# Patient Record
Sex: FEMALE | Race: WHITE | NOT HISPANIC OR LATINO | Employment: OTHER | URBAN - METROPOLITAN AREA
[De-identification: names, ages, dates, MRNs, and addresses within clinical notes are randomized per-mention and may not be internally consistent; named-entity substitution may affect disease eponyms.]

---

## 2017-03-03 ENCOUNTER — TELEPHONE (OUTPATIENT)
Dept: NEUROLOGY | Facility: CLINIC | Age: 67
End: 2017-03-03

## 2017-03-07 ENCOUNTER — TELEPHONE (OUTPATIENT)
Dept: NEUROLOGY | Facility: CLINIC | Age: 67
End: 2017-03-07

## 2017-03-07 NOTE — TELEPHONE ENCOUNTER
----- Message from Marcia Costa sent at 3/7/2017  2:02 PM CST -----  Contact: Patient [phone]  Patient is calling to find out if her EMG results have been mailed to her, Please call.

## 2020-06-12 PROBLEM — E78.00 HIGH CHOLESTEROL: Status: ACTIVE | Noted: 2018-02-23

## 2020-06-12 PROBLEM — G47.00 INSOMNIA: Status: ACTIVE | Noted: 2017-10-27

## 2020-06-12 PROBLEM — G43.909 MIGRAINE: Status: ACTIVE | Noted: 2017-10-27

## 2020-06-12 PROBLEM — R25.2 SPASM: Status: ACTIVE | Noted: 2018-02-23

## 2020-11-12 NOTE — TELEPHONE ENCOUNTER
----- Message from George Murillo sent at 3/3/2017 12:19 PM CST -----  Contact: Self [phone]  Patient is calling to get copy  of the results of the EMG test for her files, pls call   
I have spoken to this patient and asked her where she need me to send her results to and she said to mail them to her home   
operating room

## 2020-12-10 PROBLEM — E11.21 DIABETIC RENAL DISEASE: Status: ACTIVE | Noted: 2020-12-10

## 2020-12-13 PROBLEM — E11.21 DIABETIC RENAL DISEASE: Chronic | Status: ACTIVE | Noted: 2020-12-10

## 2021-07-06 PROBLEM — E11.21 DIABETIC RENAL DISEASE: Chronic | Status: RESOLVED | Noted: 2020-12-10 | Resolved: 2021-07-06

## 2021-09-17 ENCOUNTER — LAB VISIT (OUTPATIENT)
Dept: LAB | Facility: HOSPITAL | Age: 71
End: 2021-09-17
Attending: FAMILY MEDICINE
Payer: MEDICARE

## 2021-09-17 DIAGNOSIS — N39.0 URINARY TRACT INFECTION WITHOUT HEMATURIA, SITE UNSPECIFIED: ICD-10-CM

## 2021-09-17 PROCEDURE — 87086 URINE CULTURE/COLONY COUNT: CPT | Performed by: FAMILY MEDICINE

## 2021-09-19 LAB — BACTERIA UR CULT: NO GROWTH

## 2022-03-04 ENCOUNTER — LAB VISIT (OUTPATIENT)
Dept: LAB | Facility: HOSPITAL | Age: 72
End: 2022-03-04
Attending: FAMILY MEDICINE
Payer: MEDICARE

## 2022-03-04 DIAGNOSIS — I10 ESSENTIAL HYPERTENSION: Chronic | ICD-10-CM

## 2022-03-04 DIAGNOSIS — E78.00 HIGH CHOLESTEROL: ICD-10-CM

## 2022-03-04 DIAGNOSIS — E11.40 TYPE 2 DIABETES MELLITUS WITH DIABETIC NEUROPATHY, WITHOUT LONG-TERM CURRENT USE OF INSULIN: Chronic | ICD-10-CM

## 2022-03-04 LAB
ALBUMIN SERPL BCP-MCNC: 3.6 G/DL (ref 3.5–5.2)
ALP SERPL-CCNC: 61 U/L (ref 55–135)
ALT SERPL W/O P-5'-P-CCNC: 20 U/L (ref 10–44)
ANION GAP SERPL CALC-SCNC: 10 MMOL/L (ref 8–16)
AST SERPL-CCNC: 24 U/L (ref 10–40)
BASOPHILS # BLD AUTO: 0.06 K/UL (ref 0–0.2)
BASOPHILS NFR BLD: 0.7 % (ref 0–1.9)
BILIRUB SERPL-MCNC: 0.5 MG/DL (ref 0.1–1)
BUN SERPL-MCNC: 14 MG/DL (ref 8–23)
CALCIUM SERPL-MCNC: 10 MG/DL (ref 8.7–10.5)
CHLORIDE SERPL-SCNC: 99 MMOL/L (ref 95–110)
CHOLEST SERPL-MCNC: 213 MG/DL (ref 120–199)
CHOLEST/HDLC SERPL: 5.9 {RATIO} (ref 2–5)
CO2 SERPL-SCNC: 31 MMOL/L (ref 23–29)
CREAT SERPL-MCNC: 0.8 MG/DL (ref 0.5–1.4)
DIFFERENTIAL METHOD: ABNORMAL
EOSINOPHIL # BLD AUTO: 0.2 K/UL (ref 0–0.5)
EOSINOPHIL NFR BLD: 2.1 % (ref 0–8)
ERYTHROCYTE [DISTWIDTH] IN BLOOD BY AUTOMATED COUNT: 12.8 % (ref 11.5–14.5)
EST. GFR  (AFRICAN AMERICAN): >60 ML/MIN/1.73 M^2
EST. GFR  (NON AFRICAN AMERICAN): >60 ML/MIN/1.73 M^2
ESTIMATED AVG GLUCOSE: 180 MG/DL (ref 68–131)
GLUCOSE SERPL-MCNC: 159 MG/DL (ref 70–110)
HBA1C MFR BLD: 7.9 % (ref 4–5.6)
HCT VFR BLD AUTO: 39.5 % (ref 37–48.5)
HDLC SERPL-MCNC: 36 MG/DL (ref 40–75)
HDLC SERPL: 16.9 % (ref 20–50)
HGB BLD-MCNC: 13.7 G/DL (ref 12–16)
IMM GRANULOCYTES # BLD AUTO: 0.02 K/UL (ref 0–0.04)
IMM GRANULOCYTES NFR BLD AUTO: 0.2 % (ref 0–0.5)
LDLC SERPL CALC-MCNC: 137.8 MG/DL (ref 63–159)
LYMPHOCYTES # BLD AUTO: 2.8 K/UL (ref 1–4.8)
LYMPHOCYTES NFR BLD: 34.8 % (ref 18–48)
MCH RBC QN AUTO: 31.6 PG (ref 27–31)
MCHC RBC AUTO-ENTMCNC: 34.7 G/DL (ref 32–36)
MCV RBC AUTO: 91 FL (ref 82–98)
MONOCYTES # BLD AUTO: 0.5 K/UL (ref 0.3–1)
MONOCYTES NFR BLD: 6.5 % (ref 4–15)
NEUTROPHILS # BLD AUTO: 4.5 K/UL (ref 1.8–7.7)
NEUTROPHILS NFR BLD: 55.7 % (ref 38–73)
NONHDLC SERPL-MCNC: 177 MG/DL
NRBC BLD-RTO: 0 /100 WBC
PLATELET # BLD AUTO: 230 K/UL (ref 150–450)
PMV BLD AUTO: 11.6 FL (ref 9.2–12.9)
POTASSIUM SERPL-SCNC: 3.9 MMOL/L (ref 3.5–5.1)
PROT SERPL-MCNC: 7.5 G/DL (ref 6–8.4)
RBC # BLD AUTO: 4.33 M/UL (ref 4–5.4)
SODIUM SERPL-SCNC: 140 MMOL/L (ref 136–145)
TRIGL SERPL-MCNC: 196 MG/DL (ref 30–150)
TSH SERPL DL<=0.005 MIU/L-ACNC: 1.67 UIU/ML (ref 0.4–4)
WBC # BLD AUTO: 8.1 K/UL (ref 3.9–12.7)

## 2022-03-04 PROCEDURE — 83036 HEMOGLOBIN GLYCOSYLATED A1C: CPT | Performed by: FAMILY MEDICINE

## 2022-03-04 PROCEDURE — 36415 COLL VENOUS BLD VENIPUNCTURE: CPT | Performed by: FAMILY MEDICINE

## 2022-03-04 PROCEDURE — 80053 COMPREHEN METABOLIC PANEL: CPT | Performed by: FAMILY MEDICINE

## 2022-03-04 PROCEDURE — 85025 COMPLETE CBC W/AUTO DIFF WBC: CPT | Performed by: FAMILY MEDICINE

## 2022-03-04 PROCEDURE — 84443 ASSAY THYROID STIM HORMONE: CPT | Performed by: FAMILY MEDICINE

## 2022-03-04 PROCEDURE — 80061 LIPID PANEL: CPT | Performed by: FAMILY MEDICINE

## 2022-07-08 PROBLEM — F11.20 OPIOID DEPENDENCE: Status: ACTIVE | Noted: 2022-07-08

## 2023-01-05 ENCOUNTER — LAB VISIT (OUTPATIENT)
Dept: LAB | Facility: HOSPITAL | Age: 73
End: 2023-01-05
Attending: FAMILY MEDICINE
Payer: MEDICARE

## 2023-01-05 DIAGNOSIS — E78.00 HIGH CHOLESTEROL: ICD-10-CM

## 2023-01-05 DIAGNOSIS — I10 ESSENTIAL HYPERTENSION: Chronic | ICD-10-CM

## 2023-01-05 DIAGNOSIS — E11.40 TYPE 2 DIABETES MELLITUS WITH DIABETIC NEUROPATHY, WITHOUT LONG-TERM CURRENT USE OF INSULIN: Chronic | ICD-10-CM

## 2023-01-05 LAB
ALBUMIN SERPL BCP-MCNC: 3.4 G/DL (ref 3.5–5.2)
ALP SERPL-CCNC: 63 U/L (ref 55–135)
ALT SERPL W/O P-5'-P-CCNC: 24 U/L (ref 10–44)
ANION GAP SERPL CALC-SCNC: 9 MMOL/L (ref 8–16)
AST SERPL-CCNC: 20 U/L (ref 10–40)
BASOPHILS # BLD AUTO: 0.06 K/UL (ref 0–0.2)
BASOPHILS NFR BLD: 0.7 % (ref 0–1.9)
BILIRUB SERPL-MCNC: 0.4 MG/DL (ref 0.1–1)
BUN SERPL-MCNC: 7 MG/DL (ref 8–23)
CALCIUM SERPL-MCNC: 9.3 MG/DL (ref 8.7–10.5)
CHLORIDE SERPL-SCNC: 101 MMOL/L (ref 95–110)
CHOLEST SERPL-MCNC: 199 MG/DL (ref 120–199)
CHOLEST/HDLC SERPL: 5 {RATIO} (ref 2–5)
CO2 SERPL-SCNC: 30 MMOL/L (ref 23–29)
CREAT SERPL-MCNC: 0.8 MG/DL (ref 0.5–1.4)
DIFFERENTIAL METHOD: ABNORMAL
EOSINOPHIL # BLD AUTO: 0.2 K/UL (ref 0–0.5)
EOSINOPHIL NFR BLD: 2.4 % (ref 0–8)
ERYTHROCYTE [DISTWIDTH] IN BLOOD BY AUTOMATED COUNT: 13.2 % (ref 11.5–14.5)
EST. GFR  (NO RACE VARIABLE): >60 ML/MIN/1.73 M^2
ESTIMATED AVG GLUCOSE: 174 MG/DL (ref 68–131)
GLUCOSE SERPL-MCNC: 220 MG/DL (ref 70–110)
HBA1C MFR BLD: 7.7 % (ref 4–5.6)
HCT VFR BLD AUTO: 33.6 % (ref 37–48.5)
HDLC SERPL-MCNC: 40 MG/DL (ref 40–75)
HDLC SERPL: 20.1 % (ref 20–50)
HGB BLD-MCNC: 12.5 G/DL (ref 12–16)
IMM GRANULOCYTES # BLD AUTO: 0.04 K/UL (ref 0–0.04)
IMM GRANULOCYTES NFR BLD AUTO: 0.4 % (ref 0–0.5)
LDLC SERPL CALC-MCNC: 121.8 MG/DL (ref 63–159)
LYMPHOCYTES # BLD AUTO: 2.7 K/UL (ref 1–4.8)
LYMPHOCYTES NFR BLD: 29.9 % (ref 18–48)
MCH RBC QN AUTO: 36 PG (ref 27–31)
MCHC RBC AUTO-ENTMCNC: 37.2 G/DL (ref 32–36)
MCV RBC AUTO: 97 FL (ref 82–98)
MONOCYTES # BLD AUTO: 0.6 K/UL (ref 0.3–1)
MONOCYTES NFR BLD: 6.5 % (ref 4–15)
NEUTROPHILS # BLD AUTO: 5.4 K/UL (ref 1.8–7.7)
NEUTROPHILS NFR BLD: 60.1 % (ref 38–73)
NONHDLC SERPL-MCNC: 159 MG/DL
NRBC BLD-RTO: 0 /100 WBC
PLATELET # BLD AUTO: 207 K/UL (ref 150–450)
PMV BLD AUTO: 11 FL (ref 9.2–12.9)
POTASSIUM SERPL-SCNC: 4 MMOL/L (ref 3.5–5.1)
PROT SERPL-MCNC: 7 G/DL (ref 6–8.4)
RBC # BLD AUTO: 3.47 M/UL (ref 4–5.4)
SODIUM SERPL-SCNC: 140 MMOL/L (ref 136–145)
TRIGL SERPL-MCNC: 186 MG/DL (ref 30–150)
WBC # BLD AUTO: 8.93 K/UL (ref 3.9–12.7)

## 2023-01-05 PROCEDURE — 80061 LIPID PANEL: CPT | Performed by: FAMILY MEDICINE

## 2023-01-05 PROCEDURE — 85025 COMPLETE CBC W/AUTO DIFF WBC: CPT | Performed by: FAMILY MEDICINE

## 2023-01-05 PROCEDURE — 83036 HEMOGLOBIN GLYCOSYLATED A1C: CPT | Performed by: FAMILY MEDICINE

## 2023-01-05 PROCEDURE — 80053 COMPREHEN METABOLIC PANEL: CPT | Performed by: FAMILY MEDICINE

## 2023-01-05 PROCEDURE — 36415 COLL VENOUS BLD VENIPUNCTURE: CPT | Performed by: FAMILY MEDICINE

## 2023-09-14 ENCOUNTER — LAB VISIT (OUTPATIENT)
Dept: LAB | Facility: HOSPITAL | Age: 73
End: 2023-09-14
Attending: FAMILY MEDICINE
Payer: MEDICARE

## 2023-09-14 DIAGNOSIS — E11.40 TYPE 2 DIABETES MELLITUS WITH DIABETIC NEUROPATHY, WITHOUT LONG-TERM CURRENT USE OF INSULIN: Chronic | ICD-10-CM

## 2023-09-14 LAB
ANION GAP SERPL CALC-SCNC: 11 MMOL/L (ref 8–16)
BUN SERPL-MCNC: 12 MG/DL (ref 8–23)
CALCIUM SERPL-MCNC: 9.8 MG/DL (ref 8.7–10.5)
CHLORIDE SERPL-SCNC: 105 MMOL/L (ref 95–110)
CO2 SERPL-SCNC: 26 MMOL/L (ref 23–29)
CREAT SERPL-MCNC: 0.8 MG/DL (ref 0.5–1.4)
EST. GFR  (NO RACE VARIABLE): >60 ML/MIN/1.73 M^2
ESTIMATED AVG GLUCOSE: 143 MG/DL (ref 68–131)
GLUCOSE SERPL-MCNC: 133 MG/DL (ref 70–110)
HBA1C MFR BLD: 6.6 % (ref 4–5.6)
POTASSIUM SERPL-SCNC: 4.2 MMOL/L (ref 3.5–5.1)
SODIUM SERPL-SCNC: 142 MMOL/L (ref 136–145)

## 2023-09-14 PROCEDURE — 80048 BASIC METABOLIC PNL TOTAL CA: CPT | Performed by: FAMILY MEDICINE

## 2023-09-14 PROCEDURE — 83036 HEMOGLOBIN GLYCOSYLATED A1C: CPT | Performed by: FAMILY MEDICINE

## 2023-09-14 PROCEDURE — 36415 COLL VENOUS BLD VENIPUNCTURE: CPT | Performed by: FAMILY MEDICINE

## 2023-09-17 PROBLEM — Z53.20 MAMMOGRAM DECLINED: Status: ACTIVE | Noted: 2023-09-17

## 2023-09-17 PROBLEM — Z53.20 COLON CANCER SCREENING DECLINED: Status: ACTIVE | Noted: 2023-09-17

## 2024-03-12 ENCOUNTER — LAB VISIT (OUTPATIENT)
Dept: LAB | Facility: HOSPITAL | Age: 74
End: 2024-03-12
Attending: FAMILY MEDICINE
Payer: MEDICARE

## 2024-03-12 DIAGNOSIS — E11.40 TYPE 2 DIABETES MELLITUS WITH DIABETIC NEUROPATHY, WITHOUT LONG-TERM CURRENT USE OF INSULIN: Chronic | ICD-10-CM

## 2024-03-12 LAB
ANION GAP SERPL CALC-SCNC: 10 MMOL/L (ref 8–16)
BUN SERPL-MCNC: 15 MG/DL (ref 8–23)
CALCIUM SERPL-MCNC: 9.4 MG/DL (ref 8.7–10.5)
CHLORIDE SERPL-SCNC: 99 MMOL/L (ref 95–110)
CO2 SERPL-SCNC: 28 MMOL/L (ref 23–29)
CREAT SERPL-MCNC: 1 MG/DL (ref 0.5–1.4)
EST. GFR  (NO RACE VARIABLE): 60 ML/MIN/1.73 M^2
ESTIMATED AVG GLUCOSE: 151 MG/DL (ref 68–131)
GLUCOSE SERPL-MCNC: 139 MG/DL (ref 70–110)
HBA1C MFR BLD: 6.9 % (ref 4–5.6)
POTASSIUM SERPL-SCNC: 4.9 MMOL/L (ref 3.5–5.1)
SODIUM SERPL-SCNC: 137 MMOL/L (ref 136–145)

## 2024-03-12 PROCEDURE — 83036 HEMOGLOBIN GLYCOSYLATED A1C: CPT | Performed by: FAMILY MEDICINE

## 2024-03-12 PROCEDURE — 80048 BASIC METABOLIC PNL TOTAL CA: CPT | Performed by: FAMILY MEDICINE

## 2024-03-12 PROCEDURE — 82043 UR ALBUMIN QUANTITATIVE: CPT | Performed by: FAMILY MEDICINE

## 2024-03-12 PROCEDURE — 36415 COLL VENOUS BLD VENIPUNCTURE: CPT | Performed by: FAMILY MEDICINE

## 2024-03-13 LAB
ALBUMIN/CREAT UR: 27.5 UG/MG (ref 0–30)
CREAT UR-MCNC: 153 MG/DL (ref 15–325)
MICROALBUMIN UR DL<=1MG/L-MCNC: 42 UG/ML

## 2024-05-14 ENCOUNTER — LAB VISIT (OUTPATIENT)
Dept: LAB | Facility: HOSPITAL | Age: 74
End: 2024-05-14
Attending: FAMILY MEDICINE
Payer: MEDICARE

## 2024-05-14 DIAGNOSIS — R04.0 EPISTAXIS: ICD-10-CM

## 2024-05-14 DIAGNOSIS — E78.00 HIGH CHOLESTEROL: ICD-10-CM

## 2024-05-14 DIAGNOSIS — I10 ESSENTIAL HYPERTENSION: Chronic | ICD-10-CM

## 2024-05-14 DIAGNOSIS — E11.40 TYPE 2 DIABETES MELLITUS WITH DIABETIC NEUROPATHY, WITHOUT LONG-TERM CURRENT USE OF INSULIN: Chronic | ICD-10-CM

## 2024-05-14 LAB
ALBUMIN SERPL BCP-MCNC: 3.7 G/DL (ref 3.5–5.2)
ALP SERPL-CCNC: 64 U/L (ref 55–135)
ALT SERPL W/O P-5'-P-CCNC: 25 U/L (ref 10–44)
ANION GAP SERPL CALC-SCNC: 11 MMOL/L (ref 8–16)
AST SERPL-CCNC: 23 U/L (ref 10–40)
BASOPHILS # BLD AUTO: 0.08 K/UL (ref 0–0.2)
BASOPHILS NFR BLD: 0.9 % (ref 0–1.9)
BILIRUB SERPL-MCNC: 0.6 MG/DL (ref 0.1–1)
BUN SERPL-MCNC: 18 MG/DL (ref 8–23)
CALCIUM SERPL-MCNC: 10.1 MG/DL (ref 8.7–10.5)
CHLORIDE SERPL-SCNC: 103 MMOL/L (ref 95–110)
CHOLEST SERPL-MCNC: 189 MG/DL (ref 120–199)
CHOLEST/HDLC SERPL: 5 {RATIO} (ref 2–5)
CO2 SERPL-SCNC: 28 MMOL/L (ref 23–29)
CREAT SERPL-MCNC: 1 MG/DL (ref 0.5–1.4)
DIFFERENTIAL METHOD BLD: ABNORMAL
EOSINOPHIL # BLD AUTO: 0.3 K/UL (ref 0–0.5)
EOSINOPHIL NFR BLD: 3 % (ref 0–8)
ERYTHROCYTE [DISTWIDTH] IN BLOOD BY AUTOMATED COUNT: 13 % (ref 11.5–14.5)
EST. GFR  (NO RACE VARIABLE): 60 ML/MIN/1.73 M^2
ESTIMATED AVG GLUCOSE: 146 MG/DL (ref 68–131)
GLUCOSE SERPL-MCNC: 143 MG/DL (ref 70–110)
HBA1C MFR BLD: 6.7 % (ref 4–5.6)
HCT VFR BLD AUTO: 40.7 % (ref 37–48.5)
HDLC SERPL-MCNC: 38 MG/DL (ref 40–75)
HDLC SERPL: 20.1 % (ref 20–50)
HGB BLD-MCNC: 13.9 G/DL (ref 12–16)
IMM GRANULOCYTES # BLD AUTO: 0.03 K/UL (ref 0–0.04)
IMM GRANULOCYTES NFR BLD AUTO: 0.3 % (ref 0–0.5)
LDLC SERPL CALC-MCNC: 133 MG/DL (ref 63–159)
LYMPHOCYTES # BLD AUTO: 3.4 K/UL (ref 1–4.8)
LYMPHOCYTES NFR BLD: 38.1 % (ref 18–48)
MCH RBC QN AUTO: 32.5 PG (ref 27–31)
MCHC RBC AUTO-ENTMCNC: 34.2 G/DL (ref 32–36)
MCV RBC AUTO: 95 FL (ref 82–98)
MONOCYTES # BLD AUTO: 0.7 K/UL (ref 0.3–1)
MONOCYTES NFR BLD: 7.5 % (ref 4–15)
NEUTROPHILS # BLD AUTO: 4.4 K/UL (ref 1.8–7.7)
NEUTROPHILS NFR BLD: 50.2 % (ref 38–73)
NONHDLC SERPL-MCNC: 151 MG/DL
NRBC BLD-RTO: 0 /100 WBC
PLATELET # BLD AUTO: 190 K/UL (ref 150–450)
PMV BLD AUTO: 11.8 FL (ref 9.2–12.9)
POTASSIUM SERPL-SCNC: 4.4 MMOL/L (ref 3.5–5.1)
PROT SERPL-MCNC: 7.6 G/DL (ref 6–8.4)
RBC # BLD AUTO: 4.28 M/UL (ref 4–5.4)
SODIUM SERPL-SCNC: 142 MMOL/L (ref 136–145)
TRIGL SERPL-MCNC: 90 MG/DL (ref 30–150)
WBC # BLD AUTO: 8.79 K/UL (ref 3.9–12.7)

## 2024-05-14 PROCEDURE — 80061 LIPID PANEL: CPT | Performed by: FAMILY MEDICINE

## 2024-05-14 PROCEDURE — 36415 COLL VENOUS BLD VENIPUNCTURE: CPT | Performed by: FAMILY MEDICINE

## 2024-05-14 PROCEDURE — 80053 COMPREHEN METABOLIC PANEL: CPT | Performed by: FAMILY MEDICINE

## 2024-05-14 PROCEDURE — 85025 COMPLETE CBC W/AUTO DIFF WBC: CPT | Performed by: FAMILY MEDICINE

## 2024-05-14 PROCEDURE — 83036 HEMOGLOBIN GLYCOSYLATED A1C: CPT | Performed by: FAMILY MEDICINE

## 2024-09-16 ENCOUNTER — LAB VISIT (OUTPATIENT)
Dept: LAB | Facility: HOSPITAL | Age: 74
End: 2024-09-16
Attending: FAMILY MEDICINE
Payer: MEDICARE

## 2024-09-16 DIAGNOSIS — E11.40 TYPE 2 DIABETES MELLITUS WITH DIABETIC NEUROPATHY, WITHOUT LONG-TERM CURRENT USE OF INSULIN: ICD-10-CM

## 2024-09-16 LAB
ANION GAP SERPL CALC-SCNC: 9 MMOL/L (ref 8–16)
BUN SERPL-MCNC: 19 MG/DL (ref 8–23)
CALCIUM SERPL-MCNC: 10.1 MG/DL (ref 8.7–10.5)
CHLORIDE SERPL-SCNC: 106 MMOL/L (ref 95–110)
CO2 SERPL-SCNC: 25 MMOL/L (ref 23–29)
CREAT SERPL-MCNC: 0.9 MG/DL (ref 0.5–1.4)
EST. GFR  (NO RACE VARIABLE): >60 ML/MIN/1.73 M^2
ESTIMATED AVG GLUCOSE: 128 MG/DL (ref 68–131)
GLUCOSE SERPL-MCNC: 163 MG/DL (ref 70–110)
HBA1C MFR BLD: 6.1 % (ref 4–5.6)
POTASSIUM SERPL-SCNC: 4.6 MMOL/L (ref 3.5–5.1)
SODIUM SERPL-SCNC: 140 MMOL/L (ref 136–145)

## 2024-09-16 PROCEDURE — 80048 BASIC METABOLIC PNL TOTAL CA: CPT | Performed by: FAMILY MEDICINE

## 2024-09-16 PROCEDURE — 83036 HEMOGLOBIN GLYCOSYLATED A1C: CPT | Performed by: FAMILY MEDICINE

## 2025-03-25 ENCOUNTER — APPOINTMENT (OUTPATIENT)
Dept: LAB | Facility: HOSPITAL | Age: 75
End: 2025-03-25
Attending: FAMILY MEDICINE
Payer: MEDICARE

## 2025-06-18 ENCOUNTER — HOSPITAL ENCOUNTER (INPATIENT)
Facility: HOSPITAL | Age: 75
LOS: 6 days | Discharge: OTHER INSTITUTION | DRG: 260 | End: 2025-06-24
Attending: EMERGENCY MEDICINE | Admitting: INTERNAL MEDICINE
Payer: MEDICARE

## 2025-06-18 DIAGNOSIS — I44.2 CHB (COMPLETE HEART BLOCK): ICD-10-CM

## 2025-06-18 DIAGNOSIS — J81.0 ACUTE PULMONARY EDEMA: ICD-10-CM

## 2025-06-18 DIAGNOSIS — M79.89 SWOLLEN LEG: ICD-10-CM

## 2025-06-18 DIAGNOSIS — R53.1 WEAKNESS: ICD-10-CM

## 2025-06-18 DIAGNOSIS — N17.9 AKI (ACUTE KIDNEY INJURY): ICD-10-CM

## 2025-06-18 DIAGNOSIS — R10.9 ABDOMINAL PAIN, UNSPECIFIED ABDOMINAL LOCATION: ICD-10-CM

## 2025-06-18 DIAGNOSIS — R00.1 BRADYCARDIA: ICD-10-CM

## 2025-06-18 DIAGNOSIS — E87.6 HYPOKALEMIA: ICD-10-CM

## 2025-06-18 DIAGNOSIS — R55 SYNCOPE, UNSPECIFIED SYNCOPE TYPE: ICD-10-CM

## 2025-06-18 DIAGNOSIS — I44.2 COMPLETE HEART BLOCK: Primary | ICD-10-CM

## 2025-06-18 LAB
ABSOLUTE EOSINOPHIL (OHS): 0.17 K/UL
ABSOLUTE MONOCYTE (OHS): 0.49 K/UL (ref 0.3–1)
ABSOLUTE NEUTROPHIL COUNT (OHS): 4.94 K/UL (ref 1.8–7.7)
ALBUMIN SERPL BCP-MCNC: 3.5 G/DL (ref 3.5–5.2)
ALLENS TEST: YES
ALLENS TEST: YES
ALP SERPL-CCNC: 51 UNIT/L (ref 40–150)
ALT SERPL W/O P-5'-P-CCNC: 35 UNIT/L (ref 10–44)
ANION GAP (OHS): 11 MMOL/L (ref 8–16)
ANION GAP (OHS): 12 MMOL/L (ref 8–16)
AST SERPL-CCNC: 29 UNIT/L (ref 11–45)
BASOPHILS # BLD AUTO: 0.04 K/UL
BASOPHILS NFR BLD AUTO: 0.5 %
BILIRUB SERPL-MCNC: 0.5 MG/DL (ref 0.1–1)
BNP SERPL-MCNC: 1200 PG/ML (ref 0–99)
BUN SERPL-MCNC: 25 MG/DL (ref 8–23)
BUN SERPL-MCNC: 28 MG/DL (ref 8–23)
CALCIUM SERPL-MCNC: 8.6 MG/DL (ref 8.7–10.5)
CALCIUM SERPL-MCNC: 9.4 MG/DL (ref 8.7–10.5)
CHLORIDE SERPL-SCNC: 107 MMOL/L (ref 95–110)
CHLORIDE SERPL-SCNC: 109 MMOL/L (ref 95–110)
CO2 SERPL-SCNC: 20 MMOL/L (ref 23–29)
CO2 SERPL-SCNC: 21 MMOL/L (ref 23–29)
CREAT SERPL-MCNC: 1.1 MG/DL (ref 0.5–1.4)
CREAT SERPL-MCNC: 1.2 MG/DL (ref 0.5–1.4)
EAG (OHS): 151 MG/DL (ref 68–131)
ERYTHROCYTE [DISTWIDTH] IN BLOOD BY AUTOMATED COUNT: 14.9 % (ref 11.5–14.5)
FIO2: 100 %
FIO2: 100 %
GFR SERPLBLD CREATININE-BSD FMLA CKD-EPI: 48 ML/MIN/1.73/M2
GFR SERPLBLD CREATININE-BSD FMLA CKD-EPI: 53 ML/MIN/1.73/M2
GLUCOSE SERPL-MCNC: 170 MG/DL (ref 70–110)
GLUCOSE SERPL-MCNC: 248 MG/DL (ref 70–110)
HBA1C MFR BLD: 6.9 % (ref 4–5.6)
HCT VFR BLD AUTO: 33.4 % (ref 37–48.5)
HGB BLD-MCNC: 12.4 GM/DL (ref 12–16)
HOLD SPECIMEN: NORMAL
IMM GRANULOCYTES # BLD AUTO: 0.03 K/UL (ref 0–0.04)
IMM GRANULOCYTES NFR BLD AUTO: 0.4 % (ref 0–0.5)
LYMPHOCYTES # BLD AUTO: 2.12 K/UL (ref 1–4.8)
MAGNESIUM SERPL-MCNC: 1.7 MG/DL (ref 1.6–2.6)
MCH RBC QN AUTO: 37.7 PG (ref 27–31)
MCHC RBC AUTO-ENTMCNC: 37.1 G/DL (ref 32–36)
MCV RBC AUTO: 102 FL (ref 82–98)
NUCLEATED RBC (/100WBC) (OHS): 0 /100 WBC
PCO2 BLDA: 49 MMHG (ref 35–45)
PCO2 BLDA: 51.9 MMHG (ref 35–45)
PH SMN: 7.24 [PH] (ref 7.35–7.45)
PH SMN: 7.29 [PH] (ref 7.35–7.45)
PLATELET # BLD AUTO: 132 K/UL (ref 150–450)
PMV BLD AUTO: 12.6 FL (ref 9.2–12.9)
PO2 BLDA: 159 MMHG (ref 80–100)
PO2 BLDA: 77.5 MMHG (ref 80–100)
POC BASE DEFICIT: -3.7 MMOL/L (ref -2–2)
POC BASE DEFICIT: -5.5 MMOL/L (ref -2–2)
POC HCO3: 22.4 MMOL/L (ref 24–28)
POC HCO3: 23.3 MMOL/L (ref 24–28)
POC PERFORMED BY: ABNORMAL
POC PERFORMED BY: ABNORMAL
POC SATURATED O2: 93.9 % (ref 95–100)
POC SATURATED O2: 99.3 % (ref 95–100)
POCT GLUCOSE: 190 MG/DL (ref 70–110)
POTASSIUM SERPL-SCNC: 4.1 MMOL/L (ref 3.5–5.1)
POTASSIUM SERPL-SCNC: 4.3 MMOL/L (ref 3.5–5.1)
PROT SERPL-MCNC: 7 GM/DL (ref 6–8.4)
RBC # BLD AUTO: 3.29 M/UL (ref 4–5.4)
RELATIVE EOSINOPHIL (OHS): 2.2 %
RELATIVE LYMPHOCYTE (OHS): 27.2 % (ref 18–48)
RELATIVE MONOCYTE (OHS): 6.3 % (ref 4–15)
RELATIVE NEUTROPHIL (OHS): 63.4 % (ref 38–73)
SODIUM SERPL-SCNC: 140 MMOL/L (ref 136–145)
SODIUM SERPL-SCNC: 140 MMOL/L (ref 136–145)
SPECIMEN SOURCE: ABNORMAL
SPECIMEN SOURCE: ABNORMAL
TROPONIN I SERPL DL<=0.01 NG/ML-MCNC: 0.02 NG/ML
TSH SERPL-ACNC: 2.75 UIU/ML (ref 0.4–4)
WBC # BLD AUTO: 7.79 K/UL (ref 3.9–12.7)

## 2025-06-18 PROCEDURE — 25000003 PHARM REV CODE 250

## 2025-06-18 PROCEDURE — 63600175 PHARM REV CODE 636 W HCPCS

## 2025-06-18 PROCEDURE — 33207 INSERT HEART PM VENTRICULAR: CPT | Mod: ,,, | Performed by: INTERNAL MEDICINE

## 2025-06-18 PROCEDURE — 85025 COMPLETE CBC W/AUTO DIFF WBC: CPT | Performed by: EMERGENCY MEDICINE

## 2025-06-18 PROCEDURE — 80053 COMPREHEN METABOLIC PANEL: CPT | Performed by: EMERGENCY MEDICINE

## 2025-06-18 PROCEDURE — 99285 EMERGENCY DEPT VISIT HI MDM: CPT | Mod: 25

## 2025-06-18 PROCEDURE — 99900035 HC TECH TIME PER 15 MIN (STAT)

## 2025-06-18 PROCEDURE — 63600175 PHARM REV CODE 636 W HCPCS: Performed by: INTERNAL MEDICINE

## 2025-06-18 PROCEDURE — 63600175 PHARM REV CODE 636 W HCPCS: Performed by: EMERGENCY MEDICINE

## 2025-06-18 PROCEDURE — C1894 INTRO/SHEATH, NON-LASER: HCPCS | Performed by: INTERNAL MEDICINE

## 2025-06-18 PROCEDURE — 94660 CPAP INITIATION&MGMT: CPT

## 2025-06-18 PROCEDURE — 5A1223Z PERFORMANCE OF CARDIAC PACING, CONTINUOUS: ICD-10-PCS | Performed by: INTERNAL MEDICINE

## 2025-06-18 PROCEDURE — C1766 INTRO/SHEATH,STRBLE,NON-PEEL: HCPCS | Performed by: INTERNAL MEDICINE

## 2025-06-18 PROCEDURE — 27000221 HC OXYGEN, UP TO 24 HOURS

## 2025-06-18 PROCEDURE — 82803 BLOOD GASES ANY COMBINATION: CPT

## 2025-06-18 PROCEDURE — 93010 ELECTROCARDIOGRAM REPORT: CPT | Mod: ,,, | Performed by: INTERNAL MEDICINE

## 2025-06-18 PROCEDURE — 02HK3JZ INSERTION OF PACEMAKER LEAD INTO RIGHT VENTRICLE, PERCUTANEOUS APPROACH: ICD-10-PCS | Performed by: INTERNAL MEDICINE

## 2025-06-18 PROCEDURE — 83036 HEMOGLOBIN GLYCOSYLATED A1C: CPT

## 2025-06-18 PROCEDURE — 96374 THER/PROPH/DIAG INJ IV PUSH: CPT

## 2025-06-18 PROCEDURE — 27000190 HC CPAP FULL FACE MASK W/VALVE

## 2025-06-18 PROCEDURE — 83880 ASSAY OF NATRIURETIC PEPTIDE: CPT | Performed by: EMERGENCY MEDICINE

## 2025-06-18 PROCEDURE — 83735 ASSAY OF MAGNESIUM: CPT | Performed by: EMERGENCY MEDICINE

## 2025-06-18 PROCEDURE — 84443 ASSAY THYROID STIM HORMONE: CPT | Performed by: EMERGENCY MEDICINE

## 2025-06-18 PROCEDURE — 99291 CRITICAL CARE FIRST HOUR: CPT | Mod: 25,ICN,, | Performed by: INTERNAL MEDICINE

## 2025-06-18 PROCEDURE — 5A09457 ASSISTANCE WITH RESPIRATORY VENTILATION, 24-96 CONSECUTIVE HOURS, CONTINUOUS POSITIVE AIRWAY PRESSURE: ICD-10-PCS | Performed by: INTERNAL MEDICINE

## 2025-06-18 PROCEDURE — C1769 GUIDE WIRE: HCPCS | Performed by: INTERNAL MEDICINE

## 2025-06-18 PROCEDURE — 33207 INSERT HEART PM VENTRICULAR: CPT | Performed by: INTERNAL MEDICINE

## 2025-06-18 PROCEDURE — 84484 ASSAY OF TROPONIN QUANT: CPT | Performed by: EMERGENCY MEDICINE

## 2025-06-18 PROCEDURE — 94761 N-INVAS EAR/PLS OXIMETRY MLT: CPT

## 2025-06-18 PROCEDURE — 20000000 HC ICU ROOM

## 2025-06-18 PROCEDURE — 80061 LIPID PANEL: CPT

## 2025-06-18 PROCEDURE — 93005 ELECTROCARDIOGRAM TRACING: CPT

## 2025-06-18 PROCEDURE — 36600 WITHDRAWAL OF ARTERIAL BLOOD: CPT

## 2025-06-18 PROCEDURE — 27201423 OPTIME MED/SURG SUP & DEVICES STERILE SUPPLY: Performed by: INTERNAL MEDICINE

## 2025-06-18 RX ORDER — MIDAZOLAM HYDROCHLORIDE 1 MG/ML
INJECTION, SOLUTION INTRAMUSCULAR; INTRAVENOUS
Status: DISCONTINUED | OUTPATIENT
Start: 2025-06-18 | End: 2025-06-18 | Stop reason: HOSPADM

## 2025-06-18 RX ORDER — NALOXONE HCL 0.4 MG/ML
0.02 VIAL (ML) INJECTION
Status: DISCONTINUED | OUTPATIENT
Start: 2025-06-18 | End: 2025-06-24 | Stop reason: HOSPADM

## 2025-06-18 RX ORDER — INSULIN ASPART 100 [IU]/ML
0-5 INJECTION, SOLUTION INTRAVENOUS; SUBCUTANEOUS EVERY 6 HOURS PRN
Status: DISCONTINUED | OUTPATIENT
Start: 2025-06-19 | End: 2025-06-19

## 2025-06-18 RX ORDER — LORAZEPAM 2 MG/ML
0.5 INJECTION INTRAMUSCULAR ONCE
Status: COMPLETED | OUTPATIENT
Start: 2025-06-18 | End: 2025-06-18

## 2025-06-18 RX ORDER — LIDOCAINE HYDROCHLORIDE 10 MG/ML
INJECTION, SOLUTION EPIDURAL; INFILTRATION; INTRACAUDAL; PERINEURAL
Status: DISCONTINUED | OUTPATIENT
Start: 2025-06-18 | End: 2025-06-18 | Stop reason: HOSPADM

## 2025-06-18 RX ORDER — GLUCAGON 1 MG
1 KIT INJECTION
Status: DISCONTINUED | OUTPATIENT
Start: 2025-06-18 | End: 2025-06-24 | Stop reason: HOSPADM

## 2025-06-18 RX ORDER — ATROPINE SULFATE 0.1 MG/ML
0.5 INJECTION INTRAVENOUS
Status: COMPLETED | OUTPATIENT
Start: 2025-06-18 | End: 2025-06-18

## 2025-06-18 RX ORDER — ATORVASTATIN CALCIUM 20 MG/1
20 TABLET, FILM COATED ORAL DAILY
Status: DISCONTINUED | OUTPATIENT
Start: 2025-06-19 | End: 2025-06-24 | Stop reason: HOSPADM

## 2025-06-18 RX ORDER — IBUPROFEN 200 MG
16 TABLET ORAL
Status: DISCONTINUED | OUTPATIENT
Start: 2025-06-18 | End: 2025-06-24 | Stop reason: HOSPADM

## 2025-06-18 RX ORDER — GLUCAGON 1 MG
1 KIT INJECTION
Status: DISCONTINUED | OUTPATIENT
Start: 2025-06-19 | End: 2025-06-19

## 2025-06-18 RX ORDER — DOPAMINE HYDROCHLORIDE 160 MG/100ML
2.5-1 INJECTION, SOLUTION INTRAVENOUS CONTINUOUS
Status: DISCONTINUED | OUTPATIENT
Start: 2025-06-18 | End: 2025-06-18

## 2025-06-18 RX ORDER — OLANZAPINE 10 MG/2ML
2.5 INJECTION, POWDER, FOR SOLUTION INTRAMUSCULAR ONCE AS NEEDED
Status: CANCELLED | OUTPATIENT
Start: 2025-06-18 | End: 2036-11-14

## 2025-06-18 RX ORDER — NITROGLYCERIN 20 MG/100ML
0-5 INJECTION INTRAVENOUS CONTINUOUS
Status: DISCONTINUED | OUTPATIENT
Start: 2025-06-18 | End: 2025-06-19

## 2025-06-18 RX ORDER — SODIUM CHLORIDE 0.9 % (FLUSH) 0.9 %
2 SYRINGE (ML) INJECTION EVERY 12 HOURS PRN
Status: DISCONTINUED | OUTPATIENT
Start: 2025-06-18 | End: 2025-06-24 | Stop reason: HOSPADM

## 2025-06-18 RX ORDER — FUROSEMIDE 10 MG/ML
40 INJECTION INTRAMUSCULAR; INTRAVENOUS ONCE
Status: COMPLETED | OUTPATIENT
Start: 2025-06-18 | End: 2025-06-18

## 2025-06-18 RX ORDER — NITROGLYCERIN 20 MG/100ML
INJECTION INTRAVENOUS
Status: COMPLETED
Start: 2025-06-18 | End: 2025-06-18

## 2025-06-18 RX ORDER — MAGNESIUM SULFATE HEPTAHYDRATE 40 MG/ML
2 INJECTION, SOLUTION INTRAVENOUS ONCE
Status: COMPLETED | OUTPATIENT
Start: 2025-06-18 | End: 2025-06-18

## 2025-06-18 RX ORDER — OLANZAPINE 10 MG/2ML
2.5 INJECTION, POWDER, FOR SOLUTION INTRAMUSCULAR ONCE
Status: COMPLETED | OUTPATIENT
Start: 2025-06-19 | End: 2025-06-18

## 2025-06-18 RX ORDER — FENTANYL CITRATE 50 UG/ML
INJECTION, SOLUTION INTRAMUSCULAR; INTRAVENOUS
Status: DISCONTINUED | OUTPATIENT
Start: 2025-06-18 | End: 2025-06-18 | Stop reason: HOSPADM

## 2025-06-18 RX ORDER — HYDROXYZINE HYDROCHLORIDE 25 MG/1
25 TABLET, FILM COATED ORAL 2 TIMES DAILY
Status: DISCONTINUED | OUTPATIENT
Start: 2025-06-18 | End: 2025-06-19

## 2025-06-18 RX ORDER — HEPARIN SODIUM 200 [USP'U]/100ML
INJECTION, SOLUTION INTRAVENOUS
Status: DISCONTINUED | OUTPATIENT
Start: 2025-06-18 | End: 2025-06-24 | Stop reason: HOSPADM

## 2025-06-18 RX ORDER — LORAZEPAM 2 MG/ML
INJECTION INTRAMUSCULAR
Status: COMPLETED
Start: 2025-06-18 | End: 2025-06-18

## 2025-06-18 RX ORDER — IBUPROFEN 200 MG
24 TABLET ORAL
Status: DISCONTINUED | OUTPATIENT
Start: 2025-06-18 | End: 2025-06-24 | Stop reason: HOSPADM

## 2025-06-18 RX ORDER — FUROSEMIDE 10 MG/ML
60 INJECTION INTRAMUSCULAR; INTRAVENOUS ONCE
Status: COMPLETED | OUTPATIENT
Start: 2025-06-18 | End: 2025-06-18

## 2025-06-18 RX ORDER — ATROPINE SULFATE 0.1 MG/ML
INJECTION INTRAVENOUS
Status: DISPENSED
Start: 2025-06-18 | End: 2025-06-19

## 2025-06-18 RX ORDER — VANCOMYCIN HYDROCHLORIDE 1 G/20ML
INJECTION, POWDER, LYOPHILIZED, FOR SOLUTION INTRAVENOUS
Status: DISCONTINUED | OUTPATIENT
Start: 2025-06-18 | End: 2025-06-18 | Stop reason: HOSPADM

## 2025-06-18 RX ADMIN — NITROGLYCERIN 5 MCG/KG/MIN: 20 INJECTION INTRAVENOUS at 09:06

## 2025-06-18 RX ADMIN — LORAZEPAM 0.5 MG: 2 INJECTION INTRAMUSCULAR at 09:06

## 2025-06-18 RX ADMIN — OLANZAPINE 2.5 MG: 10 INJECTION, POWDER, FOR SOLUTION INTRAMUSCULAR at 11:06

## 2025-06-18 RX ADMIN — FUROSEMIDE 40 MG: 10 INJECTION, SOLUTION INTRAVENOUS at 07:06

## 2025-06-18 RX ADMIN — ATROPINE SULFATE 0.5 MG: 0.1 INJECTION, SOLUTION ENDOTRACHEAL; INTRAMUSCULAR; INTRAVENOUS; SUBCUTANEOUS at 03:06

## 2025-06-18 RX ADMIN — HYDROXYZINE HYDROCHLORIDE 25 MG: 25 TABLET, FILM COATED ORAL at 07:06

## 2025-06-18 RX ADMIN — FUROSEMIDE 60 MG: 10 INJECTION, SOLUTION INTRAMUSCULAR; INTRAVENOUS at 09:06

## 2025-06-18 RX ADMIN — LORAZEPAM 0.5 MG: 2 INJECTION INTRAMUSCULAR; INTRAVENOUS at 09:06

## 2025-06-18 RX ADMIN — MAGNESIUM SULFATE 2 G: 2 INJECTION INTRAVENOUS at 08:06

## 2025-06-18 NOTE — Clinical Note
Patient remained in room for recovery until ICU bed available. During recovery several instances of pauses were noted. MD Tellezf notified and at bedside to adjust TVP placement.

## 2025-06-18 NOTE — Clinical Note
A dressing was applied to the right internal jugular vein puncture site. TVP Sheath Sutured in place

## 2025-06-18 NOTE — Clinical Note
Pt. Transferred to ICU bed in room. Placed on transport monitors. Staff waiting for ICU room to be cleaned.

## 2025-06-18 NOTE — Clinical Note
TVP experiencing frequent pauses. Patient moved back to the exam table, right IJ cleaned with chloroprep, and redraped.

## 2025-06-18 NOTE — H&P
"[facility] Medicine H&P Note     Admitting Team: \[facility]\"" Hospitalist Team B  Attending Physician: Robin Ndiaye MD  Resident: Blair  Intern: Merry    Date of Admit: 2025    Chief Complaint     Bradycardia (Pt referred to ED by PCP for bradycardia, HR 30's. Pt reports fatigue w/ intermittent SOB. )   for 3 weeks    Subjective:      History of Present Illness:  Cady Watkins is a 74 y.o.  female who  has a past medical history of Diabetes mellitus, type 2, Fibromyalgia (2016), and Hypertension (2016).. The patient presented to [facility] on 2025 with a primary complaint of Bradycardia (Pt referred to ED by PCP for bradycardia, HR 30's. Pt reports fatigue w/ intermittent SOB. )    Patient presenting to the hospital after being seen at her PCP clinic where she was found to be bradycardic to the 30's. She was transferred to the ED and emergenty taken to the cath lab for insertion of temporary transvenous pacemaker.     Of note patient was in her normal state of health until 3 weeks weeks ago when she "blacked out," patient endorses being in her kitchen and just standing around feeling very off and decided to walk to her couch. She states that she blacked out and was out for 3 minutes prior to walking up. For the last 3 weeks she has been more fatigued than usual, decrease appetite, and increase shortness of breath.    Past Medical History:  Past Medical History:   Diagnosis Date    Diabetes mellitus, type 2     Fibromyalgia 2016    Hypertension 2016       Past Surgical History:  Past Surgical History:   Procedure Laterality Date     SECTION      CHOLECYSTECTOMY      FOOT SURGERY Bilateral     plantar fasciotomy bilateral, arthroplasty fifth toe bilateral    HERNIA REPAIR         Allergies:  Review of patient's allergies indicates:   Allergen Reactions    Advil [ibuprofen] Hives    Penicillins     Codeine     Excedrin aspirin free [acetaminophen-caffeine]  "       Home Medications:  WILL NEED TO VERIFY  Prior to Admission medications    Medication Sig Start Date End Date Taking? Authorizing Provider   cyclobenzaprine (FLEXERIL) 10 MG tablet TAKE 1 TABLET BY MOUTH EVERY DAY NIGHTLY AS NEEDED FOR MUSCLE SPASMS 6/18/25   Blaine Benítez MD   gabapentin (NEURONTIN) 300 MG capsule TAKE 1 CAPSULE BY MOUTH EVERYDAY AT BEDTIME 4/15/24   Blaine Benítez MD   lisinopriL (PRINIVIL,ZESTRIL) 20 MG tablet TAKE 1 TABLET BY MOUTH EVERY DAY 12/12/24   Blaine Benítez MD   meclizine (ANTIVERT) 12.5 mg tablet TAKE 1 TABLET BY MOUTH 2 (TWO) TIMES DAILY AS NEEDED FOR DIZZINESS. 7/30/22   Blaine Benítez MD   metFORMIN (GLUCOPHAGE) 500 MG tablet TAKE 1 TABLET BY MOUTH TWICE A DAY WITH MEALS 7/19/24   Blaine Benítez MD   metoprolol tartrate (LOPRESSOR) 50 MG tablet TAKE 1 TABLET BY MOUTH TWICE A DAY 10/18/24   Blaine Benítez MD   oxyCODONE-acetaminophen (PERCOCET)  mg per tablet Take 1 tablet by mouth nightly as needed for Pain. 5/21/25   Blaine Benítez MD   rosuvastatin (CRESTOR) 5 MG tablet Take 1 tablet (5 mg total) by mouth once daily. 3/24/25 3/24/26  Blaine Benítez MD   cyclobenzaprine (FLEXERIL) 10 MG tablet TAKE 1 TABLET BY MOUTH EVERY DAY NIGHTLY AS NEEDED FOR MUSCLE SPASMS 5/19/25 6/18/25  Blaine Benítez MD       Family History:  Family History   Problem Relation Name Age of Onset    Diabetes Father      Heart disease Sister      Heart disease Brother         Social History:  Social History[1]    Review of Systems:  Pertinent items are noted in HPI. All other systems are reviewed and are negative.    Health Maintaince :   Primary Care Physician: Blaine Benítez MD    Immunizations:   TDap Did not assess    Flu Did not assess  Pna Did not assess    Cancer Screening:  PAP: Did not assess  MMG: Did not assess  Colonoscopy: Did not assess     Objective:   Last 24 Hour Vital Signs:  BP  Min: 162/52  " Max: 232/106  Temp  Av.4 °F (36.3 °C)  Min: 97.1 °F (36.2 °C)  Max: 97.6 °F (36.4 °C)  Pulse  Av.9  Min: 28  Max: 80  Resp  Av.4  Min: 18  Max: 44  SpO2  Av.9 %  Min: 90 %  Max: 100 %  Height  Av' 1.5" (156.2 cm)  Min: 5' 1" (154.9 cm)  Max: 5' 2" (157.5 cm)  Weight  Av.5 kg (166 lb 6.9 oz)  Min: 73.5 kg (162 lb)  Max: 77.5 kg (170 lb 13.7 oz)  Body mass index is 31.25 kg/m².  No intake/output data recorded.    Physical Examination:  Physical Exam  Constitutional:       Appearance: Normal appearance.   HENT:      Head: Normocephalic and atraumatic.   Cardiovascular:      Rate and Rhythm: Regular rhythm. Bradycardia present.      Pulses: Normal pulses.      Heart sounds: Normal heart sounds.   Pulmonary:      Effort: Pulmonary effort is normal.      Breath sounds: Normal breath sounds.   Abdominal:      Comments: Large ventral hernia   Skin:     General: Skin is warm and dry.   Neurological:      General: No focal deficit present.      Mental Status: She is alert.   Psychiatric:         Mood and Affect: Mood normal.         Behavior: Behavior normal.         Thought Content: Thought content normal.         Judgment: Judgment normal.     Post Procedural  She had a normal heart rate but was very anxious and nervous. She was very hypertensive to the 230 sbp and started on a nitroglyerin drip.         Laboratory:  Most Recent Data:  CBC:   Lab Results   Component Value Date    WBC 7.79 2025    HGB 12.4 2025    HCT 33.4 (L) 2025     (L) 2025     (H) 2025    RDW 14.9 (H) 2025       BMP:   Lab Results   Component Value Date     2025    K 4.3 2025     2025    CO2 20 (L) 2025    BUN 28 (H) 2025    CREATININE 1.2 2025     (H) 2025    CALCIUM 9.4 2025    MG 1.7 2025     LFTs:   Lab Results   Component Value Date    PROT 7.0 2025    ALBUMIN 3.5 2025    BILITOT 0.5 " "06/18/2025    AST 29 06/18/2025    ALKPHOS 51 06/18/2025    ALT 35 06/18/2025     Coags: No results found for: "INR", "PROTIME", "PTT"  FLP:   Lab Results   Component Value Date    CHOL 138 03/25/2025    HDL 34 (L) 03/25/2025    LDLCALC 75.8 03/25/2025    TRIG 141 03/25/2025    CHOLHDL 24.6 03/25/2025     DM:   Lab Results   Component Value Date    HGBA1C 6.9 (H) 06/18/2025    HGBA1C 6.4 (H) 03/25/2025    HGBA1C 6.1 (H) 09/16/2024    LDLCALC 75.8 03/25/2025    CREATININE 1.2 06/18/2025     Thyroid:   Lab Results   Component Value Date    TSH 2.751 06/18/2025     Anemia:   Lab Results   Component Value Date    ZZTQKDAA40 245 03/25/2025    FOLATE 4.4 03/25/2025     Cardiac:   Lab Results   Component Value Date    TROPONINI 0.019 06/18/2025    BNP 1,200 (H) 06/18/2025     Urinalysis:   Lab Results   Component Value Date    LABURIN No growth 09/17/2021    COLORU Light Yellow 03/16/2025    UROBILINOGEN Normal 03/16/2025       Trended Lab Data:  Recent Labs   Lab 06/18/25  1557   WBC 7.79   HGB 12.4   HCT 33.4*   *   *   RDW 14.9*      K 4.3      CO2 20*   BUN 28*   CREATININE 1.2   *   PROT 7.0   ALBUMIN 3.5   BILITOT 0.5   AST 29   ALKPHOS 51   ALT 35       Trended Cardiac Data:  Recent Labs   Lab 06/18/25  1557   TROPONINI 0.019   BNP 1,200*       Microbiology Data:  None    Other Results:  EKG (my interpretation): Complete Heart Block HR 28    Radiology:  Chest xray concern for pulmonary edema.     Assessment:     Cady Watkins is a 74 y.o. female with:  Patient Active Problem List    Diagnosis Date Noted    Complete heart block 06/18/2025    Syncope 06/18/2025    Colon cancer screening declined 09/17/2023    Mammogram declined 09/17/2023    Opioid dependence 07/08/2022    Diabetic nephropathy 12/10/2020    High cholesterol 02/23/2018    Spasm 02/23/2018    Insomnia 10/27/2017    Migraine 10/27/2017    Type 2 diabetes with neuropathy 12/13/2016    Obesity 10/13/2016    Kidney " stone 10/13/2016    Fibromyalgia 09/21/2016    Hypertension 09/21/2016    Ventral hernia 09/21/2016        Plan:     Complete Heart Block  Syncope  - Cardiology consulted in the ED.  - Cardiology placing Temporary Pacemaker  - Discontinue AV blocking agents.  - Holding Metoprolol  - Will order TSH and 2D Echocardiogram    Hypertensive Emergency  Flash Pulmonary Edema  Anxiety  - Patient hypertensive with SBP in the 230s. MAP goal of 120 within 2 hours of initiation of drip.   - Started on Nitroglycerine drip per cardiology recommendations.   - Strict In and out put. BNP 1200 on arrival. Given 40 mg of IV lasix followed by 60 mg of IV Lasix.   - Patient received one dose of 0.5 mg ativan due to agitation and tachypnea.  - did not tolerate Ativan. Attempting Zyprexa IM x 2 times. If anxiety and agitation does not improve will intubate.  - Will do Propofol and Fentanyl for sedation per cardiology.     Diabetes  Diabetic neuropathy  - Holding Metformin  - SSI while in patient   - Last A1c 6.4 ~ 3 months ago    ISAIAS  - Will hold lisinopril at this time.   - CR 1.2 will continue to monitor.   - Recheck BMP.    HLD  - Holding Crestor.  - LDL 76, HDL 34, Total 138 ~ 3 months ago    Folate Deficiency  Vitamin B12 Deficiency  Macrocytosis  - Should be on weekly B12 injections  - MCV continues to be high.      Code Status:     Full Code  No blood products    Nalini Pinto MD  [facility] Internal Medicine HO-2    [facility] Medicine Hospitalist Pager numbers:   [facility] Hospitalist Medicine Team A (Jamar/Leslie): [phone]  [facility] Hospitalist Medicine Team B (Zelda/Romeo):  [phone]             [1]   Social History  Tobacco Use    Smoking status: Never    Smokeless tobacco: Never   Substance Use Topics    Alcohol use: Yes     Comment: Occasionally

## 2025-06-18 NOTE — NURSING
Pt arrived to ICU room 558; pt on 1L NC and connected to ICU monitoring; RIJ TVP  in place and capturing and sensing (KVO attached); pt agitated and attempting to get out of bed to use the restroom; purewick in place; pt AAOx3 with some confusion to month and year

## 2025-06-18 NOTE — Clinical Note
The ECG showed paced rhythm. The patient has a temporary pacemaker. The pacemaker rate was 70 bpm. The pacemaker output was 10 mA.

## 2025-06-18 NOTE — BRIEF OP NOTE
Procedure:     Successful transjugular temporary pacemaker placement for symptomatic bradycardia     Access:  Right IJ with 5 [place] sheath.  Ultrasound guidance     Closure:  Sheath sutured in place     Complications:  No apparent immediate complications postprocedure     Estimated blood loss:  None     Description of the procedure:  Informed consent obtained.  Patient brought to the cath lab.  Prepped and draped in the usual sterile fashion.  Under ultrasound guidance right IJ access with micro puncture needle.  Micro puncture sheath placed and was exchanged over J-wire to 5 [place] sheath.  Temp pacemaker advanced and placed in the RV under fluoro guidance.      The pacemaker was capturing well with loss of V capture at 0.2 mA.  V output set at 5 mA.  Rate set at 70     Assessment and plan  Complete heart block     Plan  Continue to hold Toprol  We will assess in 24-48 hours if permanent pacemaker is needed after beta-blockers washout  Check chest x-ray

## 2025-06-18 NOTE — Clinical Note
The ECG showed paced rhythm. The patient has a temporary pacemaker. The pacemaker rate was 80 bpm. The pacemaker output was 10 mA. The pacemaker output was 2 mV.

## 2025-06-18 NOTE — CONSULTS
[facility] - Emergency Dept  Cardiology  Consult Note    Patient Name: Cady Watkins  MRN: [id number]  Admission Date: 2025  Hospital Length of Stay: 0 days  Code Status: No Order   Attending Provider: Sanya Mishra Jr., MD   Consulting Provider: Sean Ureña MD  Primary Care Physician: Blaine Benítez MD  Principal Problem:<principal problem not specified>    Patient information was obtained from patient, past medical records, and ER records.     Inpatient consult to Cardiology  Consult performed by: Sean Ureña MD  Consult ordered by: Sanya Mishra Jr., MD        Subjective:     Chief Complaint:  Weakness and dizziness  HPI:  Cardiology consulted for complete heart block.  74 years old female who has not been feeling well for the last 2-3 weeks.  Significant weakness, dizziness, and syncopal event.  Also lower extremities edema.  Went to her PCP today was noted to have slow heart rate so referred to the emergency room.  EKG showed complete heart block with a rate in the 20s.  Blood pressure stable.  Mentation okay.  On Toprol-XL 50 mg that she took today    Past Medical History:   Diagnosis Date    Diabetes mellitus, type 2     Fibromyalgia 2016    Hypertension 2016       Past Surgical History:   Procedure Laterality Date     SECTION      CHOLECYSTECTOMY      FOOT SURGERY Bilateral     plantar fasciotomy bilateral, arthroplasty fifth toe bilateral    HERNIA REPAIR         Review of patient's allergies indicates:   Allergen Reactions    Advil [ibuprofen] Hives    Penicillins     Codeine     Excedrin aspirin free [acetaminophen-caffeine]        No current facility-administered medications on file prior to encounter.     Current Outpatient Medications on File Prior to Encounter   Medication Sig    cyclobenzaprine (FLEXERIL) 10 MG tablet TAKE 1 TABLET BY MOUTH EVERY DAY NIGHTLY AS NEEDED FOR MUSCLE SPASMS    gabapentin (NEURONTIN) 300 MG capsule TAKE 1 CAPSULE BY MOUTH  EVERYDAY AT BEDTIME    lisinopriL (PRINIVIL,ZESTRIL) 20 MG tablet TAKE 1 TABLET BY MOUTH EVERY DAY    meclizine (ANTIVERT) 12.5 mg tablet TAKE 1 TABLET BY MOUTH 2 (TWO) TIMES DAILY AS NEEDED FOR DIZZINESS.    metFORMIN (GLUCOPHAGE) 500 MG tablet TAKE 1 TABLET BY MOUTH TWICE A DAY WITH MEALS    metoprolol tartrate (LOPRESSOR) 50 MG tablet TAKE 1 TABLET BY MOUTH TWICE A DAY    oxyCODONE-acetaminophen (PERCOCET)  mg per tablet Take 1 tablet by mouth nightly as needed for Pain.    rosuvastatin (CRESTOR) 5 MG tablet Take 1 tablet (5 mg total) by mouth once daily.    [DISCONTINUED] cyclobenzaprine (FLEXERIL) 10 MG tablet TAKE 1 TABLET BY MOUTH EVERY DAY NIGHTLY AS NEEDED FOR MUSCLE SPASMS     Family History       Problem Relation (Age of Onset)    Diabetes Father    Heart disease Sister, Brother          Tobacco Use    Smoking status: Never    Smokeless tobacco: Never   Substance and Sexual Activity    Alcohol use: Yes     Comment: Occasionally    Drug use: Not on file    Sexual activity: Yes     Review of Systems   Unable to perform ROS: Acuity of condition     Objective:     Vital Signs (Most Recent):  Pulse: (!) 28 (06/18/25 1559)  Resp: (!) 23 (06/18/25 1557)  BP: (!) 181/76 (06/18/25 1542)  SpO2: 100 % (06/18/25 1557) Vital Signs (24h Range):  Temp:  [97.6 °F (36.4 °C)] 97.6 °F (36.4 °C)  Pulse:  [28-76] 28  Resp:  [23-24] 23  SpO2:  [96 %-100 %] 100 %  BP: (162-181)/(52-76) 181/76     Weight: 73.5 kg (162 lb)  Body mass index is 30.61 kg/m².    SpO2: 100 %       No intake or output data in the 24 hours ending 06/18/25 1620    Lines/Drains/Airways       Peripheral Intravenous Line  Duration                  Peripheral IV - Single Lumen 06/18/25 1553 20 G Right Antecubital <1 day         Peripheral IV - Single Lumen 06/18/25 20 G Left Antecubital <1 day                    Physical Exam  Constitutional:       Appearance: She is ill-appearing.   HENT:      Head: Normocephalic and atraumatic.   Cardiovascular:       "Rate and Rhythm: Bradycardia present.      Heart sounds: Murmur (Grade 2 systolic) heard.   Pulmonary:      Breath sounds: Rales present.   Abdominal:      General: Abdomen is flat.      Palpations: Abdomen is soft.   Musculoskeletal:      Right lower leg: Edema present.      Left lower leg: Edema present.   Neurological:      Mental Status: She is oriented to person, place, and time.   Psychiatric:         Behavior: Behavior normal.         Significant Labs: BMP: No results for input(s): "GLU", "NA", "K", "CL", "CO2", "BUN", "CREATININE", "CALCIUM", "MG" in the last 48 hours., CMP No results for input(s): "NA", "K", "CL", "CO2", "GLU", "BUN", "CREATININE", "CALCIUM", "PROT", "ALBUMIN", "BILITOT", "ALKPHOS", "AST", "ALT", "ANIONGAP", "ESTGFRAFRICA", "EGFRNONAA" in the last 48 hours., Lipid Panel No results for input(s): "CHOL", "HDL", "LDLCALC", "TRIG", "CHOLHDL" in the last 48 hours., Troponin No results for input(s): "TROPONINIHS" in the last 48 hours., and All pertinent lab results from the last 24 hours have been reviewed.    Significant Imaging: Echocardiogram: 2D echo with color flow doppler: No results found for this or any previous visit. and Transthoracic echo (TTE) complete (Cupid Only): No results found for this or any previous visit.  Assessment and Plan:   74 years old female with      1. Complete heart block  2. Christian      Emergent temporary pacemaker  Discontinue Toprol  Avoid any AV manjit blocking agents  2D echocardiogram  Check TSH      Total critical care time: Approximately 45 minutes     Due to a high probability of clinically significant, life threatening deterioration, I personally spent this critical care time directly and personally managing the patient. This critical care time included obtaining a history; examining the patient; ordering and review of studies; arranging urgent treatment with development of a management plan; evaluation of patient's response to treatment; " frequent reassessment; and, discussions with other providers.   This critical care time was performed to assess and manage the high probability of imminent, life-threatening deterioration that could result in multi-organ failure. It was medically reasonable and necessary. It was exclusive of separately billable procedures and treating other patients and teaching time       There are no hospital problems to display for this patient.      VTE Risk Mitigation (From admission, onward)      None            Thank you for your consult. I will follow-up with patient. Please contact us if you have any additional questions.    Sean Ureña MD  Cardiology   [facility] - Emergency Dept

## 2025-06-18 NOTE — EICU
"Virtual ICU Admission    Admit Date: 2025  LOS: 0  Code Status: No Order   : 1950  Bed: K558/K558 A:     Diagnosis: <principal problem not specified>    Patient  has a past medical history of Diabetes mellitus, type 2, Fibromyalgia, and Hypertension.    Last VS: BP (!) 181/76   Pulse 80   Resp 18   Ht 5' 1" (1.549 m)   Wt 73.5 kg (162 lb)   LMP 2001   SpO2 100%   BMI 30.61 kg/m²       VICU Review    VICU nurse assessment :  [place] completed and LDA documentation reconciliation completed                  "

## 2025-06-19 PROBLEM — J96.01 ACUTE HYPOXEMIC RESPIRATORY FAILURE: Status: ACTIVE | Noted: 2025-06-19

## 2025-06-19 PROBLEM — J81.0 ACUTE PULMONARY EDEMA: Status: ACTIVE | Noted: 2025-06-19

## 2025-06-19 PROBLEM — I16.1 HYPERTENSIVE EMERGENCY: Status: ACTIVE | Noted: 2025-06-19

## 2025-06-19 LAB
ABSOLUTE EOSINOPHIL (OHS): 0.01 K/UL
ABSOLUTE MONOCYTE (OHS): 0.53 K/UL (ref 0.3–1)
ABSOLUTE NEUTROPHIL COUNT (OHS): 9.79 K/UL (ref 1.8–7.7)
ALBUMIN SERPL BCP-MCNC: 3.1 G/DL (ref 3.5–5.2)
ALLENS TEST: ABNORMAL
ALP SERPL-CCNC: 46 UNIT/L (ref 40–150)
ALT SERPL W/O P-5'-P-CCNC: 31 UNIT/L (ref 10–44)
ANION GAP (OHS): 9 MMOL/L (ref 8–16)
AORTIC SIZE INDEX (SOV): 1.8 CM/M2
AORTIC SIZE INDEX: 1.7 CM/M2
APICAL FOUR CHAMBER EJECTION FRACTION: 23 %
APICAL TWO CHAMBER EJECTION FRACTION: 21 %
ASCENDING AORTA: 2.9 CM
AST SERPL-CCNC: 27 UNIT/L (ref 11–45)
AV INDEX (PROSTH): 0.92
AV MEAN GRADIENT: 5 MMHG
AV PEAK GRADIENT: 7 MMHG
AV VALVE AREA BY VELOCITY RATIO: 2.2 CM²
AV VALVE AREA: 2.6 CM²
AV VELOCITY RATIO: 0.77
BASOPHILS # BLD AUTO: 0.03 K/UL
BASOPHILS NFR BLD AUTO: 0.3 %
BILIRUB SERPL-MCNC: 0.9 MG/DL (ref 0.1–1)
BSA FOR ECHO PROCEDURE: 1.79 M2
BUN SERPL-MCNC: 25 MG/DL (ref 8–23)
CALCIUM SERPL-MCNC: 8.6 MG/DL (ref 8.7–10.5)
CHLORIDE SERPL-SCNC: 109 MMOL/L (ref 95–110)
CHOLEST SERPL-MCNC: 116 MG/DL (ref 120–199)
CHOLEST/HDLC SERPL: 4.3 {RATIO} (ref 2–5)
CO2 SERPL-SCNC: 22 MMOL/L (ref 23–29)
CREAT SERPL-MCNC: 1.1 MG/DL (ref 0.5–1.4)
CV ECHO LV RWT: 0.39 CM
DOP CALC AO PEAK VEL: 1.3 M/S
DOP CALC AO VTI: 22.6 CM
DOP CALC LVOT AREA: 2.8 CM2
DOP CALC LVOT DIAMETER: 1.9 CM
DOP CALC LVOT PEAK VEL: 1 M/S
DOP CALC LVOT STROKE VOLUME: 58.7 CM3
DOP CALCLVOT PEAK VEL VTI: 20.7 CM
E WAVE DECELERATION TIME: 125 MSEC
E/A RATIO: 1.29
E/E' RATIO: 19 M/S
ECHO LV POSTERIOR WALL: 0.9 CM (ref 0.6–1.1)
ERYTHROCYTE [DISTWIDTH] IN BLOOD BY AUTOMATED COUNT: 14 % (ref 11.5–14.5)
FIO2: 80 %
FIO2: 80 %
FIO2: 90 %
FRACTIONAL SHORTENING: 34.8 % (ref 28–44)
GFR SERPLBLD CREATININE-BSD FMLA CKD-EPI: 53 ML/MIN/1.73/M2
GLUCOSE SERPL-MCNC: 188 MG/DL (ref 70–110)
HCT VFR BLD AUTO: 37 % (ref 37–48.5)
HDLC SERPL-MCNC: 27 MG/DL (ref 40–75)
HDLC SERPL: 23.3 % (ref 20–50)
HGB BLD-MCNC: 12.6 GM/DL (ref 12–16)
IMM GRANULOCYTES # BLD AUTO: 0.11 K/UL (ref 0–0.04)
IMM GRANULOCYTES NFR BLD AUTO: 0.9 % (ref 0–0.5)
INTERVENTRICULAR SEPTUM: 0.8 CM (ref 0.6–1.1)
IVRT: 133 MSEC
LDLC SERPL CALC-MCNC: 70 MG/DL (ref 63–159)
LEFT ATRIUM AREA SYSTOLIC (APICAL 2 CHAMBER): 20.82 CM2
LEFT ATRIUM AREA SYSTOLIC (APICAL 4 CHAMBER): 19.64 CM2
LEFT ATRIUM VOLUME INDEX MOD: 34 ML/M2
LEFT ATRIUM VOLUME MOD: 59 ML
LEFT INTERNAL DIMENSION IN SYSTOLE: 3 CM (ref 2.1–4)
LEFT VENTRICLE DIASTOLIC VOLUME INDEX: 56 ML/M2
LEFT VENTRICLE DIASTOLIC VOLUME: 98 ML
LEFT VENTRICLE END DIASTOLIC VOLUME APICAL 2 CHAMBER: 90.5 ML
LEFT VENTRICLE END DIASTOLIC VOLUME APICAL 4 CHAMBER: 80.97 ML
LEFT VENTRICLE END SYSTOLIC VOLUME APICAL 2 CHAMBER: 61.55 ML
LEFT VENTRICLE END SYSTOLIC VOLUME APICAL 4 CHAMBER: 55.95 ML
LEFT VENTRICLE MASS INDEX: 72.9 G/M2
LEFT VENTRICLE SYSTOLIC VOLUME INDEX: 20 ML/M2
LEFT VENTRICLE SYSTOLIC VOLUME: 35 ML
LEFT VENTRICULAR INTERNAL DIMENSION IN DIASTOLE: 4.6 CM (ref 3.5–6)
LEFT VENTRICULAR MASS: 127.7 G
LV LATERAL E/E' RATIO: 15.9 M/S
LV SEPTAL E/E' RATIO: 22.2 M/S
LVED V (TEICH): 97.9 ML
LVES V (TEICH): 35.22 ML
LVOT MG: 2.48 MMHG
LVOT MV: 0.75 CM/S
LYMPHOCYTES # BLD AUTO: 1.12 K/UL (ref 1–4.8)
MAGNESIUM SERPL-MCNC: 1.7 MG/DL (ref 1.6–2.6)
MCH RBC QN AUTO: 31.8 PG (ref 27–31)
MCHC RBC AUTO-ENTMCNC: 34.1 G/DL (ref 32–36)
MCV RBC AUTO: 93 FL (ref 82–98)
MV PEAK A VEL: 0.86 M/S
MV PEAK E VEL: 1.11 M/S
MV STENOSIS PRESSURE HALF TIME: 36.18 MS
MV VALVE AREA P 1/2 METHOD: 6.08 CM2
NONHDLC SERPL-MCNC: 89 MG/DL
NUCLEATED RBC (/100WBC) (OHS): 0 /100 WBC
OHS CV RV/LV RATIO: 0.63 CM
OHS LV EJECTION FRACTION SIMPSONS BIPLANE MOD: 21 %
PCO2 BLDA: 35.8 MMHG (ref 35–45)
PCO2 BLDA: 58.1 MMHG (ref 35–45)
PCO2 BLDA: 58.5 MMHG (ref 35–45)
PH SMN: 7.29 [PH] (ref 7.35–7.45)
PH SMN: 7.29 [PH] (ref 7.35–7.45)
PH SMN: 7.42 [PH] (ref 7.35–7.45)
PHOSPHATE SERPL-MCNC: 3.3 MG/DL (ref 2.7–4.5)
PISA TR MAX VEL: 3.9 M/S
PLATELET # BLD AUTO: 121 K/UL (ref 150–450)
PLATELET BLD QL SMEAR: ABNORMAL
PMV BLD AUTO: 12.4 FL (ref 9.2–12.9)
PO2 BLDA: 17.3 MMHG (ref 40–60)
PO2 BLDA: 23.7 MMHG (ref 40–60)
PO2 BLDA: 69.4 MMHG (ref 80–100)
POC BASE DEFICIT: -0.2 MMOL/L (ref -2–2)
POC BASE DEFICIT: -0.9 MMOL/L (ref -2–2)
POC BASE DEFICIT: 0 MMOL/L (ref -2–2)
POC HCO3: 23.2 MMOL/L (ref 24–28)
POC HCO3: 27.6 MMOL/L (ref 24–28)
POC HCO3: 27.9 MMOL/L (ref 24–28)
POC PERFORMED BY: ABNORMAL
POC SATURATED O2: 27.3 % (ref 95–100)
POC SATURATED O2: 33.9 % (ref 95–100)
POC SATURATED O2: 95.1 % (ref 95–100)
POCT GLUCOSE: 187 MG/DL (ref 70–110)
POCT GLUCOSE: 192 MG/DL (ref 70–110)
POCT GLUCOSE: 199 MG/DL (ref 70–110)
POCT GLUCOSE: 224 MG/DL (ref 70–110)
POTASSIUM SERPL-SCNC: 4.4 MMOL/L (ref 3.5–5.1)
PROT SERPL-MCNC: 6.4 GM/DL (ref 6–8.4)
RA PRESSURE ESTIMATED: 0 MMHG
RA VOL SYS: 46.35 ML
RBC # BLD AUTO: 3.96 M/UL (ref 4–5.4)
RELATIVE EOSINOPHIL (OHS): 0.1 %
RELATIVE LYMPHOCYTE (OHS): 9.7 % (ref 18–48)
RELATIVE MONOCYTE (OHS): 4.6 % (ref 4–15)
RELATIVE NEUTROPHIL (OHS): 84.4 % (ref 38–73)
RIGHT ATRIAL AREA: 17.2 CM2
RIGHT ATRIUM END SYSTOLIC VOLUME APICAL 4 CHAMBER INDEX BSA: 25.84 ML/M2
RIGHT ATRIUM VOLUME AREA LENGTH APICAL 4 CHAMBER: 45.22 ML
RIGHT VENTRICLE DIASTOLIC BASEL DIMENSION: 2.9 CM
RV TB RVSP: 4 MMHG
RV TISSUE DOPPLER FREE WALL SYSTOLIC VELOCITY 1 (APICAL 4 CHAMBER VIEW): 198.68 CM/S
SINUS: 3.15 CM
SODIUM SERPL-SCNC: 140 MMOL/L (ref 136–145)
SPECIMEN SOURCE: ABNORMAL
STJ: 2.6 CM
TDI LATERAL: 0.07 M/S
TDI SEPTAL: 0.05 M/S
TDI: 0.06 M/S
TR MAX PG: 74 MMHG
TRICUSPID ANNULAR PLANE SYSTOLIC EXCURSION: 1.6 CM
TRIGL SERPL-MCNC: 95 MG/DL (ref 30–150)
TV REST PULMONARY ARTERY PRESSURE: 61 MMHG
VT: 40
WBC # BLD AUTO: 11.59 K/UL (ref 3.9–12.7)
Z-SCORE OF LEFT VENTRICULAR DIMENSION IN END DIASTOLE: -0.51
Z-SCORE OF LEFT VENTRICULAR DIMENSION IN END SYSTOLE: 0.01

## 2025-06-19 PROCEDURE — 93005 ELECTROCARDIOGRAM TRACING: CPT

## 2025-06-19 PROCEDURE — 25500020 PHARM REV CODE 255: Performed by: INTERNAL MEDICINE

## 2025-06-19 PROCEDURE — 85025 COMPLETE CBC W/AUTO DIFF WBC: CPT

## 2025-06-19 PROCEDURE — 63600175 PHARM REV CODE 636 W HCPCS: Performed by: INTERNAL MEDICINE

## 2025-06-19 PROCEDURE — 25000003 PHARM REV CODE 250: Performed by: NURSE PRACTITIONER

## 2025-06-19 PROCEDURE — 63600175 PHARM REV CODE 636 W HCPCS

## 2025-06-19 PROCEDURE — 51702 INSERT TEMP BLADDER CATH: CPT

## 2025-06-19 PROCEDURE — 5A0935A ASSISTANCE WITH RESPIRATORY VENTILATION, LESS THAN 24 CONSECUTIVE HOURS, HIGH NASAL FLOW/VELOCITY: ICD-10-PCS | Performed by: INTERNAL MEDICINE

## 2025-06-19 PROCEDURE — 99900035 HC TECH TIME PER 15 MIN (STAT)

## 2025-06-19 PROCEDURE — 36600 WITHDRAWAL OF ARTERIAL BLOOD: CPT

## 2025-06-19 PROCEDURE — 27100092 HC HIGH FLOW DELIVERY CANNULA

## 2025-06-19 PROCEDURE — 27000249 HC VAPOTHERM CIRCUIT

## 2025-06-19 PROCEDURE — 20000000 HC ICU ROOM

## 2025-06-19 PROCEDURE — 83735 ASSAY OF MAGNESIUM: CPT

## 2025-06-19 PROCEDURE — 25000003 PHARM REV CODE 250

## 2025-06-19 PROCEDURE — 80053 COMPREHEN METABOLIC PANEL: CPT

## 2025-06-19 PROCEDURE — 27100171 HC OXYGEN HIGH FLOW UP TO 24 HOURS

## 2025-06-19 PROCEDURE — 84100 ASSAY OF PHOSPHORUS: CPT

## 2025-06-19 PROCEDURE — 94799 UNLISTED PULMONARY SVC/PX: CPT

## 2025-06-19 PROCEDURE — 94761 N-INVAS EAR/PLS OXIMETRY MLT: CPT | Mod: XB

## 2025-06-19 PROCEDURE — 82803 BLOOD GASES ANY COMBINATION: CPT

## 2025-06-19 PROCEDURE — 93010 ELECTROCARDIOGRAM REPORT: CPT | Mod: ,,, | Performed by: INTERNAL MEDICINE

## 2025-06-19 PROCEDURE — 99291 CRITICAL CARE FIRST HOUR: CPT | Mod: FS,,, | Performed by: NURSE PRACTITIONER

## 2025-06-19 PROCEDURE — 63600175 PHARM REV CODE 636 W HCPCS: Performed by: NURSE PRACTITIONER

## 2025-06-19 RX ORDER — MUPIROCIN 20 MG/G
OINTMENT TOPICAL DAILY
Status: DISCONTINUED | OUTPATIENT
Start: 2025-06-19 | End: 2025-06-24 | Stop reason: HOSPADM

## 2025-06-19 RX ORDER — INSULIN GLARGINE 100 [IU]/ML
5 INJECTION, SOLUTION SUBCUTANEOUS DAILY
Status: DISCONTINUED | OUTPATIENT
Start: 2025-06-19 | End: 2025-06-23

## 2025-06-19 RX ORDER — FUROSEMIDE 10 MG/ML
120 INJECTION INTRAMUSCULAR; INTRAVENOUS ONCE
Status: COMPLETED | OUTPATIENT
Start: 2025-06-19 | End: 2025-06-19

## 2025-06-19 RX ORDER — GLUCAGON 1 MG
1 KIT INJECTION
Status: DISCONTINUED | OUTPATIENT
Start: 2025-06-19 | End: 2025-06-24 | Stop reason: HOSPADM

## 2025-06-19 RX ORDER — FUROSEMIDE 10 MG/ML
80 INJECTION INTRAMUSCULAR; INTRAVENOUS 2 TIMES DAILY
Status: DISCONTINUED | OUTPATIENT
Start: 2025-06-19 | End: 2025-06-19

## 2025-06-19 RX ORDER — ACETAMINOPHEN 500 MG
1000 TABLET ORAL EVERY 8 HOURS PRN
Status: DISCONTINUED | OUTPATIENT
Start: 2025-06-19 | End: 2025-06-24 | Stop reason: HOSPADM

## 2025-06-19 RX ORDER — HEPARIN SODIUM 5000 [USP'U]/ML
5000 INJECTION, SOLUTION INTRAVENOUS; SUBCUTANEOUS EVERY 8 HOURS
Status: DISCONTINUED | OUTPATIENT
Start: 2025-06-19 | End: 2025-06-20

## 2025-06-19 RX ORDER — INSULIN ASPART 100 [IU]/ML
0-10 INJECTION, SOLUTION INTRAVENOUS; SUBCUTANEOUS EVERY 6 HOURS PRN
Status: DISCONTINUED | OUTPATIENT
Start: 2025-06-19 | End: 2025-06-22

## 2025-06-19 RX ORDER — OLANZAPINE 10 MG/2ML
2.5 INJECTION, POWDER, FOR SOLUTION INTRAMUSCULAR ONCE AS NEEDED
Status: DISCONTINUED | OUTPATIENT
Start: 2025-06-19 | End: 2025-06-19

## 2025-06-19 RX ORDER — LOSARTAN POTASSIUM 25 MG/1
25 TABLET ORAL DAILY
Status: DISCONTINUED | OUTPATIENT
Start: 2025-06-19 | End: 2025-06-20

## 2025-06-19 RX ORDER — MAGNESIUM SULFATE HEPTAHYDRATE 40 MG/ML
2 INJECTION, SOLUTION INTRAVENOUS ONCE
Status: COMPLETED | OUTPATIENT
Start: 2025-06-19 | End: 2025-06-19

## 2025-06-19 RX ORDER — FUROSEMIDE 10 MG/ML
80 INJECTION INTRAMUSCULAR; INTRAVENOUS EVERY 12 HOURS
Status: DISCONTINUED | OUTPATIENT
Start: 2025-06-19 | End: 2025-06-21

## 2025-06-19 RX ORDER — ONDANSETRON HYDROCHLORIDE 2 MG/ML
4 INJECTION, SOLUTION INTRAVENOUS ONCE
Status: COMPLETED | OUTPATIENT
Start: 2025-06-19 | End: 2025-06-19

## 2025-06-19 RX ORDER — FUROSEMIDE 10 MG/ML
60 INJECTION INTRAMUSCULAR; INTRAVENOUS EVERY 8 HOURS
Status: DISCONTINUED | OUTPATIENT
Start: 2025-06-19 | End: 2025-06-19

## 2025-06-19 RX ORDER — OLANZAPINE 10 MG/2ML
2.5 INJECTION, POWDER, FOR SOLUTION INTRAMUSCULAR ONCE AS NEEDED
Status: COMPLETED | OUTPATIENT
Start: 2025-06-19 | End: 2025-06-19

## 2025-06-19 RX ADMIN — ATORVASTATIN CALCIUM 20 MG: 20 TABLET, FILM COATED ORAL at 08:06

## 2025-06-19 RX ADMIN — ACETAMINOPHEN 1000 MG: 500 TABLET ORAL at 05:06

## 2025-06-19 RX ADMIN — LOSARTAN POTASSIUM 25 MG: 25 TABLET, FILM COATED ORAL at 02:06

## 2025-06-19 RX ADMIN — HUMAN ALBUMIN MICROSPHERES AND PERFLUTREN 0.11 MG: 10; .22 INJECTION, SOLUTION INTRAVENOUS at 10:06

## 2025-06-19 RX ADMIN — INSULIN ASPART 2 UNITS: 100 INJECTION, SOLUTION INTRAVENOUS; SUBCUTANEOUS at 02:06

## 2025-06-19 RX ADMIN — INSULIN ASPART 1 UNITS: 100 INJECTION, SOLUTION INTRAVENOUS; SUBCUTANEOUS at 09:06

## 2025-06-19 RX ADMIN — INSULIN ASPART 1 UNITS: 100 INJECTION, SOLUTION INTRAVENOUS; SUBCUTANEOUS at 01:06

## 2025-06-19 RX ADMIN — MUPIROCIN: 20 OINTMENT TOPICAL at 02:06

## 2025-06-19 RX ADMIN — MAGNESIUM SULFATE 2 G: 2 INJECTION INTRAVENOUS at 06:06

## 2025-06-19 RX ADMIN — INSULIN ASPART 2 UNITS: 100 INJECTION, SOLUTION INTRAVENOUS; SUBCUTANEOUS at 08:06

## 2025-06-19 RX ADMIN — OLANZAPINE 2.5 MG: 10 INJECTION, POWDER, FOR SOLUTION INTRAMUSCULAR at 06:06

## 2025-06-19 RX ADMIN — HEPARIN SODIUM 5000 UNITS: 5000 INJECTION INTRAVENOUS; SUBCUTANEOUS at 02:06

## 2025-06-19 RX ADMIN — FUROSEMIDE 80 MG: 10 INJECTION, SOLUTION INTRAVENOUS at 08:06

## 2025-06-19 RX ADMIN — INSULIN GLARGINE 5 UNITS: 100 INJECTION, SOLUTION SUBCUTANEOUS at 08:06

## 2025-06-19 RX ADMIN — FUROSEMIDE 120 MG: 10 INJECTION, SOLUTION INTRAVENOUS at 02:06

## 2025-06-19 RX ADMIN — HEPARIN SODIUM 5000 UNITS: 5000 INJECTION INTRAVENOUS; SUBCUTANEOUS at 09:06

## 2025-06-19 RX ADMIN — FUROSEMIDE 120 MG: 10 INJECTION, SOLUTION INTRAVENOUS at 08:06

## 2025-06-19 RX ADMIN — ONDANSETRON 4 MG: 2 INJECTION INTRAMUSCULAR; INTRAVENOUS at 05:06

## 2025-06-19 RX ADMIN — ACETAMINOPHEN 1000 MG: 500 TABLET ORAL at 11:06

## 2025-06-19 NOTE — PROGRESS NOTES
Pt remains restless/agitated attempting to get out of bed and pulling at/removing lines. SBP >180 on Nitro gtt. Per Dr. Keita orders ok to give 2.5 mg IM Zyprexa now. Call if pt remains agitated post intervention.

## 2025-06-19 NOTE — EICU
Intervention Initiated From:  Bedside    Anamika intervened regarding:  Other      Doctor Notified:  Yes    Comments: Patient agitated with increased work of breathing.  Bedside RN requesting eICU doctor to camera into room.    LIO Sharma M.D. notified

## 2025-06-19 NOTE — NURSING
Called to bedside by primary RN. Pox low 80's on NRB. Patient aggitated, pulling at TVP wires, pulse ox, and other monitoring devices. Unable to redirect patient. Dr. Pinto notified and stated he'd come up to bedside.

## 2025-06-19 NOTE — SUBJECTIVE & OBJECTIVE
Review of Systems   Reason unable to perform ROS: limted due to acute respiratory distress.   Cardiovascular:  Positive for dyspnea on exertion.   Respiratory:  Positive for shortness of breath.      Objective:     Vital Signs (Most Recent):  Temp: 96.4 °F (35.8 °C) (06/18/25 2315)  Pulse: 70 (06/19/25 0947)  Resp: (!) 21 (06/19/25 0947)  BP: (!) 155/88 (06/19/25 0930)  SpO2: (!) 86 % (06/19/25 0947) Vital Signs (24h Range):  Temp:  [96.4 °F (35.8 °C)-97.6 °F (36.4 °C)] 96.4 °F (35.8 °C)  Pulse:  [22-88] 70  Resp:  [16-48] 21  SpO2:  [71 %-100 %] 86 %  BP: (116-233)/() 155/88     Weight: 77.5 kg (170 lb 13.7 oz)  Body mass index is 31.25 kg/m².     SpO2: (!) 86 %         Intake/Output Summary (Last 24 hours) at 6/19/2025 0955  Last data filed at 6/19/2025 0840  Gross per 24 hour   Intake 701.54 ml   Output 1920 ml   Net -1218.46 ml       Lines/Drains/Airways       Drain  Duration                  Urethral Catheter 06/19/25 0030 Silicone <1 day              Peripheral Intravenous Line  Duration                  Peripheral IV - Single Lumen 06/18/25 1553 20 G Right Antecubital <1 day         Peripheral IV - Single Lumen 06/19/25 0445 20 G 3/4 in Right Hand <1 day                       Physical Exam  Constitutional:       General: She is not in acute distress.     Appearance: She is well-developed.   Cardiovascular:      Rate and Rhythm: Normal rate and regular rhythm.      Heart sounds: No murmur heard.     No gallop.   Pulmonary:      Effort: Tachypnea, accessory muscle usage and respiratory distress present.      Breath sounds: Rales present. No wheezing.   Abdominal:      General: Bowel sounds are normal. There is no distension.      Palpations: Abdomen is soft.      Tenderness: There is no abdominal tenderness.   Skin:     General: Skin is warm and dry.   Neurological:      Mental Status: She is alert and oriented to person, place, and time.                Significant Imaging: Echocardiogram:  Transthoracic echo (TTE) complete (Cupid Only):   Results for orders placed or performed during the hospital encounter of 06/18/25   Echo *Canceled*    Narrative    The following orders were created for panel order Echo.  Procedure                               Abnormality         Status                     ---------                               -----------         ------                       Please view results for these tests on the individual orders.

## 2025-06-19 NOTE — ASSESSMENT & PLAN NOTE
- initial /106  - started on IV Tridil upon admission; SBP trended down to 140s-150s overnight  - on Lisinopril 20 and Metoprolol 50mg BID as an outpatient- both on hold for now given CHB and ISAIAS  - overall goal BP less than 130/80 however 140s-150s acceptable at this time; anticipate continued improvement in BP as respiratory status improves; resume ACEI if creatinine remains stable; will continue to hold BB; if EF depressed could consider using Hydralazine/Isordil combination  - monitor BP closely

## 2025-06-19 NOTE — CONSULTS
Consult Note  [facility]    Attending: Dr. Rahman  Fellow: Dr. Newman  Admit Date: 6/18/2025  Today's Date: 06/19/2025  Reason for Consult:  BIPAP and agitation     ASSESSMENT & RECOMMENDATIONS     Brief HPI: 75yo female with a PMH of HTN, DM, HLD admitted 6/18 for symptomatic bradycardia. Hx obtained from chart review, patient at bedside too tired to give story. Went to PCP and was found to have bradycardia and has been feeling unwell for 3 days and had one syncopal episode. No CP, fever, chills, n/v or urinary/bowel changes. Found to be bradycardic to 20-30s on arrival and was taken for TVP with cardiology. However, in the afternoon patient started becoming SOB, agitated with what appeared to be flash pulm edema. BP increased to 230s/130s ([id number]), hypoxic not improving with NRB, RR 30-40s and transitioned to BIPAP. Stepped up to MICU 6/18 for AHRF and incr. WOB.     Overall assessment: 71F with sx bradycardia s/p TVP, developed flash pulmonary edema and AHRF, placed on BIPAP and stepped up to MICU.    CNS/Neuro:  Encephalopathy?  -patient slow to answer, but per nursing staff AOX3; endorses that she feels tired     Plan:  CTM     Cardiovascular:  Complete Heart Block  -Bradycardic 20-30s, syncope and fatigue; EKG AV dissociation   -Taking lopressor at home     Plan:  Hold home BB   Cards following, considering placing PPM  TTE ordered    HTN Emergency   -developed flash pulmonary edema post procedure; SOB, agitated   -BP increased to 230s/130s ([id number]), hypoxic not improving with NRB, RR 30-40s and transitioned to BIPAP  -placed on nitroglycerin gtt and lasix 40>60  -BNP 1200, trop 0.019  -Continue statin     Plan:  Restart home antihypertensive when appropriate, no BB  Aggressive diuresis, lasix 60 TID   Strict I/Os    Respiratory:  Acute hypoxic respiratory failure   Pulmonary edema   -SOB, significant incr WOB; hypoxic to 70s failing NRB; placed on BIPAP   -VBG 7.28/58/28  -CXR c/w  pulm edema    Plan:  Aggressive diuresis as above  Wean from bipap to HFNC, wean as able     GI/Metabolic:  F: none   E: K > 4, Phos > 3, Mg > 2 and replete PRN   N: cardiac      Renal:  Baseline Cr ~ .9-1.1    Plan:  CTM Cr as patient diuresis     Heme:  Baseline Hgb ~ 12.2-13.7    Plan:  CTM    Endo:   Last TSH: 2.751  Last A1C: 6.4%    Glucose within goal range this admission with 140-180  -SSI    ID:  No acute concern   Temp: AF  WBC: 11.59  Micro: NA      SUBJECTIVE:     Overnight stepped up for agitation and flash pulm edema requiring bipap, elevated BP started on NTG now discontinued. Improved Bps and weaning to HFNC. Otherwise, no other complaints.     OBJECTIVE:     Vital Signs Trends/Hx Reviewed  Vitals:    06/19/25 1118 06/19/25 1119 06/19/25 1130 06/19/25 1132   BP: 132/65  126/61 126/61   BP Location:       Patient Position:       Pulse: 70 70 70 70   Resp: (!) 7 (!) 9 11 11   Temp:       TempSrc:       SpO2: 95% 95% (!) 94% (!) 94%   Weight:       Height:           Ventilator settings: HFNC 35L 70%  Physical Exam:  General: NAD, cooperative   HEENT: EOMI, oral and nasal mucosa moist.   Neck: Supple   Cardiac: normal rate, regular rhythm, with no murmurs, rubs, gallops with brisk cap refill and symmetric pulses in distal extremities. All extremities warm to touch.   Respiratory: On HFNC. Crackles to mid lungs BL.   Abdomen: Soft, NT/ND. +BS.  Extremities: No edema.   Neuro: Grossly intact to brief exam. Oriented x3. Follows commands.       Laboratory:  Recent Labs   Lab 06/19/25  1025   PH 7.419   PCO2 35.8   PO2 69.4*   HCO3 23.2*   POCSATURATED 95.1     Recent Labs   Lab 06/19/25  0514   WBC 11.59   RBC 3.96*   HGB 12.6   HCT 37.0   *   MCV 93   MCH 31.8*   MCHC 34.1     Recent Labs   Lab 06/19/25  0514      K 4.4      CO2 22*   BUN 25*   CREATININE 1.1   CALCIUM 8.6*   MG 1.7       Microbiology Data:   Microbiology Results (last 7 days)       ** No results found for the last 168  hours. **             Chest Imaging:   CXR Patient is slightly rotated.  Several overlying monitoring leads.  Right central venous catheter/transvenous pacer lead positioning, similar.  Persistent patchy interstitial and airspace opacities with bibasilar density and effusions as before.  Some interval clearing at the right upper lung zone from prior.  No gross pneumothorax.     Infusions:     heparin (porcine)    Continuous  mL/hr at 06/19/25 0809 Rate Verify at 06/19/25 0809       Scheduled Medications:    atorvastatin  20 mg Oral Daily    furosemide (LASIX) injection  60 mg Intravenous Q8H    heparin (porcine)  5,000 Units Subcutaneous Q8H    insulin glargine U-100  5 Units Subcutaneous Daily         Feed: heart healthy  Analgesia: NA  Sedation: NA  Thromboembolic prophylaxis: heparin TID  Head-of-bed: elevated  Ulcer prophylaxis: MA  Glycemic control: 140-180, SSI   SAT/SBT: NA  Bowel regimen: NA  Indwelling: PIV x2, lazo  De-escalate Abx: NA    Code status: Full     Disposition: pending weaning HFNC/BIPAP and diuresis    Thank you for allowing us to participate in the care of this patient. Please call with questions.    Rosanna Chopra, DO  [facility] PGY II

## 2025-06-19 NOTE — ASSESSMENT & PLAN NOTE
- presented with CHB with HR 20s; ABG with hypoxia on 100% NRB; IV diuresis initated and pO2 improving  - maintained on BiPap overnight and transitioned to HFNC this AM; rales noted throughout all lung fields along with tachypnea with RR in the 40s and mild respiratory distress; repeat ABG pending; Lasix 120mg IV ordered this AM with plans for 60mg IV Q8hrs may need more depending on response to IV Lasix as well as respiratory status and filling pressures on echo   - echo pending; concerned about possibility of new onset CHF but will await echo read  - will monitor I&Os closely with IV Lasix

## 2025-06-19 NOTE — PLAN OF CARE
Vss, nadn, pt on vapotherm, L/O decreasing slowly, pt christiana'ing. O2 sats wnls. ABG improved. Calm, coop, pleasant. Afebrile. Remains on TVP, HR wnls, VPaced. Christiana'ing well. BP wnls. SL'ed. Daughter and pt updated by CCPulm MDs and Cardio. See flow sheet for sunny info.       Problem: Wound  Goal: Optimal Coping  Outcome: Progressing  Goal: Optimal Functional Ability.   Outcome: Progressing     Problem: Adult Inpatient Plan of Care  Goal: Plan of Care Review  Outcome: Progressing  Goal: Optimal Comfort and Wellbeing  Outcome: Progressing  Goal: Readiness for Transition of Care  Outcome: Progressing     Problem: Delirium  Goal: Improved Behavioral Control  Outcome: Progressing  Goal: Improved Sleep  Outcome: Progressing

## 2025-06-19 NOTE — ASSESSMENT & PLAN NOTE
- presented to PCP appt with syncope; noted with HR 20s consistent with CHB  - admitted to ICU and TVP placed; currently V paced rhythm; no tachyarrhythmias noted on telemetry overnight  - echo pending

## 2025-06-19 NOTE — CARE UPDATE
06/18/25 1955   Patient Assessment/Suction   Level of Consciousness (AVPU) responds to voice   Respiratory Effort Normal;Unlabored   Skin Integrity   $ Wound Care Tech Time 15 min   Area Observed Cheek;Nares   Skin Appearance without discoloration   PRE-TX-O2   Device (Oxygen Therapy) nasal cannula   $ Is the patient on Low Flow Oxygen? Yes   Flow (L/min) (Oxygen Therapy) 2   SpO2 (!) 90 %   Pulse Oximetry Type Continuous   $ Pulse Oximetry - Multiple Charge Pulse Oximetry - Multiple   Pulse 70   Resp (!) 38   Respiratory Evaluation   $ Care Plan Tech Time 15 min

## 2025-06-19 NOTE — EICU
Virtual ICU Quality Rounds    Admit Date: 6/18/2025  Hospital Day: 1    ICU Day: 13h    24H Vital Sign Range:  Temp:  [96.4 °F (35.8 °C)-97.6 °F (36.4 °C)]   Pulse:  [28-88]   Resp:  [16-48]   BP: (116-233)/()   SpO2:  [71 %-100 %]     VICU Surveillance Screening    Daily review of  line necessity(optional): Urinary catheter in place            Wallis Indications : Urinary retention    LDA reconciliation : Yes

## 2025-06-19 NOTE — ASSESSMENT & PLAN NOTE
- presented with HR 20s with CHB noted on admit EKG  - emergent TVP placed and currently VPaced rhythm  - on BB as an outpatient for HTN management; will hold BB and allow for BB washout and will continue TVP fo rnow  - echo pending; will monitor closely and need for permanent pacemaker TBD

## 2025-06-19 NOTE — PROGRESS NOTES
"[facility] Medicine Progress Note    Primary Team: [facility] Hospitalist Team B  Attending Physician: Robin Ndiaye MD  Resident: Ang  Intern: Jesu    Subjective:      Patient seen at bedside this morning. She is doing much better since placing her on high flow nasal canula. Patient denied any symptoms this morning.      Objective:     Last 24 Hour Vital Signs:  BP  Min: 116/71  Max: 233/135  Temp  Av.5 °F (36.4 °C)  Min: 96.4 °F (35.8 °C)  Max: 98.3 °F (36.8 °C)  Pulse  Av.7  Min: 22  Max: 88  Resp  Av.5  Min: 7  Max: 48  SpO2  Av.4 %  Min: 71 %  Max: 100 %  Height  Av' 1.7" (156.7 cm)  Min: 5' 1" (154.9 cm)  Max: 5' 2" (157.5 cm)  Weight  Av.8 kg (164 lb 15.2 oz)  Min: 73.5 kg (162 lb)  Max: 77.5 kg (170 lb 13.7 oz)  I/O last 3 completed shifts:  In: 151.4 [I.V.:151.4]  Out: 1880 [Urine:1880]    Physical Examination:  Physical Exam  Constitutional:       Comments: Patient has TVP placed in the Right IJ.   HENT:      Mouth/Throat:      Mouth: Mucous membranes are moist.      Pharynx: Oropharynx is clear.   Eyes:      Pupils: Pupils are equal, round, and reactive to light.   Cardiovascular:      Rate and Rhythm: Normal rate and regular rhythm.   Skin:     General: Skin is warm and dry.   Neurological:      General: No focal deficit present.      Mental Status: She is alert.   Psychiatric:         Mood and Affect: Mood normal.         Behavior: Behavior normal.         Thought Content: Thought content normal.         Judgment: Judgment normal.       Laboratory:  Laboratory Data Reviewed: yes  Pertinent Findings:  Recent Labs   Lab 25  1557 25  2310 25  0514   WBC 7.79  --  11.59   HGB 12.4  --  12.6   HCT 33.4*  --  37.0   *  --  121*    140 140   K 4.3 4.1 4.4    107 109   CREATININE 1.2 1.1 1.1   BUN 28* 25* 25*   CO2 20* 21* 22*   ALT 35  --  31   AST 29  --  27     Microbiology Data Reviewed: no  Pertinent Findings:  None    Other Results:  EKG " (my interpretation): Complete Heart Block.    Radiology Data Reviewed: yes  Pertinent Findings:  X-Ray Chest AP Portable   Final Result      As above.         Electronically signed by: Michael Fields   Date:    06/19/2025   Time:    09:12      X-Ray Chest AP Portable   Final Result      As above.         Electronically signed by: Michael Fields   Date:    06/19/2025   Time:    09:31      X-Ray Chest AP Portable    (Results Pending)         Current Medications:     Infusions:   heparin (porcine)    Continuous  mL/hr at 06/19/25 0809 Rate Verify at 06/19/25 0809        Scheduled:   atorvastatin  20 mg Oral Daily    furosemide (LASIX) injection  80 mg Intravenous Q12H    heparin (porcine)  5,000 Units Subcutaneous Q8H    insulin glargine U-100  5 Units Subcutaneous Daily    losartan  25 mg Oral Daily        PRN:    Current Facility-Administered Medications:     dextrose 50%, 12.5 g, Intravenous, PRN    dextrose 50%, 12.5 g, Intravenous, PRN    dextrose 50%, 25 g, Intravenous, PRN    glucagon (human recombinant), 1 mg, Intramuscular, PRN    glucagon (human recombinant), 1 mg, Intramuscular, PRN    glucose, 16 g, Oral, PRN    glucose, 24 g, Oral, PRN    heparin (porcine), , , Continuous PRN    insulin aspart U-100, 0-10 Units, Subcutaneous, Q6H PRN    naloxone, 0.02 mg, Intravenous, PRN    sodium chloride 0.9%, 2 mL, Intravenous, Q12H PRN    Antibiotics and Day Number of Therapy:  None    Lines and Day Number of Therapy:  Peripheral IV - Single Lumen 06/18/25 1553 20 G Right Antecubital   Peripheral IV - Single Lumen 06/19/25 0445 20 G 3/4 in Right Hand   Urethral Catheter 06/19/25 0030 Silicone   Wound 06/18/25 1646 Incision Right Neck venous venogram puncture     Assessment:     Cady Watkins is a 74 y.o.female with  Patient Active Problem List    Diagnosis Date Noted    Acute pulmonary edema 06/19/2025    Acute hypoxemic respiratory failure 06/19/2025    Hypertensive emergency 06/19/2025    Complete  heart block 06/18/2025    Syncope 06/18/2025    Colon cancer screening declined 09/17/2023    Mammogram declined 09/17/2023    Opioid dependence 07/08/2022    Diabetic nephropathy 12/10/2020    High cholesterol 02/23/2018    Spasm 02/23/2018    Insomnia 10/27/2017    Migraine 10/27/2017    Type 2 diabetes with neuropathy 12/13/2016    Obesity 10/13/2016    Kidney stone 10/13/2016    Fibromyalgia 09/21/2016    Hypertension 09/21/2016    Ventral hernia 09/21/2016        Plan:     Complete Heart Block  Syncope  Heart Failure with Reduced Ejection Fraction  Severe Pulmonary HTN.  - Cardiology consulted in the ED.  - Cardiology placing Temporary Pacemaker  - Discontinue AV blocking agents.  - Holding Metoprolol  - TSH WNL.  - 2D Echocardiogram with severally reduced EF 20-25%.     Hypertensive Emergency, improved  Flash Pulmonary Edema, resolved  Anxiety  - Patient hypertensive with SBP in the 230s. MAP goal of 120 within 2 hours of initiation of drip.   - Started on Nitroglycerine drip per cardiology recommendations.   - Strict In and out put. BNP 1200 on arrival. Given 40 mg of IV lasix followed by 60 mg of IV Lasix.   - Patient received one dose of 0.5 mg ativan due to agitation and tachypnea.  - did not tolerate Ativan. Attempting Zyprexa IM x 2 times. If anxiety and agitation does not improve will intubate.  - Will do Propofol and Fentanyl for sedation per cardiology.      Diabetes  Diabetic neuropathy  - Holding Metformin  - SSI while in patient   - Last A1c 6.4 ~ 3 months ago     ISAIAS  - Will hold lisinopril at this time.   - CR 1.2 will continue to monitor.   - Recheck BMP.     HLD  - Holding Crestor.  - LDL 76, HDL 34, Total 138 ~ 3 months ago     Folate Deficiency  Vitamin B12 Deficiency  Macrocytosis  - Should be on weekly B12 injections  - MCV continues to be high.    Diet: Cardiac  Code:Full  DVT: Holding for possible ICD/PPM insertion    Nalini Pinto MD  [facility] -2    [facility]  Hospitalist Pager numbers:   [facility] Hospitalist Medicine Team A (Jamar/Leslie): [phone]  [facility] Hospitalist Medicine Team B (Zelda/Romeo):  [phone]

## 2025-06-19 NOTE — ASSESSMENT & PLAN NOTE
- related to acute pulmonary edema  - initial pO2 77.5 on 100% O2 with trend up to 159 on repeat ABG; ABG pendng this AM  - monitor closely with diuresis; if repeat ABG with recurrent hypoxia may need to be placed back on Bipap trying to avoid intubation

## 2025-06-19 NOTE — PROGRESS NOTES
Notified Dr. Keita of VBG results. Reported pt has been restless and repeatedly trying to remove the BIPAP mask. Per orders ok to give 2.5 mg IM Zyprexa now and repeat VBG @ 0900. Plan discussed with RT Wilmer.

## 2025-06-19 NOTE — PLAN OF CARE
The sw met with the pt and her dtr Marilyn Ayoub [phone] who was at bedside during the assessment. The pt lives alone in [place] but she has a very huge support system. The pt hasn't been driving lately,so Marilyn transports her to dr waggoner's and errands. The pt's independent with her ADL's and doesn't use dme. The sw completed the white board in the pt's room with her name and contact info. The sw encouraged them to call if they have any further questions or concerns. The sw will continue to follow the pt throughout her transitions of care and will assist with any d/c needs. The sw gave the pt a d/c brochure with her contact info on it.      [facility] - Intensive Care  Initial Discharge Assessment       Primary Care Provider: Blaine Benítez MD    Admission Diagnosis: Complete heart block [I44.2]  Bradycardia [R00.1]  Weakness [R53.1]  CHB (complete heart block) [I44.2]    Admission Date: 6/18/2025  Expected Discharge Date:     Transition of Care Barriers: (P) None    Payor: [employer] [facility] / Plan: [employer] / Product Type: Medicare Advantage /     Extended Emergency Contact Information  Primary Emergency Contact: Marilyn Ayoub  Mobile Phone: [phone]  Relation: Daughter  Preferred language: English  Secondary Emergency Contact: Kim Borrego   [place]  Mobile Phone: [phone]  Relation: Sister    Discharge Plan A: (P) Home Health  Discharge Plan B: (P) Other (TBD)      [id number] [place] - [address]  [address] [address]  Phone: [phone] Fax: [phone]    [id number] - [place] - [address]  [address] [facility] [address]  [address] [place] [address]  Phone: [phone] Fax: [phone]      Initial Assessment (most recent)       Adult Discharge Assessment - 06/19/25 1429          Discharge Assessment    Assessment Type Discharge Planning Assessment (P)      Confirmed/corrected  address, phone number and insurance Yes (P)      Confirmed Demographics Correct on Facesheet (P)      Source of Information patient;family (P)      Communicated LOKESH with patient/caregiver Date not available/Unable to determine (P)      People in Home alone (P)      Do you expect to return to your current living situation? Other (see comments) (P)    TBD    Do you have help at home or someone to help you manage your care at home? Yes (P)      Who are your caregiver(s) and their phone number(s)? Marilyn Ayoub(dtr)[phone]/Kim Borrego(sister)[phone] (P)      Prior to hospitilization cognitive status: Alert/Oriented (P)      Current cognitive status: Alert/Oriented (P)      Walking or Climbing Stairs Difficulty no (P)      Dressing/Bathing Difficulty no (P)      Home Accessibility wheelchair accessible (P)      Home Layout Able to live on 1st floor (P)      Equipment Currently Used at Home none (P)    the pt has a rw but doesn't use it.    Readmission within 30 days? No (P)      Patient currently being followed by outpatient case management? No (P)      Do you currently have service(s) that help you manage your care at home? No (P)      Do you take prescription medications? Yes (P)      Do you have prescription coverage? Yes (P)      Do you have any problems affording any of your prescribed medications? No (P)      Is the patient taking medications as prescribed? yes (P)      Who is going to help you get home at discharge? Marilyn Ayoub(dtr)[phone] (P)      How do you get to doctors appointments? family or friend will provide (P)      Are you on dialysis? No (P)      Do you take coumadin? No (P)      Discharge Plan A Home Health (P)      Discharge Plan B Other (P)    TBD    DME Needed Upon Discharge  other (see comments) (P)    TBD    Discharge Plan discussed with: Patient;Adult children (P)      Transition of Care Barriers None (P)         Physical Activity    On average, how many days per week do you engage in moderate to  strenuous exercise (like a brisk walk)? 0 days (P)      On average, how many minutes do you engage in exercise at this level? 0 min (P)         Financial Resource Strain    How hard is it for you to pay for the very basics like food, housing, medical care, and heating? Somewhat hard (P)         Housing Stability    In the last 12 months, was there a time when you were not able to pay the mortgage or rent on time? No (P)      In the past 12 months, how many times have you moved where you were living? 0 (P)      At any time in the past 12 months, were you homeless or living in a shelter (including now)? No (P)         Transportation Needs    In the past 12 months, has lack of transportation kept you from medical appointments or from getting medications? No (P)      In the past 12 months, has lack of transportation kept you from meetings, work, or from getting things needed for daily living? No (P)         Food Insecurity    Within the past 12 months, you worried that your food would run out before you got the money to buy more. Never true (P)      Within the past 12 months, the food you bought just didn't last and you didn't have money to get more. Never true (P)         Stress    Do you feel stress - tense, restless, nervous, or anxious, or unable to sleep at night because your mind is troubled all the time - these days? Very much (P)         Social Isolation    How often do you feel lonely or isolated from those around you?  Never (P)         Alcohol Use    Q1: How often do you have a drink containing alcohol? Never (P)      Q2: How many drinks containing alcohol do you have on a typical day when you are drinking? Patient does not drink (P)      Q3: How often do you have six or more drinks on one occasion? Never (P)         Utilities    In the past 12 months has the electric, gas, oil, or water company threatened to shut off services in your home? No (P)         Health Literacy    How often do you need to have  someone help you when you read instructions, pamphlets, or other written material from your doctor or pharmacy? Sometimes (P)

## 2025-06-19 NOTE — PROGRESS NOTES
[facility] - Intensive Care  Cardiology  Progress Note    Patient Name: Cady Watkins  MRN: [id number]  Admission Date: 6/18/2025  Hospital Length of Stay: 1 days  Code Status: Full Code   Attending Physician: Robin Ndiaye MD   Primary Care Physician: Blaine Benítez MD  Expected Discharge Date:   Principal Problem:Complete heart block    Subjective:     Hospital Course:   Per Dr. Ureña consult note 6/18/2025 Cardiology consulted for complete heart block. 74 years old female who has not been feeling well for the last 2-3 weeks. Significant weakness, dizziness, and syncopal event. Also lower extremities edema. Went to her PCP today was noted to have slow heart rate so referred to the emergency room. EKG showed complete heart block with a rate in the 20s. Blood pressure stable. Mentation okay. On Toprol-XL 50 mg that she took today     1. Complete heart block  2. Yarsanism        Emergent temporary pacemaker  Discontinue Toprol  Avoid any AV manjit blocking agents  2D echocardiogram  Check TSH    6/19/2025 TVP remains in placed with VPaced rhythm. Transitioned from BiPap to HFNC- tachypneic and mild respiratory distress noted on PE. CBC with H&H 12.6&37.0 BMp with K+ 4.4 creatinine 1.21 Mg 1.7 Initial /106 started on IV Tridil overnight with trend down of SBP to 140s-150s and Tridil weaned off. Repeat CXR this AM with continued pulmonary edema and Lasix 120mg IV ordered this AM. Echo pending. Initial ABG yesterday evening 7.242/51.9/77.5/22.4 with repeat ABG 7.285/49.0/159/23.3 repeat pending this AM                  Review of Systems   Reason unable to perform ROS: limted due to acute respiratory distress.   Cardiovascular:  Positive for dyspnea on exertion.   Respiratory:  Positive for shortness of breath.      Objective:     Vital Signs (Most Recent):  Temp: 96.4 °F (35.8 °C) (06/18/25 2315)  Pulse: 70 (06/19/25 0947)  Resp: (!) 21 (06/19/25 0947)  BP: (!) 155/88 (06/19/25 0930)  SpO2: (!) 86  % (06/19/25 0947) Vital Signs (24h Range):  Temp:  [96.4 °F (35.8 °C)-97.6 °F (36.4 °C)] 96.4 °F (35.8 °C)  Pulse:  [22-88] 70  Resp:  [16-48] 21  SpO2:  [71 %-100 %] 86 %  BP: (116-233)/() 155/88     Weight: 77.5 kg (170 lb 13.7 oz)  Body mass index is 31.25 kg/m².     SpO2: (!) 86 %         Intake/Output Summary (Last 24 hours) at 6/19/2025 0955  Last data filed at 6/19/2025 0840  Gross per 24 hour   Intake 701.54 ml   Output 1920 ml   Net -1218.46 ml       Lines/Drains/Airways       Drain  Duration                  Urethral Catheter 06/19/25 0030 Silicone <1 day              Peripheral Intravenous Line  Duration                  Peripheral IV - Single Lumen 06/18/25 1553 20 G Right Antecubital <1 day         Peripheral IV - Single Lumen 06/19/25 0445 20 G 3/4 in Right Hand <1 day                       Physical Exam  Constitutional:       General: She is not in acute distress.     Appearance: She is well-developed.   Cardiovascular:      Rate and Rhythm: Normal rate and regular rhythm.      Heart sounds: No murmur heard.     No gallop.   Pulmonary:      Effort: Tachypnea, accessory muscle usage and respiratory distress present.      Breath sounds: Rales present. No wheezing.   Abdominal:      General: Bowel sounds are normal. There is no distension.      Palpations: Abdomen is soft.      Tenderness: There is no abdominal tenderness.   Skin:     General: Skin is warm and dry.   Neurological:      Mental Status: She is alert and oriented to person, place, and time.                Significant Imaging: Echocardiogram: Transthoracic echo (TTE) complete (Cupid Only):   Results for orders placed or performed during the hospital encounter of 06/18/25   Echo *Canceled*    Narrative    The following orders were created for panel order Echo.  Procedure                               Abnormality         Status                     ---------                               -----------         ------                        Please view results for these tests on the individual orders.     Assessment and Plan:     Brief HPI: Seen on AM rounds with Dr. Ureña while resting in bed with noted tachypnea and mild acute respiratory distress. Discussed POC as detailed below-verbalized understanding and agrees with POC      * Complete heart block  - presented with HR 20s with CHB noted on admit EKG  - emergent TVP placed and currently VPaced rhythm  - on BB as an outpatient for HTN management; will hold BB and allow for BB washout and will continue TVP fo rnow  - echo pending; will monitor closely and need for permanent pacemaker TBD     Hypertensive emergency  - initial /106  - started on IV Tridil upon admission; SBP trended down to 140s-150s overnight  - on Lisinopril 20 and Metoprolol 50mg BID as an outpatient- both on hold for now given CHB and ISAIAS  - overall goal BP less than 130/80 however 140s-150s acceptable at this time; anticipate continued improvement in BP as respiratory status improves; resume ACEI if creatinine remains stable; will continue to hold BB; if EF depressed could consider using Hydralazine/Isordil combination  - monitor BP closely     Acute hypoxemic respiratory failure  - related to acute pulmonary edema  - initial pO2 77.5 on 100% O2 with trend up to 159 on repeat ABG; ABG pendng this AM  - monitor closely with diuresis; if repeat ABG with recurrent hypoxia may need to be placed back on Bipap trying to avoid intubation     Acute pulmonary edema  - presented with CHB with HR 20s; ABG with hypoxia on 100% NRB; IV diuresis initated and pO2 improving  - maintained on BiPap overnight and transitioned to HFNC this AM; rales noted throughout all lung fields along with tachypnea with RR in the 40s and mild respiratory distress; repeat ABG pending; Lasix 120mg IV ordered this AM with plans for 60mg IV Q8hrs may need more depending on response to IV Lasix as well as respiratory status and filling pressures on echo    - echo pending; concerned about possibility of new onset CHF but will await echo read  - will monitor I&Os closely with IV Lasix     Syncope  - presented to PCP appt with syncope; noted with HR 20s consistent with CHB  - admitted to ICU and TVP placed; currently V paced rhythm; no tachyarrhythmias noted on telemetry overnight  - echo pending     Hypertension  - as detailed under HTN emergency         VTE Risk Mitigation (From admission, onward)           Ordered     heparin (porcine) injection 5,000 Units  Every 8 hours         06/19/25 1001     heparin infusion 1,000 units/500 ml in 0.9% NaCl (pressure line flush)  Intra-op continuous PRN         06/18/25 1628                Patient admitted to ICU with acute hypoxemic respiratory failure related to acute pulmonary edema along with CHB and HTN emergency. Seen on AM rounds with marked tachypnea with RR in the 40s on HFNC. CXR with continued pulmonary edema and aggressive diuresis in process. Currently has a lazo in place due to critical illness and acute decompensation rather than urinary retention. At this time, we recommend continued use of lazo catheter placement due to acute pulmonary edema, hypoxia and TVP use. Movement to meet urinary needs provides risk of further decompensation or loss of capture of TVP which could cause worsening of acute issues. Once her acute issues improves then it is reasonable to remove the lazo and we anticipate that being in the next 24-48 hours     > 40 minutes was spent in the care of this patient for evaluation, critical thinking and decision making. Also discussion with patient as to present condition. Not all time was spent in direct face to face with patient as time was spent reviewing ECGs, CXR, labs, medications and past studies.       JOSÉ MIGUEL Griggs, ANP  Cardiology  [facility] - Intensive Care

## 2025-06-19 NOTE — PROGRESS NOTES
I saw and evaluated the patient. I have reviewed and agree with the residents findings, including all diagnostic interpretations, and plans as written. This is an attestation of a separate note written by the ICU resident today.  As the teaching physician, I was present for and have confirmed the key portions of the service performed by the resident today.  In addition to the resident's note, I add the following:    Problems:  Acute hypoxic respiratory failure  Complete Heart block  HTN emergency     Plan:  Continue diuresis. Alternate HHFNO and Bipap  TVP remains in place. Rate set at 70, output 5mA. Will eventually need PPM         Routine MICU Care Items    Family and Patient involvement in ICU Care:  Updated Patient  Feeding:  NPO  Analgesia: No current pain  Sedation: Not Intubated  Thromboembolism Prophylaxis:  Heparin  Head of Bed at 30 degrees: Yes  Ulcer Prophylaxis: None Indicated (On tube feeding or taking food my mouth)  Glycemic Control:  goal glucose < 180mg/dL  Spontaneous Awakening and Breathing Trial:  Not Intubated  Bowel Regimen: None Required  Invasive Device Removal:   Wallis  [x]Not Present  []Present, Remove Today and begin Nursing Monitoring for Retention  []Present, Indicated   Indication:   []Recent Pelvic Surgery   []Stage IV Sacral Ulcer and cannot be managed with non-invasive urine collection   []Hourly Titration of Continuous Infusions according to Urine Output   []Hospice or end-of-life care   []Requested by Urology    Central Line  []Not Present  []Present, Remove Today  [x]Present, Indicated      Drug de-escalation:   Antibiotics: None Currently   Code Status: FULL CODE  Disposition: ICU     55 minutes of critical care time was spent in the critical care management of the patient. This included management of organ failures related to critical illness, titration of continuous infusions, management of mechanical ventilation, review of pertinent labs and imaging studies, discussion of  the patient with consulting services, and discussion of the patients condition with the patient and family.      Bret Rahman MD  [facility]

## 2025-06-19 NOTE — ED PROVIDER NOTES
Encounter Date: 2025       History     Chief Complaint   Patient presents with    Bradycardia     Pt referred to ED by PCP for bradycardia, HR 30's. Pt reports fatigue w/ intermittent SOB.      Cady Watkins is a 74 y.o. female who  has a past medical history of Diabetes mellitus, type 2, Fibromyalgia (2016), and Hypertension (2016).    The patient presents to the ED due to   Bradycardia.  Patient was evaluated by her PCP who referred her to the emergency department for bradycardia.  Patient states she has been feeling unwell for the past 3 weeks she describes shortness of breath as well as fatigue.  She had 1 syncopal episode during this time.  She states she feels no worse today than she has been feeling for the past several days.  She does report  her sister unfortunately passed away yesterday.  She denies any fever present chest pain nausea vomiting or other symptoms/concerns    The history is provided by the patient and medical records.     Review of patient's allergies indicates:   Allergen Reactions    Advil [ibuprofen] Hives    Penicillins     Codeine     Excedrin aspirin free [acetaminophen-caffeine]      Past Medical History:   Diagnosis Date    Diabetes mellitus, type 2     Fibromyalgia 2016    Hypertension 2016     Past Surgical History:   Procedure Laterality Date     SECTION      CHOLECYSTECTOMY      FOOT SURGERY Bilateral     plantar fasciotomy bilateral, arthroplasty fifth toe bilateral    HERNIA REPAIR       Family History   Problem Relation Name Age of Onset    Diabetes Father      Heart disease Sister      Heart disease Brother       Social History[1]  Review of Systems   Constitutional:  Positive for fatigue. Negative for fever.   HENT:  Negative for sore throat.    Respiratory:  Positive for shortness of breath.    Cardiovascular:  Negative for chest pain.   Gastrointestinal:  Negative for nausea.   Genitourinary:  Negative for dysuria.   Musculoskeletal:   Negative for back pain.   Skin:  Negative for rash.   Neurological:  Positive for light-headedness. Negative for weakness.   Hematological:  Does not bruise/bleed easily.       Physical Exam     Initial Vitals   BP Pulse Resp Temp SpO2   06/18/25 1542 06/18/25 1541 06/18/25 1541 06/18/25 1855 06/18/25 1541   (!) 181/76 (!) 28 (!) 24 97.1 °F (36.2 °C) 100 %      MAP       --                Physical Exam    Nursing note and vitals reviewed.  Constitutional: She appears well-developed and well-nourished. She is not diaphoretic. No distress.   HENT:   Head: Normocephalic and atraumatic. Mouth/Throat: Oropharynx is clear and moist.   Eyes: EOM are normal. Pupils are equal, round, and reactive to light.   Neck: No tracheal deviation present.   Cardiovascular:  Normal heart sounds and intact distal pulses.            bradycardic   Pulmonary/Chest: Breath sounds normal. No stridor. No respiratory distress. She has no wheezes.   Abdominal: Abdomen is soft. Bowel sounds are normal. She exhibits distension. She exhibits no mass. There is no abdominal tenderness.   Musculoskeletal:         General: No edema. Normal range of motion.     Neurological: She is alert. She has normal strength.    Cranial nerves 2-12 grossly intact, moving all extremities well   Skin: Skin is warm and dry. Capillary refill takes less than 2 seconds. No rash noted.   Psychiatric: She has a normal mood and affect.         ED Course   Procedures  Labs Reviewed   POCT GLUCOSE - Abnormal       Result Value    POCT Glucose 190 (*)           Imaging Results    None          Medications   heparin infusion 1,000 units/500 ml in 0.9% NaCl (pressure line flush) (500 Units/hr Irrigation New Bag 6/18/25 1628)   sodium chloride 0.9% flush 2 mL (has no administration in time range)   naloxone 0.4 mg/mL injection 0.02 mg (has no administration in time range)   glucose chewable tablet 16 g (has no administration in time range)   glucose chewable tablet 24 g (has no  administration in time range)   dextrose 50% injection 12.5 g (has no administration in time range)   dextrose 50% injection 25 g (has no administration in time range)   glucagon (human recombinant) injection 1 mg (has no administration in time range)   atorvastatin tablet 20 mg (has no administration in time range)   magnesium sulfate 2g in water 50mL IVPB (premix) (has no administration in time range)   hydrOXYzine HCL tablet 25 mg (25 mg Oral Given 6/18/25 1959)   atropine injection 0.5 mg ( Intravenous Not Given 6/18/25 1600)   furosemide injection 40 mg (40 mg Intravenous Given 6/18/25 1959)     Medical Decision Making  Differential diagnosis includes but is not limited to:    Complete heart block, ACS, congestive heart failure, electrolyte abnormality,    Amount and/or Complexity of Data Reviewed  Labs: ordered.    Risk  Prescription drug management.  Decision regarding hospitalization.  Risk Details:  Patient with complete heart block, fortunately hemodynamically stable without any acute change in her symptoms today.      Patient was immediately placed in his resuscitation  room.  Dopamine on standby, pacer pads in place.  Patient will be taken to the cath lab for transvenous pacemaker and left here without acute event.  I discussed the case with [facility] as recommended by cardiology who will admit patient.  Patient will go to ICU after her procedure              Attending Attestation:         Attending Critical Care:   Critical Care Times:   Direct Patient Care (initial evaluation, reassessments, and time considering the case)................................................................10 minutes.   Additional History from reviewing old medical records or taking additional history from the family, EMS, PCP, etc.......................5 minutes.   Ordering, Reviewing, and Interpreting Diagnostic  Studies...............................................................................................................5 minutes.   Documentation..................................................................................................................................................................................5 minutes.   Consultation with other Physicians. .................................................................................................................................................5 minutes.   ==============================================================  Total Critical Care Time - exclusive of procedural time: 30 minutes.  ==============================================================  Critical care reasons: complete heart block.           ED Course as of 06/18/25 1959 Wed Jun 18, 2025   1545  EKG: Rate 28.  Appears to be third-degree heart block with AV dissociation.  No STEMI. [RN]   1606   Dr. Brock at bedside  obtaining consent for a temporary pacemaker.  Patient received 0.5 mg of IV atropine.  Pacer pads on and dopamine on standby.  Labs pending [RN]      ED Course User Index  [RN] Sanya Mishra Jr., MD                           Clinical Impression:  Final diagnoses:  [R53.1] Weakness  [I44.2] CHB (complete heart block)          ED Disposition Condition    Admit                Portions of this note were dictated using voice recognition software and may contain dictation related errors in spelling/grammar/syntax not found on text review           [1]   Social History  Tobacco Use    Smoking status: Never    Smokeless tobacco: Never   Substance Use Topics    Alcohol use: Yes     Comment: Occasionally        Sanya Mishra Jr., MD  06/18/25 1959

## 2025-06-19 NOTE — HOSPITAL COURSE
Per Dr. Ureña consult note 6/18/2025 Cardiology consulted for complete heart block. 74 years old female who has not been feeling well for the last 2-3 weeks. Significant weakness, dizziness, and syncopal event. Also lower extremities edema. Went to her PCP today was noted to have slow heart rate so referred to the emergency room. EKG showed complete heart block with a rate in the 20s. Blood pressure stable. Mentation okay. On Toprol-XL 50 mg that she took today     1. Complete heart block  2. Faith        Emergent temporary pacemaker  Discontinue Toprol  Avoid any AV manjit blocking agents  2D echocardiogram  Check TSH    6/19/2025 TVP remains in placed with VPaced rhythm. Transitioned from BiPap to HFNC- tachypneic and mild respiratory distress noted on PE. CBC with H&H 12.6&37.0 BMp with K+ 4.4 creatinine 1.21 Mg 1.7 Initial /106 started on IV Tridil overnight with trend down of SBP to 140s-150s and Tridil weaned off. Repeat CXR this AM with continued pulmonary edema and Lasix 120mg IV ordered this AM. Echo pending. Initial ABG yesterday evening 7.242/51.9/77.5/22.4 with repeat ABG 7.285/49.0/159/23.3 repeat pending this AM          6/20/2025 Remains on HFNC with wean in progress down to 70% 15L this AM. Remains on IV Lasix 80mg BID with 3.2L out overnight negative 4.2L since admission. Echo yesterday with EF 20-25% WMA and PHTN. TVP remains in place with V Paced rhythm CBC WNL BMP with K+ 3.3 Mg 1.6 replacement ordered     Per Dr. Horner progress note 6/22/2025 74 y.o. female with DM, HTN p/w sx of fatigue found to be tenzin in PCP office and later endorsed chest discomfort for last few weeks found to be in CHB w/ HR in 30s c/b syncope s/p TVP. Also with flash pulm edema. TTE EF 20-25% WMA in LAD distribution c/f complete anterior MI vs. Stress CM in light of recent stressors. Initial ECG CHB 30s, RBB anterior TW changes, Tr negative, BNP 1200.  Significant improvement with diuresis and TVP.      Continues to have significant delirium now requiring Precedex drip.  Significantly sedated this morning.  Some hypotension noted with Precedex drip.  Creatinine mildly increased to 1.5.  Chest x-ray reviewed, pacer in place, significant atelectasis and interstitial opacification noted right lower and middle lobes.  Largely pacer dependent today.     -holding spironolactone and losartan in setting of hypotension while Precedex drip is weaned   -hold further diuresis for today--can transition back to oral diuresis tomorrow  -on atorva 20,  -asa 81mg daily   -can start spironolactone 25 mg daily today  -further GDMT to follow up  -pending mental status improving we will plan for ICD/PPM Tuesday, keep NPO after midnight Monday pending mental status    6/23/2025 Hypotensive overnight requiring IV Levophed. On Precedex overnight as well for agitation but weaned off this AM. CBC with H&H 12.8&37.8 BMP with K+ 4.1 creatinine 1.9 up from 1.5 Urology consulted with CT scan with non obstructing 3mm renal stone no mass appreciated

## 2025-06-19 NOTE — EICU
Camera evaluation for Respiratory distress, anxiety.    Discussed with RN,  On NRB, awake and alert.  Obese.  Anxious.   S/p venous pacing for Bradycardia, syncope.  Trying to sit up.  HR 69, MAP > 65.    Plan:  Getting NTG drip for CHF per Cards.  Avoiding precedex at this time, asked bed side to touch base with cards if it is ok to use it if needed overnight. Risk for intubation over night.  -stat low dose ativan for now  Asp precautions  Get stat Chest X ray, ABG.  Going on BiPAP, has an order already.    21:49  CxR images seen, no old x ray to compare.  Consistent with pulmonary edema, but has also right mid zone air space densities-pneumonia.     AB.24/51/77/22.   WBC normal.  No fever.    - follow ABG while on BiPAP.   - diuresis, NTG drip.  - s/p heparin intra op. To go on lovenox as VTE prophylaxis from tomorrow.

## 2025-06-20 PROBLEM — I50.21 ACUTE SYSTOLIC HEART FAILURE: Status: ACTIVE | Noted: 2025-06-20

## 2025-06-20 LAB
ABSOLUTE EOSINOPHIL (OHS): 0.05 K/UL
ABSOLUTE MONOCYTE (OHS): 0.83 K/UL (ref 0.3–1)
ABSOLUTE NEUTROPHIL COUNT (OHS): 7.15 K/UL (ref 1.8–7.7)
ALBUMIN SERPL BCP-MCNC: 2.8 G/DL (ref 3.5–5.2)
ALP SERPL-CCNC: 37 UNIT/L (ref 40–150)
ALT SERPL W/O P-5'-P-CCNC: 23 UNIT/L (ref 10–44)
ANION GAP (OHS): 8 MMOL/L (ref 8–16)
ANION GAP (OHS): 8 MMOL/L (ref 8–16)
AST SERPL-CCNC: 19 UNIT/L (ref 11–45)
BASOPHILS # BLD AUTO: 0.04 K/UL
BASOPHILS NFR BLD AUTO: 0.4 %
BILIRUB SERPL-MCNC: 1 MG/DL (ref 0.1–1)
BSA FOR ECHO PROCEDURE: 1.79 M2
BUN SERPL-MCNC: 23 MG/DL (ref 8–23)
BUN SERPL-MCNC: 25 MG/DL (ref 8–23)
CALCIUM SERPL-MCNC: 8.1 MG/DL (ref 8.7–10.5)
CALCIUM SERPL-MCNC: 8.2 MG/DL (ref 8.7–10.5)
CHLORIDE SERPL-SCNC: 100 MMOL/L (ref 95–110)
CHLORIDE SERPL-SCNC: 105 MMOL/L (ref 95–110)
CO2 SERPL-SCNC: 26 MMOL/L (ref 23–29)
CO2 SERPL-SCNC: 31 MMOL/L (ref 23–29)
CREAT SERPL-MCNC: 1 MG/DL (ref 0.5–1.4)
CREAT SERPL-MCNC: 1.1 MG/DL (ref 0.5–1.4)
ERYTHROCYTE [DISTWIDTH] IN BLOOD BY AUTOMATED COUNT: 14 % (ref 11.5–14.5)
GFR SERPLBLD CREATININE-BSD FMLA CKD-EPI: 53 ML/MIN/1.73/M2
GFR SERPLBLD CREATININE-BSD FMLA CKD-EPI: 60 ML/MIN/1.73/M2
GLUCOSE SERPL-MCNC: 141 MG/DL (ref 70–110)
GLUCOSE SERPL-MCNC: 145 MG/DL (ref 70–110)
HCT VFR BLD AUTO: 34 % (ref 37–48.5)
HGB BLD-MCNC: 11.5 GM/DL (ref 12–16)
IMM GRANULOCYTES # BLD AUTO: 0.03 K/UL (ref 0–0.04)
IMM GRANULOCYTES NFR BLD AUTO: 0.3 % (ref 0–0.5)
LYMPHOCYTES # BLD AUTO: 2.78 K/UL (ref 1–4.8)
MAGNESIUM SERPL-MCNC: 1.6 MG/DL (ref 1.6–2.6)
MCH RBC QN AUTO: 31.9 PG (ref 27–31)
MCHC RBC AUTO-ENTMCNC: 33.8 G/DL (ref 32–36)
MCV RBC AUTO: 94 FL (ref 82–98)
NUCLEATED RBC (/100WBC) (OHS): 0 /100 WBC
PHOSPHATE SERPL-MCNC: 2.6 MG/DL (ref 2.7–4.5)
PLATELET # BLD AUTO: 118 K/UL (ref 150–450)
PMV BLD AUTO: 12.4 FL (ref 9.2–12.9)
POCT GLUCOSE: 143 MG/DL (ref 70–110)
POCT GLUCOSE: 149 MG/DL (ref 70–110)
POCT GLUCOSE: 160 MG/DL (ref 70–110)
POCT GLUCOSE: 166 MG/DL (ref 70–110)
POCT GLUCOSE: 170 MG/DL (ref 70–110)
POTASSIUM SERPL-SCNC: 3.3 MMOL/L (ref 3.5–5.1)
POTASSIUM SERPL-SCNC: 4.1 MMOL/L (ref 3.5–5.1)
PROT SERPL-MCNC: 5.7 GM/DL (ref 6–8.4)
RBC # BLD AUTO: 3.61 M/UL (ref 4–5.4)
RELATIVE EOSINOPHIL (OHS): 0.5 %
RELATIVE LYMPHOCYTE (OHS): 25.6 % (ref 18–48)
RELATIVE MONOCYTE (OHS): 7.6 % (ref 4–15)
RELATIVE NEUTROPHIL (OHS): 65.6 % (ref 38–73)
SODIUM SERPL-SCNC: 139 MMOL/L (ref 136–145)
SODIUM SERPL-SCNC: 139 MMOL/L (ref 136–145)
WBC # BLD AUTO: 10.88 K/UL (ref 3.9–12.7)

## 2025-06-20 PROCEDURE — 25000003 PHARM REV CODE 250

## 2025-06-20 PROCEDURE — 84100 ASSAY OF PHOSPHORUS: CPT

## 2025-06-20 PROCEDURE — 63600175 PHARM REV CODE 636 W HCPCS: Performed by: NURSE PRACTITIONER

## 2025-06-20 PROCEDURE — 20000000 HC ICU ROOM

## 2025-06-20 PROCEDURE — 94761 N-INVAS EAR/PLS OXIMETRY MLT: CPT

## 2025-06-20 PROCEDURE — 25000003 PHARM REV CODE 250: Performed by: CLINIC/CENTER

## 2025-06-20 PROCEDURE — 36415 COLL VENOUS BLD VENIPUNCTURE: CPT

## 2025-06-20 PROCEDURE — 27100171 HC OXYGEN HIGH FLOW UP TO 24 HOURS

## 2025-06-20 PROCEDURE — 99291 CRITICAL CARE FIRST HOUR: CPT | Mod: ,,, | Performed by: NURSE PRACTITIONER

## 2025-06-20 PROCEDURE — 99900035 HC TECH TIME PER 15 MIN (STAT)

## 2025-06-20 PROCEDURE — 63600175 PHARM REV CODE 636 W HCPCS: Performed by: INTERNAL MEDICINE

## 2025-06-20 PROCEDURE — 25000003 PHARM REV CODE 250: Performed by: NURSE PRACTITIONER

## 2025-06-20 PROCEDURE — 63600175 PHARM REV CODE 636 W HCPCS

## 2025-06-20 PROCEDURE — 85025 COMPLETE CBC W/AUTO DIFF WBC: CPT

## 2025-06-20 PROCEDURE — 83735 ASSAY OF MAGNESIUM: CPT

## 2025-06-20 PROCEDURE — 80053 COMPREHEN METABOLIC PANEL: CPT

## 2025-06-20 RX ORDER — CETIRIZINE HYDROCHLORIDE 5 MG/1
5 TABLET ORAL ONCE
Status: COMPLETED | OUTPATIENT
Start: 2025-06-20 | End: 2025-06-20

## 2025-06-20 RX ORDER — ENOXAPARIN SODIUM 100 MG/ML
40 INJECTION SUBCUTANEOUS EVERY 24 HOURS
Status: DISCONTINUED | OUTPATIENT
Start: 2025-06-20 | End: 2025-06-23

## 2025-06-20 RX ORDER — LOSARTAN POTASSIUM 50 MG/1
100 TABLET ORAL DAILY
Status: DISCONTINUED | OUTPATIENT
Start: 2025-06-21 | End: 2025-06-23

## 2025-06-20 RX ORDER — LORAZEPAM 0.5 MG/1
0.5 TABLET ORAL ONCE
Status: COMPLETED | OUTPATIENT
Start: 2025-06-20 | End: 2025-06-20

## 2025-06-20 RX ORDER — LOSARTAN POTASSIUM 25 MG/1
25 TABLET ORAL ONCE
Status: COMPLETED | OUTPATIENT
Start: 2025-06-20 | End: 2025-06-20

## 2025-06-20 RX ORDER — POLYETHYLENE GLYCOL 3350 17 G/17G
17 POWDER, FOR SOLUTION ORAL DAILY
Status: DISCONTINUED | OUTPATIENT
Start: 2025-06-20 | End: 2025-06-24 | Stop reason: HOSPADM

## 2025-06-20 RX ORDER — LOSARTAN POTASSIUM 50 MG/1
50 TABLET ORAL ONCE
Status: COMPLETED | OUTPATIENT
Start: 2025-06-20 | End: 2025-06-20

## 2025-06-20 RX ORDER — LOSARTAN POTASSIUM 50 MG/1
50 TABLET ORAL DAILY
Status: DISCONTINUED | OUTPATIENT
Start: 2025-06-21 | End: 2025-06-20

## 2025-06-20 RX ORDER — MAGNESIUM SULFATE HEPTAHYDRATE 40 MG/ML
2 INJECTION, SOLUTION INTRAVENOUS ONCE
Status: COMPLETED | OUTPATIENT
Start: 2025-06-20 | End: 2025-06-20

## 2025-06-20 RX ADMIN — HEPARIN SODIUM 5000 UNITS: 5000 INJECTION INTRAVENOUS; SUBCUTANEOUS at 06:06

## 2025-06-20 RX ADMIN — ACETAMINOPHEN 1000 MG: 500 TABLET ORAL at 03:06

## 2025-06-20 RX ADMIN — MAGNESIUM SULFATE HEPTAHYDRATE 2 G: 40 INJECTION, SOLUTION INTRAVENOUS at 06:06

## 2025-06-20 RX ADMIN — INSULIN ASPART 2 UNITS: 100 INJECTION, SOLUTION INTRAVENOUS; SUBCUTANEOUS at 05:06

## 2025-06-20 RX ADMIN — CETIRIZINE HYDROCHLORIDE 5 MG: 5 TABLET, FILM COATED ORAL at 03:06

## 2025-06-20 RX ADMIN — LOSARTAN POTASSIUM 25 MG: 25 TABLET, FILM COATED ORAL at 09:06

## 2025-06-20 RX ADMIN — LORAZEPAM 0.5 MG: 0.5 TABLET ORAL at 10:06

## 2025-06-20 RX ADMIN — POTASSIUM BICARBONATE 20 MEQ: 391 TABLET, EFFERVESCENT ORAL at 11:06

## 2025-06-20 RX ADMIN — POTASSIUM BICARBONATE 20 MEQ: 391 TABLET, EFFERVESCENT ORAL at 06:06

## 2025-06-20 RX ADMIN — FUROSEMIDE 80 MG: 10 INJECTION, SOLUTION INTRAVENOUS at 09:06

## 2025-06-20 RX ADMIN — LOSARTAN POTASSIUM 50 MG: 50 TABLET, FILM COATED ORAL at 12:06

## 2025-06-20 RX ADMIN — POLYETHYLENE GLYCOL 3350 17 G: 17 POWDER, FOR SOLUTION ORAL at 10:06

## 2025-06-20 RX ADMIN — INSULIN GLARGINE 5 UNITS: 100 INJECTION, SOLUTION SUBCUTANEOUS at 09:06

## 2025-06-20 RX ADMIN — MUPIROCIN: 20 OINTMENT TOPICAL at 09:06

## 2025-06-20 RX ADMIN — ENOXAPARIN SODIUM 40 MG: 40 INJECTION SUBCUTANEOUS at 04:06

## 2025-06-20 RX ADMIN — LOSARTAN POTASSIUM 25 MG: 25 TABLET, FILM COATED ORAL at 10:06

## 2025-06-20 RX ADMIN — POTASSIUM BICARBONATE 20 MEQ: 391 TABLET, EFFERVESCENT ORAL at 09:06

## 2025-06-20 RX ADMIN — ATORVASTATIN CALCIUM 20 MG: 20 TABLET, FILM COATED ORAL at 09:06

## 2025-06-20 RX ADMIN — POTASSIUM BICARBONATE 20 MEQ: 391 TABLET, EFFERVESCENT ORAL at 10:06

## 2025-06-20 RX ADMIN — INSULIN ASPART 2 UNITS: 100 INJECTION, SOLUTION INTRAVENOUS; SUBCUTANEOUS at 11:06

## 2025-06-20 RX ADMIN — FUROSEMIDE 80 MG: 10 INJECTION, SOLUTION INTRAVENOUS at 08:06

## 2025-06-20 NOTE — PROGRESS NOTES
"[facility] Medicine Progress Note    Primary Team: [facility] Hospitalist Team B  Attending Physician: Robin Ndiaye MD  Resident: Ang  Intern: Jesu    Subjective:      Patient seen at bedside this morning. No acute complaints. Denies chest pain, shortness of breath, fevers, or chills.     Objective:     Last 24 Hour Vital Signs:  BP  Min: 121/68  Max: 177/88  Temp  Av °F (36.7 °C)  Min: 97.8 °F (36.6 °C)  Max: 98.3 °F (36.8 °C)  Pulse  Av.7  Min: 68  Max: 73  Resp  Av.9  Min: 5  Max: 43  SpO2  Av.1 %  Min: 84 %  Max: 100 %  Height  Av' 2" (157.5 cm)  Min: 5' 2" (157.5 cm)  Max: 5' 2" (157.5 cm)  Weight  Av.5 kg (162 lb)  Min: 73.5 kg (162 lb)  Max: 73.5 kg (162 lb)  I/O last 3 completed shifts:  In: 898.5 [P.O.:600; I.V.:238.5; Other:60]  Out: 5170 [Urine:5170]    Physical Examination:  Physical Exam  Constitutional:       Comments: Patient has TVP placed in the Right IJ.   HENT:      Mouth/Throat:      Mouth: Mucous membranes are moist.      Pharynx: Oropharynx is clear.   Eyes:      Pupils: Pupils are equal, round, and reactive to light.   Cardiovascular:      Rate and Rhythm: Normal rate and regular rhythm.   Skin:     General: Skin is warm and dry.   Neurological:      General: No focal deficit present.      Mental Status: She is alert.   Psychiatric:         Mood and Affect: Mood normal.         Behavior: Behavior normal.         Thought Content: Thought content normal.         Judgment: Judgment normal.       Laboratory:  Laboratory Data Reviewed: yes  Pertinent Findings:  Recent Labs   Lab 25  1557 25  2310 25  0514 25  0337   WBC 7.79  --  11.59 10.88   HGB 12.4  --  12.6 11.5*   HCT 33.4*  --  37.0 34.0*   *  --  121* 118*    140 140 139   K 4.3 4.1 4.4 3.3*    107 109 105   CREATININE 1.2 1.1 1.1 1.1   BUN 28* 25* 25* 23   CO2 20* 21* 22* 26   ALT 35  --  31 23   AST 29  --  27 19     Microbiology Data Reviewed: no  Pertinent " Findings:  None    Other Results:  EKG (my interpretation): Complete Heart Block.    Radiology Data Reviewed: yes  Pertinent Findings:  X-Ray Chest AP Portable   Final Result      As above.         Electronically signed by: Michael Fields   Date:    06/19/2025   Time:    09:12      X-Ray Chest AP Portable   Final Result      As above.         Electronically signed by: Michael Fields   Date:    06/19/2025   Time:    09:31      X-Ray Chest AP Portable    (Results Pending)   X-Ray Chest AP Portable    (Results Pending)         Current Medications:     Infusions:   heparin (porcine)    Continuous  mL/hr at 06/18/25 1628 500 Units/hr at 06/18/25 1628        Scheduled:   atorvastatin  20 mg Oral Daily    furosemide (LASIX) injection  80 mg Intravenous Q12H    heparin (porcine)  5,000 Units Subcutaneous Q8H    insulin glargine U-100  5 Units Subcutaneous Daily    losartan  25 mg Oral Daily    mupirocin   Topical (Top) Daily    potassium bicarbonate  20 mEq Oral Q2H        PRN:    Current Facility-Administered Medications:     acetaminophen, 1,000 mg, Oral, Q8H PRN    dextrose 50%, 12.5 g, Intravenous, PRN    dextrose 50%, 12.5 g, Intravenous, PRN    dextrose 50%, 25 g, Intravenous, PRN    glucagon (human recombinant), 1 mg, Intramuscular, PRN    glucagon (human recombinant), 1 mg, Intramuscular, PRN    glucose, 16 g, Oral, PRN    glucose, 24 g, Oral, PRN    heparin (porcine), , , Continuous PRN    insulin aspart U-100, 0-10 Units, Subcutaneous, Q6H PRN    naloxone, 0.02 mg, Intravenous, PRN    sodium chloride 0.9%, 2 mL, Intravenous, Q12H PRN    Antibiotics and Day Number of Therapy:  None    Lines and Day Number of Therapy:  Peripheral IV - Single Lumen 06/18/25 1553 20 G Right Antecubital   Peripheral IV - Single Lumen 06/19/25 0445 20 G 3/4 in Right Hand   Wound 06/18/25 1646 Incision Right Neck venous venogram puncture     Assessment:     Cady Watkins is a 74 y.o.female with  Patient Active Problem  List    Diagnosis Date Noted    Acute pulmonary edema 06/19/2025    Acute hypoxemic respiratory failure 06/19/2025    Hypertensive emergency 06/19/2025    Complete heart block 06/18/2025    Syncope 06/18/2025    Colon cancer screening declined 09/17/2023    Mammogram declined 09/17/2023    Opioid dependence 07/08/2022    Diabetic nephropathy 12/10/2020    High cholesterol 02/23/2018    Spasm 02/23/2018    Insomnia 10/27/2017    Migraine 10/27/2017    Type 2 diabetes with neuropathy 12/13/2016    Obesity 10/13/2016    Kidney stone 10/13/2016    Fibromyalgia 09/21/2016    Hypertension 09/21/2016    Ventral hernia 09/21/2016        Plan:     Complete Heart Block  Syncope  Heart Failure with Reduced Ejection Fraction  Severe Pulmonary HTN.  - Cardiology consulted in the ED.  - Cardiology placed Temporary Pacemaker, Permanent next week.  - Discontinue AV blocking agents.  - Holding Metoprolol  - TSH WNL.  - 2D Echocardiogram with severally reduced EF 20-25%. Repeat next week.  - Losartan ordered 6/19 by cardiology.     Hypertensive Emergency, improved  Flash Pulmonary Edema, resolved  Anxiety  - Patient hypertensive with SBP in the 230s. MAP goal of 120 within 2 hours of initiation of drip.   - Started on Nitroglycerine drip per cardiology recommendations.   - Strict In and out put. BNP 1200 on arrival. Given 40 mg of IV lasix followed by 60 mg of IV Lasix.   - Patient received one dose of 0.5 mg ativan due to agitation and tachypnea.  - did not tolerate Ativan. Attempting Zyprexa IM x 2 times. If anxiety and agitation does not improve will intubate.  - Will do Propofol and Fentanyl for sedation per cardiology.      Diabetes  Diabetic neuropathy  - Holding Metformin  - SSI while in patient   - Last A1c 6.4 ~ 3 months ago     ISAIAS, stable  - Will hold lisinopril at this time.   - CR 1.2 will continue to monitor.   - Recheck BMP.     HLD  - Holding Crestor.  - LDL 76, HDL 34, Total 138 ~ 3 months ago     Folate  Deficiency  Vitamin B12 Deficiency  Macrocytosis  - Should be on weekly B12 injections  - MCV continues to be high.    Diet: Cardiac  Code:Full  DVT: Holding for possible ICD/PPM insertion    Nalini Pinto MD  [facility] Internal Medicine HO-2    [facility] Medicine Hospitalist Pager numbers:   [facility] Hospitalist Medicine Team A (Jamar/Leslie): [phone]  [facility] Hospitalist Medicine Team B (Zelda/Romeo):  [phone]

## 2025-06-20 NOTE — ASSESSMENT & PLAN NOTE
- new onset  - echo yesterday with EF 20-25% and WMA; acute pulmonary edema upon admission; aggressive diuresis in process with good response; negative 4.2L since admission  - WMA noted on echo as well; continue with medical management for now with ARB with hopes for transition to Entresto; no BB due to CHB  - anticipate need for ischemic evaluation with timing to be determined; currently ADHF precludes proceeding and ideally needs to be more euvolemic

## 2025-06-20 NOTE — ASSESSMENT & PLAN NOTE
- presented with HR 20s with CHB noted on admit EKG  - emergent TVP placed and currently VPaced rhythm  - on BB as an outpatient for HTN management; will hold BB and allow for BB washout and will continue TVP fo rnow  - echo with depressed EF; will monitor closely and need for permanent pacemaker TBD

## 2025-06-20 NOTE — ASSESSMENT & PLAN NOTE
- presented with CHB with HR 20s; ABG with hypoxia on 100% NRB with transition to BiPap now on HFNC  - aggressive diuresis in process with Lasix 80mg IV BID with 3.2L out overnight negative 4.2L since admission; HFNC wean in process down to 15L from 30L and FiO2 70%   - echo with newly depressed EF  - will monitor I&Os closely with IV Lasix

## 2025-06-20 NOTE — PLAN OF CARE
Pt. Had uneventful night. VSS, V paced rhythm @ 70 bpm. Pt. Has had intermittent confusion but able to be redirected. Bed in lowest position, side rails raised x2, call bell within reach. Daughter at bedside overnight, plan of care reviewed with patient and daughter.     Problem: Adult Inpatient Plan of Care  Goal: Plan of Care Review  Outcome: Progressing  Goal: Patient-Specific Goal (Individualized)  Outcome: Progressing  Goal: Absence of Hospital-Acquired Illness or Injury  Outcome: Progressing  Goal: Optimal Comfort and Wellbeing  Outcome: Progressing  Goal: Readiness for Transition of Care  Outcome: Progressing     Problem: Delirium  Goal: Optimal Coping  Outcome: Progressing  Goal: Improved Behavioral Control  Outcome: Progressing  Goal: Improved Attention and Thought Clarity  Outcome: Progressing  Goal: Improved Sleep  Outcome: Progressing

## 2025-06-20 NOTE — PLAN OF CARE
06/19/25 2018   Patient Assessment/Suction   Level of Consciousness (AVPU) alert   Respiratory Effort Normal;Unlabored   Expansion/Accessory Muscles/Retractions no retractions;no use of accessory muscles   All Lung Fields Breath Sounds Anterior:;Lateral:   ROSSY Breath Sounds clear   LLL Breath Sounds diminished   RUL Breath Sounds clear   RML Breath Sounds diminished   RLL Breath Sounds diminished   Rhythm/Pattern, Respiratory depth regular;pattern regular;unlabored   Skin Integrity   $ Wound Care Tech Time 15 min   Area Observed Behind ear;Cheek;Bridge of nose   Skin Appearance without discoloration   PRE-TX-O2   Device (Oxygen Therapy) high flow nasal cannula w/device  (vapotherm)   $ Is the patient on High Flow Oxygen? Yes   $ Noninvasive Daily Charge Noninvasive Daily   Humidification temp set 36   Humidification temp actual 36   Flow (L/min) (Oxygen Therapy) 30   Oxygen Concentration (%) 90   SpO2 96 %   Pulse Oximetry Type Continuous   $ Pulse Oximetry - Multiple Charge Pulse Oximetry - Multiple   Pulse 69   Resp (!) 21   Ready to Wean/Extubation Screen   FIO2<=50 (chart decimal) (!) 0.9   Preset CPAP/BiPAP Settings   Mode Of Delivery BiPAP S/T;standby   CPAP/BIPAP charged w/in last 24 h YES   Respiratory Evaluation   $ Care Plan Tech Time 15 min

## 2025-06-20 NOTE — PROGRESS NOTES
[facility] - Intensive Care  Cardiology  Progress Note    Patient Name: Cady Watkins  MRN: [id number]  Admission Date: 6/18/2025  Hospital Length of Stay: 2 days  Code Status: Full Code   Attending Physician: Robin Ndiaye MD   Primary Care Physician: Blaine Benítez MD  Expected Discharge Date: 6/25/2025  Principal Problem:Complete heart block    Subjective:     Hospital Course:   Per Dr. Ureña consult note 6/18/2025 Cardiology consulted for complete heart block. 74 years old female who has not been feeling well for the last 2-3 weeks. Significant weakness, dizziness, and syncopal event. Also lower extremities edema. Went to her PCP today was noted to have slow heart rate so referred to the emergency room. EKG showed complete heart block with a rate in the 20s. Blood pressure stable. Mentation okay. On Toprol-XL 50 mg that she took today     1. Complete heart block  2. Mosque        Emergent temporary pacemaker  Discontinue Toprol  Avoid any AV manjit blocking agents  2D echocardiogram  Check TSH    6/19/2025 TVP remains in placed with VPaced rhythm. Transitioned from BiPap to HFNC- tachypneic and mild respiratory distress noted on PE. CBC with H&H 12.6&37.0 BMp with K+ 4.4 creatinine 1.21 Mg 1.7 Initial /106 started on IV Tridil overnight with trend down of SBP to 140s-150s and Tridil weaned off. Repeat CXR this AM with continued pulmonary edema and Lasix 120mg IV ordered this AM. Echo pending. Initial ABG yesterday evening 7.242/51.9/77.5/22.4 with repeat ABG 7.285/49.0/159/23.3 repeat pending this AM          6/20/2025 Remains on HFNC with wean in progress down to 70% 15L this AM. Remains on IV Lasix 80mg BID with 3.2L out overnight negative 4.2L since admission. Echo yesterday with EF 20-25% WMA and PHTN. TVP remains in place with V Paced rhythm CBC WNL BMP with K+ 3.3 Mg 1.6 replacement ordered         Review of Systems   Constitutional: Negative for chills, decreased appetite,  diaphoresis, fever, malaise/fatigue, weight gain and weight loss.   Cardiovascular:  Positive for dyspnea on exertion. Negative for chest pain, claudication, irregular heartbeat, leg swelling, near-syncope, orthopnea, palpitations and paroxysmal nocturnal dyspnea.   Respiratory:  Negative for cough, shortness of breath, snoring, sputum production and wheezing.    Endocrine: Negative for cold intolerance, heat intolerance, polydipsia, polyphagia and polyuria.   Skin:  Negative for color change, dry skin, itching, nail changes and poor wound healing.   Musculoskeletal:  Negative for back pain, gout, joint pain and joint swelling.   Gastrointestinal:  Negative for bloating, abdominal pain, constipation, diarrhea, hematemesis, hematochezia, melena, nausea and vomiting.   Genitourinary:  Negative for dysuria, hematuria and nocturia.   Neurological:  Negative for dizziness, headaches, light-headedness, numbness, paresthesias and weakness.   Psychiatric/Behavioral:  Negative for altered mental status, depression and memory loss.      Objective:     Vital Signs (Most Recent):  Temp: 98.3 °F (36.8 °C) (06/20/25 1100)  Pulse: 69 (06/20/25 1112)  Resp: (!) 25 (06/20/25 1112)  BP: (!) 179/86 (06/20/25 1100)  SpO2: 99 % (06/20/25 1112) Vital Signs (24h Range):  Temp:  [97.8 °F (36.6 °C)-98.3 °F (36.8 °C)] 98.3 °F (36.8 °C)  Pulse:  [68-71] 69  Resp:  [5-43] 25  SpO2:  [63 %-100 %] 99 %  BP: (121-230)/() 179/86     Weight: 73.5 kg (162 lb)  Body mass index is 29.63 kg/m².     SpO2: 99 %         Intake/Output Summary (Last 24 hours) at 6/20/2025 1117  Last data filed at 6/20/2025 1048  Gross per 24 hour   Intake 737.17 ml   Output 3140 ml   Net -2402.83 ml       Lines/Drains/Airways       Drain  Duration             Female External Urinary Catheter w/ Suction 06/20/25 1000 <1 day              Peripheral Intravenous Line  Duration                  Peripheral IV - Single Lumen 06/19/25 0445 20 G 3/4 in Right Hand 1 day     Peripheral IV Single Lumen 06/19/25 2235 20 G Left Forearm <1 day                       Physical Exam  Constitutional:       General: She is not in acute distress.     Appearance: She is well-developed.   Cardiovascular:      Rate and Rhythm: Normal rate and regular rhythm.      Heart sounds: No murmur heard.     No gallop.   Pulmonary:      Effort: Pulmonary effort is normal. No respiratory distress.      Breath sounds: Normal breath sounds. No wheezing.   Abdominal:      General: Bowel sounds are normal. There is no distension.      Palpations: Abdomen is soft.      Tenderness: There is no abdominal tenderness.   Skin:     General: Skin is warm and dry.   Neurological:      Mental Status: She is alert and oriented to person, place, and time.                Significant Imaging: Echocardiogram: Transthoracic echo (TTE) complete (Cupid Only):   Results for orders placed or performed during the hospital encounter of 06/18/25   Echo   Result Value Ref Range    Dorantes's Biplane MOD Ejection Fraction 21 %    A2C EF 21 %    A4C EF 23 %    LVOT stroke volume 58.7 cm3    LVIDd 4.6 3.5 - 6.0 cm    LV Systolic Volume 35 mL    LV Systolic Volume Index 20.0 mL/m2    LVIDs 3.0 2.1 - 4.0 cm    LV ESV A2C 61.55 mL    LV Diastolic Volume 98 mL    LV ESV A4C 55.95 mL    LV Diastolic Volume Index 56.00 mL/m2    LV EDV A2C 90.565203075477636 mL    LV EDV A4C 80.97 mL    Left Ventricular End Systolic Volume by Teichholz Method 35.22 mL    Left Ventricular End Diastolic Volume by Teichholz Method 97.90 mL    IVS 0.8 0.6 - 1.1 cm    LVOT diameter 1.9 cm    LVOT area 2.8 cm2    FS 34.8 28 - 44 %    Left Ventricle Relative Wall Thickness 0.39 cm    PW 0.9 0.6 - 1.1 cm    LV mass 127.7 g    LV Mass Index 72.9 g/m2    MV Peak E Darrin 1.11 m/s    TDI LATERAL 0.07 m/s    TDI SEPTAL 0.05 m/s    E/E' ratio 19 m/s    MV Peak A Darrin 0.86 m/s    TR Max Darrin 3.9 m/s    E/A ratio 1.29     IVRT 133 msec    E wave deceleration time 125 msec    LV SEPTAL  E/E' RATIO 22.2 m/s    LV LATERAL E/E' RATIO 15.9 m/s    LVOT peak leanna 1.0 m/s    Left Ventricular Outflow Tract Mean Velocity 0.75 cm/s    Left Ventricular Outflow Tract Mean Gradient 2.48 mmHg    RV- elias basal diam 2.9 cm    RV S' 198.68 cm/s    TAPSE 1.6 cm    RV/LV Ratio 0.63 cm    LA Vol (MOD) 59 mL    AJAY (MOD) 34 mL/m2    RA area length vol 45.22 mL    RA Area 17.2 cm2    RA vol index 25.84 mL/m2    RA Vol 46.35 mL    AV mean gradient 5 mmHg    AV peak gradient 7 mmHg    Ao peak leanna 1.3 m/s    Ao VTI 22.6 cm    LVOT peak VTI 20.7 cm    AV valve area 2.6 cm²    AV Velocity Ratio 0.77     AV index (prosthetic) 0.92     KAREN by Velocity Ratio 2.2 cm²    MV stenosis pressure 1/2 time 36.18 ms    MV valve area p 1/2 method 6.08 cm2    Triscuspid Valve Regurgitation Peak Gradient 74 mmHg    Sinus 3.15 cm    ASI 1.8 cm/m2    STJ 2.6 cm    Ascending aorta 2.9 cm    ASI 1.7 cm/m2    Mean e' 0.06 m/s    ZLVIDS 0.01     ZLVIDD -0.51     LA area A4C 19.64 cm2    LA area A2C 20.82 cm2    BSA 1.79 m2    TV resting pulmonary artery pressure 61 mmHg    RV TB RVSP 4 mmHg    Est. RA pres 0 mmHg    Narrative      Left Ventricle: The left ventricle is normal in size. Normal wall   thickness. Regional wall motion abnormalities present. See diagram for   wall motion findings. Septal motion is consistent with pacing. There is   severely reduced systolic function with a visually estimated ejection   fraction of 20 - 25%. Quantitated ejection fraction is 21%. Unable to   assess diastolic function due to E-A fusion.    Right Ventricle: The right ventricle is normal in size measuring 2.9   cm. Systolic function is normal. Pacemaker lead present in the ventricle.    Left Atrium: The left atrium is mildly dilated measuring 34 mL/m2.    Aortic Valve: The aortic valve is a trileaflet valve. There is aortic   valve sclerosis.    Mitral Valve: There is mild regurgitation.    Tricuspid Valve: There is moderate regurgitation.    Pulmonary  Artery: There is moderate to severe pulmonary hypertension.   The estimated pulmonary artery systolic pressure is 61 mmHg.       Assessment and Plan:     Brief HPI: Seen this morning on AM rounds with Dr Horner while resting in bed with daughter at bedside. Reports feeling better. Discussed POC as detailed below-verbalized understanding and agrees with POC      * Complete heart block  - presented with HR 20s with CHB noted on admit EKG  - emergent TVP placed and currently VPaced rhythm  - on BB as an outpatient for HTN management; will hold BB and allow for BB washout and will continue TVP fo rnow  - echo with depressed EF; will monitor closely and need for permanent pacemaker TBD     Acute systolic heart failure  - new onset  - echo yesterday with EF 20-25% and WMA; acute pulmonary edema upon admission; aggressive diuresis in process with good response; negative 4.2L since admission  - WMA noted on echo as well; continue with medical management for now with ARB with hopes for transition to Entresto; no BB due to CHB  - anticipate need for ischemic evaluation with timing to be determined; currently ADHF precludes proceeding and ideally needs to be more euvolemic     Hypertensive emergency  - initial /106  - initially treated with IV Tridil upon admission; SBP trending down to 120s-130s overnight  - on Lisinopril 20 and Metoprolol 50mg BID as an outpatient- ARB resumed with plans for transition to Entresto once euvolemic; continue to hold BB   - goal BP less than 130/80  - monitor BP closely     Acute hypoxemic respiratory failure  - related to acute pulmonary edema  - initial pO2 77.5 on 100% O2 with trend up to 159 on repeat ABG; last ABG yesterday with pO2 79  - diuresis in process      Acute pulmonary edema  - presented with CHB with HR 20s; ABG with hypoxia on 100% NRB with transition to BiPap now on HFNC  - aggressive diuresis in process with Lasix 80mg IV BID with 3.2L out overnight negative 4.2L since  admission; HFNC wean in process down to 15L from 30L and FiO2 70%   - echo with newly depressed EF  - will monitor I&Os closely with IV Lasix     Syncope  - presented to PCP appt with syncope; noted with HR 20s consistent with CHB  - admitted to ICU and TVP placed; currently V paced rhythm; no tachyarrhythmias noted on telemetry overnight  - echo with depressed EF     Hypertension  - as detailed under HTN emergency         VTE Risk Mitigation (From admission, onward)           Ordered     heparin (porcine) injection 5,000 Units  Every 8 hours         06/19/25 1001     heparin infusion 1,000 units/500 ml in 0.9% NaCl (pressure line flush)  Intra-op continuous PRN         06/18/25 1628                  > 40 minutes was spent in the care of this patient for evaluation, critical thinking and decision making. Also discussion with patient as to present condition. Not all time was spent in direct face to face with patient as time was spent reviewing ECGs, CXR, labs, medications and past studies.       Zahira Deutsch APRN, ANP  Cardiology  [facility] - Intensive Care

## 2025-06-20 NOTE — PLAN OF CARE
Problem: Adult Inpatient Plan of Care  Goal: Plan of Care Review  Outcome: Progressing  Goal: Optimal Comfort and Wellbeing  Outcome: Progressing  Goal: Readiness for Transition of Care  Outcome: Progressing     Problem: Skin Injury Risk Increased  Goal: Skin Health and Integrity  Outcome: Progressing     Problem: Delirium  Goal: Improved Sleep  Outcome: Progressing

## 2025-06-20 NOTE — EICU
Virtual ICU Quality Rounds    Admit Date: 6/18/2025  Hospital Day: 2    ICU Day: 1d 12h    24H Vital Sign Range:  Temp:  [97.8 °F (36.6 °C)-98.3 °F (36.8 °C)]   Pulse:  [22-74]   Resp:  [5-48]   BP: (121-174)/(61-91)   SpO2:  [84 %-100 %]     VICU Surveillance Screening    Daily review of  line necessity(optional): Urinary catheter in place            Lazo Indications : Urinary retention and Critically ill in the intensive care unit requiring hourly urine output monitoring     LDA reconciliation : Yes            Lazo best practices : Nurse driven lazo removal protocol ordered, Prompt to consider removal if no urine output in past 24hrs or ESRD, Prompt alternatives(external catheters, in/ou cath, bladder scanning), Initiate bladder management algorithm for patients with urinary retention, Lazo securement device in place with an intact seal visualized, and Sheet clips are used to prevent dependent loops, keep the bag below the bladder, and keep the bag off the ground

## 2025-06-20 NOTE — EICU
VICU Night Rounds Checklist  24H Vital Sign Range:  Temp:  [96.4 °F (35.8 °C)-98.3 °F (36.8 °C)]   Pulse:  [22-88]   Resp:  [7-48]   BP: (116-233)/()   SpO2:  [71 %-100 %]     Video rounds and LDA reconciliation        Nursing orders placed : IP LOREN Peripheral IV Access

## 2025-06-20 NOTE — ASSESSMENT & PLAN NOTE
- presented to PCP appt with syncope; noted with HR 20s consistent with CHB  - admitted to ICU and TVP placed; currently V paced rhythm; no tachyarrhythmias noted on telemetry overnight  - echo with depressed EF

## 2025-06-20 NOTE — ASSESSMENT & PLAN NOTE
- initial /106  - initially treated with IV Tridil upon admission; SBP trending down to 120s-130s overnight  - on Lisinopril 20 and Metoprolol 50mg BID as an outpatient- ARB resumed with plans for transition to Entresto once euvolemic; continue to hold BB   - goal BP less than 130/80  - monitor BP closely

## 2025-06-20 NOTE — PROGRESS NOTES
Progress Note  [facility]    Attending: Dr. Rahman  Fellow: Dr. Newman  Admit Date: 6/18/2025  Today's Date: 06/20/2025  Reason for Consult:  BIPAP and agitation     ASSESSMENT & RECOMMENDATIONS     Brief HPI: 73yo female with a PMH of HTN, DM, HLD admitted 6/18 for symptomatic bradycardia. Hx obtained from chart review, patient at bedside too tired to give story. Went to PCP and was found to have bradycardia and has been feeling unwell for 3 days and had one syncopal episode. No CP, fever, chills, n/v or urinary/bowel changes. Found to be bradycardic to 20-30s on arrival and was taken for TVP with cardiology. However, in the afternoon patient started becoming SOB, agitated with what appeared to be flash pulm edema. BP increased to 230s/130s ([id number]), hypoxic not improving with NRB, RR 30-40s and transitioned to BIPAP. Stepped up to MICU 6/18 for AHRF and incr. WOB.     Overall assessment: 71F with sx bradycardia s/p TVP, developed flash pulmonary edema and AHRF, placed on BIPAP and stepped up to MICU.      CNS/Neuro:  No acute concerns    Cardiovascular:    Complete Heart Block  -Bradycardic 20-30s, syncope and fatigue; EKG AV dissociation   -Taking lopressor at home     Plan:  Hold home BB   Cards following, considering placing PPM next week     HTN Emergency   New onset HFrEF  Severe pHTN  -developed flash pulmonary edema post procedure; SOB, agitated   -BP increased to 230s/130s ([id number]), hypoxic not improving with NRB, RR 30-40s and transitioned to BIPAP  -placed on nitroglycerin gtt and lasix 40>60  -BNP 1200, trop 0.019  -Continue statin   -TTE EF 20-25%, unable to assess diastolic, mild LA dilation, mild MR, mod TR, severe pHTN, PASP 61    Plan:  Restart home antihypertensive when appropriate, no BB  Losartan for afterload reduction per cards, increase to 100mg qd   Plan for AICD/PPM next week with improvement  Repeat echo next week   Aggressive diuresis, lasix 80 BID   Strict  I/Os    Respiratory:    Acute hypoxic respiratory failure   Pulmonary edema   -SOB, significant incr WOB; hypoxic to 70s failing NRB; placed on BIPAP   -VBG 7.28/58/28 --> ABG 7.48/35/69/23  -CXR c/w pulm edema    Plan:  Aggressive diuresis as above  Weaned to 3-4L NC, wean as able     GI/Metabolic:  F: none   E: K > 4, Phos > 3, Mg > 2 and replete PRN   N: cardiac    Renal:  Baseline Cr ~ .9-1.1    Plan:  CTM Cr as patient diuresis     Heme:  Baseline Hgb ~ 12.2-13.7    Plan:  Jehovas witness    Endo:   Last TSH: 2.751  Last A1C: 6.4%    Glucose within goal range this admission with 140-180  -Lantus 5u   -SSI    ID:  No acute concern   Temp: AF  WBC: 10.8  Micro: NA      SUBJECTIVE:     Improved Bps and weaning HFNC. Otherwise, no other complaints.     OBJECTIVE:     Vital Signs Trends/Hx Reviewed  Vitals:    06/20/25 0600 06/20/25 0615 06/20/25 0630 06/20/25 0645   BP: (!) 145/75  136/74    BP Location:       Patient Position:       Pulse: 69 69 69 70   Resp: 14 17 20 (!) 30   Temp:       TempSrc:       SpO2: 99% 98% 99% 99%   Weight:       Height:           Ventilator settings: HFNC 30L 90% > 15L 70% > 3L NC   Physical Exam:  General: NAD, cooperative   HEENT: EOMI, oral and nasal mucosa moist.   Neck: Supple, TVP in place, clean dry intact   Cardiac: normal rate, regular rhythm, with no murmurs, rubs, gallops with brisk cap refill and symmetric pulses in distal extremities. All extremities warm to touch.   Respiratory: On NC. Crackles to mid lungs BL, improving.   Abdomen: Soft, NT/ND. +BS.  Extremities: No edema.   Neuro: Grossly intact to brief exam. Oriented x3. Follows commands.       Laboratory:  Recent Labs   Lab 06/19/25  1025   PH 7.419   PCO2 35.8   PO2 69.4*   HCO3 23.2*   POCSATURATED 95.1     Recent Labs   Lab 06/20/25  0337   WBC 10.88   RBC 3.61*   HGB 11.5*   HCT 34.0*   *   MCV 94   MCH 31.9*   MCHC 33.8     Recent Labs   Lab 06/20/25  0337      K 3.3*      CO2 26   BUN 23    CREATININE 1.1   CALCIUM 8.1*   MG 1.6       Microbiology Data:   Microbiology Results (last 7 days)       ** No results found for the last 168 hours. **             Chest Imaging:   CXR Patient is slightly rotated.  Several overlying monitoring leads.  Right central venous catheter/transvenous pacer lead positioning, similar.  Persistent patchy interstitial and airspace opacities with bibasilar density and effusions as before.  Some interval clearing at the right upper lung zone from prior.  No gross pneumothorax.     Infusions:     heparin (porcine)    Continuous  mL/hr at 06/18/25 1628 500 Units/hr at 06/18/25 1628       Scheduled Medications:    atorvastatin  20 mg Oral Daily    furosemide (LASIX) injection  80 mg Intravenous Q12H    heparin (porcine)  5,000 Units Subcutaneous Q8H    insulin glargine U-100  5 Units Subcutaneous Daily    losartan  25 mg Oral Daily    magnesium sulfate 2 g IVPB  2 g Intravenous Once    mupirocin   Topical (Top) Daily    potassium bicarbonate  20 mEq Oral Q2H         Feed: heart healthy  Analgesia: NA  Sedation: NA  Thromboembolic prophylaxis: LVXN   Head-of-bed: elevated  Ulcer prophylaxis: NA  Glycemic control: 140-180, SSI   SAT/SBT: NA  Bowel regimen: NA  Indwelling: PIV x2, lazo (discontinued)  De-escalate Abx: NA    Code status: Full     Disposition: pending weaning HFNC and diuresis    Thank you for allowing us to participate in the care of this patient. Please call with questions.    Rosanna Chopra, DO  [facility] PGY II

## 2025-06-20 NOTE — ASSESSMENT & PLAN NOTE
- related to acute pulmonary edema  - initial pO2 77.5 on 100% O2 with trend up to 159 on repeat ABG; last ABG yesterday with pO2 79  - diuresis in process

## 2025-06-20 NOTE — SUBJECTIVE & OBJECTIVE
Review of Systems   Constitutional: Negative for chills, decreased appetite, diaphoresis, fever, malaise/fatigue, weight gain and weight loss.   Cardiovascular:  Positive for dyspnea on exertion. Negative for chest pain, claudication, irregular heartbeat, leg swelling, near-syncope, orthopnea, palpitations and paroxysmal nocturnal dyspnea.   Respiratory:  Negative for cough, shortness of breath, snoring, sputum production and wheezing.    Endocrine: Negative for cold intolerance, heat intolerance, polydipsia, polyphagia and polyuria.   Skin:  Negative for color change, dry skin, itching, nail changes and poor wound healing.   Musculoskeletal:  Negative for back pain, gout, joint pain and joint swelling.   Gastrointestinal:  Negative for bloating, abdominal pain, constipation, diarrhea, hematemesis, hematochezia, melena, nausea and vomiting.   Genitourinary:  Negative for dysuria, hematuria and nocturia.   Neurological:  Negative for dizziness, headaches, light-headedness, numbness, paresthesias and weakness.   Psychiatric/Behavioral:  Negative for altered mental status, depression and memory loss.      Objective:     Vital Signs (Most Recent):  Temp: 98.3 °F (36.8 °C) (06/20/25 1100)  Pulse: 69 (06/20/25 1112)  Resp: (!) 25 (06/20/25 1112)  BP: (!) 179/86 (06/20/25 1100)  SpO2: 99 % (06/20/25 1112) Vital Signs (24h Range):  Temp:  [97.8 °F (36.6 °C)-98.3 °F (36.8 °C)] 98.3 °F (36.8 °C)  Pulse:  [68-71] 69  Resp:  [5-43] 25  SpO2:  [63 %-100 %] 99 %  BP: (121-230)/() 179/86     Weight: 73.5 kg (162 lb)  Body mass index is 29.63 kg/m².     SpO2: 99 %         Intake/Output Summary (Last 24 hours) at 6/20/2025 1117  Last data filed at 6/20/2025 1048  Gross per 24 hour   Intake 737.17 ml   Output 3140 ml   Net -2402.83 ml       Lines/Drains/Airways       Drain  Duration             Female External Urinary Catheter w/ Suction 06/20/25 1000 <1 day              Peripheral Intravenous Line  Duration                   Peripheral IV - Single Lumen 06/19/25 0445 20 G 3/4 in Right Hand 1 day    Peripheral IV Single Lumen 06/19/25 2235 20 G Left Forearm <1 day                       Physical Exam  Constitutional:       General: She is not in acute distress.     Appearance: She is well-developed.   Cardiovascular:      Rate and Rhythm: Normal rate and regular rhythm.      Heart sounds: No murmur heard.     No gallop.   Pulmonary:      Effort: Pulmonary effort is normal. No respiratory distress.      Breath sounds: Normal breath sounds. No wheezing.   Abdominal:      General: Bowel sounds are normal. There is no distension.      Palpations: Abdomen is soft.      Tenderness: There is no abdominal tenderness.   Skin:     General: Skin is warm and dry.   Neurological:      Mental Status: She is alert and oriented to person, place, and time.                Significant Imaging: Echocardiogram: Transthoracic echo (TTE) complete (Cupid Only):   Results for orders placed or performed during the hospital encounter of 06/18/25   Echo   Result Value Ref Range    Dorantes's Biplane MOD Ejection Fraction 21 %    A2C EF 21 %    A4C EF 23 %    LVOT stroke volume 58.7 cm3    LVIDd 4.6 3.5 - 6.0 cm    LV Systolic Volume 35 mL    LV Systolic Volume Index 20.0 mL/m2    LVIDs 3.0 2.1 - 4.0 cm    LV ESV A2C 61.55 mL    LV Diastolic Volume 98 mL    LV ESV A4C 55.95 mL    LV Diastolic Volume Index 56.00 mL/m2    LV EDV A2C 90.046487432107504 mL    LV EDV A4C 80.97 mL    Left Ventricular End Systolic Volume by Teichholz Method 35.22 mL    Left Ventricular End Diastolic Volume by Teichholz Method 97.90 mL    IVS 0.8 0.6 - 1.1 cm    LVOT diameter 1.9 cm    LVOT area 2.8 cm2    FS 34.8 28 - 44 %    Left Ventricle Relative Wall Thickness 0.39 cm    PW 0.9 0.6 - 1.1 cm    LV mass 127.7 g    LV Mass Index 72.9 g/m2    MV Peak E Darrin 1.11 m/s    TDI LATERAL 0.07 m/s    TDI SEPTAL 0.05 m/s    E/E' ratio 19 m/s    MV Peak A Darrin 0.86 m/s    TR Max Darrin 3.9 m/s    E/A  ratio 1.29     IVRT 133 msec    E wave deceleration time 125 msec    LV SEPTAL E/E' RATIO 22.2 m/s    LV LATERAL E/E' RATIO 15.9 m/s    LVOT peak leanna 1.0 m/s    Left Ventricular Outflow Tract Mean Velocity 0.75 cm/s    Left Ventricular Outflow Tract Mean Gradient 2.48 mmHg    RV- elias basal diam 2.9 cm    RV S' 198.68 cm/s    TAPSE 1.6 cm    RV/LV Ratio 0.63 cm    LA Vol (MOD) 59 mL    AJAY (MOD) 34 mL/m2    RA area length vol 45.22 mL    RA Area 17.2 cm2    RA vol index 25.84 mL/m2    RA Vol 46.35 mL    AV mean gradient 5 mmHg    AV peak gradient 7 mmHg    Ao peak leanna 1.3 m/s    Ao VTI 22.6 cm    LVOT peak VTI 20.7 cm    AV valve area 2.6 cm²    AV Velocity Ratio 0.77     AV index (prosthetic) 0.92     KAREN by Velocity Ratio 2.2 cm²    MV stenosis pressure 1/2 time 36.18 ms    MV valve area p 1/2 method 6.08 cm2    Triscuspid Valve Regurgitation Peak Gradient 74 mmHg    Sinus 3.15 cm    ASI 1.8 cm/m2    STJ 2.6 cm    Ascending aorta 2.9 cm    ASI 1.7 cm/m2    Mean e' 0.06 m/s    ZLVIDS 0.01     ZLVIDD -0.51     LA area A4C 19.64 cm2    LA area A2C 20.82 cm2    BSA 1.79 m2    TV resting pulmonary artery pressure 61 mmHg    RV TB RVSP 4 mmHg    Est. RA pres 0 mmHg    Narrative      Left Ventricle: The left ventricle is normal in size. Normal wall   thickness. Regional wall motion abnormalities present. See diagram for   wall motion findings. Septal motion is consistent with pacing. There is   severely reduced systolic function with a visually estimated ejection   fraction of 20 - 25%. Quantitated ejection fraction is 21%. Unable to   assess diastolic function due to E-A fusion.    Right Ventricle: The right ventricle is normal in size measuring 2.9   cm. Systolic function is normal. Pacemaker lead present in the ventricle.    Left Atrium: The left atrium is mildly dilated measuring 34 mL/m2.    Aortic Valve: The aortic valve is a trileaflet valve. There is aortic   valve sclerosis.    Mitral Valve: There is mild  regurgitation.    Tricuspid Valve: There is moderate regurgitation.    Pulmonary Artery: There is moderate to severe pulmonary hypertension.   The estimated pulmonary artery systolic pressure is 61 mmHg.

## 2025-06-20 NOTE — PROGRESS NOTES
I saw and evaluated the patient. I have reviewed and agree with the residents findings, including all diagnostic interpretations, and plans as written. This is an attestation of a separate note written by the ICU resident today.  As the teaching physician, I was present for and have confirmed the key portions of the service performed by the resident today.  In addition to the resident's note, I add the followinyo F w/ pmhx DM2, HTN, Fibromyalgia who presented to [facility] ED on  following bradycardia at PCP office. EKG confirmed CHB. Taken to the cath lab for TVP. Course complicated by HTN emergency and pulmonary edema. Nearly intubated as she did not tolerate NIV but tolerated HHFNO. Good UOP with lasix.     Problems:  Acute hypoxic respiratory failure  Complete Heart block  HTN emergency     Plan:  Wean O2 as tolerated. Continue diuresis  TVP remains in place. Rate set at 70, output 5mA. Plan for PPM next week       Routine MICU Care Items    Family and Patient involvement in ICU Care:  Updated Patient  Feeding:  PO Diet  Analgesia: No current pain  Sedation: Not Intubated  Thromboembolism Prophylaxis:  Heparin  Head of Bed at 30 degrees: Yes  Ulcer Prophylaxis: None Indicated (On tube feeding or taking food my mouth)  Glycemic Control:  goal glucose < 180mg/dL  Spontaneous Awakening and Breathing Trial:  Not Intubated  Bowel Regimen: None Required  Invasive Device Removal:   Wallis  []Not Present  [x]Present, Remove Today and begin Nursing Monitoring for Retention  []Present, Indicated   Indication:   []Recent Pelvic Surgery   []Stage IV Sacral Ulcer and cannot be managed with non-invasive urine collection   []Hourly Titration of Continuous Infusions according to Urine Output   []Hospice or end-of-life care   []Requested by Urology    Central Line  []Not Present  []Present, Remove Today  [x]Present, Indicated      Drug de-escalation:   Antibiotics: None Currently   Code Status: FULL CODE  Disposition:  ICU     50 minutes of critical care time was spent in the critical care management of the patient. This included management of organ failures related to critical illness, titration of continuous infusions, management of mechanical ventilation, review of pertinent labs and imaging studies, discussion of the patient with consulting services, and discussion of the patients condition with the patient and family.      Bret Rahman MD  [facility]

## 2025-06-21 LAB
ABSOLUTE EOSINOPHIL (OHS): 0.04 K/UL
ABSOLUTE MONOCYTE (OHS): 0.66 K/UL (ref 0.3–1)
ABSOLUTE NEUTROPHIL COUNT (OHS): 7.09 K/UL (ref 1.8–7.7)
ALBUMIN SERPL BCP-MCNC: 2.8 G/DL (ref 3.5–5.2)
ALP SERPL-CCNC: 40 UNIT/L (ref 40–150)
ALT SERPL W/O P-5'-P-CCNC: 17 UNIT/L (ref 10–44)
ANION GAP (OHS): 11 MMOL/L (ref 8–16)
AST SERPL-CCNC: 23 UNIT/L (ref 11–45)
BASOPHILS # BLD AUTO: 0.06 K/UL
BASOPHILS NFR BLD AUTO: 0.6 %
BILIRUB SERPL-MCNC: 1.2 MG/DL (ref 0.1–1)
BUN SERPL-MCNC: 21 MG/DL (ref 8–23)
CALCIUM SERPL-MCNC: 8.5 MG/DL (ref 8.7–10.5)
CHLORIDE SERPL-SCNC: 99 MMOL/L (ref 95–110)
CO2 SERPL-SCNC: 28 MMOL/L (ref 23–29)
CREAT SERPL-MCNC: 0.9 MG/DL (ref 0.5–1.4)
ERYTHROCYTE [DISTWIDTH] IN BLOOD BY AUTOMATED COUNT: 13.9 % (ref 11.5–14.5)
GFR SERPLBLD CREATININE-BSD FMLA CKD-EPI: >60 ML/MIN/1.73/M2
GLUCOSE SERPL-MCNC: 147 MG/DL (ref 70–110)
HCT VFR BLD AUTO: 39.7 % (ref 37–48.5)
HGB BLD-MCNC: 13.4 GM/DL (ref 12–16)
IMM GRANULOCYTES # BLD AUTO: 0.07 K/UL (ref 0–0.04)
IMM GRANULOCYTES NFR BLD AUTO: 0.7 % (ref 0–0.5)
LYMPHOCYTES # BLD AUTO: 1.88 K/UL (ref 1–4.8)
MAGNESIUM SERPL-MCNC: 1.7 MG/DL (ref 1.6–2.6)
MCH RBC QN AUTO: 31.8 PG (ref 27–31)
MCHC RBC AUTO-ENTMCNC: 33.8 G/DL (ref 32–36)
MCV RBC AUTO: 94 FL (ref 82–98)
NUCLEATED RBC (/100WBC) (OHS): 0 /100 WBC
OHS QRS DURATION: 100 MS
OHS QRS DURATION: 132 MS
OHS QRS DURATION: 132 MS
OHS QTC CALCULATION: 407 MS
OHS QTC CALCULATION: 498 MS
OHS QTC CALCULATION: 505 MS
PHOSPHATE SERPL-MCNC: 2.1 MG/DL (ref 2.7–4.5)
PLATELET # BLD AUTO: 124 K/UL (ref 150–450)
PLATELET BLD QL SMEAR: ABNORMAL
PMV BLD AUTO: 13.3 FL (ref 9.2–12.9)
POCT GLUCOSE: 116 MG/DL (ref 70–110)
POCT GLUCOSE: 150 MG/DL (ref 70–110)
POCT GLUCOSE: 154 MG/DL (ref 70–110)
POCT GLUCOSE: 156 MG/DL (ref 70–110)
POCT GLUCOSE: 164 MG/DL (ref 70–110)
POCT GLUCOSE: 171 MG/DL (ref 70–110)
POTASSIUM SERPL-SCNC: 4.2 MMOL/L (ref 3.5–5.1)
PROT SERPL-MCNC: 6.4 GM/DL (ref 6–8.4)
RBC # BLD AUTO: 4.22 M/UL (ref 4–5.4)
RELATIVE EOSINOPHIL (OHS): 0.4 %
RELATIVE LYMPHOCYTE (OHS): 19.2 % (ref 18–48)
RELATIVE MONOCYTE (OHS): 6.7 % (ref 4–15)
RELATIVE NEUTROPHIL (OHS): 72.4 % (ref 38–73)
SODIUM SERPL-SCNC: 138 MMOL/L (ref 136–145)
VIT B12 SERPL-MCNC: >2000 PG/ML (ref 210–950)
WBC # BLD AUTO: 9.8 K/UL (ref 3.9–12.7)

## 2025-06-21 PROCEDURE — 63600175 PHARM REV CODE 636 W HCPCS: Performed by: NURSE PRACTITIONER

## 2025-06-21 PROCEDURE — 80053 COMPREHEN METABOLIC PANEL: CPT | Performed by: INTERNAL MEDICINE

## 2025-06-21 PROCEDURE — 63600175 PHARM REV CODE 636 W HCPCS: Performed by: CLINIC/CENTER

## 2025-06-21 PROCEDURE — 63600175 PHARM REV CODE 636 W HCPCS

## 2025-06-21 PROCEDURE — 83735 ASSAY OF MAGNESIUM: CPT | Performed by: INTERNAL MEDICINE

## 2025-06-21 PROCEDURE — 84100 ASSAY OF PHOSPHORUS: CPT | Performed by: INTERNAL MEDICINE

## 2025-06-21 PROCEDURE — 36415 COLL VENOUS BLD VENIPUNCTURE: CPT | Performed by: INTERNAL MEDICINE

## 2025-06-21 PROCEDURE — 99900035 HC TECH TIME PER 15 MIN (STAT)

## 2025-06-21 PROCEDURE — 25000003 PHARM REV CODE 250

## 2025-06-21 PROCEDURE — 82607 VITAMIN B-12: CPT

## 2025-06-21 PROCEDURE — 20000000 HC ICU ROOM

## 2025-06-21 PROCEDURE — 25000003 PHARM REV CODE 250: Performed by: SURGERY

## 2025-06-21 PROCEDURE — 99024 POSTOP FOLLOW-UP VISIT: CPT | Mod: ,,, | Performed by: INTERNAL MEDICINE

## 2025-06-21 PROCEDURE — 94761 N-INVAS EAR/PLS OXIMETRY MLT: CPT

## 2025-06-21 PROCEDURE — 63600175 PHARM REV CODE 636 W HCPCS: Performed by: SURGERY

## 2025-06-21 PROCEDURE — 85025 COMPLETE CBC W/AUTO DIFF WBC: CPT | Performed by: INTERNAL MEDICINE

## 2025-06-21 PROCEDURE — 25000003 PHARM REV CODE 250: Performed by: INTERNAL MEDICINE

## 2025-06-21 PROCEDURE — 27100171 HC OXYGEN HIGH FLOW UP TO 24 HOURS

## 2025-06-21 RX ORDER — LANOLIN ALCOHOL/MO/W.PET/CERES
1000 CREAM (GRAM) TOPICAL DAILY
Status: DISCONTINUED | OUTPATIENT
Start: 2025-06-21 | End: 2025-06-21

## 2025-06-21 RX ORDER — OLANZAPINE 10 MG/2ML
5 INJECTION, POWDER, FOR SOLUTION INTRAMUSCULAR ONCE
Status: DISCONTINUED | OUTPATIENT
Start: 2025-06-21 | End: 2025-06-24 | Stop reason: HOSPADM

## 2025-06-21 RX ORDER — OLANZAPINE 10 MG/2ML
5 INJECTION, POWDER, FOR SOLUTION INTRAMUSCULAR ONCE AS NEEDED
Status: COMPLETED | OUTPATIENT
Start: 2025-06-21 | End: 2025-06-21

## 2025-06-21 RX ORDER — MIDAZOLAM HYDROCHLORIDE 1 MG/ML
2 INJECTION, SOLUTION INTRAMUSCULAR; INTRAVENOUS ONCE
Status: COMPLETED | OUTPATIENT
Start: 2025-06-21 | End: 2025-06-21

## 2025-06-21 RX ORDER — NOREPINEPHRINE BITARTRATE/D5W 4MG/250ML
0-3 PLASTIC BAG, INJECTION (ML) INTRAVENOUS CONTINUOUS
Status: DISCONTINUED | OUTPATIENT
Start: 2025-06-21 | End: 2025-06-24 | Stop reason: HOSPADM

## 2025-06-21 RX ORDER — SODIUM CHLORIDE 9 MG/ML
INJECTION, SOLUTION INTRAVENOUS CONTINUOUS
Status: DISCONTINUED | OUTPATIENT
Start: 2025-06-21 | End: 2025-06-24 | Stop reason: HOSPADM

## 2025-06-21 RX ORDER — SODIUM,POTASSIUM PHOSPHATES 280-250MG
1 POWDER IN PACKET (EA) ORAL
Status: DISPENSED | OUTPATIENT
Start: 2025-06-21 | End: 2025-06-22

## 2025-06-21 RX ORDER — CYANOCOBALAMIN 1000 UG/ML
1000 INJECTION, SOLUTION INTRAMUSCULAR; SUBCUTANEOUS DAILY
Status: DISCONTINUED | OUTPATIENT
Start: 2025-06-21 | End: 2025-06-21

## 2025-06-21 RX ORDER — SPIRONOLACTONE 25 MG/1
25 TABLET ORAL DAILY
Status: DISCONTINUED | OUTPATIENT
Start: 2025-06-21 | End: 2025-06-23

## 2025-06-21 RX ORDER — NAPROXEN SODIUM 220 MG/1
81 TABLET, FILM COATED ORAL DAILY
Status: DISCONTINUED | OUTPATIENT
Start: 2025-06-21 | End: 2025-06-24 | Stop reason: HOSPADM

## 2025-06-21 RX ORDER — DEXMEDETOMIDINE HYDROCHLORIDE 4 UG/ML
0-1.4 INJECTION, SOLUTION INTRAVENOUS CONTINUOUS
Status: DISCONTINUED | OUTPATIENT
Start: 2025-06-21 | End: 2025-06-24 | Stop reason: HOSPADM

## 2025-06-21 RX ORDER — FUROSEMIDE 10 MG/ML
80 INJECTION INTRAMUSCULAR; INTRAVENOUS EVERY 12 HOURS
Status: COMPLETED | OUTPATIENT
Start: 2025-06-21 | End: 2025-06-21

## 2025-06-21 RX ORDER — FUROSEMIDE 40 MG/1
80 TABLET ORAL DAILY
Status: DISCONTINUED | OUTPATIENT
Start: 2025-06-22 | End: 2025-06-23

## 2025-06-21 RX ORDER — MAGNESIUM SULFATE HEPTAHYDRATE 40 MG/ML
2 INJECTION, SOLUTION INTRAVENOUS ONCE
Status: COMPLETED | OUTPATIENT
Start: 2025-06-21 | End: 2025-06-21

## 2025-06-21 RX ORDER — OLANZAPINE 10 MG/2ML
5 INJECTION, POWDER, FOR SOLUTION INTRAMUSCULAR ONCE AS NEEDED
Status: DISCONTINUED | OUTPATIENT
Start: 2025-06-21 | End: 2025-06-21

## 2025-06-21 RX ADMIN — MIDAZOLAM 2 MG: 1 INJECTION INTRAMUSCULAR; INTRAVENOUS at 05:06

## 2025-06-21 RX ADMIN — MAGNESIUM SULFATE HEPTAHYDRATE 2 G: 40 INJECTION, SOLUTION INTRAVENOUS at 05:06

## 2025-06-21 RX ADMIN — DEXMEDETOMIDINE HYDROCHLORIDE 0.2 MCG/KG/HR: 4 INJECTION, SOLUTION INTRAVENOUS at 04:06

## 2025-06-21 RX ADMIN — ATORVASTATIN CALCIUM 20 MG: 20 TABLET, FILM COATED ORAL at 08:06

## 2025-06-21 RX ADMIN — INSULIN GLARGINE 5 UNITS: 100 INJECTION, SOLUTION SUBCUTANEOUS at 08:06

## 2025-06-21 RX ADMIN — ACETAMINOPHEN 1000 MG: 500 TABLET ORAL at 10:06

## 2025-06-21 RX ADMIN — MUPIROCIN: 20 OINTMENT TOPICAL at 08:06

## 2025-06-21 RX ADMIN — ENOXAPARIN SODIUM 40 MG: 40 INJECTION SUBCUTANEOUS at 05:06

## 2025-06-21 RX ADMIN — OLANZAPINE 5 MG: 10 INJECTION, POWDER, FOR SOLUTION INTRAMUSCULAR at 04:06

## 2025-06-21 RX ADMIN — SODIUM CHLORIDE: 9 INJECTION, SOLUTION INTRAVENOUS at 10:06

## 2025-06-21 RX ADMIN — INSULIN ASPART 2 UNITS: 100 INJECTION, SOLUTION INTRAVENOUS; SUBCUTANEOUS at 06:06

## 2025-06-21 RX ADMIN — ASPIRIN 81 MG CHEWABLE TABLET 81 MG: 81 TABLET CHEWABLE at 05:06

## 2025-06-21 RX ADMIN — FUROSEMIDE 80 MG: 10 INJECTION, SOLUTION INTRAVENOUS at 08:06

## 2025-06-21 RX ADMIN — DEXMEDETOMIDINE HYDROCHLORIDE 1 MCG/KG/HR: 4 INJECTION, SOLUTION INTRAVENOUS at 08:06

## 2025-06-21 RX ADMIN — INSULIN ASPART 1 UNITS: 100 INJECTION, SOLUTION INTRAVENOUS; SUBCUTANEOUS at 11:06

## 2025-06-21 RX ADMIN — POTASSIUM & SODIUM PHOSPHATES POWDER PACK 280-160-250 MG 1 PACKET: 280-160-250 PACK at 05:06

## 2025-06-21 RX ADMIN — POLYETHYLENE GLYCOL 3350 17 G: 17 POWDER, FOR SOLUTION ORAL at 08:06

## 2025-06-21 RX ADMIN — SPIRONOLACTONE 25 MG: 25 TABLET, FILM COATED ORAL at 05:06

## 2025-06-21 RX ADMIN — LOSARTAN POTASSIUM 100 MG: 50 TABLET, FILM COATED ORAL at 08:06

## 2025-06-21 RX ADMIN — CYANOCOBALAMIN 1000 MCG: 1000 INJECTION INTRAMUSCULAR; SUBCUTANEOUS at 09:06

## 2025-06-21 NOTE — PLAN OF CARE
Problem: Diabetes Comorbidity  Goal: Blood Glucose Level Within Targeted Range  Outcome: Progressing     Problem: Wound  Goal: Optimal Coping  Outcome: Progressing  Goal: Optimal Functional Ability  Outcome: Progressing  Goal: Absence of Infection Signs and Symptoms  Outcome: Progressing  Goal: Improved Oral Intake  Outcome: Progressing  Goal: Optimal Pain Control and Function  Outcome: Progressing  Goal: Skin Health and Integrity  Outcome: Progressing  Goal: Optimal Wound Healing  Outcome: Progressing     Problem: Adult Inpatient Plan of Care  Goal: Plan of Care Review  Outcome: Progressing  Goal: Patient-Specific Goal (Individualized)  Outcome: Progressing  Goal: Absence of Hospital-Acquired Illness or Injury  Outcome: Progressing  Goal: Optimal Comfort and Wellbeing  Outcome: Progressing  Goal: Readiness for Transition of Care  Outcome: Progressing     Problem: Infection  Goal: Absence of Infection Signs and Symptoms  Outcome: Progressing     Problem: Skin Injury Risk Increased  Goal: Skin Health and Integrity  Outcome: Progressing     Problem: Fall Injury Risk  Goal: Absence of Fall and Fall-Related Injury  Outcome: Progressing

## 2025-06-21 NOTE — NURSING
"Went in to check glucose and admin insulin, pt stated she "does not want anything else". Rescheduled labs to lab collect for 0800 as pt will have had time to rest and family will possibly be at bedside  "

## 2025-06-21 NOTE — EICU
e-ICU    Pt severely agitated, does not know where she is or situation, seems to be fearful     VSS    Given heart block will avoid antipsychotic    Will order Midazolam 2 mg IV

## 2025-06-21 NOTE — NURSING
Pt unable to sleep, moving back and forth in bed, removing socks, ect. Unable to keep purewick in place, removed. Changed linen and cleaned pt

## 2025-06-21 NOTE — NURSING
"Pt becoming agitated and paranoid and stating "you all are keeping me hostage here against my wishes"; pt also began pulling at lines; Dr. Serrano and Dr. Coreas notified; 5mg Zyprexa IV given and precedex started   "

## 2025-06-21 NOTE — EICU
Intervention Initiated From:  Bedside    Anamika intervened regarding:  Medication    Doctor Notified:  Yes  Comments: Dr. SUNNY Song notified that bedside RN called eICU requesting order for medication for sleep.

## 2025-06-21 NOTE — PROGRESS NOTES
Progress Note  [facility]    Attending: Dr. Rahman  Fellow: Dr. Newman  Admit Date: 6/18/2025  Today's Date: 06/21/2025  Reason for Consult:  BIPAP and agitation     ASSESSMENT & RECOMMENDATIONS     71F with sx bradycardia s/p TVP, developed flash pulmonary edema and AHRF, placed on BIPAP and stepped up to MICU. Now s/p IVP with controlled rate pending PPM placement next week.    CNS/Neuro:  No acute concerns    Cardiovascular:    Complete Heart Block  -Bradycardic 20-30s, syncope and fatigue; EKG AV dissociation   - S/p IVP with good capture, HR 60, 5 mA    Plan:  Continue IVP  Cards following, considering placing PPM next week     HTN Emergency   New onset HFrEF  Severe pHTN  -developed flash pulmonary edema post procedure; SOB, agitated   -BP increased to 230s/130s ([id number]), hypoxic not improving with NRB, RR 30-40s and transitioned to BIPAP  -placed on nitroglycerin gtt and lasix 40>60  -BNP 1200, trop 0.019  -Continue statin   -TTE EF 20-25%, unable to assess diastolic, mild LA dilation, mild MR, mod TR, severe pHTN, PASP 61    Plan:  Restart home antihypertensive when appropriate, no BB  Losartan for afterload reduction per cards, increase to 100mg qd   Plan for AICD/PPM next week with improvement  Repeat echo next week   Aggressive diuresis, lasix 80 BID   Strict I/Os    Respiratory:    Acute hypoxic respiratory failure   Pulmonary edema   -SOB, significant incr WOB; hypoxic to 70s failing NRB; placed on BIPAP   -VBG 7.28/58/28 --> ABG 7.48/35/69/23  -CXR c/w pulm edema    Plan:  Aggressive diuresis as above  Weaned to 3-4L NC, wean as able     GI/Metabolic:  F: none   E: K > 4, Phos > 3, Mg > 2 and replete PRN   N: cardiac    Renal:  Baseline Cr ~ .9-1.1    Plan:  CTM Cr as patient diuresis     Heme:  Baseline Hgb ~ 12.2-13.7    Plan:  Jehovas witness    Endo:   Last TSH: 2.751  Last A1C: 6.4%    Glucose within goal range this admission with 140-180  -Lantus 5u   -SSI    ID:  No  "acute concern   Temp: AF  WBC: 10.8  Micro: NA      SUBJECTIVE:     Brief HPI: 73yo female with a PMH of HTN, DM, HLD admitted 6/18 for symptomatic bradycardia. Hx obtained from chart review, patient at bedside too tired to give story. Went to PCP and was found to have bradycardia and has been feeling unwell for 3 days and had one syncopal episode. No CP, fever, chills, n/v or urinary/bowel changes. Found to be bradycardic to 20-30s on arrival and was taken for TVP with cardiology. However, in the afternoon patient started becoming SOB, agitated with what appeared to be flash pulm edema. BP increased to 230s/130s ([id number]), hypoxic not improving with NRB, RR 30-40s and transitioned to BIPAP. Stepped up to MICU 6/18 for AHRF and incr. WOB.     Overnight: She has weaned down significantly on her oxygen requirements with only a few liters. She did have one episodes of anxiety and delirium that was self-limited into this morning. Resting quietly so far today.     OBJECTIVE:     Vital Signs Trends/Hx Reviewed  Vitals:    06/21/25 1000 06/21/25 1101 06/21/25 1200 06/21/25 1300   BP: (!) 160/82 (!) 153/72 (!) 109/59 (!) 112/56   Pulse: (!) 59 60 60 60   Resp: 20 19 20 18   Temp:  97.6 °F (36.4 °C)     TempSrc:  Oral     SpO2: 100% 96% (!) 93% (!) 92%   Weight:       Height:           Ventilator settings: HFNC 30L 90% > 15L 70% > 3L NC   Physical Exam:  General: NAD, cooperative   HEENT: EOMI, oral and nasal mucosa moist.   Neck: Supple, TVP in place, clean dry intact   Cardiac: normal rate, regular rhythm, with no murmurs, rubs, gallops with brisk cap refill and symmetric pulses in distal extremities. All extremities warm to touch.   Respiratory: On NC. Crackles to mid lungs BL, improving.   Abdomen: Soft, NT/ND. +BS.  Extremities: No edema.   Neuro: Grossly intact to brief exam. Oriented x3. Follows commands.       Laboratory:  No results for input(s): "PH", "PCO2", "PO2", "HCO3", "POCSATURATED", "BE" in the last 24 " hours.    Recent Labs   Lab 06/21/25  1002   WBC 9.80   RBC 4.22   HGB 13.4   HCT 39.7   *   MCV 94   MCH 31.8*   MCHC 33.8     Recent Labs   Lab 06/21/25  1002      K 4.2   CL 99   CO2 28   BUN 21   CREATININE 0.9   CALCIUM 8.5*   MG 1.7       Microbiology Data:   Microbiology Results (last 7 days)       ** No results found for the last 168 hours. **             Chest Imaging:   CXR Patient is slightly rotated.  Several overlying monitoring leads.  Right central venous catheter/transvenous pacer lead positioning, similar.  Persistent patchy interstitial and airspace opacities with bibasilar density and effusions as before.  Some interval clearing at the right upper lung zone from prior.  No gross pneumothorax.     Infusions:     0.9% NaCl   Intravenous Continuous 10 mL/hr at 06/21/25 1307 Rate Verify at 06/21/25 1307    heparin (porcine)    Continuous  mL/hr at 06/18/25 1628 500 Units/hr at 06/18/25 1628       Scheduled Medications:    atorvastatin  20 mg Oral Daily    cyanocobalamin  1,000 mcg Intramuscular Daily    enoxparin  40 mg Subcutaneous Q24H (prophylaxis, 1700)    furosemide (LASIX) injection  80 mg Intravenous Q12H    insulin glargine U-100  5 Units Subcutaneous Daily    losartan  100 mg Oral Daily    mupirocin   Topical (Top) Daily    polyethylene glycol  17 g Oral Daily         Feed: heart healthy  Analgesia: NA  Sedation: NA  Thromboembolic prophylaxis: LVXN   Head-of-bed: elevated  Ulcer prophylaxis: NA  Glycemic control: 140-180, SSI   SAT/SBT: NA  Bowel regimen: NA  Indwelling: PIV x2  De-escalate Abx: NA    Code status: Full     Disposition: Currently w/ IV pacer in place until PPM; will continue in ICU care    Thank you for allowing us to participate in the care of this patient. Please call with questions.    Justin Ellerman, MD  [facility]

## 2025-06-21 NOTE — EICU
Intervention Initiated From:  COR / KALLIU    Anamika intervened regarding:  Rounding (Video assessment)    VICU Night Rounds Checklist  24H Vital Sign Range:  Temp:  [97.5 °F (36.4 °C)-98.3 °F (36.8 °C)]   Pulse:  [68-72]   Resp:  [5-49]   BP: (110-230)/()   SpO2:  [63 %-100 %]     Video rounds and LDA reconciliation

## 2025-06-21 NOTE — PROGRESS NOTES
[facility]     HPI/Interval:       Physically doing well this morning however per family and staff seems to be more agitated/paranoid which was impression I had during my interview (distressed in medical system as a whole).  Pacemaker interrogated.  Telemetry reviewed without events.  Discussed course per below with family.    History obtained by patient interview and supplemented by nursing documentation and chart review.   Objective   PMHx:  has a past medical history of Diabetes mellitus, type 2, Fibromyalgia (2016), and Hypertension (2016).   SurgHx:  has a past surgical history that includes Hernia repair; Cholecystectomy;  section; Foot surgery (Bilateral); and insertion, pacemaker, temporary transvenous (N/A, 2025).   FamHx: family history includes Diabetes in her father; Heart disease in her brother and sister.   SocialHx:  reports that she has never smoked. She has never used smokeless tobacco. She reports current alcohol use.  Home Meds:  Current Outpatient Medications   Medication Instructions    cyclobenzaprine (FLEXERIL) 10 MG tablet TAKE 1 TABLET BY MOUTH EVERY DAY NIGHTLY AS NEEDED FOR MUSCLE SPASMS    gabapentin (NEURONTIN) 300 mg, Oral, Nightly, at bedtime    lisinopriL (PRINIVIL,ZESTRIL) 20 mg, Oral    meclizine (ANTIVERT) 12.5 mg tablet TAKE 1 TABLET BY MOUTH 2 (TWO) TIMES DAILY AS NEEDED FOR DIZZINESS.    metFORMIN (GLUCOPHAGE) 500 MG tablet TAKE 1 TABLET BY MOUTH TWICE A DAY WITH MEALS    metoprolol tartrate (LOPRESSOR) 50 MG tablet TAKE 1 TABLET BY MOUTH TWICE A DAY    oxyCODONE-acetaminophen (PERCOCET)  mg per tablet 1 tablet, Oral, Nightly PRN    rosuvastatin (CRESTOR) 5 mg, Oral, Daily     Review of Systems: Comprehensive ROS was performed and is negative unless otherwise noted in HPI.     Objective:   Last 24 Hour Vital Signs:  BP  Min: 110/57  Max: 230/104  Temp  Av °F (36.7 °C)  Min: 97.5 °F (36.4 °C)  Max: 98.3 °F (36.8 °C)  Pulse  Avg:  69.8  Min: 68  Max: 72  Resp  Av.1  Min: 5  Max: 49  SpO2  Av.7 %  Min: 63 %  Max: 100 %  I/O last 3 completed shifts:  In: 1527.4 [P.O.:1320; I.V.:147.4; Other:60]  Out: 4320 [Urine:4320]    Physical Examination:  Constitutional: NAD, Atraumatic   HEENT: MMM  Cardiovascular: RRR, no murmurs noted, no chest tenderness to palpation, 2+ radial pulses b/l  Pulmonary: normal respiratory effort, CTAB, no crackles, wheezes  Abdominal: S/NT/ND  Musculoskeletal: No lower extremity edema noted  Neurological: Alert & oriented to self, location, time and situation. No gross neurological deficits  Skin: W/D/I  Psych: Appropriate affect, normal mood    Laboratory:  Personally reviewed    Other Results:  TTE:  Results for orders placed during the hospital encounter of 25    Echo Saline Bubble? Yes    Interpretation Summary    Left Ventricle: No echocardiographic evidence of VSD.    Left Atrium: Agitated saline study of the atrial septum is negative after vasalva maneuver, suggesting absence of intracardiac shunt at the atrial level.    Stress Testing:   No results found for this or any previous visit.     Coronary Angiogram:  No results found for this or any previous visit.      Time spent on this visit included personally:  -Reviewing Medical records & lab results  -Independently reviewing and interpreting (if not documented by myself) EKGs, echocardiograms, catherizations   -Obtaining a history, performing a relevant exam, counseling/educating the patient/family  -Documenting clinical information in the EMR including ordering of tests  -Care coordination and communicating with other health care providers         Assessment/Plan:     Active Hospital Problems    Diagnosis    *Complete heart block    Acute systolic heart failure    Acute pulmonary edema    Acute hypoxemic respiratory failure    Hypertensive emergency    Syncope    Hypertension       Cady Watkins is a 74 y.o. female with DM, HTN p/w sx of  fatigue found to be tenzin in PCP office and later endorsed chest discomfort for last few weeks found to be in CHB w/ HR in 30s c/b syncope s/p TVP. Also with flash pulm edema. TTE EF 20-25% WMA in LAD distribution c/f complete anterior MI vs. Stress CM in light of recent stressors. Initial ECG CHB 30s, RBB anterior TW changes, Tr negative, BNP 1200.  Significant improvement with diuresis and TVP.    Respiratory symptoms improved today, less pacer dependent (baseline rate/rhythm in low 40s, sinus Tenzin)-pacer set at 60 beats per minute.  Does have more paranoia symptoms today per family and staff, is A&O x3.  Family concerned about B12 levels.  Volume status much improved    -may need reconcile psychiatric home medications to ensure no withdrawal leading to paranoia?  Granddaughter we will obtain complete list  -on atorva 20, losartan 100mg daily  -start asa 81mg daily   -continue lasix IV for 1 more day, can transition to oral Lasix 80 mg p.o. daily tomorrow  -chest x-ray tomorrow  -continue losartan 100 mg daily-we will likely switch to Entresto at some point  -can start spironolactone 25 mg daily today  -further GDMT to follow up  -will need ischemic workup at some point, however may be better to have dual-chamber ICD placed prior--we will make final decision tomorrow regarding order after reassessing her clinical and mental status.    Thank you for the opportunity to care for this patient. Please don't hesitate to reach out with any questions/concerns,  Speech recognition software used for parts of this note. Please excuse grammar, out of context phrases, and/or syntax issues.

## 2025-06-21 NOTE — EICU
Virtual ICU Quality Rounds    Admit Date: 6/18/2025  Hospital Day: 3    ICU Day: 2d 12h    24H Vital Sign Range:  Temp:  [97.5 °F (36.4 °C)-98.3 °F (36.8 °C)]   Pulse:  [68-73]   Resp:  [11-49]   BP: (110-230)/()   SpO2:  [63 %-100 %]     VICU Surveillance Screening      LDA reconciliation : Yes

## 2025-06-21 NOTE — PROGRESS NOTES
"[facility] Medicine Progress Note    Primary Team: [facility] Hospitalist Team B  Attending Physician: Robin Ndiaye MD  Resident: Blair  Intern: Merry    Subjective:      Patient seen at bedside this morning. No acute complaints. Denies chest pain, shortness of breath, fevers, or chills. Became agitated this morning and refusing labs. Pt redirectable and says she is tired of being "stuck here". Reassured this morning and discussed plan.      Objective:     Last 24 Hour Vital Signs:  BP  Min: 110/57  Max: 230/104  Temp  Av.8 °F (36.6 °C)  Min: 97.5 °F (36.4 °C)  Max: 98.3 °F (36.8 °C)  Pulse  Av.8  Min: 68  Max: 73  Resp  Av.3  Min: 11  Max: 49  SpO2  Av %  Min: 63 %  Max: 100 %  I/O last 3 completed shifts:  In: 780.2 [P.O.:720; I.V.:60.2]  Out: 2680 [Urine:2680]    Physical Examination:  Physical Exam  Constitutional:       Comments: Patient has TVP placed in the Right IJ.   HENT:      Mouth/Throat:      Mouth: Mucous membranes are moist.      Pharynx: Oropharynx is clear.   Eyes:      Pupils: Pupils are equal, round, and reactive to light.   Cardiovascular:      Rate and Rhythm: Normal rate and regular rhythm.   Skin:     General: Skin is warm and dry.   Neurological:      General: No focal deficit present.      Mental Status: She is alert.   Psychiatric:         Mood and Affect: Mood normal.         Behavior: Behavior normal.         Thought Content: Thought content normal.         Judgment: Judgment normal.     Laboratory:  Laboratory Data Reviewed: yes  Pertinent Findings:  Recent Labs   Lab 25  1557 25  2310 25  0514 25  0337 25  1945   WBC 7.79  --  11.59 10.88  --    HGB 12.4  --  12.6 11.5*  --    HCT 33.4*  --  37.0 34.0*  --    *  --  121* 118*  --       < > 140 139 139   K 4.3   < > 4.4 3.3* 4.1      < > 109 105 100   CREATININE 1.2   < > 1.1 1.1 1.0   BUN 28*   < > 25* 23 25*   CO2 20*   < > 22* 26 31*   ALT 35  --  31 23  --    AST 29 "  --  27 19  --     < > = values in this interval not displayed.     Microbiology Data Reviewed: no  Pertinent Findings:  None    Other Results:  EKG (my interpretation): Complete Heart Block.    Radiology Data Reviewed: yes  Pertinent Findings:  X-Ray Chest AP Portable   Final Result      As above         Electronically signed by: Michael Fields   Date:    06/20/2025   Time:    10:01      X-Ray Chest AP Portable   Final Result      As above.         Electronically signed by: Michael Fields   Date:    06/19/2025   Time:    09:12      X-Ray Chest AP Portable   Final Result      As above.         Electronically signed by: iMchael Fields   Date:    06/19/2025   Time:    09:31      X-Ray Chest AP Portable    (Results Pending)         Current Medications:     Infusions:   heparin (porcine)    Continuous  mL/hr at 06/18/25 1628 500 Units/hr at 06/18/25 1628        Scheduled:   atorvastatin  20 mg Oral Daily    enoxparin  40 mg Subcutaneous Q24H (prophylaxis, 1700)    furosemide (LASIX) injection  80 mg Intravenous Q12H    insulin glargine U-100  5 Units Subcutaneous Daily    losartan  100 mg Oral Daily    mupirocin   Topical (Top) Daily    polyethylene glycol  17 g Oral Daily        PRN:    Current Facility-Administered Medications:     acetaminophen, 1,000 mg, Oral, Q8H PRN    dextrose 50%, 12.5 g, Intravenous, PRN    dextrose 50%, 12.5 g, Intravenous, PRN    dextrose 50%, 25 g, Intravenous, PRN    glucagon (human recombinant), 1 mg, Intramuscular, PRN    glucagon (human recombinant), 1 mg, Intramuscular, PRN    glucose, 16 g, Oral, PRN    glucose, 24 g, Oral, PRN    heparin (porcine), , , Continuous PRN    insulin aspart U-100, 0-10 Units, Subcutaneous, Q6H PRN    naloxone, 0.02 mg, Intravenous, PRN    sodium chloride 0.9%, 2 mL, Intravenous, Q12H PRN    Antibiotics and Day Number of Therapy:  None    Lines and Day Number of Therapy:  Peripheral IV - Single Lumen 06/18/25 1553 20 G Right Antecubital    Peripheral IV - Single Lumen 06/19/25 0445 20 G 3/4 in Right Hand   Wound 06/18/25 1646 Incision Right Neck venous venogram puncture     Assessment:     Cady Watkins is a 74 y.o.female with  Patient Active Problem List    Diagnosis Date Noted    Acute systolic heart failure 06/20/2025    Acute pulmonary edema 06/19/2025    Acute hypoxemic respiratory failure 06/19/2025    Hypertensive emergency 06/19/2025    Complete heart block 06/18/2025    Syncope 06/18/2025    Colon cancer screening declined 09/17/2023    Mammogram declined 09/17/2023    Opioid dependence 07/08/2022    Diabetic nephropathy 12/10/2020    High cholesterol 02/23/2018    Spasm 02/23/2018    Insomnia 10/27/2017    Migraine 10/27/2017    Type 2 diabetes with neuropathy 12/13/2016    Obesity 10/13/2016    Kidney stone 10/13/2016    Fibromyalgia 09/21/2016    Hypertension 09/21/2016    Ventral hernia 09/21/2016        Plan:     Complete Heart Block  Syncope  Heart Failure with Reduced Ejection Fraction  Severe Pulmonary HTN.  - Cardiology consulted in the ED.  - Cardiology placed Temporary Pacemaker, Permanent next week.  - Discontinue AV blocking agents.  - Holding Metoprolol  - TSH WNL.  - 2D Echocardiogram with severally reduced EF 20-25%. Bubble study negative. Repeat next week.  - Losartan ordered 6/19 by cardiology.     Hypertensive Emergency, improved  Flash Pulmonary Edema, resolved  Anxiety  - Patient hypertensive with SBP in the 230s. MAP goal of 120 within 2 hours of initiation of drip.   - Started on Nitroglycerine drip per cardiology recommendations.   - Strict In and out put. BNP 1200 on arrival. Given 40 mg of IV lasix followed by 60 mg of IV Lasix.   - Patient received one dose of 0.5 mg ativan due to agitation and tachypnea.  - did not tolerate Ativan. Attempting Zyprexa IM x 2 times. If anxiety and agitation does not improve will intubate.  - Will do Propofol and Fentanyl for sedation per cardiology.      Diabetes  Diabetic  neuropathy  - Holding Metformin  - SSI while in patient   - Last A1c 6.4 ~ 3 months ago     ISAIAS, resolved  - CR 1.2 on admit, baseline 0.9   - improved to baseline     HLD  - on crestor 5mg at home  - lisinopril 20 while admitted  - LDL 76, HDL 34, Total 138 ~ 3 months ago     Folate Deficiency  Vitamin B12 Deficiency  Macrocytosis  - Should be on weekly B12 injections  - MCV continues to be high.  - B12, folate ordered  - Cyanocobalamin 1000mcg IV x3 doses followed by 1000 mcg PO daily    Diet: Cardiac  Code:Full  DVT: lovenox  Dispo: pending ICD/PPM insertion next week    Wilfrido Serrano MD  [facility] Internal Medicine HO-I    [facility] Medicine Hospitalist Pager numbers:   [facility] Hospitalist Medicine Team A (Jamar/Leslie): [phone]  [facility] Hospitalist Medicine Team B (Zelda/Romeo):  [phone]

## 2025-06-21 NOTE — PLAN OF CARE
Pt would benefit from a more proactive delirium plan for getting sleep at night. Lorazepam unsuccessful, olanzapine unsuccessful on 6/19 per notes after becoming agitated around 0600 like this am. Pt reports very minimal sleep since hospital stay, however was AAOx4 prior to approximately 0000. Glucose  covered with SS    Problem: Diabetes Comorbidity  Goal: Blood Glucose Level Within Targeted Range  Outcome: Progressing     Problem: Adult Inpatient Plan of Care  Goal: Plan of Care Review  Outcome: Progressing     Problem: Infection  Goal: Absence of Infection Signs and Symptoms  Outcome: Progressing     Problem: Skin Injury Risk Increased  Goal: Skin Health and Integrity  Outcome: Progressing     Problem: Delirium  Goal: Improved Sleep  Outcome: Not Progressing     Problem: Fall Injury Risk  Goal: Absence of Fall and Fall-Related Injury  Outcome: Progressing

## 2025-06-21 NOTE — EICU
Intervention Initiated From:  Bedside    Anamika intervened regarding:  Medication and Neuro    Doctor Notified:  Yes  Comments: Dr. SUNNY Song notified that bedside RN called eICU reporting pt is agitated and confused--requesting IV medication for agitation .

## 2025-06-21 NOTE — NURSING
Pulled lorazepam for pt, she states she would like to wait until 2200 to take. Returned med. Will reassess at 2200

## 2025-06-21 NOTE — NURSING
Pt is extremely confused, does not know where she is or situation, seems to be fearful. Calmed pt, redirected. Changed linens and gave bath. Pt has not slept entire shift. All lights have been turned down to facilitate sleep
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
## 2025-06-21 NOTE — NURSING
Pt and daughter requesting something for sleep tonight, daughter states that her mother has not slept much the entire hospital stay. Maybe got 2 hours sleep last night. Pt expresses some anxiety about being in the hospital and concerned about her cats. Would like to be able to get some sleep. Notified eICU, left message with nurse for MD

## 2025-06-22 LAB
ABSOLUTE EOSINOPHIL (OHS): 0.2 K/UL
ABSOLUTE MONOCYTE (OHS): 0.51 K/UL (ref 0.3–1)
ABSOLUTE NEUTROPHIL COUNT (OHS): 4.78 K/UL (ref 1.8–7.7)
ALBUMIN SERPL BCP-MCNC: 2.5 G/DL (ref 3.5–5.2)
ALP SERPL-CCNC: 35 UNIT/L (ref 40–150)
ALT SERPL W/O P-5'-P-CCNC: 14 UNIT/L (ref 10–44)
ANION GAP (OHS): 13 MMOL/L (ref 8–16)
AST SERPL-CCNC: 19 UNIT/L (ref 11–45)
BASOPHILS # BLD AUTO: 0.04 K/UL
BASOPHILS NFR BLD AUTO: 0.6 %
BILIRUB SERPL-MCNC: 0.8 MG/DL (ref 0.1–1)
BUN SERPL-MCNC: 29 MG/DL (ref 8–23)
CALCIUM SERPL-MCNC: 8.2 MG/DL (ref 8.7–10.5)
CHLORIDE SERPL-SCNC: 100 MMOL/L (ref 95–110)
CO2 SERPL-SCNC: 25 MMOL/L (ref 23–29)
CREAT SERPL-MCNC: 1.5 MG/DL (ref 0.5–1.4)
CREAT UR-MCNC: 95.3 MG/DL (ref 15–325)
ERYTHROCYTE [DISTWIDTH] IN BLOOD BY AUTOMATED COUNT: 13.9 % (ref 11.5–14.5)
GFR SERPLBLD CREATININE-BSD FMLA CKD-EPI: 37 ML/MIN/1.73/M2
GLUCOSE SERPL-MCNC: 152 MG/DL (ref 70–110)
HCT VFR BLD AUTO: 35.8 % (ref 37–48.5)
HGB BLD-MCNC: 12.1 GM/DL (ref 12–16)
IMM GRANULOCYTES # BLD AUTO: 0.02 K/UL (ref 0–0.04)
IMM GRANULOCYTES NFR BLD AUTO: 0.3 % (ref 0–0.5)
LYMPHOCYTES # BLD AUTO: 1.41 K/UL (ref 1–4.8)
MAGNESIUM SERPL-MCNC: 2.1 MG/DL (ref 1.6–2.6)
MCH RBC QN AUTO: 32.3 PG (ref 27–31)
MCHC RBC AUTO-ENTMCNC: 33.8 G/DL (ref 32–36)
MCV RBC AUTO: 96 FL (ref 82–98)
NUCLEATED RBC (/100WBC) (OHS): 0 /100 WBC
PHOSPHATE SERPL-MCNC: 3.2 MG/DL (ref 2.7–4.5)
PLATELET # BLD AUTO: 119 K/UL (ref 150–450)
PMV BLD AUTO: 12.9 FL (ref 9.2–12.9)
POCT GLUCOSE: 141 MG/DL (ref 70–110)
POCT GLUCOSE: 146 MG/DL (ref 70–110)
POCT GLUCOSE: 163 MG/DL (ref 70–110)
POCT GLUCOSE: 182 MG/DL (ref 70–110)
POTASSIUM SERPL-SCNC: 4.6 MMOL/L (ref 3.5–5.1)
PROT SERPL-MCNC: 5.8 GM/DL (ref 6–8.4)
RBC # BLD AUTO: 3.75 M/UL (ref 4–5.4)
RELATIVE EOSINOPHIL (OHS): 2.9 %
RELATIVE LYMPHOCYTE (OHS): 20.3 % (ref 18–48)
RELATIVE MONOCYTE (OHS): 7.3 % (ref 4–15)
RELATIVE NEUTROPHIL (OHS): 68.6 % (ref 38–73)
SODIUM SERPL-SCNC: 138 MMOL/L (ref 136–145)
SODIUM UR-SCNC: 55 MMOL/L (ref 20–250)
UUN UR-MCNC: 252 MG/DL (ref 140–1050)
WBC # BLD AUTO: 6.96 K/UL (ref 3.9–12.7)

## 2025-06-22 PROCEDURE — 36415 COLL VENOUS BLD VENIPUNCTURE: CPT

## 2025-06-22 PROCEDURE — 25000003 PHARM REV CODE 250

## 2025-06-22 PROCEDURE — 83735 ASSAY OF MAGNESIUM: CPT

## 2025-06-22 PROCEDURE — 99024 POSTOP FOLLOW-UP VISIT: CPT | Mod: ,,, | Performed by: INTERNAL MEDICINE

## 2025-06-22 PROCEDURE — 27000221 HC OXYGEN, UP TO 24 HOURS

## 2025-06-22 PROCEDURE — 84540 ASSAY OF URINE/UREA-N: CPT

## 2025-06-22 PROCEDURE — 84300 ASSAY OF URINE SODIUM: CPT

## 2025-06-22 PROCEDURE — 99900035 HC TECH TIME PER 15 MIN (STAT)

## 2025-06-22 PROCEDURE — 63600175 PHARM REV CODE 636 W HCPCS

## 2025-06-22 PROCEDURE — 84100 ASSAY OF PHOSPHORUS: CPT

## 2025-06-22 PROCEDURE — 51701 INSERT BLADDER CATHETER: CPT

## 2025-06-22 PROCEDURE — 51798 US URINE CAPACITY MEASURE: CPT

## 2025-06-22 PROCEDURE — 94761 N-INVAS EAR/PLS OXIMETRY MLT: CPT

## 2025-06-22 PROCEDURE — 85025 COMPLETE CBC W/AUTO DIFF WBC: CPT

## 2025-06-22 PROCEDURE — 20000000 HC ICU ROOM

## 2025-06-22 PROCEDURE — 80053 COMPREHEN METABOLIC PANEL: CPT

## 2025-06-22 PROCEDURE — 82570 ASSAY OF URINE CREATININE: CPT

## 2025-06-22 RX ORDER — GABAPENTIN 300 MG/1
300 CAPSULE ORAL NIGHTLY
Status: DISCONTINUED | OUTPATIENT
Start: 2025-06-22 | End: 2025-06-23

## 2025-06-22 RX ORDER — INSULIN ASPART 100 [IU]/ML
0-10 INJECTION, SOLUTION INTRAVENOUS; SUBCUTANEOUS
Status: DISCONTINUED | OUTPATIENT
Start: 2025-06-22 | End: 2025-06-24 | Stop reason: HOSPADM

## 2025-06-22 RX ORDER — INSULIN ASPART 100 [IU]/ML
0-10 INJECTION, SOLUTION INTRAVENOUS; SUBCUTANEOUS
Status: CANCELLED | OUTPATIENT
Start: 2025-06-22

## 2025-06-22 RX ORDER — INSULIN ASPART 100 [IU]/ML
0-10 INJECTION, SOLUTION INTRAVENOUS; SUBCUTANEOUS
Status: DISCONTINUED | OUTPATIENT
Start: 2025-06-22 | End: 2025-06-22

## 2025-06-22 RX ADMIN — GABAPENTIN 300 MG: 300 CAPSULE ORAL at 08:06

## 2025-06-22 RX ADMIN — ASPIRIN 81 MG CHEWABLE TABLET 81 MG: 81 TABLET CHEWABLE at 10:06

## 2025-06-22 RX ADMIN — ENOXAPARIN SODIUM 40 MG: 40 INJECTION SUBCUTANEOUS at 04:06

## 2025-06-22 RX ADMIN — ACETAMINOPHEN 1000 MG: 500 TABLET ORAL at 01:06

## 2025-06-22 RX ADMIN — INSULIN GLARGINE 5 UNITS: 100 INJECTION, SOLUTION SUBCUTANEOUS at 10:06

## 2025-06-22 RX ADMIN — POTASSIUM & SODIUM PHOSPHATES POWDER PACK 280-160-250 MG 1 PACKET: 280-160-250 PACK at 07:06

## 2025-06-22 RX ADMIN — DEXMEDETOMIDINE HYDROCHLORIDE 1 MCG/KG/HR: 4 INJECTION, SOLUTION INTRAVENOUS at 02:06

## 2025-06-22 RX ADMIN — POLYETHYLENE GLYCOL 3350 17 G: 17 POWDER, FOR SOLUTION ORAL at 10:06

## 2025-06-22 RX ADMIN — INSULIN ASPART 2 UNITS: 100 INJECTION, SOLUTION INTRAVENOUS; SUBCUTANEOUS at 07:06

## 2025-06-22 RX ADMIN — INSULIN ASPART 2 UNITS: 100 INJECTION, SOLUTION INTRAVENOUS; SUBCUTANEOUS at 04:06

## 2025-06-22 RX ADMIN — DEXMEDETOMIDINE HYDROCHLORIDE 0.2 MCG/KG/HR: 4 INJECTION, SOLUTION INTRAVENOUS at 08:06

## 2025-06-22 RX ADMIN — POTASSIUM & SODIUM PHOSPHATES POWDER PACK 280-160-250 MG 1 PACKET: 280-160-250 PACK at 10:06

## 2025-06-22 RX ADMIN — ATORVASTATIN CALCIUM 20 MG: 20 TABLET, FILM COATED ORAL at 10:06

## 2025-06-22 RX ADMIN — MUPIROCIN: 20 OINTMENT TOPICAL at 10:06

## 2025-06-22 NOTE — PLAN OF CARE
Problem: Diabetes Comorbidity  Goal: Blood Glucose Level Within Targeted Range  Outcome: Progressing     CBG monitored AC/HS according to orders     Problem: Wound  Goal: Improved Oral Intake  Outcome: Progressing     Pt having more PO intake this shift and tolerating well    Problem: Delirium  Goal: Improved Sleep  Outcome: Progressing     Pt oriented x3 this shift without any episodes of agitation; precedex off     Problem: Adult Inpatient Plan of Care  Goal: Plan of Care Review  Outcome: Progressing     POC reviewed with pt and daughter throughout shift

## 2025-06-22 NOTE — PROGRESS NOTES
Progress Note  [facility]    Attending: Dr. Rahman  Fellow: Dr. Newman  Admit Date: 6/18/2025  Today's Date: 06/22/2025  Reason for Consult:  BIPAP and agitation     ASSESSMENT & RECOMMENDATIONS     71F with sx bradycardia s/p TVP, developed flash pulmonary edema and AHRF, placed on BIPAP and stepped up to MICU. Now s/p IVP with controlled rate pending PPM placement next week.    CNS/Neuro:    Acute ICU delirium  Possible contributing gabapentin/flexeril/opioid w/d  -patient per dispensed meds on daily flexeril 10qhs, lily 300qhs, percocet qhs prn  -patient agitated and non-redirectable  -likely long term improvement after patient out of ICU/hospital setting     Plan:  Restarted gabapentin qhs to see if there is improvement  Delirium precautions    Cardiovascular:    Complete Heart Block  -Bradycardic 20-30s, syncope and fatigue; EKG AV dissociation   - S/p IVP with good capture, HR 60, 5 mA    Plan:  Continue IVP  Cards following, considering placing PPM next week     HTN Emergency   New onset HFrEF  Severe pHTN  -developed flash pulmonary edema post procedure; SOB, agitated   -BP increased to 230s/130s ([id number]), hypoxic not improving with NRB, RR 30-40s and transitioned to BIPAP  -placed on nitroglycerin gtt and lasix 40>60  -BNP 1200, trop 0.019  -Continue statin   -TTE EF 20-25%, unable to assess diastolic, mild LA dilation, mild MR, mod TR, severe pHTN, PASP 61    Plan:  Restart home antihypertensive when appropriate, no BB  Losartan for afterload reduction per cards, increase to 100mg qd   Plan for AICD/PPM next week with improvement  Repeat echo next week   Aggressive diuresis, lasix 80 qd   Strict I/Os    Respiratory:    Acute hypoxic respiratory failure   Pulmonary edema   -SOB, significant incr WOB; hypoxic to 70s failing NRB; placed on BIPAP   -VBG 7.28/58/28 --> ABG 7.48/35/69/23  -CXR c/w pulm edema    Plan:  Diuresis as above  Weaned to 3-4L NC, wean as able      GI/Metabolic:  F: none   E: K > 4, Phos > 3, Mg > 2 and replete PRN   N: cardiac    Renal:  Baseline Cr ~ .9-1.1    ISAIAS   -Cr incr to 1.5, likely from diuresis, decreasing diuresis    Plan:  CTM Cr as patient diuresis     Bladder lesions  Nonobstructive left renal stone  -renal US with small nonobs R Renal stone, 3cm dependent lesion in bladder, c/f blood vs neoplasm    Plan:  Urology consulted per primary    Heme:  Baseline Hgb ~ 12.2-13.7    Plan:  Jehovas witness    Endo:   Last TSH: 2.751  Last A1C: 6.4%    Glucose within goal range this admission with 140-180  -Lantus 5u   -SSI    ID:  No acute concern   Temp: AF  WBC: 10.8  Micro: NA      SUBJECTIVE:     Brief HPI: 75yo female with a PMH of HTN, DM, HLD admitted 6/18 for symptomatic bradycardia. Hx obtained from chart review, patient at bedside too tired to give story. Went to PCP and was found to have bradycardia and has been feeling unwell for 3 days and had one syncopal episode. No CP, fever, chills, n/v or urinary/bowel changes. Found to be bradycardic to 20-30s on arrival and was taken for TVP with cardiology. However, in the afternoon patient started becoming SOB, agitated with what appeared to be flash pulm edema. BP increased to 230s/130s ([id number]), hypoxic not improving with NRB, RR 30-40s and transitioned to BIPAP. Stepped up to MICU 6/18 for AHRF and incr. WOB.     Overnight: She did have one episodes of anxiety and delirium that was self-limited this morning resting and has slept for the past several hours per daughter. Most recently at baseline Aox3 and apologetic for episodes.     OBJECTIVE:     Vital Signs Trends/Hx Reviewed  Vitals:    06/22/25 1000 06/22/25 1015 06/22/25 1030 06/22/25 1045   BP: (!) 87/51 (!) 104/52 (!) 78/46 (!) 85/53   Pulse: 60 60 60 60   Resp: 20 20 20 19   Temp:       TempSrc:       SpO2: 97% 96% 97% 96%   Weight:       Height:           Ventilator settings: 3L NC   Physical Exam:  General: NAD, cooperative   HEENT:  "EOMI, oral and nasal mucosa moist.   Neck: Supple, TVP in place, clean dry intact   Cardiac: normal rate, regular rhythm, with no murmurs, rubs, gallops with brisk cap refill and symmetric pulses in distal extremities. All extremities warm to touch.   Respiratory: On NC. CTA BL  Abdomen: Soft, NT/ND. +BS.  Extremities: No edema.   Neuro: Grossly intact to brief exam. Oriented x3. Follows commands.       Laboratory:  No results for input(s): "PH", "PCO2", "PO2", "HCO3", "POCSATURATED", "BE" in the last 24 hours.    Recent Labs   Lab 06/22/25 0627   WBC 6.96   RBC 3.75*   HGB 12.1   HCT 35.8*   *   MCV 96   MCH 32.3*   MCHC 33.8     Recent Labs   Lab 06/22/25  0627      K 4.6      CO2 25   BUN 29*   CREATININE 1.5*   CALCIUM 8.2*   MG 2.1       Microbiology Data:   Microbiology Results (last 7 days)       ** No results found for the last 168 hours. **             Chest Imaging:   CXR Patient is slightly rotated.  Several overlying monitoring leads.  Right central venous catheter/transvenous pacer lead positioning, similar.  Persistent patchy interstitial and airspace opacities with bibasilar density and effusions as before.  Some interval clearing at the right upper lung zone from prior.  No gross pneumothorax.     Infusions:     0.9% NaCl   Intravenous Continuous 10 mL/hr at 06/22/25 0800 Rate Verify at 06/22/25 0800    dexmedeTOMIDine (Precedex) infusion (titrating)  0-1.4 mcg/kg/hr Intravenous Continuous 7.35 mL/hr at 06/22/25 0800 0.4 mcg/kg/hr at 06/22/25 0800    heparin (porcine)    Continuous  mL/hr at 06/22/25 0700 Rate Verify at 06/22/25 0700    NORepinephrine bitartrate-D5W  0-3 mcg/kg/min (Dosing Weight) Intravenous Continuous   Held at 06/21/25 2045       Scheduled Medications:    aspirin  81 mg Oral Daily    atorvastatin  20 mg Oral Daily    enoxparin  40 mg Subcutaneous Q24H (prophylaxis, 1700)    furosemide  80 mg Oral Daily    insulin glargine U-100  5 Units Subcutaneous " Daily    losartan  100 mg Oral Daily    mupirocin   Topical (Top) Daily    OLANZapine  5 mg Intramuscular Once    polyethylene glycol  17 g Oral Daily    potassium, sodium phosphates  1 packet Oral QID (AC & HS)    spironolactone  25 mg Oral Daily         Feed: heart healthy  Analgesia: NA  Sedation: precedex held  Thromboembolic prophylaxis: LVXN   Head-of-bed: elevated  Ulcer prophylaxis: NA  Glycemic control: 140-180, SSI   SAT/SBT: NA  Bowel regimen: miralax  Indwelling: PIV x2  De-escalate Abx: NA    Code status: Full     Disposition: Currently w/ IV pacer in place until PPM; will continue in ICU care    Thank you for allowing us to participate in the care of this patient. Please call with questions.    Rosanna Chopra, DO   [facility] PGY II

## 2025-06-22 NOTE — PLAN OF CARE
Problem: Diabetes Comorbidity  Goal: Blood Glucose Level Within Targeted Range  Outcome: Ongoing     Problem: Wound  Goal: Optimal Coping  Outcome: Ongoing  Goal: Optimal Functional Ability  Outcome: Ongoing  Goal: Absence of Infection Signs and Symptoms  Outcome: Ongoing  Goal: Improved Oral Intake  Outcome: Ongoing  Goal: Optimal Pain Control and Function  Outcome: Ongoing  Goal: Skin Health and Integrity  Outcome: Ongoing  Goal: Optimal Wound Healing  Outcome: Ongoing     Problem: Adult Inpatient Plan of Care  Goal: Plan of Care Review  Outcome: Ongoing  Goal: Patient-Specific Goal (Individualized)  Outcome: Ongoing  Goal: Absence of Hospital-Acquired Illness or Injury  Outcome: Ongoing  Goal: Optimal Comfort and Wellbeing  Outcome: Ongoing  Goal: Readiness for Transition of Care  Outcome: Ongoing     Problem: Infection  Goal: Absence of Infection Signs and Symptoms  Outcome: Ongoing     Problem: Skin Injury Risk Increased  Goal: Skin Health and Integrity  Outcome: Ongoing     Problem: Delirium  Goal: Improved Sleep  Outcome: Ongoing     Problem: Fall Injury Risk  Goal: Absence of Fall and Fall-Related Injury  Outcome: Ongoing

## 2025-06-22 NOTE — PROGRESS NOTES
[facility] Cardiology Note     HPI/Interval:       Tele reviewed.  No events.  Patient had eventful evening due to delirium requiring Precedex drip.  Quite sedated this morning on my interview.  Discussed care with daughter.  Pacemaker interrogated.    History obtained by patient interview and supplemented by nursing documentation and chart review.   Objective   PMHx:  has a past medical history of Diabetes mellitus, type 2, Fibromyalgia (2016), and Hypertension (2016).   SurgHx:  has a past surgical history that includes Hernia repair; Cholecystectomy;  section; Foot surgery (Bilateral); and insertion, pacemaker, temporary transvenous (N/A, 2025).   FamHx: family history includes Diabetes in her father; Heart disease in her brother and sister.   SocialHx:  reports that she has never smoked. She has never used smokeless tobacco. She reports current alcohol use.  Home Meds:  Current Outpatient Medications   Medication Instructions    cyclobenzaprine (FLEXERIL) 10 MG tablet TAKE 1 TABLET BY MOUTH EVERY DAY NIGHTLY AS NEEDED FOR MUSCLE SPASMS    gabapentin (NEURONTIN) 300 mg, Oral, Nightly, at bedtime    lisinopriL (PRINIVIL,ZESTRIL) 20 mg, Oral    meclizine (ANTIVERT) 12.5 mg tablet TAKE 1 TABLET BY MOUTH 2 (TWO) TIMES DAILY AS NEEDED FOR DIZZINESS.    metFORMIN (GLUCOPHAGE) 500 MG tablet TAKE 1 TABLET BY MOUTH TWICE A DAY WITH MEALS    metoprolol tartrate (LOPRESSOR) 50 MG tablet TAKE 1 TABLET BY MOUTH TWICE A DAY    oxyCODONE-acetaminophen (PERCOCET)  mg per tablet 1 tablet, Oral, Nightly PRN    rosuvastatin (CRESTOR) 5 mg, Oral, Daily     Review of Systems: Comprehensive ROS was performed and is negative unless otherwise noted in HPI.     Objective:   Last 24 Hour Vital Signs:  BP  Min: 77/53  Max: 141/84  Temp  Av.4 °F (36.3 °C)  Min: 96.6 °F (35.9 °C)  Max: 98.5 °F (36.9 °C)  Pulse  Av  Min: 60  Max: 60  Resp  Av.5  Min: 9  Max: 36  SpO2  Av.1 %  Min: 92 %  Max:  100 %  I/O last 3 completed shifts:  In: 1035.4 [I.V.:1035.4]  Out: 550 [Urine:550]    Physical Examination:  Constitutional: NAD, Atraumatic   HEENT: MMM  Cardiovascular: RRR, no murmurs noted, no chest tenderness to palpation, 2+ radial pulses b/l  Pulmonary: normal respiratory effort, CTAB, no crackles, wheezes  Abdominal: S/NT/ND  Musculoskeletal: No lower extremity edema noted  Skin: W/D/I    Laboratory:  Personally reviewed    Other Results:  TTE:  Results for orders placed during the hospital encounter of 06/18/25    Echo Saline Bubble? Yes    Interpretation Summary    Left Ventricle: No echocardiographic evidence of VSD.    Left Atrium: Agitated saline study of the atrial septum is negative after vasalva maneuver, suggesting absence of intracardiac shunt at the atrial level.    Stress Testing:   No results found for this or any previous visit.     Coronary Angiogram:  No results found for this or any previous visit.      Time spent on this visit included personally:  -Reviewing Medical records & lab results  -Independently reviewing and interpreting (if not documented by myself) EKGs, echocardiograms, catherizations   -Obtaining a history, performing a relevant exam, counseling/educating the patient/family  -Documenting clinical information in the EMR including ordering of tests  -Care coordination and communicating with other health care providers         Assessment/Plan:     Active Hospital Problems    Diagnosis    *Complete heart block    Acute systolic heart failure    Acute pulmonary edema    Acute hypoxemic respiratory failure    Hypertensive emergency    Syncope    Hypertension       Cady Watkins is a 74 y.o. female with DM, HTN p/w sx of fatigue found to be tenzin in PCP office and later endorsed chest discomfort for last few weeks found to be in CHB w/ HR in 30s c/b syncope s/p TVP. Also with flash pulm edema. TTE EF 20-25% WMA in LAD distribution c/f complete anterior MI vs. Stress CM in  light of recent stressors. Initial ECG CHB 30s, RBB anterior TW changes, Tr negative, BNP 1200.  Significant improvement with diuresis and TVP.    Continues to have significant delirium now requiring Precedex drip.  Significantly sedated this morning.  Some hypotension noted with Precedex drip.  Creatinine mildly increased to 1.5.  Chest x-ray reviewed, pacer in place, significant atelectasis and interstitial opacification noted right lower and middle lobes.  Largely pacer dependent today.    -holding spironolactone and losartan in setting of hypotension while Precedex drip is weaned   -hold further diuresis for today--can transition back to oral diuresis tomorrow  -on atorva 20,  -asa 81mg daily   -can start spironolactone 25 mg daily today  -further GDMT to follow up  -pending mental status improving we will plan for ICD/PPM Tuesday, keep NPO after midnight Monday pending mental status    Thank you for the opportunity to care for this patient. Please don't hesitate to reach out with any questions/concerns,  Speech recognition software used for parts of this note. Please excuse grammar, out of context phrases, and/or syntax issues.

## 2025-06-22 NOTE — EICU
ELLIEU Night Rounds Checklist  24H Vital Sign Range:  Temp:  [97.6 °F (36.4 °C)-99.1 °F (37.3 °C)]   Pulse:  [58-73]   Resp:  [11-41]   BP: ()/()   SpO2:  [90 %-100 %]     Video rounds and LDA reconciliation

## 2025-06-22 NOTE — EICU
Virtual ICU Quality Rounds    Admit Date: 6/18/2025  Hospital Day: 4    ICU Day: 3d 12h    24H Vital Sign Range:  Temp:  [97.1 °F (36.2 °C)-99.1 °F (37.3 °C)]   Pulse:  [58-69]   Resp:  [9-36]   BP: ()/()   SpO2:  [92 %-100 %]     VICU Surveillance Screening    Daily review of  line necessity(optional): Central line(s) in place    Central line type (optional): Cordis            LDA reconciliation : Yes

## 2025-06-22 NOTE — PHYSICIAN QUERY
Due to the conflicting clinical picture, please clinically validate the Acute kidney injury. If validated, please provide additional clinical support for the Acute kidney injury.   The condition is not confirmed and/or it has been ruled out

## 2025-06-22 NOTE — PROGRESS NOTES
[facility] Medicine Progress Note    Primary Team: [facility] Hospitalist Team B  Attending Physician: Robin Ndiaye MD  Resident: Blair  Intern: Merry    Subjective:      Patient seen at bedside this morning. No acute overnight complaints. Patient's agitation improved with precedex. Had episode of retaining with 250 or so ml, was not straight cathed. Patient no urinating much overnight.      Objective:     Last 24 Hour Vital Signs:  BP  Min: 77/53  Max: 164/80  Temp  Av.3 °F (36.3 °C)  Min: 96.6 °F (35.9 °C)  Max: 97.8 °F (36.6 °C)  Pulse  Av  Min: 58  Max: 60  Resp  Av.9  Min: 9  Max: 36  SpO2  Av.2 %  Min: 92 %  Max: 100 %  I/O last 3 completed shifts:  In: 1035.4 [I.V.:1035.4]  Out: 550 [Urine:550]    Physical Examination:  Physical Exam  Constitutional:       Comments: Patient has TVP placed in the Right IJ.   HENT:      Mouth/Throat:      Mouth: Mucous membranes are moist.      Pharynx: Oropharynx is clear.   Eyes:      Pupils: Pupils are equal, round, and reactive to light.   Cardiovascular:      Rate and Rhythm: Normal rate and regular rhythm.   Skin:     General: Skin is warm and dry.   Neurological:      General: No focal deficit present.      Mental Status: She is alert.   Psychiatric:         Mood and Affect: Mood normal.         Behavior: Behavior normal.         Thought Content: Thought content normal.         Judgment: Judgment normal.       Laboratory:  Laboratory Data Reviewed: yes  Pertinent Findings:  Recent Labs   Lab 25  0337 25  1945 25  1002 25  0627   WBC 10.88  --  9.80 6.96   HGB 11.5*  --  13.4 12.1   HCT 34.0*  --  39.7 35.8*   *  --  124* 119*    139 138 138   K 3.3* 4.1 4.2 4.6    100 99 100   CREATININE 1.1 1.0 0.9 1.5*   BUN 23 25* 21 29*   CO2 26 31* 28 25   ALT 23  --  17 14   AST 19  --  23 19     Microbiology Data Reviewed: no  Pertinent Findings:  None    Other Results:  EKG (my interpretation): Complete Heart  Block.    Radiology Data Reviewed: yes  Pertinent Findings:  X-Ray Chest AP Portable   Final Result      As above         Electronically signed by: Michael Fields   Date:    06/20/2025   Time:    10:01      X-Ray Chest AP Portable   Final Result      As above.         Electronically signed by: Mcihael Fields   Date:    06/19/2025   Time:    09:12      X-Ray Chest AP Portable   Final Result      As above.         Electronically signed by: Michael Fields   Date:    06/19/2025   Time:    09:31      X-Ray Chest AP Portable    (Results Pending)   X-Ray Chest AP Portable    (Results Pending)         Current Medications:     Infusions:   0.9% NaCl   Intravenous Continuous 10 mL/hr at 06/22/25 0800 Rate Verify at 06/22/25 0800    dexmedeTOMIDine (Precedex) infusion (titrating)  0-1.4 mcg/kg/hr Intravenous Continuous 7.35 mL/hr at 06/22/25 0800 0.4 mcg/kg/hr at 06/22/25 0800    heparin (porcine)    Continuous  mL/hr at 06/22/25 0700 Rate Verify at 06/22/25 0700    NORepinephrine bitartrate-D5W  0-3 mcg/kg/min (Dosing Weight) Intravenous Continuous   Held at 06/21/25 2045        Scheduled:   aspirin  81 mg Oral Daily    atorvastatin  20 mg Oral Daily    enoxparin  40 mg Subcutaneous Q24H (prophylaxis, 1700)    furosemide  80 mg Oral Daily    insulin glargine U-100  5 Units Subcutaneous Daily    losartan  100 mg Oral Daily    mupirocin   Topical (Top) Daily    OLANZapine  5 mg Intramuscular Once    polyethylene glycol  17 g Oral Daily    potassium, sodium phosphates  1 packet Oral QID (AC & HS)    spironolactone  25 mg Oral Daily        PRN:    Current Facility-Administered Medications:     acetaminophen, 1,000 mg, Oral, Q8H PRN    dextrose 50%, 12.5 g, Intravenous, PRN    dextrose 50%, 12.5 g, Intravenous, PRN    dextrose 50%, 25 g, Intravenous, PRN    glucagon (human recombinant), 1 mg, Intramuscular, PRN    glucagon (human recombinant), 1 mg, Intramuscular, PRN    glucose, 16 g, Oral, PRN    glucose, 24 g,  Oral, PRN    heparin (porcine), , , Continuous PRN    insulin aspart U-100, 0-10 Units, Subcutaneous, QID (AC + HS) PRN    naloxone, 0.02 mg, Intravenous, PRN    sodium chloride 0.9%, 2 mL, Intravenous, Q12H PRN    Antibiotics and Day Number of Therapy:  None    Lines and Day Number of Therapy:  Introducer 06/18/25 Internal Jugular Right   Peripheral IV Single Lumen 06/19/25 2235 20 G Left Forearm   Peripheral IV Single Lumen 06/22/25 0657 22 G Anterior;Right Forearm     Assessment:     Cady Watkins is a 74 y.o.female with  Patient Active Problem List    Diagnosis Date Noted    Acute systolic heart failure 06/20/2025    Acute pulmonary edema 06/19/2025    Acute hypoxemic respiratory failure 06/19/2025    Hypertensive emergency 06/19/2025    Complete heart block 06/18/2025    Syncope 06/18/2025    Colon cancer screening declined 09/17/2023    Mammogram declined 09/17/2023    Opioid dependence 07/08/2022    Diabetic nephropathy 12/10/2020    High cholesterol 02/23/2018    Spasm 02/23/2018    Insomnia 10/27/2017    Migraine 10/27/2017    Type 2 diabetes with neuropathy 12/13/2016    Obesity 10/13/2016    Kidney stone 10/13/2016    Fibromyalgia 09/21/2016    Hypertension 09/21/2016    Ventral hernia 09/21/2016        Plan:     Complete Heart Block  Syncope  Heart Failure with Reduced Ejection Fraction  Severe Pulmonary HTN.  - Cardiology consulted in the ED.  - Cardiology placed Temporary Pacemaker, Permanent next week.  - Discontinue AV blocking agents.  - Holding Metoprolol  - TSH WNL.  - 2D Echocardiogram with severally reduced EF 20-25%. Bubble study negative. Repeat next week.  - Losartan ordered 6/19 by cardiology.    Agiation  - Continue precedex  - Patient not on any anti-psychotics per family.     Bladder Mass  - Bladder US with 3.0 cm mass in right bladder.  - Consulted Urology for further evaluation.     Hypertensive Emergency, improved  Flash Pulmonary Edema, resolved  Anxiety  - Patient hypertensive  with SBP in the 230s. MAP goal of 120 within 2 hours of initiation of drip.   - Started on Nitroglycerine drip per cardiology recommendations.   - Strict In and out put. BNP 1200 on arrival. Given 40 mg of IV lasix followed by 60 mg of IV Lasix.   - Patient received one dose of 0.5 mg ativan due to agitation and tachypnea.  - did not tolerate Ativan. Attempting Zyprexa IM x 2 times. If anxiety and agitation does not improve will intubate.  - Will do Propofol and Fentanyl for sedation per cardiology.      Diabetes  Diabetic neuropathy  - Holding Metformin  - SSI while in patient   - Last A1c 6.4 ~ 3 months ago     ISAIAS, returned  - CR 1.2 on admit, baseline 0.9   - CR 1.5 likely secondary to diureses.   - Urine studies ordered.  - Renal US ordered.     HLD  - on crestor 5mg at home  - lisinopril 20 while admitted  - LDL 76, HDL 34, Total 138 ~ 3 months ago     Folate Deficiency  Vitamin B12 Deficiency  Macrocytosis  - Should be on weekly B12 injections  - MCV continues to be high.  - B12, folate ordered  - Cyanocobalamin 1000mcg IV x3 doses followed by 1000 mcg PO daily    Diet: Cardiac  Code:Full  DVT: lovenox  Dispo: pending ICD/PPM insertion next week    Nalini Pinto MD  [facility] Internal Medicine HO-I    [facility] Medicine Hospitalist Pager numbers:   [facility] Hospitalist Medicine Team A (Jamar/Leslie): [phone]  [facility] Hospitalist Medicine Team B (Zelda/Romeo):  [phone]

## 2025-06-23 VITALS
OXYGEN SATURATION: 92 % | RESPIRATION RATE: 23 BRPM | SYSTOLIC BLOOD PRESSURE: 160 MMHG | HEIGHT: 62 IN | BODY MASS INDEX: 29.81 KG/M2 | WEIGHT: 162 LBS | TEMPERATURE: 98 F | DIASTOLIC BLOOD PRESSURE: 75 MMHG | HEART RATE: 60 BPM

## 2025-06-23 PROBLEM — N32.89 BLADDER MASS: Status: ACTIVE | Noted: 2025-06-23

## 2025-06-23 PROBLEM — N17.9 AKI (ACUTE KIDNEY INJURY): Status: ACTIVE | Noted: 2025-06-23

## 2025-06-23 LAB
ABSOLUTE EOSINOPHIL (OHS): 0.61 K/UL
ABSOLUTE MONOCYTE (OHS): 0.94 K/UL (ref 0.3–1)
ABSOLUTE NEUTROPHIL COUNT (OHS): 6 K/UL (ref 1.8–7.7)
ALBUMIN SERPL BCP-MCNC: 2.4 G/DL (ref 3.5–5.2)
ALP SERPL-CCNC: 41 UNIT/L (ref 40–150)
ALT SERPL W/O P-5'-P-CCNC: 13 UNIT/L (ref 10–44)
ANION GAP (OHS): 14 MMOL/L (ref 8–16)
ANISOCYTOSIS BLD QL SMEAR: SLIGHT
AST SERPL-CCNC: 20 UNIT/L (ref 11–45)
BACTERIA #/AREA URNS AUTO: ABNORMAL /HPF
BASOPHILS # BLD AUTO: 0.12 K/UL
BASOPHILS NFR BLD AUTO: 1.1 %
BILIRUB SERPL-MCNC: 0.6 MG/DL (ref 0.1–1)
BILIRUB UR QL STRIP.AUTO: NEGATIVE
BUN SERPL-MCNC: 42 MG/DL (ref 8–23)
CALCIUM SERPL-MCNC: 8.3 MG/DL (ref 8.7–10.5)
CHLORIDE SERPL-SCNC: 96 MMOL/L (ref 95–110)
CLARITY UR: CLEAR
CO2 SERPL-SCNC: 24 MMOL/L (ref 23–29)
COLOR UR AUTO: YELLOW
CREAT SERPL-MCNC: 1.9 MG/DL (ref 0.5–1.4)
ERYTHROCYTE [DISTWIDTH] IN BLOOD BY AUTOMATED COUNT: 14 % (ref 11.5–14.5)
GFR SERPLBLD CREATININE-BSD FMLA CKD-EPI: 28 ML/MIN/1.73/M2
GLUCOSE SERPL-MCNC: 183 MG/DL (ref 70–110)
GLUCOSE UR QL STRIP: NEGATIVE
HCT VFR BLD AUTO: 37.8 % (ref 37–48.5)
HGB BLD-MCNC: 12.8 GM/DL (ref 12–16)
HGB UR QL STRIP: NEGATIVE
IMM GRANULOCYTES # BLD AUTO: 0.04 K/UL (ref 0–0.04)
IMM GRANULOCYTES NFR BLD AUTO: 0.4 % (ref 0–0.5)
KETONES UR QL STRIP: NEGATIVE
LEUKOCYTE ESTERASE UR QL STRIP: ABNORMAL
LYMPHOCYTES # BLD AUTO: 3.14 K/UL (ref 1–4.8)
MAGNESIUM SERPL-MCNC: 2.1 MG/DL (ref 1.6–2.6)
MCH RBC QN AUTO: 32.2 PG (ref 27–31)
MCHC RBC AUTO-ENTMCNC: 33.9 G/DL (ref 32–36)
MCV RBC AUTO: 95 FL (ref 82–98)
MICROSCOPIC COMMENT: ABNORMAL
NITRITE UR QL STRIP: NEGATIVE
NUCLEATED RBC (/100WBC) (OHS): 0 /100 WBC
PH UR STRIP: 5 [PH]
PHOSPHATE SERPL-MCNC: 4.5 MG/DL (ref 2.7–4.5)
PLATELET # BLD AUTO: 172 K/UL (ref 150–450)
PLATELET BLD QL SMEAR: NORMAL
PMV BLD AUTO: 12.9 FL (ref 9.2–12.9)
POCT GLUCOSE: 183 MG/DL (ref 70–110)
POCT GLUCOSE: 199 MG/DL (ref 70–110)
POCT GLUCOSE: 201 MG/DL (ref 70–110)
POCT GLUCOSE: 209 MG/DL (ref 70–110)
POLYCHROMASIA BLD QL SMEAR: NORMAL
POTASSIUM SERPL-SCNC: 4.1 MMOL/L (ref 3.5–5.1)
PROT SERPL-MCNC: 5.5 GM/DL (ref 6–8.4)
PROT UR QL STRIP: NEGATIVE
RBC # BLD AUTO: 3.98 M/UL (ref 4–5.4)
RBC #/AREA URNS AUTO: 2 /HPF (ref 0–4)
RBC AGGLUT BLD QL: PRESENT
RELATIVE EOSINOPHIL (OHS): 5.6 %
RELATIVE LYMPHOCYTE (OHS): 28.9 % (ref 18–48)
RELATIVE MONOCYTE (OHS): 8.7 % (ref 4–15)
RELATIVE NEUTROPHIL (OHS): 55.3 % (ref 38–73)
SODIUM SERPL-SCNC: 134 MMOL/L (ref 136–145)
SP GR UR STRIP: 1.01
SQUAMOUS #/AREA URNS AUTO: 1 /HPF
UROBILINOGEN UR STRIP-ACNC: NEGATIVE EU/DL
WBC # BLD AUTO: 10.85 K/UL (ref 3.9–12.7)
WBC #/AREA URNS AUTO: 7 /HPF (ref 0–5)

## 2025-06-23 PROCEDURE — 25000003 PHARM REV CODE 250

## 2025-06-23 PROCEDURE — 20000000 HC ICU ROOM

## 2025-06-23 PROCEDURE — 27000221 HC OXYGEN, UP TO 24 HOURS

## 2025-06-23 PROCEDURE — 25000003 PHARM REV CODE 250: Performed by: INTERNAL MEDICINE

## 2025-06-23 PROCEDURE — 80053 COMPREHEN METABOLIC PANEL: CPT

## 2025-06-23 PROCEDURE — 63600175 PHARM REV CODE 636 W HCPCS

## 2025-06-23 PROCEDURE — 99900035 HC TECH TIME PER 15 MIN (STAT)

## 2025-06-23 PROCEDURE — 51701 INSERT BLADDER CATHETER: CPT

## 2025-06-23 PROCEDURE — 99291 CRITICAL CARE FIRST HOUR: CPT | Mod: ,,, | Performed by: NURSE PRACTITIONER

## 2025-06-23 PROCEDURE — 36415 COLL VENOUS BLD VENIPUNCTURE: CPT

## 2025-06-23 PROCEDURE — 85025 COMPLETE CBC W/AUTO DIFF WBC: CPT

## 2025-06-23 PROCEDURE — 94761 N-INVAS EAR/PLS OXIMETRY MLT: CPT

## 2025-06-23 PROCEDURE — 51798 US URINE CAPACITY MEASURE: CPT

## 2025-06-23 PROCEDURE — 84100 ASSAY OF PHOSPHORUS: CPT

## 2025-06-23 PROCEDURE — 81001 URINALYSIS AUTO W/SCOPE: CPT | Performed by: INTERNAL MEDICINE

## 2025-06-23 PROCEDURE — 99222 1ST HOSP IP/OBS MODERATE 55: CPT | Mod: ,,, | Performed by: UROLOGY

## 2025-06-23 PROCEDURE — 83735 ASSAY OF MAGNESIUM: CPT

## 2025-06-23 RX ORDER — NALOXONE HCL 0.4 MG/ML
0.02 VIAL (ML) INJECTION
Status: CANCELLED | OUTPATIENT
Start: 2025-06-23

## 2025-06-23 RX ORDER — INSULIN GLARGINE 100 [IU]/ML
8 INJECTION, SOLUTION SUBCUTANEOUS DAILY
Status: DISCONTINUED | OUTPATIENT
Start: 2025-06-23 | End: 2025-06-24 | Stop reason: HOSPADM

## 2025-06-23 RX ORDER — ENOXAPARIN SODIUM 100 MG/ML
30 INJECTION SUBCUTANEOUS EVERY 24 HOURS
Status: CANCELLED | OUTPATIENT
Start: 2025-06-24

## 2025-06-23 RX ORDER — OLANZAPINE 10 MG/2ML
2.5 INJECTION, POWDER, FOR SOLUTION INTRAMUSCULAR ONCE AS NEEDED
Status: DISCONTINUED | OUTPATIENT
Start: 2025-06-23 | End: 2025-06-23

## 2025-06-23 RX ORDER — ATORVASTATIN CALCIUM 20 MG/1
20 TABLET, FILM COATED ORAL DAILY
Status: CANCELLED | OUTPATIENT
Start: 2025-06-24

## 2025-06-23 RX ORDER — IBUPROFEN 200 MG
16 TABLET ORAL
Status: CANCELLED | OUTPATIENT
Start: 2025-06-23

## 2025-06-23 RX ORDER — OLANZAPINE 10 MG/2ML
2.5 INJECTION, POWDER, FOR SOLUTION INTRAMUSCULAR ONCE AS NEEDED
Status: CANCELLED | OUTPATIENT
Start: 2025-06-23 | End: 2036-11-19

## 2025-06-23 RX ORDER — LIDOCAINE 50 MG/G
1 PATCH TOPICAL
Status: CANCELLED | OUTPATIENT
Start: 2025-06-24

## 2025-06-23 RX ORDER — LIDOCAINE 50 MG/G
1 PATCH TOPICAL
Status: DISCONTINUED | OUTPATIENT
Start: 2025-06-23 | End: 2025-06-24 | Stop reason: HOSPADM

## 2025-06-23 RX ORDER — NAPROXEN SODIUM 220 MG/1
81 TABLET, FILM COATED ORAL DAILY
Status: CANCELLED | OUTPATIENT
Start: 2025-06-24

## 2025-06-23 RX ORDER — ENOXAPARIN SODIUM 100 MG/ML
30 INJECTION SUBCUTANEOUS EVERY 24 HOURS
Status: DISCONTINUED | OUTPATIENT
Start: 2025-06-23 | End: 2025-06-24 | Stop reason: HOSPADM

## 2025-06-23 RX ORDER — ACETAMINOPHEN 500 MG
1000 TABLET ORAL EVERY 8 HOURS PRN
Status: CANCELLED | OUTPATIENT
Start: 2025-06-23

## 2025-06-23 RX ORDER — IBUPROFEN 200 MG
24 TABLET ORAL
Status: CANCELLED | OUTPATIENT
Start: 2025-06-23

## 2025-06-23 RX ORDER — NOREPINEPHRINE BITARTRATE/D5W 4MG/250ML
0-3 PLASTIC BAG, INJECTION (ML) INTRAVENOUS CONTINUOUS
Status: CANCELLED | OUTPATIENT
Start: 2025-06-23

## 2025-06-23 RX ORDER — SODIUM CHLORIDE 0.9 % (FLUSH) 0.9 %
2 SYRINGE (ML) INJECTION EVERY 12 HOURS PRN
Status: CANCELLED | OUTPATIENT
Start: 2025-06-23

## 2025-06-23 RX ORDER — POLYETHYLENE GLYCOL 3350 17 G/17G
17 POWDER, FOR SOLUTION ORAL DAILY
Status: CANCELLED | OUTPATIENT
Start: 2025-06-24

## 2025-06-23 RX ORDER — INSULIN GLARGINE 100 [IU]/ML
8 INJECTION, SOLUTION SUBCUTANEOUS DAILY
Status: CANCELLED | OUTPATIENT
Start: 2025-06-24

## 2025-06-23 RX ORDER — DEXMEDETOMIDINE HYDROCHLORIDE 4 UG/ML
0-1.4 INJECTION, SOLUTION INTRAVENOUS CONTINUOUS
Status: CANCELLED | OUTPATIENT
Start: 2025-06-23

## 2025-06-23 RX ORDER — SODIUM CHLORIDE 9 MG/ML
INJECTION, SOLUTION INTRAVENOUS CONTINUOUS
Status: CANCELLED | OUTPATIENT
Start: 2025-06-23

## 2025-06-23 RX ORDER — MUPIROCIN 20 MG/G
OINTMENT TOPICAL DAILY
Status: CANCELLED | OUTPATIENT
Start: 2025-06-24

## 2025-06-23 RX ORDER — GLUCAGON 1 MG
1 KIT INJECTION
Status: CANCELLED | OUTPATIENT
Start: 2025-06-23

## 2025-06-23 RX ORDER — INSULIN ASPART 100 [IU]/ML
0-10 INJECTION, SOLUTION INTRAVENOUS; SUBCUTANEOUS
Status: CANCELLED | OUTPATIENT
Start: 2025-06-23

## 2025-06-23 RX ADMIN — INSULIN ASPART 4 UNITS: 100 INJECTION, SOLUTION INTRAVENOUS; SUBCUTANEOUS at 04:06

## 2025-06-23 RX ADMIN — LIDOCAINE 1 PATCH: 50 PATCH CUTANEOUS at 02:06

## 2025-06-23 RX ADMIN — INSULIN GLARGINE 8 UNITS: 100 INJECTION, SOLUTION SUBCUTANEOUS at 08:06

## 2025-06-23 RX ADMIN — OLANZAPINE 2.5 MG: 10 INJECTION, POWDER, FOR SOLUTION INTRAMUSCULAR at 08:06

## 2025-06-23 RX ADMIN — ATORVASTATIN CALCIUM 20 MG: 20 TABLET, FILM COATED ORAL at 08:06

## 2025-06-23 RX ADMIN — SODIUM CHLORIDE, POTASSIUM CHLORIDE, SODIUM LACTATE AND CALCIUM CHLORIDE 250 ML: 600; 310; 30; 20 INJECTION, SOLUTION INTRAVENOUS at 08:06

## 2025-06-23 RX ADMIN — ACETAMINOPHEN 1000 MG: 500 TABLET ORAL at 08:06

## 2025-06-23 RX ADMIN — ASPIRIN 81 MG CHEWABLE TABLET 81 MG: 81 TABLET CHEWABLE at 08:06

## 2025-06-23 RX ADMIN — MUPIROCIN: 20 OINTMENT TOPICAL at 08:06

## 2025-06-23 RX ADMIN — INSULIN ASPART 4 UNITS: 100 INJECTION, SOLUTION INTRAVENOUS; SUBCUTANEOUS at 07:06

## 2025-06-23 RX ADMIN — POLYETHYLENE GLYCOL 3350 17 G: 17 POWDER, FOR SOLUTION ORAL at 08:06

## 2025-06-23 RX ADMIN — INSULIN ASPART 1 UNITS: 100 INJECTION, SOLUTION INTRAVENOUS; SUBCUTANEOUS at 09:06

## 2025-06-23 RX ADMIN — ENOXAPARIN SODIUM 30 MG: 30 INJECTION SUBCUTANEOUS at 04:06

## 2025-06-23 RX ADMIN — SODIUM CHLORIDE: 9 INJECTION, SOLUTION INTRAVENOUS at 06:06

## 2025-06-23 RX ADMIN — ACETAMINOPHEN 1000 MG: 500 TABLET ORAL at 11:06

## 2025-06-23 RX ADMIN — DEXMEDETOMIDINE HYDROCHLORIDE 0.5 MCG/KG/HR: 4 INJECTION, SOLUTION INTRAVENOUS at 04:06

## 2025-06-23 RX ADMIN — NOREPINEPHRINE BITARTRATE 0.02 MCG/KG/MIN: 4 INJECTION, SOLUTION INTRAVENOUS at 12:06

## 2025-06-23 RX ADMIN — INSULIN ASPART 2 UNITS: 100 INJECTION, SOLUTION INTRAVENOUS; SUBCUTANEOUS at 11:06

## 2025-06-23 NOTE — ASSESSMENT & PLAN NOTE
- presented to PCP appt with syncope; noted with HR 20s consistent with CHB  - admitted to ICU and TVP placed; currently V paced rhythm; no tachyarrhythmias noted on telemetry overnight  - echo with depressed EF; plan as detailed under CHB

## 2025-06-23 NOTE — CONSULTS
[facility] - Intensive Care  Urology  Consult Note    Patient Name: Cady Watkins  MRN: [id number]  Admission Date: 2025  Hospital Length of Stay: 5   Code Status: Full Code   Attending Provider: Robin Ndiaye MD   Consulting Provider: Mu Buck MD  Primary Care Physician: Blaine Benítez MD  Principal Problem:Complete heart block    Inpatient consult to Urology  Consult performed by: Mu Buck MD  Consult ordered by: Nalini Pinto MD          Subjective:     HPI:  Cady Watkins is a 74 y.o. F presenting with bradycardia s/p TVP, developed flash pulmonary edema and AHRF, placed on BIPAP and stepped up to MICU .  Urology consulted for bladder mass seen on US.    Patient asleep this morning and all questions answered by daughter. Patient with history of UTI and kidney stones. Does not report any gross hematuria. US did not show any hydronephrosis, but small nonobstructing stone in the right kidney, 3cm bladder mass vs blood products    Hypotensive this morning, not tachycardic  Hgb 12.8 from 12.1  Cr 1.9 from 1.5  No UA      Past Medical History:   Diagnosis Date    Diabetes mellitus, type 2     Fibromyalgia 2016    Hypertension 2016       Past Surgical History:   Procedure Laterality Date     SECTION      CHOLECYSTECTOMY      FOOT SURGERY Bilateral     plantar fasciotomy bilateral, arthroplasty fifth toe bilateral    HERNIA REPAIR      INSERTION, PACEMAKER, TEMPORARY TRANSVENOUS N/A 2025    Procedure: Insertion, Pacemaker, Temporary Transvenous;  Surgeon: Sean Ureña MD;  Location: [facility] CATH LAB/EP;  Service: Cardiology;  Laterality: N/A;       Review of patient's allergies indicates:   Allergen Reactions    Advil [ibuprofen] Hives    Penicillins     Codeine     Excedrin aspirin free [acetaminophen-caffeine]        Family History       Problem Relation (Age of Onset)    Diabetes Father    Heart disease Sister, Brother            Tobacco Use    Smoking  status: Never    Smokeless tobacco: Never   Substance and Sexual Activity    Alcohol use: Yes     Comment: Occasionally    Drug use: Not on file    Sexual activity: Yes       Review of Systems    Objective:     Temp:  [96.6 °F (35.9 °C)-98.5 °F (36.9 °C)] 97.2 °F (36.2 °C)  Pulse:  [59-61] 60  Resp:  [10-31] 20  SpO2:  [94 %-100 %] 98 %  BP: ()/(42-84) 66/44  Weight: 73.5 kg (162 lb)  Body mass index is 29.63 kg/m².    Date 06/23/25 0700 - 06/24/25 0659   Shift 0431-7360 6610-2632 3032-5828 24 Hour Total   INTAKE   I.V.(mL/kg) 18.8(0.3)   18.8(0.3)   Shift Total(mL/kg) 18.8(0.3)   18.8(0.3)   OUTPUT   Shift Total(mL/kg)       Weight (kg) 73.5 73.5 73.5 73.5     Bladder Scan Volume (mL): 0 mL (06/23/25 0532)    Drains       Drain  Duration             Female External Urinary Catheter w/ Suction 06/21/25 0919 1 day                     Physical Exam  Constitutional:       Comments: Asleep   HENT:      Head: Normocephalic.   Cardiovascular:      Rate and Rhythm: Normal rate.   Pulmonary:      Effort: Pulmonary effort is normal.   Skin:     General: Skin is warm.          Significant Labs:    BMP:  Recent Labs   Lab 06/21/25  1002 06/22/25 0627 06/23/25  0456    138 134*   K 4.2 4.6 4.1   CL 99 100 96   CO2 28 25 24   BUN 21 29* 42*   CREATININE 0.9 1.5* 1.9*   CALCIUM 8.5* 8.2* 8.3*       CBC:  Recent Labs   Lab 06/21/25  1002 06/22/25 0627 06/23/25  0456   WBC 9.80 6.96 10.02   HGB 13.4 12.1 12.8   HCT 39.7 35.8* 27.0*   * 119* 166       All pertinent labs results from the past 24 hours have been reviewed.    Significant Imaging:  All pertinent imaging results/findings from the past 24 hours have been reviewed.                    Assessment and Plan:     Bladder mass  -Bladder mass seen on US  -Given history of kidney stones and bladder mass with increasing Cr, recommend CT RSS. Ordered  -Will allow better evaluation of potential mass and ureteral stones if present  -Recommend UA to assess for  UTI  -Will need outpatient cysto and bladder mass biopsy if confirmed on CT RSS          VTE Risk Mitigation (From admission, onward)           Ordered     enoxaparin injection 40 mg  Every 24 hours         06/20/25 1208     heparin infusion 1,000 units/500 ml in 0.9% NaCl (pressure line flush)  Intra-op continuous PRN         06/18/25 7254                    Thank you for your consult.     Mu Buck MD  Urology  [facility] - Intensive Care

## 2025-06-23 NOTE — PROGRESS NOTES
[facility] Medicine Progress Note    Primary Team: [facility] [place] Hospitalist Team B  Attending Physician: Olegario Walters MD  Resident: Blair  Intern: Merry    Subjective:      Patient seen at bedside this morning. No acute overnight complaints. Patient was hypotensive overnight and started on a levo overnight. She is currently stable without any issues. She is to get PPM placed tomorrow.      Objective:     Last 24 Hour Vital Signs:  BP  Min: 66/44  Max: 168/70  Temp  Av.7 °F (36.5 °C)  Min: 97.2 °F (36.2 °C)  Max: 97.9 °F (36.6 °C)  Pulse  Av  Min: 59  Max: 61  Resp  Av.4  Min: 7  Max: 31  SpO2  Av.6 %  Min: 92 %  Max: 100 %  I/O last 3 completed shifts:  In: 1254.2 [I.V.:1254.2]  Out: 680 [Urine:680]    Physical Examination:  Physical Exam  Constitutional:       Comments: Patient has TVP placed in the Right IJ.   HENT:      Mouth/Throat:      Mouth: Mucous membranes are moist.      Pharynx: Oropharynx is clear.   Eyes:      Pupils: Pupils are equal, round, and reactive to light.   Cardiovascular:      Rate and Rhythm: Normal rate and regular rhythm.   Skin:     General: Skin is warm and dry.   Neurological:      General: No focal deficit present.      Mental Status: She is alert.   Psychiatric:         Mood and Affect: Mood normal.         Behavior: Behavior normal.         Thought Content: Thought content normal.         Judgment: Judgment normal.       Laboratory:  Laboratory Data Reviewed: yes  Pertinent Findings:  Recent Labs   Lab 25  1002 25  0627 25  0456   WBC 9.80 6.96 10.85   HGB 13.4 12.1 12.8   HCT 39.7 35.8* 37.8   * 119* 172    138 134*   K 4.2 4.6 4.1   CL 99 100 96   CREATININE 0.9 1.5* 1.9*   BUN 21 29* 42*   CO2 28 25 24   ALT 17 14 13   AST 23 19 20     Microbiology Data Reviewed: no  Pertinent Findings:  None    Other Results:  EKG (my interpretation): Complete Heart Block.    Radiology Data Reviewed: yes  Pertinent Findings:  CT Renal Stone  Study Abd Pelvis WO   Final Result      There is a 3 mm nonobstructing calculus seen within the inferior pole of the right kidney.      There is no evidence bladder mass on this non contrasted study however the sensitivity is limited by the lack of contrast in the bladder.  Contrast installation into the bladder may provide greater sensitivity for small bladder masses or possibly cystoscopy if there is high clinical concern for bladder mass.      There is a large anterior abdominal wall hernia containing portions of small and large bowel without evidence obstruction.      There are bilateral pleural effusions with associated compressive atelectasis.         Electronically signed by: Renetta Acosta MD   Date:    06/23/2025   Time:    09:39      US Lower Extremity Veins Bilateral   Final Result      No evidence of deep venous thrombosis in either lower extremity.         Electronically signed by: Kevin Borrero MD   Date:    06/22/2025   Time:    16:51      US Retroperitoneal Complete   Final Result      Small nonobstructing right renal stone.      No hydronephrosis.      3.0 cm lesion in the dependent bladder, concerning for blood products or neoplasm.  Correlate with urinalysis and consider cystoscopy.         Electronically signed by: Zion Malone   Date:    06/22/2025   Time:    11:53      X-Ray Chest AP Portable   Final Result      As above.         Electronically signed by: Michael Fields   Date:    06/22/2025   Time:    10:35      X-Ray Chest AP Portable   Final Result      As above         Electronically signed by: Michael Fields   Date:    06/20/2025   Time:    10:01      X-Ray Chest AP Portable   Final Result      As above.         Electronically signed by: Michael Fields   Date:    06/19/2025   Time:    09:12      X-Ray Chest AP Portable   Final Result      As above.         Electronically signed by: Michael Fields   Date:    06/19/2025   Time:    09:31      X-Ray Chest AP Portable    (Results  Pending)   CT Urogram Abd Pelvis W WO    (Results Pending)         Current Medications:     Infusions:   0.9% NaCl   Intravenous Continuous 10 mL/hr at 06/23/25 1220 Rate Verify at 06/23/25 1220    dexmedeTOMIDine (Precedex) infusion (titrating)  0-1.4 mcg/kg/hr Intravenous Continuous   Stopped at 06/23/25 0645    heparin (porcine)    Continuous  mL/hr at 06/23/25 0716 Rate Verify at 06/23/25 0716    NORepinephrine bitartrate-D5W  0-3 mcg/kg/min (Dosing Weight) Intravenous Continuous   Stopped at 06/23/25 0803        Scheduled:   aspirin  81 mg Oral Daily    atorvastatin  20 mg Oral Daily    enoxparin  30 mg Subcutaneous Q24H (prophylaxis, 1700)    insulin glargine U-100  8 Units Subcutaneous Daily    mupirocin   Topical (Top) Daily    OLANZapine  5 mg Intramuscular Once    polyethylene glycol  17 g Oral Daily        PRN:    Current Facility-Administered Medications:     acetaminophen, 1,000 mg, Oral, Q8H PRN    dextrose 50%, 12.5 g, Intravenous, PRN    dextrose 50%, 12.5 g, Intravenous, PRN    dextrose 50%, 25 g, Intravenous, PRN    glucagon (human recombinant), 1 mg, Intramuscular, PRN    glucagon (human recombinant), 1 mg, Intramuscular, PRN    glucose, 16 g, Oral, PRN    glucose, 24 g, Oral, PRN    heparin (porcine), , , Continuous PRN    insulin aspart U-100, 0-10 Units, Subcutaneous, QID (AC + HS) PRN    naloxone, 0.02 mg, Intravenous, PRN    sodium chloride 0.9%, 2 mL, Intravenous, Q12H PRN    Antibiotics and Day Number of Therapy:  None    Lines and Day Number of Therapy:  Introducer 06/18/25 Internal Jugular Right   Peripheral IV Single Lumen 06/22/25 0657 22 G Anterior;Right Forearm     Assessment:     Cady Watkins is a 74 y.o.female with  Patient Active Problem List    Diagnosis Date Noted    Bladder mass 06/23/2025    Acute systolic heart failure 06/20/2025    Acute pulmonary edema 06/19/2025    Acute hypoxemic respiratory failure 06/19/2025    Hypertensive emergency 06/19/2025    Complete  heart block 06/18/2025    Syncope 06/18/2025    Colon cancer screening declined 09/17/2023    Mammogram declined 09/17/2023    Opioid dependence 07/08/2022    Diabetic nephropathy 12/10/2020    High cholesterol 02/23/2018    Spasm 02/23/2018    Insomnia 10/27/2017    Migraine 10/27/2017    Type 2 diabetes with neuropathy 12/13/2016    Obesity 10/13/2016    Kidney stone 10/13/2016    Fibromyalgia 09/21/2016    Hypertension 09/21/2016    Ventral hernia 09/21/2016        Plan:     Complete Heart Block  Syncope  Heart Failure with Reduced Ejection Fraction  Severe Pulmonary HTN.  - Cardiology consulted in the ED.  - Cardiology placed Temporary Pacemaker, Permanent next week.  - Discontinue AV blocking agents.  - Holding Metoprolol  - TSH WNL.  - 2D Echocardiogram with severally reduced EF 20-25%. Bubble study negative. Repeat next week.  - Losartan initially started but holding due to hypotension.      Agiation  - Continue precedex as needed.  - Patient not on any anti-psychotics per family.     Bladder Mass  - Bladder US with 3.0 cm mass in right bladder.  - Consulted Urology for further evaluation.     Hypertensive Emergency, improved  Flash Pulmonary Edema, resolved  Anxiety  - Patient hypertensive with SBP in the 230s. MAP goal of 120 within 2 hours of initiation of drip.   - Started on Nitroglycerine drip per cardiology recommendations.   - Strict In and out put. BNP 1200 on arrival. Given 40 mg of IV lasix followed by 60 mg of IV Lasix.   - Patient received one dose of 0.5 mg ativan due to agitation and tachypnea.  - did not tolerate Ativan. Attempting Zyprexa IM x 2 times. If anxiety and agitation does not improve will intubate.  - Will do Propofol and Fentanyl for sedation per cardiology.      Diabetes  Diabetic neuropathy  - Holding Metformin  - SSI while in patient   - Last A1c 6.4 ~ 3 months ago     ISAIAS, returned  - CR 1.2 on admit, baseline 0.9   - CR 1.5 likely secondary to diureses.   - Urine studies  consistent with pre-renal  - Nephrology Consulted  - Renal US showing normal Kidneys.      HLD  - on crestor 5mg at home  - lisinopril 20 while admitted  - LDL 76, HDL 34, Total 138 ~ 3 months ago     Folate Deficiency  Vitamin B12 Deficiency  Macrocytosis  - Should be on weekly B12 injections  - MCV continues to be high.  - B12, folate ordered  - Cyanocobalamin 1000mcg IV x3 doses followed by 1000 mcg PO daily    Diet: Cardiac, NPO at midnight.  Code:Full  DVT: lovenox  Dispo: pending ICD/PPM insertion next week    Nalini Pinto MD  [facility] HO-2    [facility] Medicine Hospitalist Pager numbers:   [facility] Hospitalist Medicine Team A (Jamar/Leslie): [phone]  [facility] Hospitalist Medicine Team B (Zelda/Romeo):  [phone]

## 2025-06-23 NOTE — PROGRESS NOTES
[facility] - Intensive Care  Cardiology  Progress Note    Patient Name: Cady Watkins  MRN: [id number]  Admission Date: 6/18/2025  Hospital Length of Stay: 5 days  Code Status: Full Code   Attending Physician: Olegario Walters MD   Primary Care Physician: Blaine Benítez MD  Expected Discharge Date: 6/25/2025  Principal Problem:Complete heart block    Subjective:     Hospital Course:   Per Dr. Ureña consult note 6/18/2025 Cardiology consulted for complete heart block. 74 years old female who has not been feeling well for the last 2-3 weeks. Significant weakness, dizziness, and syncopal event. Also lower extremities edema. Went to her PCP today was noted to have slow heart rate so referred to the emergency room. EKG showed complete heart block with a rate in the 20s. Blood pressure stable. Mentation okay. On Toprol-XL 50 mg that she took today     1. Complete heart block  2. Confucianist        Emergent temporary pacemaker  Discontinue Toprol  Avoid any AV manjit blocking agents  2D echocardiogram  Check TSH    6/19/2025 TVP remains in placed with VPaced rhythm. Transitioned from BiPap to HFNC- tachypneic and mild respiratory distress noted on PE. CBC with H&H 12.6&37.0 BMp with K+ 4.4 creatinine 1.21 Mg 1.7 Initial /106 started on IV Tridil overnight with trend down of SBP to 140s-150s and Tridil weaned off. Repeat CXR this AM with continued pulmonary edema and Lasix 120mg IV ordered this AM. Echo pending. Initial ABG yesterday evening 7.242/51.9/77.5/22.4 with repeat ABG 7.285/49.0/159/23.3 repeat pending this AM          6/20/2025 Remains on HFNC with wean in progress down to 70% 15L this AM. Remains on IV Lasix 80mg BID with 3.2L out overnight negative 4.2L since admission. Echo yesterday with EF 20-25% WMA and PHTN. TVP remains in place with V Paced rhythm CBC WNL BMP with K+ 3.3 Mg 1.6 replacement ordered     Per Dr. Horner progress note 6/22/2025 74 y.o. female with DM, HTN p/w sx of  fatigue found to be tenzin in PCP office and later endorsed chest discomfort for last few weeks found to be in CHB w/ HR in 30s c/b syncope s/p TVP. Also with flash pulm edema. TTE EF 20-25% WMA in LAD distribution c/f complete anterior MI vs. Stress CM in light of recent stressors. Initial ECG CHB 30s, RBB anterior TW changes, Tr negative, BNP 1200.  Significant improvement with diuresis and TVP.     Continues to have significant delirium now requiring Precedex drip.  Significantly sedated this morning.  Some hypotension noted with Precedex drip.  Creatinine mildly increased to 1.5.  Chest x-ray reviewed, pacer in place, significant atelectasis and interstitial opacification noted right lower and middle lobes.  Largely pacer dependent today.     -holding spironolactone and losartan in setting of hypotension while Precedex drip is weaned   -hold further diuresis for today--can transition back to oral diuresis tomorrow  -on atorva 20,  -asa 81mg daily   -can start spironolactone 25 mg daily today  -further GDMT to follow up  -pending mental status improving we will plan for ICD/PPM Tuesday, keep NPO after midnight Monday pending mental status    6/23/2025 Hypotensive overnight requiring IV Levophed. On Precedex overnight as well for agitation but weaned off this AM. CBC with H&H 12.8&37.8 BMP with K+ 4.1 creatinine 1.9 up from 1.5 Urology consulted with CT scan with non obstructing 3mm renal stone no mass appreciated        Review of Systems   Cardiovascular:  Negative for chest pain and dyspnea on exertion.     Objective:     Vital Signs (Most Recent):  Temp: 97.9 °F (36.6 °C) (06/23/25 1101)  Pulse: 60 (06/23/25 1330)  Resp: 18 (06/23/25 1330)  BP: 125/64 (06/23/25 1330)  SpO2: (!) 93 % (06/23/25 1330) Vital Signs (24h Range):  Temp:  [97.2 °F (36.2 °C)-97.9 °F (36.6 °C)] 97.9 °F (36.6 °C)  Pulse:  [59-61] 60  Resp:  [7-31] 18  SpO2:  [92 %-100 %] 93 %  BP: ()/(42-77) 125/64     Weight: 73.5 kg (162 lb)  Body  mass index is 29.63 kg/m².     SpO2: (!) 93 %         Intake/Output Summary (Last 24 hours) at 6/23/2025 1400  Last data filed at 6/23/2025 1326  Gross per 24 hour   Intake 821.97 ml   Output 680 ml   Net 141.97 ml       Lines/Drains/Airways       Central Venous Catheter Line  Duration             Introducer 06/18/25 Internal Jugular Right 5 days              Drain  Duration             Female External Urinary Catheter w/ Suction 06/21/25 0919 2 days              Peripheral Intravenous Line  Duration             Peripheral IV Single Lumen 06/22/25 0657 22 G Anterior;Right Forearm 1 day    Peripheral IV Single Lumen 06/23/25 1312 20 G Posterior;Right Hand <1 day                       Physical Exam  Constitutional:       General: She is not in acute distress.     Appearance: She is ill-appearing.   Cardiovascular:      Rate and Rhythm: Normal rate and regular rhythm.      Heart sounds: No murmur heard.     No gallop.   Pulmonary:      Effort: Pulmonary effort is normal. No respiratory distress.      Breath sounds: Normal breath sounds. No wheezing.   Abdominal:      General: Bowel sounds are normal. There is no distension.      Palpations: Abdomen is soft.      Tenderness: There is no abdominal tenderness.   Skin:     General: Skin is warm and dry.      Coloration: Skin is pale.   Neurological:      Mental Status: She is alert and oriented to person, place, and time.              Significant Imaging: Echocardiogram: Transthoracic echo (TTE) complete (Cupid Only):   Results for orders placed or performed during the hospital encounter of 06/18/25   Echo Saline Bubble? Yes   Result Value Ref Range    BSA 1.79 m2    Narrative      Left Ventricle: No echocardiographic evidence of VSD.    Left Atrium: Agitated saline study of the atrial septum is negative   after vasalva maneuver, suggesting absence of intracardiac shunt at the   atrial level.       Assessment and Plan:     Brief HPI: Seen this morning on AM rounds with   [facility] while resting in bed with daughter at the bedside. Complained of low back pain but denies any chest pain or SOB. Discussed POC as detailed below-verbalized understanding and agrees with POC      * Complete heart block  - presented with HR 20s with CHB noted on admit EKG  - emergent TVP placed and currently VPaced rhythm  - on BB as an outpatient for HTN management; BB on hold; TVP remains in place   - echo with depressed EF; plan for PPM/ICD tomorrow as long as mental status improved, remains off pressors/Precedex and no acute issues overnight    Acute systolic heart failure  - new onset  - echo  with EF 20-25% and WMA; acute pulmonary edema upon admission; aggressively  diuresed; negative 4.3L since admission  - WMA noted on echo as well; continue with medical management although limited given CHB and hypotension requiring pressors   - anticipate need for ischemic evaluation but will likely defer to outpatient setting     Hypertensive emergency  - initial /106  - initially treated with IV Tridil upon admission; SBP trending down; hypotensive overnight requiring IV Levophed  - weaned off pressors; SBP 100s-110s  - on Lisinopril 20 and Metoprolol 50mg BID as an outpatient- will hold for now   - goal BP less than 130/80  - monitor BP closely     Acute hypoxemic respiratory failure  - related to acute pulmonary edema  - diuresis as detailed previously     Acute pulmonary edema  - presented with CHB with HR 20s; initially hypoxic on  ABG  - aggressively diuresed negative 4.3L since admission; weaned to 2LPM via NC   - echo with newly depressed EF  - will monitor I&Os last dose of IV Lasix pm 6/21    Syncope  - presented to PCP appt with syncope; noted with HR 20s consistent with CHB  - admitted to ICU and TVP placed; currently V paced rhythm; no tachyarrhythmias noted on telemetry overnight  - echo with depressed EF; plan as detailed under CHB     Hypertension  - as detailed under HTN emergency         VTE  Risk Mitigation (From admission, onward)           Ordered     enoxaparin injection 30 mg  Every 24 hours         06/23/25 0807     heparin infusion 1,000 units/500 ml in 0.9% NaCl (pressure line flush)  Intra-op continuous PRN         06/18/25 1628                  > 40 minutes was spent in the care of this patient for evaluation, critical thinking and decision making. Also discussion with patient and patient's daughter as to present condition. Not all time was spent in direct face to face with patient as time was spent reviewing ECGs, CXR, labs, medications and past studies.     Zahira Deutsch, APRN, ANP  Cardiology  [facility] - Intensive Care

## 2025-06-23 NOTE — SUBJECTIVE & OBJECTIVE
Past Medical History:   Diagnosis Date    Diabetes mellitus, type 2     Fibromyalgia 2016    Hypertension 2016       Past Surgical History:   Procedure Laterality Date     SECTION      CHOLECYSTECTOMY      FOOT SURGERY Bilateral     plantar fasciotomy bilateral, arthroplasty fifth toe bilateral    HERNIA REPAIR      INSERTION, PACEMAKER, TEMPORARY TRANSVENOUS N/A 2025    Procedure: Insertion, Pacemaker, Temporary Transvenous;  Surgeon: Sean Ureña MD;  Location: [facility] CATH LAB/EP;  Service: Cardiology;  Laterality: N/A;       Review of patient's allergies indicates:   Allergen Reactions    Advil [ibuprofen] Hives    Penicillins     Codeine     Excedrin aspirin free [acetaminophen-caffeine]        Family History       Problem Relation (Age of Onset)    Diabetes Father    Heart disease Sister, Brother            Tobacco Use    Smoking status: Never    Smokeless tobacco: Never   Substance and Sexual Activity    Alcohol use: Yes     Comment: Occasionally    Drug use: Not on file    Sexual activity: Yes       Review of Systems    Objective:     Temp:  [96.6 °F (35.9 °C)-98.5 °F (36.9 °C)] 97.2 °F (36.2 °C)  Pulse:  [59-61] 60  Resp:  [10-31] 20  SpO2:  [94 %-100 %] 98 %  BP: ()/(42-84) 66/44  Weight: 73.5 kg (162 lb)  Body mass index is 29.63 kg/m².    Date 25 0700 - 25 0659   Shift 8180-4761 6968-0764 9879-2395 24 Hour Total   INTAKE   I.V.(mL/kg) 18.8(0.3)   18.8(0.3)   Shift Total(mL/kg) 18.8(0.3)   18.8(0.3)   OUTPUT   Shift Total(mL/kg)       Weight (kg) 73.5 73.5 73.5 73.5     Bladder Scan Volume (mL): 0 mL (25 0532)    Drains       Drain  Duration             Female External Urinary Catheter w/ Suction 25 0919 1 day                     Physical Exam  Constitutional:       Comments: Asleep   HENT:      Head: Normocephalic.   Cardiovascular:      Rate and Rhythm: Normal rate.   Pulmonary:      Effort: Pulmonary effort is normal.   Skin:     General: Skin is  warm.          Significant Labs:    BMP:  Recent Labs   Lab 06/21/25  1002 06/22/25  0627 06/23/25  0456    138 134*   K 4.2 4.6 4.1   CL 99 100 96   CO2 28 25 24   BUN 21 29* 42*   CREATININE 0.9 1.5* 1.9*   CALCIUM 8.5* 8.2* 8.3*       CBC:  Recent Labs   Lab 06/21/25  1002 06/22/25 0627 06/23/25  0456   WBC 9.80 6.96 10.02   HGB 13.4 12.1 12.8   HCT 39.7 35.8* 27.0*   * 119* 166       All pertinent labs results from the past 24 hours have been reviewed.    Significant Imaging:  All pertinent imaging results/findings from the past 24 hours have been reviewed.

## 2025-06-23 NOTE — ASSESSMENT & PLAN NOTE
- presented with HR 20s with CHB noted on admit EKG  - emergent TVP placed and currently VPaced rhythm  - on BB as an outpatient for HTN management; BB on hold; TVP remains in place   - echo with depressed EF; plan for PPM/ICD tomorrow as long as mental status improved, remains off pressors/Precedex and no acute issues overnight

## 2025-06-23 NOTE — HPI
Cady Watkins is a 74 y.o. F presenting with bradycardia s/p TVP, developed flash pulmonary edema and AHRF, placed on BIPAP and stepped up to MICU .  Urology consulted for bladder mass seen on US.    Patient asleep this morning and all questions answered by daughter. Patient with history of UTI and kidney stones. Does not report any gross hematuria. US did not show any hydronephrosis, but small nonobstructing stone in the right kidney, 3cm bladder mass vs blood products    Hypotensive this morning, not tachycardic  Hgb 12.8 from 12.1  Cr 1.9 from 1.5  No UA

## 2025-06-23 NOTE — EICU
Virtual ICU Quality Rounds    Admit Date: 6/18/2025  Hospital Day: 5    ICU Day: 4d 15h    24H Vital Sign Range:  Temp:  [97.2 °F (36.2 °C)-98.5 °F (36.9 °C)]   Pulse:  [59-61]   Resp:  [7-31]   BP: ()/(42-77)   SpO2:  [93 %-100 %]     VICU Surveillance Screening                    LDA reconciliation : Yes

## 2025-06-23 NOTE — ASSESSMENT & PLAN NOTE
- initial /106  - initially treated with IV Tridil upon admission; SBP trending down; hypotensive overnight requiring IV Levophed  - weaned off pressors; SBP 100s-110s  - on Lisinopril 20 and Metoprolol 50mg BID as an outpatient- will hold for now   - goal BP less than 130/80  - monitor BP closely

## 2025-06-23 NOTE — ASSESSMENT & PLAN NOTE
- new onset  - echo  with EF 20-25% and WMA; acute pulmonary edema upon admission; aggressively  diuresed; negative 4.3L since admission  - WMA noted on echo as well; continue with medical management although limited given CHB and hypotension requiring pressors   - anticipate need for ischemic evaluation but will likely defer to outpatient setting

## 2025-06-23 NOTE — PROGRESS NOTES
Progress Note  [facility]    Attending: Dr. Silva  Fellow: Dr. Newman  Admit Date: 6/18/2025  Today's Date: 06/23/2025  Reason for Consult:  BIPAP and agitation     ASSESSMENT & RECOMMENDATIONS     71F with sx bradycardia s/p TVP, developed flash pulmonary edema and AHRF, placed on BIPAP and stepped up to MICU. Now s/p IVP with controlled rate pending tentative PPM placement tomorrow.     CNS/Neuro:    Acute ICU delirium  Possible contributing gabapentin/flexeril/opioid w/d  -patient per dispensed meds on daily flexeril 10qhs, lily 300qhs, percocet qhs prn  -patient agitated and non-redirectable  -likely long term improvement after patient out of ICU/hospital setting     Plan:  Restarted gabapentin qhs to see if there is improvement  Delirium precautions    Cardiovascular:    Complete Heart Block  -Bradycardic 20-30s, syncope and fatigue; EKG AV dissociation   - S/p IVP with good capture, HR 60, 5 mA    Plan:  Continue IVP  Cards following, considering placing PPM tentatively tomorrow     Hypotension  -lower blood pressures labile, MAP 50-90; 60-80s/40-50s and intermittently hypertensive 140-150s/60s  -concern for aggressive diuresis   -required minimal levo overnight   Plan:  Hold anti-HTN/diuresis  Wean levophed as able     HTN Emergency, resolved  New onset HFrEF  Severe pHTN  -developed flash pulmonary edema post procedure; SOB, agitated   -BP increased to 230s/130s ([id number]), hypoxic not improving with NRB, RR 30-40s and transitioned to BIPAP  -placed on nitroglycerin gtt and lasix 40>60  -BNP 1200, trop 0.019  -Continue statin, ASA  -TTE EF 20-25%, unable to assess diastolic, mild LA dilation, mild MR, mod TR, severe pHTN, PASP 61    Plan:  Restart home antihypertensive when appropriate, no BB  Losartan and aldactone, held ISO ISAIAS and lower BP  Plan for AICD/PPM next week with improvement  Repeat echo next week   Hold diuresis ISO ISAIAS and reassess volume status   Strict  I/Os    Respiratory:    Acute hypoxic respiratory failure   Pulmonary edema   -SOB, significant incr WOB; hypoxic to 70s failing NRB; placed on BIPAP   -VBG 7.28/58/28 --> ABG 7.48/35/69/23  -CXR c/w pulm edema    Plan:  Hold diuresis ISO ISAIAS and reassess volume status   Weaned to 3L NC, wean as able     GI/Metabolic:  F: none   E: K > 4, Phos > 3, Mg > 2 and replete PRN   N: cardiac    Renal:  Baseline Cr ~ .9-1.1    ISAIAS   -Cr incr to 1.5 and uptrending, likely from diuresis, decreasing diuresis  -decreasing UOP     Plan:  CTM Cr as patient diuresis  Renally dose meds     Bladder lesions  Nonobstructive left renal stone  -renal US with small nonobs R Renal stone, 3cm dependent lesion in bladder, c/f blood vs neoplasm  -urology recommending CT RSS and UA, outpt cysto/bx if mass confirmed    Plan:  Follow CT RSS and outpt urology follow up   Follow UA     Heme:  Baseline Hgb ~ 12.2-13.7    Plan:  Jehovas witness    Endo:   Last TSH: 2.751  Last A1C: 6.4%    Glucose within goal range this admission with 140-180  -Lantus 8u   -SSI    ID:  No acute concern   Temp: AF  WBC: 10.8  Micro: NA      SUBJECTIVE:     Brief HPI: 73yo female with a PMH of HTN, DM, HLD admitted 6/18 for symptomatic bradycardia. Hx obtained from chart review, patient at bedside too tired to give story. Went to PCP and was found to have bradycardia and has been feeling unwell for 3 days and had one syncopal episode. No CP, fever, chills, n/v or urinary/bowel changes. Found to be bradycardic to 20-30s on arrival and was taken for TVP with cardiology. However, in the afternoon patient started becoming SOB, agitated with what appeared to be flash pulm edema. BP increased to 230s/130s ([id number]), hypoxic not improving with NRB, RR 30-40s and transitioned to BIPAP. Stepped up to MICU 6/18 for AHRF and incr. WOB.     Overnight: intermittently mild hypotension, asx; required minimal dose of pressors.     OBJECTIVE:     Vital Signs Trends/Hx  "Reviewed  Vitals:    06/23/25 0727 06/23/25 0730 06/23/25 0748 06/23/25 0800   BP:  (!) 88/52 (!) 112/57    Pulse:  60 60 60   Resp:  20 (!) 7 17   Temp: 97.7 °F (36.5 °C)      TempSrc: Axillary      SpO2:  100% 98% 100%   Weight:       Height:           Ventilator settings:   Physical Exam:  General: NAD, cooperative   HEENT: EOMI, oral and nasal mucosa moist.   Neck: Supple, TVP in place, clean dry intact   Cardiac: normal rate, regular rhythm, with no murmurs, rubs, gallops with brisk cap refill and symmetric pulses in distal extremities. All extremities warm to touch.   Respiratory: On NC. CTA BL  Abdomen: Soft, NT/ND. +BS.  Extremities: No edema.   Neuro: Grossly intact to brief exam. Oriented x3. Follows commands.       Laboratory:  No results for input(s): "PH", "PCO2", "PO2", "HCO3", "POCSATURATED", "BE" in the last 24 hours.    Recent Labs   Lab 06/23/25  0456   WBC 10.02   RBC 2.63*   HGB 12.8   HCT 27.0*      *   MCH 48.7*   MCHC >40.0*     Recent Labs   Lab 06/23/25  0456   *   K 4.1   CL 96   CO2 24   BUN 42*   CREATININE 1.9*   CALCIUM 8.3*   MG 2.1       Microbiology Data:   Microbiology Results (last 7 days)       ** No results found for the last 168 hours. **             Chest Imaging:   CXR Patient is slightly rotated.  Several overlying monitoring leads.  Right central venous catheter/transvenous pacer lead positioning, similar.  Persistent patchy interstitial and airspace opacities with bibasilar density and effusions as before.  Some interval clearing at the right upper lung zone from prior.  No gross pneumothorax.     Infusions:     0.9% NaCl   Intravenous Continuous 10 mL/hr at 06/23/25 0802 Rate Verify at 06/23/25 0802    dexmedeTOMIDine (Precedex) infusion (titrating)  0-1.4 mcg/kg/hr Intravenous Continuous   Stopped at 06/23/25 0645    heparin (porcine)    Continuous  mL/hr at 06/23/25 0716 Rate Verify at 06/23/25 0716    NORepinephrine bitartrate-D5W  0-3 " mcg/kg/min (Dosing Weight) Intravenous Continuous 5.8 mL/hr at 06/23/25 0802 0.02 mcg/kg/min at 06/23/25 0802       Scheduled Medications:    aspirin  81 mg Oral Daily    atorvastatin  20 mg Oral Daily    enoxparin  40 mg Subcutaneous Q24H (prophylaxis, 1700)    gabapentin  300 mg Oral QHS    insulin glargine U-100  8 Units Subcutaneous Daily    mupirocin   Topical (Top) Daily    OLANZapine  5 mg Intramuscular Once    polyethylene glycol  17 g Oral Daily         Feed: heart healthy  Analgesia: NA  Sedation: precedex   Thromboembolic prophylaxis: LVNX 30 (renally dose)  Head-of-bed: elevated  Ulcer prophylaxis: NA  Glycemic control: 140-180, lantus/SSI   SAT/SBT: NA  Bowel regimen: miralax  Indwelling: PIV x2, TVP  De-escalate Abx: NA    Code status: Full     Disposition: Currently w/ IV pacer in place until PPM; will continue in ICU care    Thank you for allowing us to participate in the care of this patient. Please call with questions.    Rosanna Chopra, DO   [facility] PGY II     Pt seen and examined with Pulmonary/Critical Care team and this note reviewed and validated with the following additional comments: No longer SOB.  POCUS shows only few basilar B-lines. Now on room air with SpO2 >92%. Pacemaker capturing at rate of 60/min.    The complexity of Medical Decision Making (MDM) was high.  The number of problems addressed was 2.  The risk of complications and/or morbidity/mortality was high.  The tests ordered were POCUS.  I communicated with the following providers Cards.  Time spent in the care of this patient was 35 minutes.    Dion García MD  Phone [phone]

## 2025-06-23 NOTE — SUBJECTIVE & OBJECTIVE
Review of Systems   Cardiovascular:  Negative for chest pain and dyspnea on exertion.     Objective:     Vital Signs (Most Recent):  Temp: 97.9 °F (36.6 °C) (06/23/25 1101)  Pulse: 60 (06/23/25 1330)  Resp: 18 (06/23/25 1330)  BP: 125/64 (06/23/25 1330)  SpO2: (!) 93 % (06/23/25 1330) Vital Signs (24h Range):  Temp:  [97.2 °F (36.2 °C)-97.9 °F (36.6 °C)] 97.9 °F (36.6 °C)  Pulse:  [59-61] 60  Resp:  [7-31] 18  SpO2:  [92 %-100 %] 93 %  BP: ()/(42-77) 125/64     Weight: 73.5 kg (162 lb)  Body mass index is 29.63 kg/m².     SpO2: (!) 93 %         Intake/Output Summary (Last 24 hours) at 6/23/2025 1400  Last data filed at 6/23/2025 1326  Gross per 24 hour   Intake 821.97 ml   Output 680 ml   Net 141.97 ml       Lines/Drains/Airways       Central Venous Catheter Line  Duration             Introducer 06/18/25 Internal Jugular Right 5 days              Drain  Duration             Female External Urinary Catheter w/ Suction 06/21/25 0919 2 days              Peripheral Intravenous Line  Duration             Peripheral IV Single Lumen 06/22/25 0657 22 G Anterior;Right Forearm 1 day    Peripheral IV Single Lumen 06/23/25 1312 20 G Posterior;Right Hand <1 day                       Physical Exam  Constitutional:       General: She is not in acute distress.     Appearance: She is ill-appearing.   Cardiovascular:      Rate and Rhythm: Normal rate and regular rhythm.      Heart sounds: No murmur heard.     No gallop.   Pulmonary:      Effort: Pulmonary effort is normal. No respiratory distress.      Breath sounds: Normal breath sounds. No wheezing.   Abdominal:      General: Bowel sounds are normal. There is no distension.      Palpations: Abdomen is soft.      Tenderness: There is no abdominal tenderness.   Skin:     General: Skin is warm and dry.      Coloration: Skin is pale.   Neurological:      Mental Status: She is alert and oriented to person, place, and time.              Significant Imaging: Echocardiogram:  Transthoracic echo (TTE) complete (Cupid Only):   Results for orders placed or performed during the hospital encounter of 06/18/25   Echo Saline Bubble? Yes   Result Value Ref Range    BSA 1.79 m2    Narrative      Left Ventricle: No echocardiographic evidence of VSD.    Left Atrium: Agitated saline study of the atrial septum is negative   after vasalva maneuver, suggesting absence of intracardiac shunt at the   atrial level.

## 2025-06-23 NOTE — NURSING
@0915: pt taken to CT via bed by RN x2 and transport tech x1; TVP in place, pt connected to transport O2 and transport monitoring; VSS    @0930: pt arrived back to ICU room from CT via bed by RN x2 and transport tech x1; TVP in place, pt connected to continuous monitoring, and connected back to wall O2; VSS

## 2025-06-23 NOTE — ASSESSMENT & PLAN NOTE
- presented with CHB with HR 20s; initially hypoxic on  ABG  - aggressively diuresed negative 4.3L since admission; weaned to 2LPM via NC   - echo with newly depressed EF  - will monitor I&Os last dose of IV Lasix pm 6/21

## 2025-06-23 NOTE — CONSULTS
NEPHROLOGY CONSULT NOTE    HPI & INTERVAL HISTORY:    Past Medical History:   Diagnosis Date    Diabetes mellitus, type 2     Fibromyalgia 2016    Hypertension 2016      Past Surgical History:   Procedure Laterality Date     SECTION      CHOLECYSTECTOMY      FOOT SURGERY Bilateral     plantar fasciotomy bilateral, arthroplasty fifth toe bilateral    HERNIA REPAIR      INSERTION, PACEMAKER, TEMPORARY TRANSVENOUS N/A 2025    Procedure: Insertion, Pacemaker, Temporary Transvenous;  Surgeon: Sean Ureña MD;  Location: [facility] CATH LAB/EP;  Service: Cardiology;  Laterality: N/A;      Review of patient's allergies indicates:   Allergen Reactions    Advil [ibuprofen] Hives    Penicillins     Codeine     Excedrin aspirin free [acetaminophen-caffeine]       Prescriptions Prior to Admission[1]    Social History[2]     MEDS   aspirin  81 mg Oral Daily    atorvastatin  20 mg Oral Daily    enoxparin  30 mg Subcutaneous Q24H (prophylaxis, 1700)    gabapentin  300 mg Oral QHS    insulin glargine U-100  8 Units Subcutaneous Daily    mupirocin   Topical (Top) Daily    OLANZapine  5 mg Intramuscular Once    polyethylene glycol  17 g Oral Daily             CONTINOUS INFUSIONS:      Intake/Output Summary (Last 24 hours) at 2025 1103  Last data filed at 2025 1002  Gross per 24 hour   Intake 522.16 ml   Output 680 ml   Net -157.84 ml        HEMODYNAMICS:    Temp:  [97.2 °F (36.2 °C)-98.5 °F (36.9 °C)] 97.9 °F (36.6 °C)  Pulse:  [59-61] 60  Resp:  [7-31] 19  SpO2:  [92 %-100 %] 94 %  BP: ()/(42-77) 119/60   General:   Weakness  Fatigue  Abdominal discomfort - left side  Poor intake      Cardiology :pulse 60  Pulmonary : RR 19  Pulse oximeter 94 % O2  Abdomen- distended,  soft   Extremities: no edema   Skin: dry, pale     LABS   Lab Results   Component Value Date    WBC 10.85 2025    HGB 12.8 2025    HCT 37.8 2025    MCV 95 2025     2025        Recent Labs  "  Lab 06/23/25  0456   *   CALCIUM 8.3*   ALBUMIN 2.4*   PROT 5.5*   *   K 4.1   CO2 24   CL 96   BUN 42*   CREATININE 1.9*   ALKPHOS 41   ALT 13   AST 20   BILITOT 0.6      Lab Results   Component Value Date    CALCIUM 8.3 (L) 06/23/2025    PHOS 4.5 06/23/2025      No results found for: "IRON", "TIBC", "FERRITIN"     ABG  Recent Labs   Lab 06/19/25  1025   PH 7.419   PO2 69.4*   PCO2 35.8   HCO3 23.2*         IMAGING:  CXR    ASSESSMENT / PLAN  Bradycardia. Syncope.   S/p TVP.   Flash pulmonary  edema.   Improved with diuretics.  CT  There are bilateral pleural effusions with associated compressive atelectasis.   TTE EF 20-25%.  Blood pressure 125/66 today.    ISAIAS/ CKD 3.  Possible causes - hypotension, bradycardia, urinary retention, diuresis.  History Diabetes mellitus.   cc  UA Na 55  US  Right kidney: The right kidney measures 11.0 cm. No cortical thinning. No loss of corticomedullary distinction. Resistive index measures 0.75.  No mass. There is a 0.3 cm echogenic focus with twinkle artifact in the mid pole.  No hydronephrosis.  Left kidney: The left kidney measures 10.7 cm. No cortical thinning. No loss of corticomedullary distinction. Resistive index measures 0.78.  No mass. No renal stone. No hydronephrosis.  Splenic resistive index measures 0.64.  The bladder is partially distended.  There is a 3.0 cm masslike lesion with lobulated margins in the right dependent bladder.    Urology consulted.  CT  There is a 3 mm nonobstructing calculus seen within the inferior pole of the right kidney.  There is no evidence bladder mass on this non contrasted study however the sensitivity is limited by the lack of contrast in the bladder.  Contrast installation into the bladder may provide greater sensitivity for small bladder masses or possibly cystoscopy if there is high clinical concern for bladder mass.  There is a large anterior abdominal wall hernia containing portions of small and large bowel " without evidence obstruction.  Creatinine 1.9  BUN 42  Hyponatremia 134  K 4.1  Poor nutrition  Albumin 2.4  Hb 12.8  Weight daily  I and O   Avoid nephrotoxic agents, hypotension, hypovolemia  Hold gabapentin.  Renal, ADA diet as tolerated.  Bladder scan.       [1]   Medications Prior to Admission   Medication Sig Dispense Refill Last Dose/Taking    cyclobenzaprine (FLEXERIL) 10 MG tablet TAKE 1 TABLET BY MOUTH EVERY DAY NIGHTLY AS NEEDED FOR MUSCLE SPASMS 30 tablet 0     gabapentin (NEURONTIN) 300 MG capsule TAKE 1 CAPSULE BY MOUTH EVERYDAY AT BEDTIME 90 capsule 3     lisinopriL (PRINIVIL,ZESTRIL) 20 MG tablet TAKE 1 TABLET BY MOUTH EVERY DAY 90 tablet 3     meclizine (ANTIVERT) 12.5 mg tablet TAKE 1 TABLET BY MOUTH 2 (TWO) TIMES DAILY AS NEEDED FOR DIZZINESS. 30 tablet 0     metFORMIN (GLUCOPHAGE) 500 MG tablet TAKE 1 TABLET BY MOUTH TWICE A DAY WITH MEALS 180 tablet 3     metoprolol tartrate (LOPRESSOR) 50 MG tablet TAKE 1 TABLET BY MOUTH TWICE A  tablet 3     oxyCODONE-acetaminophen (PERCOCET)  mg per tablet Take 1 tablet by mouth nightly as needed for Pain. 30 tablet 0     rosuvastatin (CRESTOR) 5 MG tablet Take 1 tablet (5 mg total) by mouth once daily. 90 tablet 3    [2]   Social History  Socioeconomic History    Marital status:    Tobacco Use    Smoking status: Never    Smokeless tobacco: Never   Substance and Sexual Activity    Alcohol use: Yes     Comment: Occasionally    Sexual activity: Yes     Social Drivers of Health     Financial Resource Strain: Medium Risk (6/19/2025)    Overall Financial Resource Strain (CARDIA)     Difficulty of Paying Living Expenses: Somewhat hard   Food Insecurity: No Food Insecurity (6/19/2025)    Hunger Vital Sign     Worried About Running Out of Food in the Last Year: Never true     Ran Out of Food in the Last Year: Never true   Transportation Needs: No Transportation Needs (6/19/2025)    PRAPARE - Transportation     Lack of Transportation (Medical): No      Lack of Transportation (Non-Medical): No   Physical Activity: Inactive (6/19/2025)    Exercise Vital Sign     Days of Exercise per Week: 0 days     Minutes of Exercise per Session: 0 min   Stress: Stress Concern Present (6/19/2025)    [employer] - Occupational Stress Questionnaire     Feeling of Stress : Very much   Housing Stability: Low Risk  (6/19/2025)    Housing Stability Vital Sign     Unable to Pay for Housing in the Last Year: No     Number of Times Moved in the Last Year: 0     Homeless in the Last Year: No

## 2025-06-23 NOTE — EICU
ELLIEU Night Rounds Checklist  24H Vital Sign Range:  Temp:  [96.6 °F (35.9 °C)-98.5 °F (36.9 °C)]   Pulse:  [59-61]   Resp:  [10-31]   BP: ()/(46-84)   SpO2:  [95 %-100 %]     Video rounds and LDA reconciliation

## 2025-06-23 NOTE — ASSESSMENT & PLAN NOTE
-Bladder mass seen on US  -Given history of kidney stones and bladder mass with increasing Cr, recommend CT RSS. Ordered  -Will allow better evaluation of potential mass and ureteral stones if present  -Recommend UA to assess for UTI  -Will need outpatient cysto and bladder mass biopsy if confirmed on CT RSS

## 2025-06-23 NOTE — CARE UPDATE
Urology Note    Patient needs IV contrast even with low GFR to rule out malignancy. Okay to administer contrast for CTU.    Jayce Holliday MD  Urology  [facility]

## 2025-06-23 NOTE — PLAN OF CARE
Problem: Diabetes Comorbidity  Goal: Blood Glucose Level Within Targeted Range  Outcome: Progressing     Problem: Wound  Goal: Optimal Coping  Outcome: Progressing  Goal: Optimal Functional Ability  Outcome: Progressing  Goal: Absence of Infection Signs and Symptoms  Outcome: Progressing  Goal: Improved Oral Intake  Outcome: Progressing  Goal: Optimal Pain Control and Function  Outcome: Progressing  Goal: Skin Health and Integrity  Outcome: Progressing  Goal: Optimal Wound Healing  Outcome: Progressing     Problem: Adult Inpatient Plan of Care  Goal: Plan of Care Review  Outcome: Progressing  Goal: Patient-Specific Goal (Individualized)  Outcome: Progressing  Goal: Absence of Hospital-Acquired Illness or Injury  Outcome: Progressing  Goal: Optimal Comfort and Wellbeing  Outcome: Progressing  Goal: Readiness for Transition of Care  Outcome: Progressing     Problem: Infection  Goal: Absence of Infection Signs and Symptoms  Outcome: Progressing     Problem: Skin Injury Risk Increased  Goal: Skin Health and Integrity  Outcome: Progressing     Problem: Delirium  Goal: Improved Sleep  Outcome: Progressing     Problem: Fall Injury Risk  Goal: Absence of Fall and Fall-Related Injury  Outcome: Progressing     Problem: Acute Kidney Injury/Impairment  Goal: Fluid and Electrolyte Balance  Outcome: Progressing  Goal: Improved Oral Intake  Outcome: Progressing  Goal: Effective Renal Function  Outcome: Progressing

## 2025-06-24 ENCOUNTER — HOSPITAL ENCOUNTER (INPATIENT)
Facility: HOSPITAL | Age: 75
LOS: 14 days | Discharge: NURSING, REHAB OR LONG-TERM CARE | DRG: 242 | End: 2025-07-08
Attending: INTERNAL MEDICINE | Admitting: INTERNAL MEDICINE
Payer: MEDICARE

## 2025-06-24 DIAGNOSIS — I75.022: ICD-10-CM

## 2025-06-24 DIAGNOSIS — R07.9 CHEST PAIN: ICD-10-CM

## 2025-06-24 DIAGNOSIS — I50.20 HFREF (HEART FAILURE WITH REDUCED EJECTION FRACTION): ICD-10-CM

## 2025-06-24 DIAGNOSIS — I73.9 PAD (PERIPHERAL ARTERY DISEASE): ICD-10-CM

## 2025-06-24 DIAGNOSIS — I44.2 CHB (COMPLETE HEART BLOCK): ICD-10-CM

## 2025-06-24 DIAGNOSIS — I47.19 ATRIAL TACHYCARDIA: ICD-10-CM

## 2025-06-24 DIAGNOSIS — I50.9 HF (HEART FAILURE): ICD-10-CM

## 2025-06-24 DIAGNOSIS — M79.672 LEFT FOOT PAIN: Primary | ICD-10-CM

## 2025-06-24 DIAGNOSIS — I50.9 HEART FAILURE: ICD-10-CM

## 2025-06-24 DIAGNOSIS — N32.89 BLADDER MASS: ICD-10-CM

## 2025-06-24 DIAGNOSIS — I44.2 COMPLETE HEART BLOCK: ICD-10-CM

## 2025-06-24 LAB
ABSOLUTE EOSINOPHIL (OHS): 0.48 K/UL
ABSOLUTE MONOCYTE (OHS): 1.04 K/UL (ref 0.3–1)
ABSOLUTE NEUTROPHIL COUNT (OHS): 5 K/UL (ref 1.8–7.7)
ALBUMIN SERPL BCP-MCNC: 2.6 G/DL (ref 3.5–5.2)
ALBUMIN SERPL BCP-MCNC: 2.9 G/DL (ref 3.5–5.2)
ALP SERPL-CCNC: 44 UNIT/L (ref 40–150)
ALP SERPL-CCNC: 50 UNIT/L (ref 40–150)
ALT SERPL W/O P-5'-P-CCNC: 16 UNIT/L (ref 10–44)
ALT SERPL W/O P-5'-P-CCNC: 16 UNIT/L (ref 10–44)
ANION GAP (OHS): 10 MMOL/L (ref 8–16)
ANION GAP (OHS): 11 MMOL/L (ref 8–16)
ANION GAP (OHS): 12 MMOL/L (ref 8–16)
AORTIC SIZE INDEX (SOV): 1.8 CM/M2
AORTIC SIZE INDEX: 1.5 CM/M2
APTT PPP: 25.3 SECONDS (ref 21–32)
ASCENDING AORTA: 2.6 CM
AST SERPL-CCNC: 24 UNIT/L (ref 11–45)
AST SERPL-CCNC: 25 UNIT/L (ref 11–45)
AV AREA BY CONTINUOUS VTI: 2.4 CM2
AV INDEX (PROSTH): 0.78
AV LVOT MEAN GRADIENT: 2 MMHG
AV LVOT PEAK GRADIENT: 5 MMHG
AV MEAN GRADIENT: 5 MMHG
AV PEAK GRADIENT: 13 MMHG
AV VALVE AREA BY VELOCITY RATIO: 1.9 CM²
AV VALVE AREA: 2.5 CM2
AV VELOCITY RATIO: 0.61
BASOPHILS # BLD AUTO: 0.08 K/UL
BASOPHILS NFR BLD AUTO: 0.8 %
BILIRUB SERPL-MCNC: 0.6 MG/DL (ref 0.1–1)
BILIRUB SERPL-MCNC: 0.7 MG/DL (ref 0.1–1)
BNP SERPL-MCNC: 1991 PG/ML (ref 0–99)
BSA FOR ECHO PROCEDURE: 1.71 M2
BUN SERPL-MCNC: 35 MG/DL (ref 8–23)
BUN SERPL-MCNC: 36 MG/DL (ref 8–23)
BUN SERPL-MCNC: 39 MG/DL (ref 8–23)
CALCIUM SERPL-MCNC: 8.4 MG/DL (ref 8.7–10.5)
CALCIUM SERPL-MCNC: 8.7 MG/DL (ref 8.7–10.5)
CALCIUM SERPL-MCNC: 8.8 MG/DL (ref 8.7–10.5)
CHLORIDE SERPL-SCNC: 100 MMOL/L (ref 95–110)
CHLORIDE SERPL-SCNC: 100 MMOL/L (ref 95–110)
CHLORIDE SERPL-SCNC: 98 MMOL/L (ref 95–110)
CO2 SERPL-SCNC: 26 MMOL/L (ref 23–29)
CO2 SERPL-SCNC: 27 MMOL/L (ref 23–29)
CO2 SERPL-SCNC: 31 MMOL/L (ref 23–29)
CREAT SERPL-MCNC: 1.4 MG/DL (ref 0.5–1.4)
CREAT SERPL-MCNC: 1.5 MG/DL (ref 0.5–1.4)
CREAT SERPL-MCNC: 1.7 MG/DL (ref 0.5–1.4)
CV ECHO LV RWT: 0.38 CM
DOP CALC AO PEAK VEL: 1.8 M/S
DOP CALC AO VTI: 28.8 CM
DOP CALC LVOT AREA: 3.1 CM2
DOP CALC LVOT DIAMETER: 2 CM
DOP CALC LVOT PEAK VEL: 1.1 M/S
DOP CALC LVOT STROKE VOLUME: 71 CM3
DOP CALCLVOT PEAK VEL VTI: 22.6 CM
E WAVE DECELERATION TIME: 222 MS
E WAVE DECELERATION TIME: 229 MS
E/A RATIO: 0.75
E/E' RATIO: 16 M/S
ECHO EF ESTIMATED: 56 %
ECHO LV POSTERIOR WALL: 0.9 CM (ref 0.6–1.1)
ERYTHROCYTE [DISTWIDTH] IN BLOOD BY AUTOMATED COUNT: 13.9 % (ref 11.5–14.5)
FRACTIONAL SHORTENING: 29.2 % (ref 28–44)
GFR SERPLBLD CREATININE-BSD FMLA CKD-EPI: 32 ML/MIN/1.73/M2
GFR SERPLBLD CREATININE-BSD FMLA CKD-EPI: 37 ML/MIN/1.73/M2
GFR SERPLBLD CREATININE-BSD FMLA CKD-EPI: 40 ML/MIN/1.73/M2
GLUCOSE SERPL-MCNC: 126 MG/DL (ref 70–110)
GLUCOSE SERPL-MCNC: 129 MG/DL (ref 70–110)
GLUCOSE SERPL-MCNC: 134 MG/DL (ref 70–110)
HCT VFR BLD AUTO: 40.7 % (ref 37–48.5)
HGB BLD-MCNC: 14 GM/DL (ref 12–16)
HOLD SPECIMEN: NORMAL
IMM GRANULOCYTES # BLD AUTO: 0.02 K/UL (ref 0–0.04)
IMM GRANULOCYTES NFR BLD AUTO: 0.2 % (ref 0–0.5)
INR PPP: 1 (ref 0.8–1.2)
INTERVENTRICULAR SEPTUM: 0.8 CM (ref 0.6–1.1)
LA MAJOR: 6 CM
LA MINOR: 5.9 CM
LA WIDTH: 3.8 CM
LEFT ATRIUM SIZE: 3.4 CM
LEFT ATRIUM VOLUME INDEX: 39 ML/M2
LEFT ATRIUM VOLUME: 65 CM3
LEFT INTERNAL DIMENSION IN SYSTOLE: 3.4 CM (ref 2.1–4)
LEFT VENTRICLE DIASTOLIC VOLUME INDEX: 62.5 ML/M2
LEFT VENTRICLE DIASTOLIC VOLUME: 105 ML
LEFT VENTRICLE MASS INDEX: 81.6 G/M2
LEFT VENTRICLE SYSTOLIC VOLUME INDEX: 28 ML/M2
LEFT VENTRICLE SYSTOLIC VOLUME: 47 ML
LEFT VENTRICULAR INTERNAL DIMENSION IN DIASTOLE: 4.8 CM (ref 3.5–6)
LEFT VENTRICULAR MASS: 137.1 G
LV LATERAL E/E' RATIO: 14.3
LV SEPTAL E/E' RATIO: 17.2
LYMPHOCYTES # BLD AUTO: 3.16 K/UL (ref 1–4.8)
MAGNESIUM SERPL-MCNC: 2.2 MG/DL (ref 1.6–2.6)
MCH RBC QN AUTO: 32.5 PG (ref 27–31)
MCHC RBC AUTO-ENTMCNC: 34.4 G/DL (ref 32–36)
MCV RBC AUTO: 94 FL (ref 82–98)
MV PEAK A VEL: 1.14 M/S
MV PEAK E VEL: 0.86 M/S
NUCLEATED RBC (/100WBC) (OHS): 0 /100 WBC
OHS CV RV/LV RATIO: 0.58 CM
OHS QRS DURATION: 150 MS
OHS QTC CALCULATION: 437 MS
PHOSPHATE SERPL-MCNC: 4.2 MG/DL (ref 2.7–4.5)
PISA TR MAX VEL: 3 M/S
PLATELET # BLD AUTO: 205 K/UL (ref 150–450)
PMV BLD AUTO: 12.3 FL (ref 9.2–12.9)
POCT GLUCOSE: 130 MG/DL (ref 70–110)
POCT GLUCOSE: 138 MG/DL (ref 70–110)
POTASSIUM SERPL-SCNC: 4.7 MMOL/L (ref 3.5–5.1)
POTASSIUM SERPL-SCNC: 4.7 MMOL/L (ref 3.5–5.1)
POTASSIUM SERPL-SCNC: 5.4 MMOL/L (ref 3.5–5.1)
PROT SERPL-MCNC: 5.8 GM/DL (ref 6–8.4)
PROT SERPL-MCNC: 6.5 GM/DL (ref 6–8.4)
PROTHROMBIN TIME: 11.3 SECONDS (ref 9–12.5)
RA MAJOR: 5.34 CM
RA PRESSURE ESTIMATED: 15 MMHG
RA WIDTH: 3.66 CM
RBC # BLD AUTO: 4.31 M/UL (ref 4–5.4)
RELATIVE EOSINOPHIL (OHS): 4.9 %
RELATIVE LYMPHOCYTE (OHS): 32.3 % (ref 18–48)
RELATIVE MONOCYTE (OHS): 10.6 % (ref 4–15)
RELATIVE NEUTROPHIL (OHS): 51.2 % (ref 38–73)
RIGHT VENTRICLE DIASTOLIC BASEL DIMENSION: 2.8 CM
RV TB RVSP: 18 MMHG
SINUS: 3 CM
SODIUM SERPL-SCNC: 137 MMOL/L (ref 136–145)
SODIUM SERPL-SCNC: 137 MMOL/L (ref 136–145)
SODIUM SERPL-SCNC: 141 MMOL/L (ref 136–145)
STJ: 2.3 CM
TDI LATERAL: 0.06 M/S
TDI SEPTAL: 0.05 M/S
TDI: 0.06 M/S
TRICUSPID ANNULAR PLANE SYSTOLIC EXCURSION: 2.1 CM
TV PEAK GRADIENT: 35 MMHG
TV REST PULMONARY ARTERY PRESSURE: 51 MMHG
WBC # BLD AUTO: 9.78 K/UL (ref 3.9–12.7)
Z-SCORE OF LEFT VENTRICULAR DIMENSION IN END DIASTOLE: 0.24
Z-SCORE OF LEFT VENTRICULAR DIMENSION IN END SYSTOLE: 1.25

## 2025-06-24 PROCEDURE — 20000000 HC ICU ROOM

## 2025-06-24 PROCEDURE — 87040 BLOOD CULTURE FOR BACTERIA: CPT | Performed by: STUDENT IN AN ORGANIZED HEALTH CARE EDUCATION/TRAINING PROGRAM

## 2025-06-24 PROCEDURE — 63600175 PHARM REV CODE 636 W HCPCS: Performed by: STUDENT IN AN ORGANIZED HEALTH CARE EDUCATION/TRAINING PROGRAM

## 2025-06-24 PROCEDURE — 85025 COMPLETE CBC W/AUTO DIFF WBC: CPT

## 2025-06-24 PROCEDURE — 25000003 PHARM REV CODE 250

## 2025-06-24 PROCEDURE — 85730 THROMBOPLASTIN TIME PARTIAL: CPT | Performed by: STUDENT IN AN ORGANIZED HEALTH CARE EDUCATION/TRAINING PROGRAM

## 2025-06-24 PROCEDURE — 25000003 PHARM REV CODE 250: Performed by: STUDENT IN AN ORGANIZED HEALTH CARE EDUCATION/TRAINING PROGRAM

## 2025-06-24 PROCEDURE — 99223 1ST HOSP IP/OBS HIGH 75: CPT | Mod: GC,,, | Performed by: INTERNAL MEDICINE

## 2025-06-24 PROCEDURE — 85610 PROTHROMBIN TIME: CPT | Performed by: STUDENT IN AN ORGANIZED HEALTH CARE EDUCATION/TRAINING PROGRAM

## 2025-06-24 PROCEDURE — 99223 1ST HOSP IP/OBS HIGH 75: CPT | Mod: ,,, | Performed by: INTERNAL MEDICINE

## 2025-06-24 PROCEDURE — 82374 ASSAY BLOOD CARBON DIOXIDE: CPT | Performed by: STUDENT IN AN ORGANIZED HEALTH CARE EDUCATION/TRAINING PROGRAM

## 2025-06-24 PROCEDURE — 27000221 HC OXYGEN, UP TO 24 HOURS

## 2025-06-24 PROCEDURE — 99223 1ST HOSP IP/OBS HIGH 75: CPT | Mod: 24,GC,, | Performed by: INTERNAL MEDICINE

## 2025-06-24 PROCEDURE — 80053 COMPREHEN METABOLIC PANEL: CPT | Performed by: STUDENT IN AN ORGANIZED HEALTH CARE EDUCATION/TRAINING PROGRAM

## 2025-06-24 PROCEDURE — 80053 COMPREHEN METABOLIC PANEL: CPT

## 2025-06-24 PROCEDURE — 84100 ASSAY OF PHOSPHORUS: CPT

## 2025-06-24 PROCEDURE — 83880 ASSAY OF NATRIURETIC PEPTIDE: CPT | Performed by: STUDENT IN AN ORGANIZED HEALTH CARE EDUCATION/TRAINING PROGRAM

## 2025-06-24 PROCEDURE — 63600175 PHARM REV CODE 636 W HCPCS

## 2025-06-24 PROCEDURE — 99900035 HC TECH TIME PER 15 MIN (STAT)

## 2025-06-24 PROCEDURE — 94799 UNLISTED PULMONARY SVC/PX: CPT

## 2025-06-24 PROCEDURE — 25500020 PHARM REV CODE 255: Performed by: INTERNAL MEDICINE

## 2025-06-24 PROCEDURE — 83735 ASSAY OF MAGNESIUM: CPT

## 2025-06-24 PROCEDURE — 94761 N-INVAS EAR/PLS OXIMETRY MLT: CPT

## 2025-06-24 PROCEDURE — 83880 ASSAY OF NATRIURETIC PEPTIDE: CPT

## 2025-06-24 RX ORDER — DEXMEDETOMIDINE HYDROCHLORIDE 4 UG/ML
INJECTION, SOLUTION INTRAVENOUS
Status: COMPLETED
Start: 2025-06-24 | End: 2025-06-24

## 2025-06-24 RX ORDER — ACETAMINOPHEN 325 MG/1
650 TABLET ORAL EVERY 6 HOURS PRN
Status: DISCONTINUED | OUTPATIENT
Start: 2025-06-24 | End: 2025-06-24 | Stop reason: HOSPADM

## 2025-06-24 RX ORDER — GLUCAGON 1 MG
1 KIT INJECTION
Status: DISCONTINUED | OUTPATIENT
Start: 2025-06-24 | End: 2025-06-24

## 2025-06-24 RX ORDER — HALOPERIDOL LACTATE 5 MG/ML
5 INJECTION, SOLUTION INTRAMUSCULAR ONCE
Status: COMPLETED | OUTPATIENT
Start: 2025-06-24 | End: 2025-06-24

## 2025-06-24 RX ORDER — FUROSEMIDE 10 MG/ML
40 INJECTION INTRAMUSCULAR; INTRAVENOUS EVERY 12 HOURS
Status: DISCONTINUED | OUTPATIENT
Start: 2025-06-24 | End: 2025-06-25

## 2025-06-24 RX ORDER — NAPROXEN SODIUM 220 MG/1
81 TABLET, FILM COATED ORAL DAILY
Status: DISCONTINUED | OUTPATIENT
Start: 2025-06-24 | End: 2025-07-08 | Stop reason: HOSPADM

## 2025-06-24 RX ORDER — SODIUM CHLORIDE 9 MG/ML
INJECTION, SOLUTION INTRAVENOUS CONTINUOUS
Status: DISCONTINUED | OUTPATIENT
Start: 2025-06-24 | End: 2025-06-26

## 2025-06-24 RX ORDER — LIDOCAINE 50 MG/G
1 PATCH TOPICAL
Status: DISCONTINUED | OUTPATIENT
Start: 2025-06-24 | End: 2025-07-08 | Stop reason: HOSPADM

## 2025-06-24 RX ORDER — HYDRALAZINE HYDROCHLORIDE 50 MG/1
50 TABLET, FILM COATED ORAL EVERY 8 HOURS
Status: DISPENSED | OUTPATIENT
Start: 2025-06-25 | End: 2025-06-28

## 2025-06-24 RX ORDER — DEXMEDETOMIDINE HYDROCHLORIDE 4 UG/ML
0-1.4 INJECTION, SOLUTION INTRAVENOUS CONTINUOUS
Status: DISCONTINUED | OUTPATIENT
Start: 2025-06-24 | End: 2025-06-24

## 2025-06-24 RX ORDER — INSULIN ASPART 100 [IU]/ML
0-10 INJECTION, SOLUTION INTRAVENOUS; SUBCUTANEOUS
Status: DISCONTINUED | OUTPATIENT
Start: 2025-06-24 | End: 2025-06-24

## 2025-06-24 RX ORDER — LORAZEPAM 2 MG/ML
1 INJECTION INTRAMUSCULAR ONCE
Status: DISCONTINUED | OUTPATIENT
Start: 2025-06-24 | End: 2025-06-24

## 2025-06-24 RX ORDER — HYDRALAZINE HYDROCHLORIDE 10 MG/1
10 TABLET, FILM COATED ORAL EVERY 8 HOURS
Status: DISCONTINUED | OUTPATIENT
Start: 2025-06-24 | End: 2025-06-24

## 2025-06-24 RX ORDER — GLUCAGON 1 MG
1 KIT INJECTION
Status: DISCONTINUED | OUTPATIENT
Start: 2025-06-24 | End: 2025-07-08 | Stop reason: HOSPADM

## 2025-06-24 RX ORDER — POLYETHYLENE GLYCOL 3350 17 G/17G
17 POWDER, FOR SOLUTION ORAL DAILY
Status: DISCONTINUED | OUTPATIENT
Start: 2025-06-24 | End: 2025-07-08 | Stop reason: HOSPADM

## 2025-06-24 RX ORDER — NALOXONE HCL 0.4 MG/ML
0.02 VIAL (ML) INJECTION
Status: DISCONTINUED | OUTPATIENT
Start: 2025-06-24 | End: 2025-07-08 | Stop reason: HOSPADM

## 2025-06-24 RX ORDER — ATORVASTATIN CALCIUM 20 MG/1
20 TABLET, FILM COATED ORAL DAILY
Status: DISCONTINUED | OUTPATIENT
Start: 2025-06-24 | End: 2025-07-02

## 2025-06-24 RX ORDER — INSULIN GLARGINE 100 [IU]/ML
5 INJECTION, SOLUTION SUBCUTANEOUS DAILY
Status: DISCONTINUED | OUTPATIENT
Start: 2025-06-24 | End: 2025-07-05

## 2025-06-24 RX ORDER — IBUPROFEN 200 MG
16 TABLET ORAL
Status: DISCONTINUED | OUTPATIENT
Start: 2025-06-24 | End: 2025-07-08 | Stop reason: HOSPADM

## 2025-06-24 RX ORDER — OLANZAPINE 10 MG/2ML
2.5 INJECTION, POWDER, FOR SOLUTION INTRAMUSCULAR ONCE AS NEEDED
Status: DISCONTINUED | OUTPATIENT
Start: 2025-06-24 | End: 2025-06-24

## 2025-06-24 RX ORDER — IBUPROFEN 200 MG
24 TABLET ORAL
Status: DISCONTINUED | OUTPATIENT
Start: 2025-06-24 | End: 2025-07-08 | Stop reason: HOSPADM

## 2025-06-24 RX ORDER — ISOSORBIDE DINITRATE 20 MG/1
20 TABLET ORAL 3 TIMES DAILY
Status: DISCONTINUED | OUTPATIENT
Start: 2025-06-25 | End: 2025-06-25

## 2025-06-24 RX ORDER — INSULIN GLARGINE 100 [IU]/ML
8 INJECTION, SOLUTION SUBCUTANEOUS DAILY
Status: DISCONTINUED | OUTPATIENT
Start: 2025-06-24 | End: 2025-06-24

## 2025-06-24 RX ORDER — INSULIN ASPART 100 [IU]/ML
0-5 INJECTION, SOLUTION INTRAVENOUS; SUBCUTANEOUS EVERY 6 HOURS PRN
Status: DISCONTINUED | OUTPATIENT
Start: 2025-06-24 | End: 2025-07-04

## 2025-06-24 RX ORDER — QUETIAPINE FUMARATE 25 MG/1
50 TABLET, FILM COATED ORAL ONCE
Status: COMPLETED | OUTPATIENT
Start: 2025-06-24 | End: 2025-06-24

## 2025-06-24 RX ORDER — MUPIROCIN 20 MG/G
OINTMENT TOPICAL DAILY
Status: DISCONTINUED | OUTPATIENT
Start: 2025-06-24 | End: 2025-07-08 | Stop reason: HOSPADM

## 2025-06-24 RX ORDER — DIPHENHYDRAMINE HYDROCHLORIDE 50 MG/ML
25 INJECTION, SOLUTION INTRAMUSCULAR; INTRAVENOUS ONCE
Status: COMPLETED | OUTPATIENT
Start: 2025-06-24 | End: 2025-06-24

## 2025-06-24 RX ORDER — FUROSEMIDE 10 MG/ML
40 INJECTION INTRAMUSCULAR; INTRAVENOUS ONCE
Status: COMPLETED | OUTPATIENT
Start: 2025-06-24 | End: 2025-06-24

## 2025-06-24 RX ORDER — HYDRALAZINE HYDROCHLORIDE 25 MG/1
25 TABLET, FILM COATED ORAL EVERY 8 HOURS
Status: DISCONTINUED | OUTPATIENT
Start: 2025-06-24 | End: 2025-06-24

## 2025-06-24 RX ORDER — DEXMEDETOMIDINE HYDROCHLORIDE 4 UG/ML
0-1.4 INJECTION, SOLUTION INTRAVENOUS CONTINUOUS
Status: DISCONTINUED | OUTPATIENT
Start: 2025-06-24 | End: 2025-06-25

## 2025-06-24 RX ORDER — ACETAMINOPHEN 325 MG/1
650 TABLET ORAL EVERY 6 HOURS PRN
Status: DISCONTINUED | OUTPATIENT
Start: 2025-06-24 | End: 2025-07-02

## 2025-06-24 RX ORDER — FUROSEMIDE 10 MG/ML
40 INJECTION INTRAMUSCULAR; INTRAVENOUS DAILY
Status: DISCONTINUED | OUTPATIENT
Start: 2025-06-25 | End: 2025-06-24

## 2025-06-24 RX ORDER — ISOSORBIDE DINITRATE 20 MG/1
20 TABLET ORAL 3 TIMES DAILY
Status: DISCONTINUED | OUTPATIENT
Start: 2025-06-24 | End: 2025-06-24

## 2025-06-24 RX ORDER — SODIUM CHLORIDE 0.9 % (FLUSH) 0.9 %
2 SYRINGE (ML) INJECTION EVERY 12 HOURS PRN
Status: DISCONTINUED | OUTPATIENT
Start: 2025-06-24 | End: 2025-06-26

## 2025-06-24 RX ORDER — HALOPERIDOL LACTATE 5 MG/ML
5 INJECTION, SOLUTION INTRAMUSCULAR ONCE
Status: DISCONTINUED | OUTPATIENT
Start: 2025-06-24 | End: 2025-06-24

## 2025-06-24 RX ADMIN — SODIUM CHLORIDE: 9 INJECTION, SOLUTION INTRAVENOUS at 02:06

## 2025-06-24 RX ADMIN — ASPIRIN 81 MG CHEWABLE TABLET 81 MG: 81 TABLET CHEWABLE at 09:06

## 2025-06-24 RX ADMIN — HYDRALAZINE HYDROCHLORIDE 10 MG: 10 TABLET ORAL at 05:06

## 2025-06-24 RX ADMIN — DEXMEDETOMIDINE HYDROCHLORIDE 0.2 MCG/KG/HR: 4 INJECTION INTRAVENOUS at 08:06

## 2025-06-24 RX ADMIN — ISOSORBIDE DINITRATE 20 MG: 20 TABLET ORAL at 11:06

## 2025-06-24 RX ADMIN — INSULIN GLARGINE 5 UNITS: 100 INJECTION, SOLUTION SUBCUTANEOUS at 09:06

## 2025-06-24 RX ADMIN — ATORVASTATIN CALCIUM 20 MG: 20 TABLET, FILM COATED ORAL at 09:06

## 2025-06-24 RX ADMIN — HALOPERIDOL LACTATE 5 MG: 5 INJECTION, SOLUTION INTRAMUSCULAR at 09:06

## 2025-06-24 RX ADMIN — QUETIAPINE FUMARATE 50 MG: 25 TABLET ORAL at 04:06

## 2025-06-24 RX ADMIN — LIDOCAINE 1 PATCH: 50 PATCH CUTANEOUS at 02:06

## 2025-06-24 RX ADMIN — HYDRALAZINE HYDROCHLORIDE 50 MG: 50 TABLET ORAL at 11:06

## 2025-06-24 RX ADMIN — SODIUM ZIRCONIUM CYCLOSILICATE 10 G: 10 POWDER, FOR SUSPENSION ORAL at 02:06

## 2025-06-24 RX ADMIN — DIPHENHYDRAMINE HYDROCHLORIDE 25 MG: 50 INJECTION, SOLUTION INTRAMUSCULAR; INTRAVENOUS at 10:06

## 2025-06-24 RX ADMIN — FUROSEMIDE 40 MG: 10 INJECTION, SOLUTION INTRAVENOUS at 09:06

## 2025-06-24 RX ADMIN — HYDRALAZINE HYDROCHLORIDE 25 MG: 25 TABLET ORAL at 02:06

## 2025-06-24 RX ADMIN — ACETAMINOPHEN 650 MG: 325 TABLET ORAL at 06:06

## 2025-06-24 RX ADMIN — ISOSORBIDE DINITRATE 20 MG: 20 TABLET ORAL at 02:06

## 2025-06-24 RX ADMIN — MUPIROCIN: 20 OINTMENT TOPICAL at 09:06

## 2025-06-24 RX ADMIN — FUROSEMIDE 40 MG: 10 INJECTION, SOLUTION INTRAVENOUS at 11:06

## 2025-06-24 RX ADMIN — HUMAN ALBUMIN MICROSPHERES AND PERFLUTREN 0.66 MG: 10; .22 INJECTION, SOLUTION INTRAVENOUS at 10:06

## 2025-06-24 NOTE — ASSESSMENT & PLAN NOTE
ISAIAS is likely due to pre-renal azotemia due to intravascular volume depletion. Baseline creatinine is 1.0. Most recent creatinine and eGFR are listed below.  Recent Labs     06/22/25  0627 06/23/25  0456 06/24/25  0122   CREATININE 1.5* 1.9* 1.7*   EGFRNORACEVR 37* 28* 32*      Plan  - ISAIAS is improving  - Avoid nephrotoxins and renally dose meds for GFR listed above  - Monitor urine output, serial BMP, and adjust therapy as needed

## 2025-06-24 NOTE — Clinical Note
How Severe Is Your Rash?: moderate ID band present and verified. Family is in the lobby. Is This A New Presentation, Or A Follow-Up?: Rash Additional History: Pt is an avid cyclist

## 2025-06-24 NOTE — ASSESSMENT & PLAN NOTE
Patient's blood pressure range in the last 24 hours was: BP  Min: 66/44  Max: 186/91.The patient's inpatient anti-hypertensive regimen is listed below:  Current Antihypertensives  hydrALAZINE tablet 10 mg, Every 8 hours, Oral    Plan  - BP is uncontrolled, will adjust as follows: starting hydral  - Will add on GDMT when renal function improves

## 2025-06-24 NOTE — ASSESSMENT & PLAN NOTE
Patient transferred for CHB. Remains in CHB and TVP dependent.    Plan:  - telemetry  - maintain TVP in place, daily threshold/rhythm check  - K > 4, Mg > 2  - holding AV manjit blocking agents  - EP consulted  - NPO

## 2025-06-24 NOTE — EICU
Intervention Initiated From:  [facility] / EICU    Anamika intervened regarding:  Rounding (Video assessment)    Comments: Virtual ICU Quality Rounds    Admit Date: 6/24/2025  Hospital Day: 0    ICU Day: 8h    24H Vital Sign Range:  Temp:  [97.7 °F (36.5 °C)-98.2 °F (36.8 °C)]   Pulse:  [50-60]   Resp:  [10-23]   BP: ()/(54-96)   SpO2:  [89 %-97 %]     VICU Surveillance Screening    Daily review of  line necessity(optional): introducer    Central line indications : TV pacer    LDA reconciliation : Yes

## 2025-06-24 NOTE — HPI
Mrs. Vizcarra is a 74-year-old-woman with relevant history of 2DM, fibromyalgia, and HTN who presented to the ED from her PMD office for symptomatic bradycardia, found to have CHB and was transferred to [facility] for EP evaluation      Patient initially admitted on  at OSH after presenting with CHB on ECG. She reported approximately 3 weeks ago that she had 2 episodes of an episodes of LOC for 2-3 minutes at home while on her couch and since then has felt SOB and fatigued. Denies having had chest pain episodes. On arrival to the ED patient had a ECG with CHB with ventricular escape in the 20's. She was hypertensive with SBP > 200 and became hypoxic with suspected flash pulmonary edema. She was placed on a nitroglycerin gtt and diuresed. A TVP was placed and she has remained dependent with underlying CHB since (threshold 1.5 this evening). 25 she had a TTE revealing LVEF 20-25% with WMA's c/f either prior infarct or stress induced cardiomyopathy. Patient diuresed and oxygen requirements improved, however developed ISAIAS. of note patient's sister  the day before she presented to her PMD doctor and she has been in a lot of stress and depression after this event.     EP has been consulted for PPM evaluation

## 2025-06-24 NOTE — SUBJECTIVE & OBJECTIVE
Past Medical History:   Diagnosis Date    Diabetes mellitus, type 2     Fibromyalgia 2016    Hypertension 2016       Past Surgical History:   Procedure Laterality Date     SECTION      CHOLECYSTECTOMY      FOOT SURGERY Bilateral     plantar fasciotomy bilateral, arthroplasty fifth toe bilateral    HERNIA REPAIR      INSERTION, PACEMAKER, TEMPORARY TRANSVENOUS N/A 2025    Procedure: Insertion, Pacemaker, Temporary Transvenous;  Surgeon: Sean Ureña MD;  Location: [facility] CATH LAB/EP;  Service: Cardiology;  Laterality: N/A;       Review of patient's allergies indicates:   Allergen Reactions    Advil [ibuprofen] Hives    Penicillins     Codeine     Excedrin aspirin free [acetaminophen-caffeine]        Current Facility-Administered Medications on File Prior to Encounter   Medication    [DISCONTINUED] 0.9% NaCl infusion    [DISCONTINUED] acetaminophen tablet 1,000 mg    [DISCONTINUED] acetaminophen tablet 650 mg    [DISCONTINUED] aspirin chewable tablet 81 mg    [DISCONTINUED] atorvastatin tablet 20 mg    [DISCONTINUED] dexmedetomidine (PRECEDEX) 400mcg/100mL 0.9% NaCL infusion    [DISCONTINUED] dextrose 50% injection 12.5 g    [DISCONTINUED] dextrose 50% injection 12.5 g    [DISCONTINUED] dextrose 50% injection 25 g    [DISCONTINUED] enoxaparin injection 30 mg    [DISCONTINUED] gabapentin capsule 300 mg    [DISCONTINUED] glucagon (human recombinant) injection 1 mg    [DISCONTINUED] glucagon (human recombinant) injection 1 mg    [DISCONTINUED] glucose chewable tablet 16 g    [DISCONTINUED] glucose chewable tablet 24 g    [DISCONTINUED] heparin infusion 1,000 units/500 ml in 0.9% NaCl (pressure line flush)    [DISCONTINUED] insulin aspart U-100 pen 0-10 Units    [DISCONTINUED] insulin glargine U-100 (Lantus) pen 8 Units    [DISCONTINUED] LIDOcaine 5 % patch 1 patch    [DISCONTINUED] mupirocin 2 % ointment    [DISCONTINUED] naloxone 0.4 mg/mL injection 0.02 mg    [DISCONTINUED] NORepinephrine 4  mg in dextrose 5% 250 mL infusion (premix)    [COMPLETED] OLANZapine injection 2.5 mg    [DISCONTINUED] OLANZapine injection 5 mg    [DISCONTINUED] polyethylene glycol packet 17 g    [DISCONTINUED] sodium chloride 0.9% flush 2 mL     Current Outpatient Medications on File Prior to Encounter   Medication Sig    cyclobenzaprine (FLEXERIL) 10 MG tablet TAKE 1 TABLET BY MOUTH EVERY DAY NIGHTLY AS NEEDED FOR MUSCLE SPASMS    gabapentin (NEURONTIN) 300 MG capsule TAKE 1 CAPSULE BY MOUTH EVERYDAY AT BEDTIME    lisinopriL (PRINIVIL,ZESTRIL) 20 MG tablet TAKE 1 TABLET BY MOUTH EVERY DAY    meclizine (ANTIVERT) 12.5 mg tablet TAKE 1 TABLET BY MOUTH 2 (TWO) TIMES DAILY AS NEEDED FOR DIZZINESS.    metFORMIN (GLUCOPHAGE) 500 MG tablet TAKE 1 TABLET BY MOUTH TWICE A DAY WITH MEALS    metoprolol tartrate (LOPRESSOR) 50 MG tablet TAKE 1 TABLET BY MOUTH TWICE A DAY    oxyCODONE-acetaminophen (PERCOCET)  mg per tablet Take 1 tablet by mouth nightly as needed for Pain.    rosuvastatin (CRESTOR) 5 MG tablet Take 1 tablet (5 mg total) by mouth once daily.     Family History       Problem Relation (Age of Onset)    Diabetes Father    Heart disease Sister, Brother          Tobacco Use    Smoking status: Never    Smokeless tobacco: Never   Substance and Sexual Activity    Alcohol use: Yes     Comment: Occasionally    Drug use: Not on file    Sexual activity: Yes     Review of Systems   All other systems reviewed and are negative.    Objective:     Vital Signs (Most Recent):  Temp: 98.2 °F (36.8 °C) (06/24/25 0800)  Pulse: (!) 50 (06/24/25 1000)  Resp: (!) 22 (06/24/25 1000)  BP: (!) 185/79 (06/24/25 1000)  SpO2: (!) 94 % (06/24/25 1000) Vital Signs (24h Range):  Temp:  [97.7 °F (36.5 °C)-98.2 °F (36.8 °C)] 98.2 °F (36.8 °C)  Pulse:  [49-60] 50  Resp:  [10-23] 22  SpO2:  [89 %-97 %] 94 %  BP: (122-189)/(55-96) 185/79       Weight: 67.1 kg (148 lb)  Body mass index is 27.07 kg/m².    SpO2: (!) 94 %        Physical Exam  HENT:       Mouth/Throat:      Mouth: Mucous membranes are moist.   Cardiovascular:      Rate and Rhythm: Normal rate and regular rhythm.      Pulses: Normal pulses.      Comments: V-paced at 6- bpm     Pulmonary:      Effort: Pulmonary effort is normal.   Skin:     General: Skin is warm.      Capillary Refill: Capillary refill takes less than 2 seconds.   Neurological:      Mental Status: She is alert.            Significant Labs:   Recent Lab Results  (Last 5 results in the past 24 hours)        06/24/25  0926   06/24/25  0854   06/24/25  0541   06/24/25  0122   06/23/25  2157        Albumin       2.9         ALP       50         ALT       16         Anion Gap     11   12         Ascending aorta   2.6             Ao peak leanna   1.8             ASI   1.5             ASI   1.8             Ao VTI   28.8             PTT       25.3  Comment: Refer to local heparin nomogram for intensity/dose specific therapeutic range.         AST       24         AV valve area   2.5             KAREN by Velocity Ratio   1.9             AV area by cont VTI   2.4             AV mean gradient   5             AV index (prosthetic)   0.78             LVOT mn grad   2             AV LVOT peak gradient   5             AV peak gradient   13             AV Velocity Ratio   0.61             Baso #       0.08         Basophil %       0.8         BILIRUBIN TOTAL       0.6  Comment: For infants and newborns, interpretation of results should be based   on gestational age, weight and in agreement with clinical   observations.    Premature Infant recommended reference ranges:   0-24 hours:  <8.0 mg/dL   24-48 hours: <12.0 mg/dL   3-5 days:    <15.0 mg/dL   6-29 days:   <15.0 mg/dL         BNP 1,991  Comment: Values of less than 100 pg/ml are consistent with non-CHF populations.                BSA   1.71             BUN     36   39         Calcium     8.7   8.8         Chloride     100   98         CO2     26   27         Creatinine     1.5   1.7         Left  Ventricle Relative Wall Thickness   0.38             E/A ratio   0.75             Echo EF Estimated   56             E/E' ratio   16             eGFR     37  Comment: Estimated GFR calculated using the CKD-EPI creatinine (2021) equation.   32  Comment: Estimated GFR calculated using the CKD-EPI creatinine (2021) equation.         Eos #       0.48         Eos %       4.9         E wave deceleration time   229                222             FS   29.2             Glucose     126   134         Gran # (ANC)       5.00         Hematocrit       40.7         Hemoglobin       14.0         Immature Grans (Abs)       0.02  Comment: Mild elevation in immature granulocytes is non specific and can be seen in a variety of conditions including stress response, acute inflammation, trauma and pregnancy. Correlation with other laboratory and clinical findings is essential.         Immature Granulocytes       0.2         INR       1.0  Comment: Coumadin Therapy:    2.0 - 3.0 for INR for all indicators except mechanical heart valves    and antiphospholipid syndromes which should use 2.5 - 3.5.         IVSd   0.8             LA WIDTH   3.8             Left Atrium Major Axis   6.0             Left Atrium Minor Axis   5.9             LA size   3.4             LA Vol   65             LA vol index   39             LVOT area   3.1             LV LATERAL E/E' RATIO   14.3             LV SEPTAL E/E' RATIO   17.2             LV EDV BP   105             LV Diastolic Volume Index   62.50             LVIDd   4.8             LVIDs   3.4             LV mass   137.1             LV Mass Index   81.6             LVOT diameter   2.0             LVOT peak leanna   1.1             LVOT stroke volume   71.0             LVOT peak VTI   22.6             LV ESV BP   47             LV Systolic Volume Index   28.0             Lymph #       3.16         Lymph %       32.3         Magnesium        2.2         MCH       32.5         MCHC       34.4         MCV        94         Mean e'   0.06             Mono #       1.04         Mono %       10.6         MPV       12.3         MV Peak A Darrin   1.14             MV Peak E Darrin   0.86             Neut %       51.2         nRBC       0         Phosphorus Level       4.2         Platelet Count       205         POCT Glucose         199       Potassium     4.7   5.4         PROTEIN TOTAL       6.5         PT       11.3         PW   0.9             RA Major Axis   5.34             Est. RA pres   15             RA Width   3.66             RBC       4.31         RDW       13.9         RV TB RVSP   18             RV/LV Ratio   0.58             RV- elias basal diam   2.8             Sinus   3.0             Sodium     137   137         STJ   2.3             TAPSE   2.1             TDI SEPTAL   0.05             TDI LATERAL   0.06             TR Max Darrin   3.0             TV PG   35             TV resting pulmonary artery pressure   51             WBC       9.78         ZLVIDD   0.24             ZLVIDS   1.25

## 2025-06-24 NOTE — PLAN OF CARE
[facility] - Cardiac Intensive Care  Initial Discharge Assessment       Primary Care Provider: Blaine Benítez MD    Admission Diagnosis: Complete heart block [I44.2]    Admission Date: 6/24/2025  Expected Discharge Date:     Transition of Care Barriers: None    Payor: [email or web address] [facility] / Plan: [employer] / Product Type: Medicare Advantage /     Extended Emergency Contact Information  Primary Emergency Contact: OrCordell gironki  Mobile Phone: [phone]  Relation: Daughter  Preferred language: English  Secondary Emergency Contact: Kim Borrego   [place]  Mobile Phone: [phone]  Relation: Sister    Discharge Plan A: Home Health  Discharge Plan B: Home with family      [id number] - [place] - [address]  [address] [place] [address]  Phone: [phone] Fax: [phone]    [id number] - [place] - [address]  AT [facility] [address]  [address] [place] [address]  Phone: [phone] Fax: [phone]      Initial Assessment (most recent)       Adult Discharge Assessment - 06/24/25 0928          Discharge Assessment    Assessment Type Discharge Planning Assessment     Confirmed/corrected address, phone number and insurance Yes     Confirmed Demographics Correct on Facesheet     Source of Information patient;health record     People in Home alone     Facility Arrived From: [facility]     Do you expect to return to your current living situation? Yes     Do you have help at home or someone to help you manage your care at home? Yes     Who are your caregiver(s) and their phone number(s)? Marilyn Ayoub (Daughter)  [phone]     Prior to hospitilization cognitive status: Alert/Oriented     Current cognitive status: Alert/Oriented     Walking or Climbing Stairs Difficulty no     Dressing/Bathing Difficulty no     Equipment Currently Used at Home none   owns a RW but does not use.    Readmission within  30 days? No     Patient currently being followed by outpatient case management? No     Do you currently have service(s) that help you manage your care at home? No     Do you take prescription medications? Yes     Do you have prescription coverage? Yes     Do you have any problems affording any of your prescribed medications? No     Is the patient taking medications as prescribed? yes     How do you get to doctors appointments? family or friend will provide     Are you on dialysis? No     Do you take coumadin? No     Discharge Plan A Home Health     Discharge Plan B Home with family     DME Needed Upon Discharge  other (see comments)   TBD    Transition of Care Barriers None

## 2025-06-24 NOTE — ASSESSMENT & PLAN NOTE
Patient found to have new systolic heart failure on TTE. She additionally has wall motion abnormalities that could represent old infarct or stress induced cardiomyopathy.    Plan:  - telemetry  - strict I/O's, daily weights  - hold diuresis at this time given ISAIAS and clinically appears hypovolemic  - hold BB, ARB/ARNI, MRA, and SGLt2i at this time but will plan for initiation prior to discharge  - will need ischemic workup

## 2025-06-24 NOTE — ASSESSMENT & PLAN NOTE
Patient with Hypoxic Respiratory failure which is Acute.  she is not on home oxygen. Supplemental oxygen was provided and noted-      .   Signs/symptoms of respiratory failure include- tachypnea and increased work of breathing. Contributing diagnoses includes - flash pulmonary edema Labs and images were reviewed. Patient Has not had a recent ABG. Will treat underlying causes and adjust management of respiratory failure as follows- volume optimization and BP control

## 2025-06-24 NOTE — ASSESSMENT & PLAN NOTE
74 year old woman with HTN, T2DM, and fibromyalgia who presented to the ED from her PMD office for symptomatic bradycardia and then transferred to [facility] for CHB.     Patient with no prior known cardiac disease. Though now with newly diagnosed HFrEF of 10-15% as well as complete heart block. Echo notable for WMA in the apex specifically, concerning for infarct vs stress induced CM. EP consulted and planning for PPM implantation +/- ICD. Angiogram indicated, though she remains volume up with ISAIAS and oxygen requirement.     Recommendations:   - Plan for angiogram +/- PCI this admission, though limited by renal function and oxygen requirement   - Will reassess tomorrow AM; if Cr improved, will take for angiogram  - Please make NPO MN

## 2025-06-24 NOTE — HPI
Mrs. Vizcarra is a 74 year old woman with 2DM, fibromyalgia, and HTN who presented to the ED from her PMD office for symptomatic bradycardia and was transferred to [facility] for CHB.     Patient initially admitted on 6/18 at OSH after presenting with CHB on ECG. She reported approximately 3 weeks ago that she had an episode of LOC for 3 minutes at home while on her couch and since then has felt SOB and fatigued. Denies having had chest pain episodes. On arrival to the ED patient had a ECG with CHB with ventricular escape in the 20's. She was hypertensive with SBP > 200 and became hypoxic with suspected flash pulmonary edema. She was placed on a nitroglycerin gtt and diuresed. A TVP was placed and she has remained dependent with underlying CHB since (threshold 1.5 this evening). 6/19/25 she had a TTE revealing LVEF 20-25% with WMA's c/f either prior infarct or stress induced cardiomyopathy. Patient diuresed and oxygen requirements improved, however developed ISAIAS.    On arrival to [facility] patient paced at 60, hypertensive, and on NC. No complaints aside from back pain at this time.

## 2025-06-24 NOTE — CONSULTS
[facility]  Cardiac Electrophysiology  Consult Note    Admission Date: 2025  Code Status: Full Code   Attending Provider: Mita Matute MD  Consulting Provider: Julian Fu MD  Principal Problem:<principal problem not specified>    Inpatient consult to Electrophysiology  Consult performed by: Julian Shaikh MD  Consult ordered by: Brayden Hoffmann MD        Subjective:         HPI:   Mrs. Depolitte is a 74-year-old-woman with relevant history of 2DM, fibromyalgia, and HTN who presented to the ED from her PMD office for symptomatic bradycardia, found to have CHB and was transferred to [facility] for EP evaluation      Patient initially admitted on  at OSH after presenting with CHB on ECG. She reported approximately 3 weeks ago that she had 2 episodes of an episodes of LOC for 2-3 minutes at home while on her couch and since then has felt SOB and fatigued. Denies having had chest pain episodes. On arrival to the ED patient had a ECG with CHB with ventricular escape in the 20's. She was hypertensive with SBP > 200 and became hypoxic with suspected flash pulmonary edema. She was placed on a nitroglycerin gtt and diuresed. A TVP was placed and she has remained dependent with underlying CHB since (threshold 1.5 this evening). 25 she had a TTE revealing LVEF 20-25% with WMA's c/f either prior infarct or stress induced cardiomyopathy. Patient diuresed and oxygen requirements improved, however developed ISAIAS. of note patient's sister  the day before she presented to her PMD doctor and she has been in a lot of stress and depression after this event.     EP has been consulted for PPM evaluation     Past Medical History:   Diagnosis Date    Diabetes mellitus, type 2     Fibromyalgia 2016    Hypertension 2016       Past Surgical History:   Procedure Laterality Date     SECTION      CHOLECYSTECTOMY      FOOT SURGERY Bilateral     plantar  fasciotomy bilateral, arthroplasty fifth toe bilateral    HERNIA REPAIR      INSERTION, PACEMAKER, TEMPORARY TRANSVENOUS N/A 6/18/2025    Procedure: Insertion, Pacemaker, Temporary Transvenous;  Surgeon: Sean Ureña MD;  Location: [facility] CATH LAB/EP;  Service: Cardiology;  Laterality: N/A;       Review of patient's allergies indicates:   Allergen Reactions    Advil [ibuprofen] Hives    Penicillins     Codeine     Excedrin aspirin free [acetaminophen-caffeine]        Current Facility-Administered Medications on File Prior to Encounter   Medication    [DISCONTINUED] 0.9% NaCl infusion    [DISCONTINUED] acetaminophen tablet 1,000 mg    [DISCONTINUED] acetaminophen tablet 650 mg    [DISCONTINUED] aspirin chewable tablet 81 mg    [DISCONTINUED] atorvastatin tablet 20 mg    [DISCONTINUED] dexmedetomidine (PRECEDEX) 400mcg/100mL 0.9% NaCL infusion    [DISCONTINUED] dextrose 50% injection 12.5 g    [DISCONTINUED] dextrose 50% injection 12.5 g    [DISCONTINUED] dextrose 50% injection 25 g    [DISCONTINUED] enoxaparin injection 30 mg    [DISCONTINUED] gabapentin capsule 300 mg    [DISCONTINUED] glucagon (human recombinant) injection 1 mg    [DISCONTINUED] glucagon (human recombinant) injection 1 mg    [DISCONTINUED] glucose chewable tablet 16 g    [DISCONTINUED] glucose chewable tablet 24 g    [DISCONTINUED] heparin infusion 1,000 units/500 ml in 0.9% NaCl (pressure line flush)    [DISCONTINUED] insulin aspart U-100 pen 0-10 Units    [DISCONTINUED] insulin glargine U-100 (Lantus) pen 8 Units    [DISCONTINUED] LIDOcaine 5 % patch 1 patch    [DISCONTINUED] mupirocin 2 % ointment    [DISCONTINUED] naloxone 0.4 mg/mL injection 0.02 mg    [DISCONTINUED] NORepinephrine 4 mg in dextrose 5% 250 mL infusion (premix)    [COMPLETED] OLANZapine injection 2.5 mg    [DISCONTINUED] OLANZapine injection 5 mg    [DISCONTINUED] polyethylene glycol packet 17 g    [DISCONTINUED] sodium chloride 0.9% flush 2 mL     Current Outpatient  Medications on File Prior to Encounter   Medication Sig    cyclobenzaprine (FLEXERIL) 10 MG tablet TAKE 1 TABLET BY MOUTH EVERY DAY NIGHTLY AS NEEDED FOR MUSCLE SPASMS    gabapentin (NEURONTIN) 300 MG capsule TAKE 1 CAPSULE BY MOUTH EVERYDAY AT BEDTIME    lisinopriL (PRINIVIL,ZESTRIL) 20 MG tablet TAKE 1 TABLET BY MOUTH EVERY DAY    meclizine (ANTIVERT) 12.5 mg tablet TAKE 1 TABLET BY MOUTH 2 (TWO) TIMES DAILY AS NEEDED FOR DIZZINESS.    metFORMIN (GLUCOPHAGE) 500 MG tablet TAKE 1 TABLET BY MOUTH TWICE A DAY WITH MEALS    metoprolol tartrate (LOPRESSOR) 50 MG tablet TAKE 1 TABLET BY MOUTH TWICE A DAY    oxyCODONE-acetaminophen (PERCOCET)  mg per tablet Take 1 tablet by mouth nightly as needed for Pain.    rosuvastatin (CRESTOR) 5 MG tablet Take 1 tablet (5 mg total) by mouth once daily.     Family History       Problem Relation (Age of Onset)    Diabetes Father    Heart disease Sister, Brother          Tobacco Use    Smoking status: Never    Smokeless tobacco: Never   Substance and Sexual Activity    Alcohol use: Yes     Comment: Occasionally    Drug use: Not on file    Sexual activity: Yes     Review of Systems   All other systems reviewed and are negative.    Objective:     Vital Signs (Most Recent):  Temp: 98.2 °F (36.8 °C) (06/24/25 0800)  Pulse: (!) 50 (06/24/25 1000)  Resp: (!) 22 (06/24/25 1000)  BP: (!) 185/79 (06/24/25 1000)  SpO2: (!) 94 % (06/24/25 1000) Vital Signs (24h Range):  Temp:  [97.7 °F (36.5 °C)-98.2 °F (36.8 °C)] 98.2 °F (36.8 °C)  Pulse:  [49-60] 50  Resp:  [10-23] 22  SpO2:  [89 %-97 %] 94 %  BP: (122-189)/(55-96) 185/79       Weight: 67.1 kg (148 lb)  Body mass index is 27.07 kg/m².    SpO2: (!) 94 %        Physical Exam  HENT:      Mouth/Throat:      Mouth: Mucous membranes are moist.   Cardiovascular:      Rate and Rhythm: Normal rate and regular rhythm.      Pulses: Normal pulses.      Comments: V-paced at 6- bpm     Pulmonary:      Effort: Pulmonary effort is normal.   Skin:      General: Skin is warm.      Capillary Refill: Capillary refill takes less than 2 seconds.   Neurological:      Mental Status: She is alert.            Significant Labs:   Recent Lab Results  (Last 5 results in the past 24 hours)        06/24/25  0926   06/24/25  0854   06/24/25  0541   06/24/25  0122   06/23/25  2157        Albumin       2.9         ALP       50         ALT       16         Anion Gap     11   12         Ascending aorta   2.6             Ao peak leanna   1.8             ASI   1.5             ASI   1.8             Ao VTI   28.8             PTT       25.3  Comment: Refer to local heparin nomogram for intensity/dose specific therapeutic range.         AST       24         AV valve area   2.5             KAREN by Velocity Ratio   1.9             AV area by cont VTI   2.4             AV mean gradient   5             AV index (prosthetic)   0.78             LVOT mn grad   2             AV LVOT peak gradient   5             AV peak gradient   13             AV Velocity Ratio   0.61             Baso #       0.08         Basophil %       0.8         BILIRUBIN TOTAL       0.6  Comment: For infants and newborns, interpretation of results should be based   on gestational age, weight and in agreement with clinical   observations.    Premature Infant recommended reference ranges:   0-24 hours:  <8.0 mg/dL   24-48 hours: <12.0 mg/dL   3-5 days:    <15.0 mg/dL   6-29 days:   <15.0 mg/dL         BNP 1,991  Comment: Values of less than 100 pg/ml are consistent with non-CHF populations.                BSA   1.71             BUN     36   39         Calcium     8.7   8.8         Chloride     100   98         CO2     26   27         Creatinine     1.5   1.7         Left Ventricle Relative Wall Thickness   0.38             E/A ratio   0.75             Echo EF Estimated   56             E/E' ratio   16             eGFR     37  Comment: Estimated GFR calculated using the CKD-EPI creatinine (2021) equation.   32  Comment:  Estimated GFR calculated using the CKD-EPI creatinine (2021) equation.         Eos #       0.48         Eos %       4.9         E wave deceleration time   229                222             FS   29.2             Glucose     126   134         Gran # (ANC)       5.00         Hematocrit       40.7         Hemoglobin       14.0         Immature Grans (Abs)       0.02  Comment: Mild elevation in immature granulocytes is non specific and can be seen in a variety of conditions including stress response, acute inflammation, trauma and pregnancy. Correlation with other laboratory and clinical findings is essential.         Immature Granulocytes       0.2         INR       1.0  Comment: Coumadin Therapy:    2.0 - 3.0 for INR for all indicators except mechanical heart valves    and antiphospholipid syndromes which should use 2.5 - 3.5.         IVSd   0.8             LA WIDTH   3.8             Left Atrium Major Axis   6.0             Left Atrium Minor Axis   5.9             LA size   3.4             LA Vol   65             LA vol index   39             LVOT area   3.1             LV LATERAL E/E' RATIO   14.3             LV SEPTAL E/E' RATIO   17.2             LV EDV BP   105             LV Diastolic Volume Index   62.50             LVIDd   4.8             LVIDs   3.4             LV mass   137.1             LV Mass Index   81.6             LVOT diameter   2.0             LVOT peak darrin   1.1             LVOT stroke volume   71.0             LVOT peak VTI   22.6             LV ESV BP   47             LV Systolic Volume Index   28.0             Lymph #       3.16         Lymph %       32.3         Magnesium        2.2         MCH       32.5         MCHC       34.4         MCV       94         Mean e'   0.06             Mono #       1.04         Mono %       10.6         MPV       12.3         MV Peak A Darrin   1.14             MV Peak E Darrin   0.86             Neut %       51.2         nRBC       0         Phosphorus Level       4.2          Platelet Count       205         POCT Glucose         199       Potassium     4.7   5.4         PROTEIN TOTAL       6.5         PT       11.3         PW   0.9             RA Major Axis   5.34             Est. RA pres   15             RA Width   3.66             RBC       4.31         RDW       13.9         RV TB RVSP   18             RV/LV Ratio   0.58             RV- elias basal diam   2.8             Sinus   3.0             Sodium     137   137         STJ   2.3             TAPSE   2.1             TDI SEPTAL   0.05             TDI LATERAL   0.06             TR Max Darrin   3.0             TV PG   35             TV resting pulmonary artery pressure   51             WBC       9.78         ZLVIDD   0.24             ZLVIDS   1.25                                              Assessment and Plan:     Complete heart block  Patient with new onset CHB. She had 2 syncopal episodes before presentation to the ED and has been having multiple pre-syncopal episodes with associated SOB and weakness. Of note TTE done was found to have newly reduced EF of 20% with WMA (anterior and apical). Troponin x 1 was negative and patient has not been complaining about chest pain. Given her new diagnosis of HFrEF, patient needs ischemic work up before proceeding with any pacemaker device. She would likely need a dual-chamber with LBBPA lead.     Recommendations:   - Please perform ischemic work up. Can consult IC for coronary angiogram   - Keep TVP in place, - threshold 1.5  - No heparin products the day before PM insertion   - Keep K > 4 and Mag > 2  - After ischemic work up will plan for PPM insertion             Thank you for your consult. I will follow-up with patient. Please contact us if you have any additional questions.    Julian Fu MD  Cardiac Electrophysiology  [facility]

## 2025-06-24 NOTE — Clinical Note
A venogram was performed in the left axillary vein. The vessel was injected via hand injection  with 10 mL of contrast. Given by the crna

## 2025-06-24 NOTE — HPI
Mrs. Vizcarra is a 74 year old woman with HTN, T2DM, and fibromyalgia who presented to the ED from her PMD office for symptomatic bradycardia and then transferred to [facility] for [place]B.     Patient initially admitted to OSH on  after presenting with CHB. She reported approximately 3 weeks ago that she had an episode of LOC for 3 minutes at home while on her couch and since then has felt dyspneic and fatigued. Denies chest pain. On arrival to the ED patient had a ECG with CHB with ventricular escape in the 20s. She was hypertensive with SBP > 200 and became hypoxic with suspected flash pulmonary edema. She was placed on a nitroglycerin gtt and diuresed. A TVP was placed and she has remained dependent with underlying CHB since (threshold 1.5 this evening). On 25 she had a TTE revealing LVEF 20-25% with WMA's c/f either prior infarct or stress induced cardiomyopathy. Patient diuresed and oxygen requirements improved, however developed ISAIAS. Of note, patient's sister  the day before she presented to her PMD doctor and she has been under a lot of stress with depression after this event.    Today, she reports feeling well overall, though remains mildly dyspneic. Continues to deny chest pain. She is on 3L NC and desaturates when it is reduced. ISAIAS has improved, though Cr still above baseline at 1.5. She is diuresing well on Lasix pushes.     No prior cardiac workup, including no prior angiogram or stress in the system.     TTE 25:    Left Ventricle: The left ventricle is normal in size. Normal wall thickness. Regional wall motion abnormalities present. There is severely reduced systolic function with a visually estimated ejection fraction of 15 - 20%. Grade I diastolic dysfunction. Elevated left ventricular filling pressure. No thrombus observed using contrast enhanced images. Hypokinesis of all segments except the bases suggests takastsubo CM pattern, but multivessel CAD cannot be exluded    Right Ventricle:  The right ventricle is normal in size measuring 2.8 cm. Wall thickness is normal. Systolic function is normal.    Aortic Valve: There is mild aortic valve sclerosis. There is moderate annular calcification present.    Mitral Valve: There is mild regurgitation with a centrally directed jet.    Aorta: The aortic root is normal in size measuring 3.0 cm. The proximal ascending aorta is normal in size measuring 2.6 cm.    Pulmonary Artery: There is moderate pulmonary hypertension. The estimated pulmonary artery systolic pressure is 51 mmHg.    IVC/SVC: Elevated venous pressure at 15 mmHg.    Pericardium: There is no pericardial effusion.

## 2025-06-24 NOTE — EICU
Virtual ICU Admission    Admit Date: 2025  LOS: 0  Code Status: Full Code   : 1950  Bed: [id number] A:     Diagnosis: <principal problem not specified>    Patient  has a past medical history of Diabetes mellitus, type 2, Fibromyalgia, and Hypertension.    Last VS: Temp 98 °F (36.7 °C)   Wt 67.1 kg (147 lb 14.9 oz)   LMP 2001   BMI 27.06 kg/m²       VICU Review    VICU nurse assessment :  [place] completed, LDA documentation reconciliation completed, Skin care/wounds LDA reconciliation, and VTE prophylaxis review    No skin breakdown visualized    Nursing orders placed : IP LOREN Peripheral IV Access

## 2025-06-24 NOTE — SUBJECTIVE & OBJECTIVE
Past Medical History:   Diagnosis Date    Diabetes mellitus, type 2     Fibromyalgia 2016    Hypertension 2016       Past Surgical History:   Procedure Laterality Date     SECTION      CHOLECYSTECTOMY      FOOT SURGERY Bilateral     plantar fasciotomy bilateral, arthroplasty fifth toe bilateral    HERNIA REPAIR      INSERTION, PACEMAKER, TEMPORARY TRANSVENOUS N/A 2025    Procedure: Insertion, Pacemaker, Temporary Transvenous;  Surgeon: Sean Ureña MD;  Location: [facility] CATH LAB/EP;  Service: Cardiology;  Laterality: N/A;       Review of patient's allergies indicates:   Allergen Reactions    Advil [ibuprofen] Hives    Penicillins     Codeine     Excedrin aspirin free [acetaminophen-caffeine]        Current Facility-Administered Medications on File Prior to Encounter   Medication    [COMPLETED] lactated ringers bolus 250 mL    [DISCONTINUED] 0.9% NaCl infusion    [DISCONTINUED] acetaminophen tablet 1,000 mg    [DISCONTINUED] acetaminophen tablet 650 mg    [DISCONTINUED] aspirin chewable tablet 81 mg    [DISCONTINUED] atorvastatin tablet 20 mg    [DISCONTINUED] dexmedetomidine (PRECEDEX) 400mcg/100mL 0.9% NaCL infusion    [DISCONTINUED] dextrose 50% injection 12.5 g    [DISCONTINUED] dextrose 50% injection 12.5 g    [DISCONTINUED] dextrose 50% injection 25 g    [DISCONTINUED] enoxaparin injection 30 mg    [DISCONTINUED] enoxaparin injection 40 mg    [DISCONTINUED] furosemide tablet 80 mg    [DISCONTINUED] gabapentin capsule 300 mg    [DISCONTINUED] glucagon (human recombinant) injection 1 mg    [DISCONTINUED] glucagon (human recombinant) injection 1 mg    [DISCONTINUED] glucose chewable tablet 16 g    [DISCONTINUED] glucose chewable tablet 24 g    [DISCONTINUED] heparin infusion 1,000 units/500 ml in 0.9% NaCl (pressure line flush)    [DISCONTINUED] insulin aspart U-100 pen 0-10 Units    [DISCONTINUED] insulin glargine U-100 (Lantus) pen 5 Units    [DISCONTINUED] insulin glargine U-100  (Lantus) pen 8 Units    [DISCONTINUED] LIDOcaine 5 % patch 1 patch    [DISCONTINUED] losartan tablet 100 mg    [DISCONTINUED] mupirocin 2 % ointment    [DISCONTINUED] naloxone 0.4 mg/mL injection 0.02 mg    [DISCONTINUED] NORepinephrine 4 mg in dextrose 5% 250 mL infusion (premix)    [COMPLETED] OLANZapine injection 2.5 mg    [DISCONTINUED] OLANZapine injection 5 mg    [DISCONTINUED] polyethylene glycol packet 17 g    [DISCONTINUED] sodium chloride 0.9% flush 2 mL    [DISCONTINUED] spironolactone tablet 25 mg     Current Outpatient Medications on File Prior to Encounter   Medication Sig    cyclobenzaprine (FLEXERIL) 10 MG tablet TAKE 1 TABLET BY MOUTH EVERY DAY NIGHTLY AS NEEDED FOR MUSCLE SPASMS    gabapentin (NEURONTIN) 300 MG capsule TAKE 1 CAPSULE BY MOUTH EVERYDAY AT BEDTIME    lisinopriL (PRINIVIL,ZESTRIL) 20 MG tablet TAKE 1 TABLET BY MOUTH EVERY DAY    meclizine (ANTIVERT) 12.5 mg tablet TAKE 1 TABLET BY MOUTH 2 (TWO) TIMES DAILY AS NEEDED FOR DIZZINESS.    metFORMIN (GLUCOPHAGE) 500 MG tablet TAKE 1 TABLET BY MOUTH TWICE A DAY WITH MEALS    metoprolol tartrate (LOPRESSOR) 50 MG tablet TAKE 1 TABLET BY MOUTH TWICE A DAY    oxyCODONE-acetaminophen (PERCOCET)  mg per tablet Take 1 tablet by mouth nightly as needed for Pain.    rosuvastatin (CRESTOR) 5 MG tablet Take 1 tablet (5 mg total) by mouth once daily.     Family History       Problem Relation (Age of Onset)    Diabetes Father    Heart disease Sister, Brother          Tobacco Use    Smoking status: Never    Smokeless tobacco: Never   Substance and Sexual Activity    Alcohol use: Yes     Comment: Occasionally    Drug use: Not on file    Sexual activity: Yes     Review of Systems   Constitutional: Positive for malaise/fatigue. Negative for chills and fever.   Cardiovascular:  Positive for dyspnea on exertion, leg swelling and syncope. Negative for chest pain, irregular heartbeat and palpitations.   Respiratory:  Positive for shortness of breath.   "  Musculoskeletal:  Positive for back pain and stiffness.   Genitourinary: Negative.      Objective:     Vital Signs (Most Recent):  Temp: 98 °F (36.7 °C) (06/24/25 0100) Vital Signs (24h Range):  Temp:  [97.2 °F (36.2 °C)-98.1 °F (36.7 °C)] 98 °F (36.7 °C)  Pulse:  [59-60] 60  Resp:  [7-29] 23  SpO2:  [89 %-100 %] 92 %  BP: ()/(42-96) 160/75     Weight: 67.1 kg (147 lb 14.9 oz)  Body mass index is 27.06 kg/m².            No intake or output data in the 24 hours ending 06/24/25 0232    Lines/Drains/Airways       Central Venous Catheter Line  Duration             Introducer 06/18/25 Internal Jugular Right 6 days              Drain  Duration             Female External Urinary Catheter w/ Suction 06/24/25 0120 <1 day              Line  Duration                  Pacer Wires 06/18/25 0750 5 days              Peripheral Intravenous Line  Duration             Peripheral IV Single Lumen 06/22/25 0657 22 G Anterior;Right Forearm 1 day    Peripheral IV Single Lumen 06/23/25 1312 20 G Posterior;Right Hand <1 day                     Physical Exam  Vitals reviewed.   Constitutional:       General: She is not in acute distress.  Eyes:      General: No scleral icterus.  Cardiovascular:      Rate and Rhythm: Normal rate and regular rhythm.   Pulmonary:      Effort: Pulmonary effort is normal. No respiratory distress.      Breath sounds: No wheezing.   Abdominal:      Hernia: A hernia is present.   Musculoskeletal:      Right lower leg: No edema.      Left lower leg: No edema.   Skin:     General: Skin is warm and dry.   Neurological:      Mental Status: She is alert.   Psychiatric:         Cognition and Memory: Cognition is impaired.          Significant Labs: ABG: No results for input(s): "PH", "PCO2", "HCO3", "POCSATURATED", "BE" in the last 48 hours., CMP   Recent Labs   Lab 06/22/25  0627 06/23/25  0456 06/24/25  0122    134* 137   K 4.6 4.1 5.4*    96 98   CO2 25 24 27   * 183* 134*   BUN 29* 42* 39* " "  CREATININE 1.5* 1.9* 1.7*   CALCIUM 8.2* 8.3* 8.8   PROT 5.8* 5.5* 6.5   ALBUMIN 2.5* 2.4* 2.9*   BILITOT 0.8 0.6 0.6   ALKPHOS 35* 41 50   AST 19 20 24   ALT 14 13 16   ANIONGAP 13 14 12   , CBC   Recent Labs   Lab 06/22/25  0627 06/23/25  0456 06/24/25  0122   WBC 6.96 10.85 9.78   HGB 12.1 12.8 14.0   HCT 35.8* 37.8 40.7   * 172 205   , INR   Recent Labs   Lab 06/24/25  0122   INR 1.0   PROTIME 11.3   , Lipid Panel No results for input(s): "CHOL", "HDL", "LDLCALC", "TRIG", "CHOLHDL" in the last 48 hours., Troponin No results for input(s): "TROPONINIHS" in the last 48 hours., and All pertinent lab results from the last 24 hours have been reviewed.    Significant Imaging: Echocardiogram: Transthoracic echo (TTE) complete (Cupid Only):   Results for orders placed or performed during the hospital encounter of 06/18/25   Echo Saline Bubble? Yes   Result Value Ref Range    BSA 1.79 m2    Narrative      Left Ventricle: No echocardiographic evidence of VSD.    Left Atrium: Agitated saline study of the atrial septum is negative   after vasalva maneuver, suggesting absence of intracardiac shunt at the   atrial level.       "

## 2025-06-24 NOTE — CONSULTS
[facility]  Interventional Cardiology  Consult Note    Patient Name: Cady Vizcarra  MRN: [id number]  Admission Date: 2025  Hospital Length of Stay: 0 days  Code Status: Full Code   Attending Provider: Mita Matute MD  Consulting Provider: Jacqueline Campa MD  Primary Care Physician: Blaine Benítez MD  Principal Problem:<principal problem not specified>    Patient information was obtained from patient, relative(s), past medical records, and ER records.     Inpatient consult to Interventional Cardiology  Consult performed by: Jacqueline Campa MD  Consult ordered by: Kvng Charlton MD  Reason for consult: HFrEF, WMA        Subjective:     Chief Complaint:  Dyspnea     HPI:  Mrs. Vizcarra is a 74 year old woman with HTN, T2DM, and fibromyalgia who presented to the ED from her PMD office for symptomatic bradycardia and then transferred to [facility] [place] for [place]B.     Patient initially admitted to OSH on  after presenting with CHB. She reported approximately 3 weeks ago that she had an episode of LOC for 3 minutes at home while on her couch and since then has felt dyspneic and fatigued. Denies chest pain. On arrival to the ED patient had a ECG with CHB with ventricular escape in the 20s. She was hypertensive with SBP > 200 and became hypoxic with suspected flash pulmonary edema. She was placed on a nitroglycerin gtt and diuresed. A TVP was placed and she has remained dependent with underlying CHB since (threshold 1.5 this evening). On 25 she had a TTE revealing LVEF 20-25% with WMA's c/f either prior infarct or stress induced cardiomyopathy. Patient diuresed and oxygen requirements improved, however developed ISAIAS. Of note, patient's sister  the day before she presented to her PMD doctor and she has been under a lot of stress with depression after this event.    Today, she reports feeling well overall, though remains mildly dyspneic. Continues to deny chest pain. She is on 3L NC and  desaturates when it is reduced. ISAIAS has improved, though Cr still above baseline at 1.5. She is diuresing well on Lasix pushes.     No prior cardiac workup, including no prior angiogram or stress in the system.     TTE 25:    Left Ventricle: The left ventricle is normal in size. Normal wall thickness. Regional wall motion abnormalities present. There is severely reduced systolic function with a visually estimated ejection fraction of 15 - 20%. Grade I diastolic dysfunction. Elevated left ventricular filling pressure. No thrombus observed using contrast enhanced images. Hypokinesis of all segments except the bases suggests takastsubo CM pattern, but multivessel CAD cannot be exluded    Right Ventricle: The right ventricle is normal in size measuring 2.8 cm. Wall thickness is normal. Systolic function is normal.    Aortic Valve: There is mild aortic valve sclerosis. There is moderate annular calcification present.    Mitral Valve: There is mild regurgitation with a centrally directed jet.    Aorta: The aortic root is normal in size measuring 3.0 cm. The proximal ascending aorta is normal in size measuring 2.6 cm.    Pulmonary Artery: There is moderate pulmonary hypertension. The estimated pulmonary artery systolic pressure is 51 mmHg.    IVC/SVC: Elevated venous pressure at 15 mmHg.    Pericardium: There is no pericardial effusion.    Past Medical History:   Diagnosis Date    Diabetes mellitus, type 2     Fibromyalgia 2016    Hypertension 2016       Past Surgical History:   Procedure Laterality Date     SECTION      CHOLECYSTECTOMY      FOOT SURGERY Bilateral     plantar fasciotomy bilateral, arthroplasty fifth toe bilateral    HERNIA REPAIR      INSERTION, PACEMAKER, TEMPORARY TRANSVENOUS N/A 2025    Procedure: Insertion, Pacemaker, Temporary Transvenous;  Surgeon: Sean Ureña MD;  Location: [facility] CATH LAB/EP;  Service: Cardiology;  Laterality: N/A;       Review of patient's  allergies indicates:   Allergen Reactions    Advil [ibuprofen] Hives    Penicillins     Codeine     Excedrin aspirin free [acetaminophen-caffeine]        PTA Medications   Medication Sig    cyclobenzaprine (FLEXERIL) 10 MG tablet TAKE 1 TABLET BY MOUTH EVERY DAY NIGHTLY AS NEEDED FOR MUSCLE SPASMS    gabapentin (NEURONTIN) 300 MG capsule TAKE 1 CAPSULE BY MOUTH EVERYDAY AT BEDTIME    lisinopriL (PRINIVIL,ZESTRIL) 20 MG tablet TAKE 1 TABLET BY MOUTH EVERY DAY    metFORMIN (GLUCOPHAGE) 500 MG tablet TAKE 1 TABLET BY MOUTH TWICE A DAY WITH MEALS    metoprolol tartrate (LOPRESSOR) 50 MG tablet TAKE 1 TABLET BY MOUTH TWICE A DAY    oxyCODONE-acetaminophen (PERCOCET)  mg per tablet Take 1 tablet by mouth nightly as needed for Pain.    rosuvastatin (CRESTOR) 5 MG tablet Take 1 tablet (5 mg total) by mouth once daily.     Family History       Problem Relation (Age of Onset)    Diabetes Father    Heart disease Sister, Brother          Tobacco Use    Smoking status: Never    Smokeless tobacco: Never   Substance and Sexual Activity    Alcohol use: Yes     Comment: Occasionally    Drug use: Not on file    Sexual activity: Yes     Review of Systems   Cardiovascular:  Positive for dyspnea on exertion, leg swelling and orthopnea. Negative for chest pain, irregular heartbeat, palpitations and syncope.   Respiratory:  Positive for shortness of breath.    Gastrointestinal:  Negative for abdominal pain, nausea and vomiting.     Objective:     Vital Signs (Most Recent):  Temp: 98.3 °F (36.8 °C) (06/24/25 1205)  Pulse: (!) 50 (06/24/25 1300)  Resp: (!) 23 (06/24/25 1300)  BP: (!) 171/77 (06/24/25 1300)  SpO2: 95 % (06/24/25 1300) Vital Signs (24h Range):  Temp:  [97.7 °F (36.5 °C)-98.3 °F (36.8 °C)] 98.3 °F (36.8 °C)  Pulse:  [49-60] 50  Resp:  [10-23] 23  SpO2:  [88 %-97 %] 95 %  BP: (138-189)/(55-96) 171/77     Weight: 67.1 kg (148 lb)  Body mass index is 27.07 kg/m².    SpO2: 95 %         Intake/Output Summary (Last 24 hours)  at 6/24/2025 1338  Last data filed at 6/24/2025 1301  Gross per 24 hour   Intake 226.27 ml   Output 1600 ml   Net -1373.73 ml       Lines/Drains/Airways       Central Venous Catheter Line  Duration             Introducer 06/18/25 Internal Jugular Right 6 days              Drain  Duration             Female External Urinary Catheter w/ Suction 06/24/25 0120 <1 day              Line  Duration                  Pacer Wires 06/18/25 0750 6 days              Peripheral Intravenous Line  Duration             Peripheral IV Single Lumen 06/22/25 0657 22 G Anterior;Right Forearm 2 days    Peripheral IV Single Lumen 06/23/25 1312 20 G Posterior;Right Hand 1 day                     Physical Exam  Constitutional:       Appearance: Normal appearance.   HENT:      Mouth/Throat:      Mouth: Mucous membranes are moist.   Eyes:      Extraocular Movements: Extraocular movements intact.      Conjunctiva/sclera: Conjunctivae normal.   Cardiovascular:      Rate and Rhythm: Normal rate and regular rhythm.   Pulmonary:      Effort: Pulmonary effort is normal.   Abdominal:      General: Abdomen is flat.      Palpations: Abdomen is soft.   Musculoskeletal:      Right lower leg: No edema.      Left lower leg: No edema.   Skin:     General: Skin is warm and dry.   Neurological:      General: No focal deficit present.      Mental Status: She is alert and oriented to person, place, and time.            Significant Labs: All pertinent lab results from the last 24 hours have been reviewed.    Significant Imaging: Reviewed  Assessment and Plan:     Cardiac/Vascular  Acute systolic heart failure  74 year old woman with HTN, T2DM, and fibromyalgia who presented to the ED from her PMD office for symptomatic bradycardia and then transferred to [facility] [place] for CHB.     Patient with no prior known cardiac disease. Though now with newly diagnosed HFrEF of 10-15% as well as complete heart block. Echo notable for WMA in the apex specifically, concerning for infarct  vs stress induced CM. EP consulted and planning for PPM implantation +/- ICD. Angiogram indicated, though she remains volume up with ISAIAS and oxygen requirement.     Recommendations:   - Plan for angiogram +/- PCI this admission, though limited by renal function and oxygen requirement   - Will reassess tomorrow AM; if Cr improved, will take for angiogram  - Please make NPO MN      Thank you for your consult. I will follow-up with patient. Please contact us if you have any additional questions.    Jacqueline Campa MD  Interventional Cardiology   [facility]

## 2025-06-24 NOTE — H&P
[facility]  Cardiology  History and Physical     Patient Name: Cady Vizcarra  MRN: [id number]  Admission Date: 2025  Code Status: Full Code   Attending Provider: Mita Matute MD   Primary Care Physician: Blaine Benítez MD  Principal Problem:<principal problem not specified>    Patient information was obtained from patient, past medical records, and ER records.     Subjective:     Chief Complaint:  Bradycardia     HPI:  Mrs. Vizcarra is a 74 year old woman with 2DM, fibromyalgia, and HTN who presented to the ED from her PMD office for symptomatic bradycardia and was transferred to [facility] for CHB.     Patient initially admitted on  at OSH after presenting with CHB on ECG. She reported approximately 3 weeks ago that she had an episode of LOC for 3 minutes at home while on her couch and since then has felt SOB and fatigued. Denies having had chest pain episodes. On arrival to the ED patient had a ECG with CHB with ventricular escape in the 20's. She was hypertensive with SBP > 200 and became hypoxic with suspected flash pulmonary edema. She was placed on a nitroglycerin gtt and diuresed. A TVP was placed and she has remained dependent with underlying CHB since (threshold 1.5 this evening). 25 she had a TTE revealing LVEF 20-25% with WMA's c/f either prior infarct or stress induced cardiomyopathy. Patient diuresed and oxygen requirements improved, however developed ISAIAS.    On arrival to [facility] patient paced at 60, hypertensive, and on NC. No complaints aside from back pain at this time.     Past Medical History:   Diagnosis Date    Diabetes mellitus, type 2     Fibromyalgia 2016    Hypertension 2016       Past Surgical History:   Procedure Laterality Date     SECTION      CHOLECYSTECTOMY      FOOT SURGERY Bilateral     plantar fasciotomy bilateral, arthroplasty fifth toe bilateral    HERNIA REPAIR      INSERTION, PACEMAKER,  TEMPORARY TRANSVENOUS N/A 6/18/2025    Procedure: Insertion, Pacemaker, Temporary Transvenous;  Surgeon: Sean Ureña MD;  Location: [facility] CATH LAB/EP;  Service: Cardiology;  Laterality: N/A;       Review of patient's allergies indicates:   Allergen Reactions    Advil [ibuprofen] Hives    Penicillins     Codeine     Excedrin aspirin free [acetaminophen-caffeine]        Current Facility-Administered Medications on File Prior to Encounter   Medication    [COMPLETED] lactated ringers bolus 250 mL    [DISCONTINUED] 0.9% NaCl infusion    [DISCONTINUED] acetaminophen tablet 1,000 mg    [DISCONTINUED] acetaminophen tablet 650 mg    [DISCONTINUED] aspirin chewable tablet 81 mg    [DISCONTINUED] atorvastatin tablet 20 mg    [DISCONTINUED] dexmedetomidine (PRECEDEX) 400mcg/100mL 0.9% NaCL infusion    [DISCONTINUED] dextrose 50% injection 12.5 g    [DISCONTINUED] dextrose 50% injection 12.5 g    [DISCONTINUED] dextrose 50% injection 25 g    [DISCONTINUED] enoxaparin injection 30 mg    [DISCONTINUED] enoxaparin injection 40 mg    [DISCONTINUED] furosemide tablet 80 mg    [DISCONTINUED] gabapentin capsule 300 mg    [DISCONTINUED] glucagon (human recombinant) injection 1 mg    [DISCONTINUED] glucagon (human recombinant) injection 1 mg    [DISCONTINUED] glucose chewable tablet 16 g    [DISCONTINUED] glucose chewable tablet 24 g    [DISCONTINUED] heparin infusion 1,000 units/500 ml in 0.9% NaCl (pressure line flush)    [DISCONTINUED] insulin aspart U-100 pen 0-10 Units    [DISCONTINUED] insulin glargine U-100 (Lantus) pen 5 Units    [DISCONTINUED] insulin glargine U-100 (Lantus) pen 8 Units    [DISCONTINUED] LIDOcaine 5 % patch 1 patch    [DISCONTINUED] losartan tablet 100 mg    [DISCONTINUED] mupirocin 2 % ointment    [DISCONTINUED] naloxone 0.4 mg/mL injection 0.02 mg    [DISCONTINUED] NORepinephrine 4 mg in dextrose 5% 250 mL infusion (premix)    [COMPLETED] OLANZapine injection 2.5 mg    [DISCONTINUED] OLANZapine injection  5 mg    [DISCONTINUED] polyethylene glycol packet 17 g    [DISCONTINUED] sodium chloride 0.9% flush 2 mL    [DISCONTINUED] spironolactone tablet 25 mg     Current Outpatient Medications on File Prior to Encounter   Medication Sig    cyclobenzaprine (FLEXERIL) 10 MG tablet TAKE 1 TABLET BY MOUTH EVERY DAY NIGHTLY AS NEEDED FOR MUSCLE SPASMS    gabapentin (NEURONTIN) 300 MG capsule TAKE 1 CAPSULE BY MOUTH EVERYDAY AT BEDTIME    lisinopriL (PRINIVIL,ZESTRIL) 20 MG tablet TAKE 1 TABLET BY MOUTH EVERY DAY    meclizine (ANTIVERT) 12.5 mg tablet TAKE 1 TABLET BY MOUTH 2 (TWO) TIMES DAILY AS NEEDED FOR DIZZINESS.    metFORMIN (GLUCOPHAGE) 500 MG tablet TAKE 1 TABLET BY MOUTH TWICE A DAY WITH MEALS    metoprolol tartrate (LOPRESSOR) 50 MG tablet TAKE 1 TABLET BY MOUTH TWICE A DAY    oxyCODONE-acetaminophen (PERCOCET)  mg per tablet Take 1 tablet by mouth nightly as needed for Pain.    rosuvastatin (CRESTOR) 5 MG tablet Take 1 tablet (5 mg total) by mouth once daily.     Family History       Problem Relation (Age of Onset)    Diabetes Father    Heart disease Sister, Brother          Tobacco Use    Smoking status: Never    Smokeless tobacco: Never   Substance and Sexual Activity    Alcohol use: Yes     Comment: Occasionally    Drug use: Not on file    Sexual activity: Yes     Review of Systems   Constitutional: Positive for malaise/fatigue. Negative for chills and fever.   Cardiovascular:  Positive for dyspnea on exertion, leg swelling and syncope. Negative for chest pain, irregular heartbeat and palpitations.   Respiratory:  Positive for shortness of breath.    Musculoskeletal:  Positive for back pain and stiffness.   Genitourinary: Negative.      Objective:     Vital Signs (Most Recent):  Temp: 98 °F (36.7 °C) (06/24/25 0100) Vital Signs (24h Range):  Temp:  [97.2 °F (36.2 °C)-98.1 °F (36.7 °C)] 98 °F (36.7 °C)  Pulse:  [59-60] 60  Resp:  [7-29] 23  SpO2:  [89 %-100 %] 92 %  BP: ()/(42-96) 160/75     Weight: 67.1  "kg (147 lb 14.9 oz)  Body mass index is 27.06 kg/m².            No intake or output data in the 24 hours ending 06/24/25 0232    Lines/Drains/Airways       Central Venous Catheter Line  Duration             Introducer 06/18/25 Internal Jugular Right 6 days              Drain  Duration             Female External Urinary Catheter w/ Suction 06/24/25 0120 <1 day              Line  Duration                  Pacer Wires 06/18/25 0750 5 days              Peripheral Intravenous Line  Duration             Peripheral IV Single Lumen 06/22/25 0657 22 G Anterior;Right Forearm 1 day    Peripheral IV Single Lumen 06/23/25 1312 20 G Posterior;Right Hand <1 day                     Physical Exam  Vitals reviewed.   Constitutional:       General: She is not in acute distress.  Eyes:      General: No scleral icterus.  Cardiovascular:      Rate and Rhythm: Normal rate and regular rhythm.   Pulmonary:      Effort: Pulmonary effort is normal. No respiratory distress.      Breath sounds: No wheezing.   Abdominal:      Hernia: A hernia is present.   Musculoskeletal:      Right lower leg: No edema.      Left lower leg: No edema.   Skin:     General: Skin is warm and dry.   Neurological:      Mental Status: She is alert.   Psychiatric:         Cognition and Memory: Cognition is impaired.          Significant Labs: ABG: No results for input(s): "PH", "PCO2", "HCO3", "POCSATURATED", "BE" in the last 48 hours., CMP   Recent Labs   Lab 06/22/25  0627 06/23/25  0456 06/24/25  0122    134* 137   K 4.6 4.1 5.4*    96 98   CO2 25 24 27   * 183* 134*   BUN 29* 42* 39*   CREATININE 1.5* 1.9* 1.7*   CALCIUM 8.2* 8.3* 8.8   PROT 5.8* 5.5* 6.5   ALBUMIN 2.5* 2.4* 2.9*   BILITOT 0.8 0.6 0.6   ALKPHOS 35* 41 50   AST 19 20 24   ALT 14 13 16   ANIONGAP 13 14 12   , CBC   Recent Labs   Lab 06/22/25  0627 06/23/25  0456 06/24/25  0122   WBC 6.96 10.85 9.78   HGB 12.1 12.8 14.0   HCT 35.8* 37.8 40.7   * 172 205   , INR   Recent Labs " "  Lab 06/24/25  0122   INR 1.0   PROTIME 11.3   , Lipid Panel No results for input(s): "CHOL", "HDL", "LDLCALC", "TRIG", "CHOLHDL" in the last 48 hours., Troponin No results for input(s): "TROPONINIHS" in the last 48 hours., and All pertinent lab results from the last 24 hours have been reviewed.    Significant Imaging: Echocardiogram: Transthoracic echo (TTE) complete (Cupid Only):   Results for orders placed or performed during the hospital encounter of 06/18/25   Echo Saline Bubble? Yes   Result Value Ref Range    BSA 1.79 m2    Narrative      Left Ventricle: No echocardiographic evidence of VSD.    Left Atrium: Agitated saline study of the atrial septum is negative   after vasalva maneuver, suggesting absence of intracardiac shunt at the   atrial level.       Assessment and Plan:     ISAIAS (acute kidney injury)  ISAIAS is likely due to pre-renal azotemia due to intravascular volume depletion. Baseline creatinine is 1.0. Most recent creatinine and eGFR are listed below.  Recent Labs     06/22/25  0627 06/23/25  0456 06/24/25  0122   CREATININE 1.5* 1.9* 1.7*   EGFRNORACEVR 37* 28* 32*      Plan  - ISAIAS is improving  - Avoid nephrotoxins and renally dose meds for GFR listed above  - Monitor urine output, serial BMP, and adjust therapy as needed    Acute systolic heart failure  Patient found to have new systolic heart failure on TTE. She additionally has wall motion abnormalities that could represent old infarct or stress induced cardiomyopathy.    Plan:  - telemetry  - strict I/O's, daily weights  - hold diuresis at this time given ISAIAS and clinically appears hypovolemic  - hold BB, ARB/ARNI, MRA, and SGLt2i at this time but will plan for initiation prior to discharge  - will need ischemic workup     Acute hypoxemic respiratory failure  Patient with Hypoxic Respiratory failure which is Acute.  she is not on home oxygen. Supplemental oxygen was provided and noted-      .   Signs/symptoms of respiratory failure include- " tachypnea and increased work of breathing. Contributing diagnoses includes - flash pulmonary edema Labs and images were reviewed. Patient Has not had a recent ABG. Will treat underlying causes and adjust management of respiratory failure as follows- volume optimization and BP control    Complete heart block  Patient transferred for CHB. Remains in CHB and TVP dependent.    Plan:  - telemetry  - maintain TVP in place, daily threshold/rhythm check  - K > 4, Mg > 2  - holding AV manjit blocking agents  - EP consulted  - NPO     Type 2 diabetes with neuropathy  - SSI  - carb controlled diet when diet resumed    Hypertension  Patient's blood pressure range in the last 24 hours was: BP  Min: 66/44  Max: 186/91.The patient's inpatient anti-hypertensive regimen is listed below:  Current Antihypertensives  hydrALAZINE tablet 10 mg, Every 8 hours, Oral    Plan  - BP is uncontrolled, will adjust as follows: starting hydral  - Will add on GDMT when renal function improves    High cholesterol  - continue statin          VTE Risk Mitigation (From admission, onward)      None            Brayden Hoffmann MD  Cardiology   [facility]

## 2025-06-24 NOTE — PLAN OF CARE
CICU DAILY GOALS       A: Awake    RASS: Goal -  0 - (-1)  Actual - RASS (Johnson Agitation-Sedation Scale): agitated  P: PAIN   Patient refuses to take tylenol for back pain, lidocaine patch placed to RIGHT chest.  D: Delirium   CAM-ICU:  POSITIVE.  E: Early(intubated/ Progressive (non-intubated) Mobility   MOVE Screen: Pass   Activity: Activity Management: Rolling - L1  FAS: Feeding/Nutrition   Diet order: Diet/Nutrition Received: consistent carb/diabetic diet, low saturated fat/low cholesterol, 2 gram sodium,   Fluid restriction:    T: Thrombus   DVT prophylaxis: VTE Core Measure: Pharmacological prophylaxis initiated/maintained  H: HOB Elevation   Head of Bed (HOB) Positioning: HOB at 30-45 degrees  U: Ulcer Prophylaxis   GI: yes  G: Glucose control   managed Glycemic Management: blood glucose monitored  S: Skin   Bundle compliance: yes   Bathing/Skin Care: bath, complete, dressed/undressed, incontinence care, linen changed Date: 06/24/2025 AM BATH.  B: Bowel Function   2 Bowel movements this shift.   I: Indwelling Catheters   Purewick present.   CVC necessity: Yes  D: De-escalation Antibx   None ordered.  Plan for the day   Trend BMPs, glucose checks, pain control/comfort.    Family/Goals of care/Code Status   Code Status: Full Code     No acute events throughout day, VS and assessment per flow sheet, patient progressing towards goals as tolerated, plan of care reviewed with Cady Watkins and family, all concerns addressed, will continue to monitor.

## 2025-06-24 NOTE — DISCHARGE SUMMARY
[facility] Hospital Medicine Discharge/Transfer Summary    Primary Team: [facility] Hospitalist Team B  Attending Physician: Olegario Walters MD  Resident: Blair  Intern: Merry    Date of Admit: 6/18/2025  Date of Discharge: 6/23/2025    Transfer to: [facility]  Condition: Guarded    Discharge Diagnoses     Problem List[1]    Consultants and Procedures     Consultants:  Cardiology  Nephrology  Critical Care  Urology    Procedures:   Insertion of Transvenous Pacemaker    Imaging:  CT Renal Stone Study Abd Pelvis WO   Final Result      There is a 3 mm nonobstructing calculus seen within the inferior pole of the right kidney.      There is no evidence bladder mass on this non contrasted study however the sensitivity is limited by the lack of contrast in the bladder.  Contrast installation into the bladder may provide greater sensitivity for small bladder masses or possibly cystoscopy if there is high clinical concern for bladder mass.      There is a large anterior abdominal wall hernia containing portions of small and large bowel without evidence obstruction.      There are bilateral pleural effusions with associated compressive atelectasis.         Electronically signed by: Renetta Acosta MD   Date:    06/23/2025   Time:    09:39      US Lower Extremity Veins Bilateral   Final Result      No evidence of deep venous thrombosis in either lower extremity.         Electronically signed by: Kevin Borrero MD   Date:    06/22/2025   Time:    16:51      US Retroperitoneal Complete   Final Result      Small nonobstructing right renal stone.      No hydronephrosis.      3.0 cm lesion in the dependent bladder, concerning for blood products or neoplasm.  Correlate with urinalysis and consider cystoscopy.         Electronically signed by: Zion Malone   Date:    06/22/2025   Time:    11:53      X-Ray Chest AP Portable   Final Result      As above.         Electronically signed by: Michael Fields   Date:    06/22/2025  "  Time:    10:35      X-Ray Chest AP Portable   Final Result      As above         Electronically signed by: Michael Fields   Date:    06/20/2025   Time:    10:01      X-Ray Chest AP Portable   Final Result      As above.         Electronically signed by: Michael Fields   Date:    06/19/2025   Time:    09:12      X-Ray Chest AP Portable   Final Result      As above.         Electronically signed by: Michael Fields   Date:    06/19/2025   Time:    09:31      X-Ray Chest AP Portable    (Results Pending)     Brief History of Present Illness      Cady Watkins is a 74 y.o.  female who  has a past medical history of Diabetes mellitus, type 2, Fibromyalgia, and Hypertension. The patient presented to [facility] on 6/18/2025 with a primary complaint of Bradycardia. The patient was referred to ED by PCP for bradycardia with HR in the 30's. Patient also reports fatigue w/ intermittent SOB for the last 2-3 weeks after a "Blacking Out" spell.     For the full HPI please refer to the History & Physical from this admission.    Hospital Course By Problem with Pertinent Findings     Complete Heart Block  Syncope  Heart Failure with Reduced Ejection Fraction  Severe Pulmonary HTN.  - Cardiology consulted in the ED.  - Cardiology placed Temporary Pacemaker.   - Patient transferring to [facility] for insertion of permanent PPM/AICD.   - Discontinue AV blocking agents including home metoprolol.  - TSH WNL.  - 2D Echocardiogram with severally reduced EF 20-25%. Bubble study negative. Repeat next week.  - Losartan initially started but holding due to hypotension.     Hypotension  - Likely secondary to precedex drip.  - patient required intermittent use of levo to maintain pressures.    - Holding anti-hypertensives and diuretics today.     Agiation  - Continue precedex as needed.  - Patient not on any anti-psychotics per family.      Bladder Mass  - Bladder US with 3.0 cm mass like lesion in right dependent " bladder.  - Consulted Urology for further evaluation.  - Urology ordered CT Renal Stone Study unable to visualize bladder mass.  - Urology wanting a CTU, will order either outpatient or after pacemaker placement.  - Will need follow up with urology.     Hypertensive Emergency, improved  Flash Pulmonary Edema, resolved  Anxiety  - Patient hypertensive with SBP in the 230s. MAP goal of 120 within 2 hours of initiation of drip.   - Started on Nitroglycerine drip per cardiology recommendations.   - Strict In and out put. BNP 1200 on arrival. Given 40 mg of IV lasix followed by 60 mg of IV Lasix.   - Patient received one dose of 0.5 mg ativan due to agitation and tachypnea.  - did not tolerate Ativan. Attempting Zyprexa IM x 2 times. If anxiety and agitation does not improve will intubate.  - Will do Propofol and Fentanyl for sedation per cardiology.      Diabetes  Diabetic neuropathy  - Holding Metformin  - SSI while in patient   - Last A1c 6.4 ~ 3 months ago     ISAIAS, returned  - CR 1.2 on admit, baseline 0.9   - CR 1.5 likely secondary to diureses.   - Urine studies consistent with pre-renal  - Nephrology Consulted  - Renal US showing normal Kidneys.      HLD  - on crestor 5mg at home  - lisinopril 20 while admitted  - LDL 76, HDL 34, Total 138 ~ 3 months ago     Folate Deficiency  Vitamin B12 Deficiency, resolved  Macrocytosis  - Should be on weekly B12 injections  - MCV continues to be high.  - B12 above 2000. Folate on lower side of normal. Will recommend replacement outpatient.    Discharge Medications        Medication List        ASK your doctor about these medications      cyclobenzaprine 10 MG tablet  Commonly known as: FLEXERIL  TAKE 1 TABLET BY MOUTH EVERY DAY NIGHTLY AS NEEDED FOR MUSCLE SPASMS     gabapentin 300 MG capsule  Commonly known as: NEURONTIN  TAKE 1 CAPSULE BY MOUTH EVERYDAY AT BEDTIME     lisinopriL 20 MG tablet  Commonly known as: PRINIVIL,ZESTRIL  TAKE 1 TABLET BY MOUTH EVERY DAY     meclizine  12.5 mg tablet  Commonly known as: ANTIVERT  TAKE 1 TABLET BY MOUTH 2 (TWO) TIMES DAILY AS NEEDED FOR DIZZINESS.     metFORMIN 500 MG tablet  Commonly known as: GLUCOPHAGE  TAKE 1 TABLET BY MOUTH TWICE A DAY WITH MEALS     metoprolol tartrate 50 MG tablet  Commonly known as: LOPRESSOR  TAKE 1 TABLET BY MOUTH TWICE A DAY     oxyCODONE-acetaminophen  mg per tablet  Commonly known as: PERCOCET  Take 1 tablet by mouth nightly as needed for Pain.     rosuvastatin 5 MG tablet  Commonly known as: CRESTOR  Take 1 tablet (5 mg total) by mouth once daily.              Discharge Information:   Diet:  Cardiac, NPO at MN for procedure     Physical Activity:  Bed Rest until PPM/AICD placement.             Instructions:  1. Take all medications as prescribed  2. Keep all follow-up appointments  3. Return to the hospital or call your primary care physicians if any worsening symptoms such as fever, chest pain, shortness of breath, return of symptoms, or any other concerns.    Follow-Up Appointments:  Need follow up with:  Cardiology  EP  Urology  PCP  [facility]    Nalini Pinto MD  [facility], -2       [1]   Patient Active Problem List  Diagnosis    Obesity    Kidney stone    Fibromyalgia    High cholesterol    Hypertension    Insomnia    Migraine    Spasm    Type 2 diabetes with neuropathy    Ventral hernia    Diabetic nephropathy    Opioid dependence    Colon cancer screening declined    Mammogram declined    Complete heart block    Syncope    Acute pulmonary edema    Acute hypoxemic respiratory failure    Hypertensive emergency    Acute systolic heart failure    Bladder mass    ISAIAS (acute kidney injury)

## 2025-06-24 NOTE — SUBJECTIVE & OBJECTIVE
Past Medical History:   Diagnosis Date    Diabetes mellitus, type 2     Fibromyalgia 2016    Hypertension 2016       Past Surgical History:   Procedure Laterality Date     SECTION      CHOLECYSTECTOMY      FOOT SURGERY Bilateral     plantar fasciotomy bilateral, arthroplasty fifth toe bilateral    HERNIA REPAIR      INSERTION, PACEMAKER, TEMPORARY TRANSVENOUS N/A 2025    Procedure: Insertion, Pacemaker, Temporary Transvenous;  Surgeon: Sean Ureña MD;  Location: [facility] CATH LAB/EP;  Service: Cardiology;  Laterality: N/A;       Review of patient's allergies indicates:   Allergen Reactions    Advil [ibuprofen] Hives    Penicillins     Codeine     Excedrin aspirin free [acetaminophen-caffeine]        PTA Medications   Medication Sig    cyclobenzaprine (FLEXERIL) 10 MG tablet TAKE 1 TABLET BY MOUTH EVERY DAY NIGHTLY AS NEEDED FOR MUSCLE SPASMS    gabapentin (NEURONTIN) 300 MG capsule TAKE 1 CAPSULE BY MOUTH EVERYDAY AT BEDTIME    lisinopriL (PRINIVIL,ZESTRIL) 20 MG tablet TAKE 1 TABLET BY MOUTH EVERY DAY    metFORMIN (GLUCOPHAGE) 500 MG tablet TAKE 1 TABLET BY MOUTH TWICE A DAY WITH MEALS    metoprolol tartrate (LOPRESSOR) 50 MG tablet TAKE 1 TABLET BY MOUTH TWICE A DAY    oxyCODONE-acetaminophen (PERCOCET)  mg per tablet Take 1 tablet by mouth nightly as needed for Pain.    rosuvastatin (CRESTOR) 5 MG tablet Take 1 tablet (5 mg total) by mouth once daily.     Family History       Problem Relation (Age of Onset)    Diabetes Father    Heart disease Sister, Brother          Tobacco Use    Smoking status: Never    Smokeless tobacco: Never   Substance and Sexual Activity    Alcohol use: Yes     Comment: Occasionally    Drug use: Not on file    Sexual activity: Yes     Review of Systems   Cardiovascular:  Positive for dyspnea on exertion, leg swelling and orthopnea. Negative for chest pain, irregular heartbeat, palpitations and syncope.   Respiratory:  Positive for shortness of breath.     Gastrointestinal:  Negative for abdominal pain, nausea and vomiting.     Objective:     Vital Signs (Most Recent):  Temp: 98.3 °F (36.8 °C) (06/24/25 1205)  Pulse: (!) 50 (06/24/25 1300)  Resp: (!) 23 (06/24/25 1300)  BP: (!) 171/77 (06/24/25 1300)  SpO2: 95 % (06/24/25 1300) Vital Signs (24h Range):  Temp:  [97.7 °F (36.5 °C)-98.3 °F (36.8 °C)] 98.3 °F (36.8 °C)  Pulse:  [49-60] 50  Resp:  [10-23] 23  SpO2:  [88 %-97 %] 95 %  BP: (138-189)/(55-96) 171/77     Weight: 67.1 kg (148 lb)  Body mass index is 27.07 kg/m².    SpO2: 95 %         Intake/Output Summary (Last 24 hours) at 6/24/2025 1338  Last data filed at 6/24/2025 1301  Gross per 24 hour   Intake 226.27 ml   Output 1600 ml   Net -1373.73 ml       Lines/Drains/Airways       Central Venous Catheter Line  Duration             Introducer 06/18/25 Internal Jugular Right 6 days              Drain  Duration             Female External Urinary Catheter w/ Suction 06/24/25 0120 <1 day              Line  Duration                  Pacer Wires 06/18/25 0750 6 days              Peripheral Intravenous Line  Duration             Peripheral IV Single Lumen 06/22/25 0657 22 G Anterior;Right Forearm 2 days    Peripheral IV Single Lumen 06/23/25 1312 20 G Posterior;Right Hand 1 day                     Physical Exam  Constitutional:       Appearance: Normal appearance.   HENT:      Mouth/Throat:      Mouth: Mucous membranes are moist.   Eyes:      Extraocular Movements: Extraocular movements intact.      Conjunctiva/sclera: Conjunctivae normal.   Cardiovascular:      Rate and Rhythm: Normal rate and regular rhythm.   Pulmonary:      Effort: Pulmonary effort is normal.   Abdominal:      General: Abdomen is flat.      Palpations: Abdomen is soft.   Musculoskeletal:      Right lower leg: No edema.      Left lower leg: No edema.   Skin:     General: Skin is warm and dry.   Neurological:      General: No focal deficit present.      Mental Status: She is alert and oriented to  person, place, and time.            Significant Labs: All pertinent lab results from the last 24 hours have been reviewed.    Significant Imaging: Reviewed

## 2025-06-24 NOTE — ASSESSMENT & PLAN NOTE
Patient with new onset CHB. She had 2 syncopal episodes before presentation to the ED and has been having multiple pre-syncopal episodes with associated SOB and weakness. Of note TTE done was found to have newly reduced EF of 20% with WMA (anterior and apical). Troponin x 1 was negative and patient has not been complaining about chest pain. Given her new diagnosis of HFrEF, patient needs ischemic work up before proceeding with any pacemaker device. She would likely need a dual-chamber with LBBPA lead.     Recommendations:   - Please perform ischemic work up. Can consult IC for coronary angiogram   - Keep TVP in place, - threshold 1.5  - No heparin products the day before PM insertion   - Keep K > 4 and Mag > 2  - After ischemic work up will plan for PPM insertion

## 2025-06-24 NOTE — Clinical Note
The ECG showed paced rhythm and sinus bradycardia. The patient has a temporary pacemaker. The pacemaker rate was 50 bpm.

## 2025-06-24 NOTE — PLAN OF CARE
CICU DAILY GOALS     TVP rate changed to 50 mA 10  K 5.4 10 mg lokelma given recheck 4.7  4/10 chest pain, MD aware EKG done, Hydralazine and tylenol given    A: Awake    RASS: Goal -    Actual - RASS (Johnson Agitation-Sedation Scale): alert and calm   Restraint necessity:    B: Breath   SBT: Not intubated   C: Coordinate A & B, analgesics/sedatives   Pain: managed    SAT: Not intubated  D: Delirium   CAM-ICU:    E: Early(intubated/ Progressive (non-intubated) Mobility   MOVE Screen:     Activity: Activity Management: Arm raise - L1, Rolling - L1  FAS: Feeding/Nutrition   Diet order: Diet/Nutrition Received: NPO,   Fluid restriction:     Nutritional Supplement Intake: Quantity , Type:   T: Thrombus   DVT prophylaxis: VTE Core Measure: Pharmacological prophylaxis initiated/maintained  H: HOB Elevation   Head of Bed (HOB) Positioning: HOB at 30-45 degrees  U: Ulcer Prophylaxis   GI: yes  G: Glucose control   managed Glycemic Management: blood glucose monitored  S: Skin   Bathing/Skin Care: bath, complete;dressed/undressed;incontinence care;linen changed (06/24/25 0101)  Wounds: No  Wound care consulted: No  B: Bowel Function   no issues   I: Indwelling Catheters   Wallis necessity:     CVC necessity: Yes  D: De-escalation Antibx   No  Plan for the day   EP consult for PPM  Family/Goals of care/Code Status   Code Status: Full Code     No acute events throughout day, VS and assessment per flow sheet, patient progressing towards goals as tolerated, plan of care reviewed with Cady Watkins and family, all concerns addressed, will continue to monitor.    Problem: Diabetes Comorbidity  Goal: Blood Glucose Level Within Targeted Range  Outcome: Progressing     Problem: Acute Kidney Injury/Impairment  Goal: Fluid and Electrolyte Balance  Outcome: Progressing     Problem: Skin Injury Risk Increased  Goal: Skin Health and Integrity  Outcome: Progressing

## 2025-06-25 ENCOUNTER — TELEPHONE (OUTPATIENT)
Dept: ADMINISTRATIVE | Facility: CLINIC | Age: 75
End: 2025-06-25
Payer: MEDICARE

## 2025-06-25 ENCOUNTER — ANESTHESIA EVENT (OUTPATIENT)
Dept: MEDSURG UNIT | Facility: HOSPITAL | Age: 75
End: 2025-06-25
Payer: MEDICARE

## 2025-06-25 PROBLEM — R41.0 DELIRIUM: Status: ACTIVE | Noted: 2025-06-25

## 2025-06-25 LAB
ABSOLUTE EOSINOPHIL (OHS): 0.25 K/UL
ABSOLUTE MONOCYTE (OHS): 0.71 K/UL (ref 0.3–1)
ABSOLUTE NEUTROPHIL COUNT (OHS): 3.81 K/UL (ref 1.8–7.7)
ALBUMIN SERPL BCP-MCNC: 2.6 G/DL (ref 3.5–5.2)
ALBUMIN SERPL BCP-MCNC: 2.8 G/DL (ref 3.5–5.2)
ALP SERPL-CCNC: 41 UNIT/L (ref 40–150)
ALP SERPL-CCNC: 42 UNIT/L (ref 40–150)
ALT SERPL W/O P-5'-P-CCNC: 16 UNIT/L (ref 10–44)
ALT SERPL W/O P-5'-P-CCNC: 17 UNIT/L (ref 10–44)
ANION GAP (OHS): 13 MMOL/L (ref 8–16)
ANION GAP (OHS): 14 MMOL/L (ref 8–16)
AST SERPL-CCNC: 28 UNIT/L (ref 11–45)
AST SERPL-CCNC: 29 UNIT/L (ref 11–45)
BASOPHILS # BLD AUTO: 0.06 K/UL
BASOPHILS NFR BLD AUTO: 0.9 %
BILIRUB SERPL-MCNC: 0.7 MG/DL (ref 0.1–1)
BILIRUB SERPL-MCNC: 0.7 MG/DL (ref 0.1–1)
BUN SERPL-MCNC: 29 MG/DL (ref 8–23)
BUN SERPL-MCNC: 33 MG/DL (ref 8–23)
CALCIUM SERPL-MCNC: 8.3 MG/DL (ref 8.7–10.5)
CALCIUM SERPL-MCNC: 8.4 MG/DL (ref 8.7–10.5)
CHLORIDE SERPL-SCNC: 100 MMOL/L (ref 95–110)
CHLORIDE SERPL-SCNC: 102 MMOL/L (ref 95–110)
CO2 SERPL-SCNC: 27 MMOL/L (ref 23–29)
CO2 SERPL-SCNC: 27 MMOL/L (ref 23–29)
CREAT SERPL-MCNC: 1.2 MG/DL (ref 0.5–1.4)
CREAT SERPL-MCNC: 1.3 MG/DL (ref 0.5–1.4)
ERYTHROCYTE [DISTWIDTH] IN BLOOD BY AUTOMATED COUNT: 13.7 % (ref 11.5–14.5)
GFR SERPLBLD CREATININE-BSD FMLA CKD-EPI: 44 ML/MIN/1.73/M2
GFR SERPLBLD CREATININE-BSD FMLA CKD-EPI: 48 ML/MIN/1.73/M2
GLUCOSE SERPL-MCNC: 136 MG/DL (ref 70–110)
GLUCOSE SERPL-MCNC: 147 MG/DL (ref 70–110)
HCT VFR BLD AUTO: 35.9 % (ref 37–48.5)
HGB BLD-MCNC: 12.1 GM/DL (ref 12–16)
IMM GRANULOCYTES # BLD AUTO: 0.03 K/UL (ref 0–0.04)
IMM GRANULOCYTES NFR BLD AUTO: 0.4 % (ref 0–0.5)
LYMPHOCYTES # BLD AUTO: 1.84 K/UL (ref 1–4.8)
MAGNESIUM SERPL-MCNC: 1.8 MG/DL (ref 1.6–2.6)
MCH RBC QN AUTO: 32.4 PG (ref 27–31)
MCHC RBC AUTO-ENTMCNC: 33.7 G/DL (ref 32–36)
MCV RBC AUTO: 96 FL (ref 82–98)
NT-PROBNP SERPL IA-MCNC: ABNORMAL PG/ML
NUCLEATED RBC (/100WBC) (OHS): 0 /100 WBC
PHOSPHATE SERPL-MCNC: 4 MG/DL (ref 2.7–4.5)
PLATELET # BLD AUTO: 147 K/UL (ref 150–450)
PMV BLD AUTO: 12.4 FL (ref 9.2–12.9)
POCT GLUCOSE: 150 MG/DL (ref 70–110)
POCT GLUCOSE: 179 MG/DL (ref 70–110)
POTASSIUM SERPL-SCNC: 3.6 MMOL/L (ref 3.5–5.1)
POTASSIUM SERPL-SCNC: 4.5 MMOL/L (ref 3.5–5.1)
PROT SERPL-MCNC: 6 GM/DL (ref 6–8.4)
PROT SERPL-MCNC: 6.1 GM/DL (ref 6–8.4)
RBC # BLD AUTO: 3.74 M/UL (ref 4–5.4)
RELATIVE EOSINOPHIL (OHS): 3.7 %
RELATIVE LYMPHOCYTE (OHS): 27.5 % (ref 18–48)
RELATIVE MONOCYTE (OHS): 10.6 % (ref 4–15)
RELATIVE NEUTROPHIL (OHS): 56.9 % (ref 38–73)
SODIUM SERPL-SCNC: 140 MMOL/L (ref 136–145)
SODIUM SERPL-SCNC: 143 MMOL/L (ref 136–145)
WBC # BLD AUTO: 6.7 K/UL (ref 3.9–12.7)

## 2025-06-25 PROCEDURE — 25000003 PHARM REV CODE 250: Performed by: STUDENT IN AN ORGANIZED HEALTH CARE EDUCATION/TRAINING PROGRAM

## 2025-06-25 PROCEDURE — 25000003 PHARM REV CODE 250

## 2025-06-25 PROCEDURE — 63600175 PHARM REV CODE 636 W HCPCS: Performed by: STUDENT IN AN ORGANIZED HEALTH CARE EDUCATION/TRAINING PROGRAM

## 2025-06-25 PROCEDURE — 85025 COMPLETE CBC W/AUTO DIFF WBC: CPT

## 2025-06-25 PROCEDURE — 83735 ASSAY OF MAGNESIUM: CPT

## 2025-06-25 PROCEDURE — 94761 N-INVAS EAR/PLS OXIMETRY MLT: CPT

## 2025-06-25 PROCEDURE — 93452 LEFT HRT CATH W/VENTRCLGRPHY: CPT | Mod: 73 | Performed by: INTERNAL MEDICINE

## 2025-06-25 PROCEDURE — 20000000 HC ICU ROOM

## 2025-06-25 PROCEDURE — 84132 ASSAY OF SERUM POTASSIUM: CPT | Performed by: STUDENT IN AN ORGANIZED HEALTH CARE EDUCATION/TRAINING PROGRAM

## 2025-06-25 PROCEDURE — 99900035 HC TECH TIME PER 15 MIN (STAT)

## 2025-06-25 PROCEDURE — 84100 ASSAY OF PHOSPHORUS: CPT

## 2025-06-25 PROCEDURE — 63600175 PHARM REV CODE 636 W HCPCS

## 2025-06-25 PROCEDURE — 99499 UNLISTED E&M SERVICE: CPT | Mod: ,,, | Performed by: INTERNAL MEDICINE

## 2025-06-25 PROCEDURE — 27000221 HC OXYGEN, UP TO 24 HOURS

## 2025-06-25 PROCEDURE — 99231 SBSQ HOSP IP/OBS SF/LOW 25: CPT | Mod: GC,,, | Performed by: INTERNAL MEDICINE

## 2025-06-25 RX ORDER — LANOLIN ALCOHOL/MO/W.PET/CERES
400 CREAM (GRAM) TOPICAL ONCE
Status: COMPLETED | OUTPATIENT
Start: 2025-06-25 | End: 2025-06-25

## 2025-06-25 RX ORDER — HYDROMORPHONE HYDROCHLORIDE 1 MG/ML
0.2 INJECTION, SOLUTION INTRAMUSCULAR; INTRAVENOUS; SUBCUTANEOUS ONCE
Status: COMPLETED | OUTPATIENT
Start: 2025-06-25 | End: 2025-06-25

## 2025-06-25 RX ORDER — KETOROLAC TROMETHAMINE 15 MG/ML
15 INJECTION, SOLUTION INTRAMUSCULAR; INTRAVENOUS ONCE
Status: DISCONTINUED | OUTPATIENT
Start: 2025-06-25 | End: 2025-06-25

## 2025-06-25 RX ORDER — POTASSIUM CHLORIDE 750 MG/1
30 CAPSULE, EXTENDED RELEASE ORAL
Status: COMPLETED | OUTPATIENT
Start: 2025-06-25 | End: 2025-06-25

## 2025-06-25 RX ORDER — ISOSORBIDE DINITRATE 20 MG/1
40 TABLET ORAL 3 TIMES DAILY
Status: DISPENSED | OUTPATIENT
Start: 2025-06-25 | End: 2025-06-28

## 2025-06-25 RX ORDER — GABAPENTIN 300 MG/1
300 CAPSULE ORAL NIGHTLY
Status: DISCONTINUED | OUTPATIENT
Start: 2025-06-25 | End: 2025-06-27

## 2025-06-25 RX ORDER — POTASSIUM CHLORIDE 750 MG/1
30 CAPSULE, EXTENDED RELEASE ORAL ONCE
Status: DISCONTINUED | OUTPATIENT
Start: 2025-06-25 | End: 2025-06-25

## 2025-06-25 RX ORDER — DIPHENHYDRAMINE HCL 50 MG
50 CAPSULE ORAL ONCE
Status: COMPLETED | OUTPATIENT
Start: 2025-06-25 | End: 2025-06-25

## 2025-06-25 RX ORDER — FUROSEMIDE 10 MG/ML
80 INJECTION INTRAMUSCULAR; INTRAVENOUS 2 TIMES DAILY
Status: DISCONTINUED | OUTPATIENT
Start: 2025-06-25 | End: 2025-06-26

## 2025-06-25 RX ORDER — SODIUM CHLORIDE 0.9 % (FLUSH) 0.9 %
10 SYRINGE (ML) INJECTION
Status: DISCONTINUED | OUTPATIENT
Start: 2025-06-25 | End: 2025-07-08 | Stop reason: HOSPADM

## 2025-06-25 RX ADMIN — MUPIROCIN: 20 OINTMENT TOPICAL at 09:06

## 2025-06-25 RX ADMIN — Medication 400 MG: at 05:06

## 2025-06-25 RX ADMIN — DIPHENHYDRAMINE HYDROCHLORIDE 50 MG: 50 CAPSULE ORAL at 01:06

## 2025-06-25 RX ADMIN — FUROSEMIDE 80 MG: 10 INJECTION, SOLUTION INTRAVENOUS at 04:06

## 2025-06-25 RX ADMIN — INSULIN GLARGINE 5 UNITS: 100 INJECTION, SOLUTION SUBCUTANEOUS at 09:06

## 2025-06-25 RX ADMIN — FUROSEMIDE 40 MG: 10 INJECTION, SOLUTION INTRAVENOUS at 12:06

## 2025-06-25 RX ADMIN — GABAPENTIN 300 MG: 300 CAPSULE ORAL at 09:06

## 2025-06-25 RX ADMIN — ATORVASTATIN CALCIUM 20 MG: 20 TABLET, FILM COATED ORAL at 09:06

## 2025-06-25 RX ADMIN — ISOSORBIDE DINITRATE 40 MG: 20 TABLET ORAL at 02:06

## 2025-06-25 RX ADMIN — ASPIRIN 81 MG CHEWABLE TABLET 81 MG: 81 TABLET CHEWABLE at 09:06

## 2025-06-25 RX ADMIN — DEXMEDETOMIDINE HYDROCHLORIDE 1.1 MCG/KG/HR: 4 INJECTION INTRAVENOUS at 12:06

## 2025-06-25 RX ADMIN — HYDROMORPHONE HYDROCHLORIDE 0.2 MG: 1 INJECTION, SOLUTION INTRAMUSCULAR; INTRAVENOUS; SUBCUTANEOUS at 09:06

## 2025-06-25 RX ADMIN — ISOSORBIDE DINITRATE 40 MG: 20 TABLET ORAL at 08:06

## 2025-06-25 RX ADMIN — ISOSORBIDE DINITRATE 40 MG: 20 TABLET ORAL at 09:06

## 2025-06-25 RX ADMIN — HYDRALAZINE HYDROCHLORIDE 50 MG: 50 TABLET ORAL at 01:06

## 2025-06-25 RX ADMIN — SODIUM CHLORIDE 250 MG: 9 INJECTION, SOLUTION INTRAVENOUS at 09:06

## 2025-06-25 RX ADMIN — HYDRALAZINE HYDROCHLORIDE 50 MG: 50 TABLET ORAL at 09:06

## 2025-06-25 RX ADMIN — ACETAMINOPHEN 650 MG: 325 TABLET ORAL at 12:06

## 2025-06-25 RX ADMIN — POTASSIUM CHLORIDE 30 MEQ: 750 CAPSULE, EXTENDED RELEASE ORAL at 08:06

## 2025-06-25 RX ADMIN — ACETAMINOPHEN 650 MG: 325 TABLET ORAL at 06:06

## 2025-06-25 RX ADMIN — HYDRALAZINE HYDROCHLORIDE 50 MG: 50 TABLET ORAL at 05:06

## 2025-06-25 RX ADMIN — POTASSIUM CHLORIDE 30 MEQ: 750 CAPSULE, EXTENDED RELEASE ORAL at 05:06

## 2025-06-25 NOTE — SUBJECTIVE & OBJECTIVE
Interval History: Pending [facility] today with IC. NPO @ midnight for PPM placement likely tomorrow. Will maintain TVP in the meantime. Continue with lasix, ISAIAS improving. Delirium precautions placed. On precedex as well due to agitation overnight.         Past Medical History:   Diagnosis Date    Diabetes mellitus, type 2     Fibromyalgia 2016    Hypertension 2016       Past Surgical History:   Procedure Laterality Date     SECTION      CHOLECYSTECTOMY      FOOT SURGERY Bilateral     plantar fasciotomy bilateral, arthroplasty fifth toe bilateral    HERNIA REPAIR      INSERTION, PACEMAKER, TEMPORARY TRANSVENOUS N/A 2025    Procedure: Insertion, Pacemaker, Temporary Transvenous;  Surgeon: Sean Ureña MD;  Location: [facility] CATH LAB/EP;  Service: Cardiology;  Laterality: N/A;       Review of patient's allergies indicates:   Allergen Reactions    Advil [ibuprofen] Hives    Penicillins     Codeine     Excedrin aspirin free [acetaminophen-caffeine]        No current facility-administered medications on file prior to encounter.     Current Outpatient Medications on File Prior to Encounter   Medication Sig    cyclobenzaprine (FLEXERIL) 10 MG tablet TAKE 1 TABLET BY MOUTH EVERY DAY NIGHTLY AS NEEDED FOR MUSCLE SPASMS    gabapentin (NEURONTIN) 300 MG capsule TAKE 1 CAPSULE BY MOUTH EVERYDAY AT BEDTIME    lisinopriL (PRINIVIL,ZESTRIL) 20 MG tablet TAKE 1 TABLET BY MOUTH EVERY DAY    metFORMIN (GLUCOPHAGE) 500 MG tablet TAKE 1 TABLET BY MOUTH TWICE A DAY WITH MEALS    metoprolol tartrate (LOPRESSOR) 50 MG tablet TAKE 1 TABLET BY MOUTH TWICE A DAY    oxyCODONE-acetaminophen (PERCOCET)  mg per tablet Take 1 tablet by mouth nightly as needed for Pain.    rosuvastatin (CRESTOR) 5 MG tablet Take 1 tablet (5 mg total) by mouth once daily.     Family History       Problem Relation (Age of Onset)    Diabetes Father    Heart disease Sister, Brother          Tobacco Use    Smoking status: Never    Smokeless  tobacco: Never   Substance and Sexual Activity    Alcohol use: Yes     Comment: Occasionally    Drug use: Not on file    Sexual activity: Yes     Review of Systems   Constitutional: Positive for malaise/fatigue. Negative for chills and fever.   Cardiovascular:  Positive for dyspnea on exertion, leg swelling and syncope. Negative for chest pain, irregular heartbeat and palpitations.   Respiratory:  Positive for shortness of breath.    Musculoskeletal:  Positive for back pain and stiffness.   Genitourinary: Negative.      Objective:     Vital Signs (Most Recent):  Temp: 97.5 °F (36.4 °C) (06/25/25 0701)  Pulse: (!) 55 (06/25/25 1000)  Resp: (!) 22 (06/25/25 1000)  BP: (!) 133/59 (06/25/25 1000)  SpO2: (!) 94 % (06/25/25 1000) Vital Signs (24h Range):  Temp:  [97.5 °F (36.4 °C)-98.4 °F (36.9 °C)] 97.5 °F (36.4 °C)  Pulse:  [49-56] 55  Resp:  [9-36] 22  SpO2:  [88 %-98 %] 94 %  BP: ()/() 133/59     Weight: 62.2 kg (137 lb 2 oz)  Body mass index is 25.08 kg/m².    SpO2: (!) 94 %         Intake/Output Summary (Last 24 hours) at 6/25/2025 1028  Last data filed at 6/25/2025 1000  Gross per 24 hour   Intake 1030.01 ml   Output 2200 ml   Net -1169.99 ml       Lines/Drains/Airways       Central Venous Catheter Line  Duration             Introducer 06/18/25 Internal Jugular Right 7 days              Drain  Duration             Female External Urinary Catheter w/ Suction 06/24/25 0120 1 day              Line  Duration                  Pacer Wires 06/18/25 0750 7 days              Peripheral Intravenous Line  Duration             Peripheral IV Single Lumen 06/22/25 0657 22 G Anterior;Right Forearm 3 days    Peripheral IV 06/25/25 0030 20 G Anterior;Left;Proximal Forearm <1 day                     Physical Exam  Vitals reviewed.   Constitutional:       General: She is not in acute distress.  Eyes:      General: No scleral icterus.  Cardiovascular:      Rate and Rhythm: Normal rate and regular rhythm.   Pulmonary:       "Effort: Pulmonary effort is normal. No respiratory distress.      Breath sounds: No wheezing.   Abdominal:      Hernia: A hernia is present.   Musculoskeletal:      Right lower leg: No edema.      Left lower leg: No edema.   Skin:     General: Skin is warm and dry.   Neurological:      Mental Status: She is alert.   Psychiatric:         Cognition and Memory: Cognition is impaired.          Significant Labs: ABG: No results for input(s): "PH", "PCO2", "HCO3", "POCSATURATED", "BE" in the last 48 hours., CMP   Recent Labs   Lab 06/24/25  0122 06/24/25  0541 06/24/25  1424 06/25/25  0310    137 141 143   K 5.4* 4.7 4.7 4.5   CL 98 100 100 102   CO2 27 26 31* 27   * 126* 129* 147*   BUN 39* 36* 35* 33*   CREATININE 1.7* 1.5* 1.4 1.3   CALCIUM 8.8 8.7 8.4* 8.4*   PROT 6.5  --  5.8* 6.0   ALBUMIN 2.9*  --  2.6* 2.6*   BILITOT 0.6  --  0.7 0.7   ALKPHOS 50  --  44 41   AST 24  --  25 29   ALT 16  --  16 16   ANIONGAP 12 11 10 14   , CBC   Recent Labs   Lab 06/24/25  0122 06/25/25  0508   WBC 9.78 6.70   HGB 14.0 12.1   HCT 40.7 35.9*    147*   , INR   Recent Labs   Lab 06/24/25  0122   INR 1.0   PROTIME 11.3   , Lipid Panel No results for input(s): "CHOL", "HDL", "LDLCALC", "TRIG", "CHOLHDL" in the last 48 hours., Troponin No results for input(s): "TROPONINIHS" in the last 48 hours., and All pertinent lab results from the last 24 hours have been reviewed.    Significant Imaging: Echocardiogram: Transthoracic echo (TTE) complete (Cupid Only):   Results for orders placed or performed during the hospital encounter of 06/24/25   Echo   Result Value Ref Range    BSA 1.71 m2    LVOT stroke volume 71.0 cm3    LV Systolic Volume Index 28.0 mL/m2    LV Diastolic Volume Index 62.50 mL/m2    LVOT area 3.1 cm2    FS 29.2 28 - 44 %    Left Ventricle Relative Wall Thickness 0.38 cm    LV mass 137.1 g    LV Mass Index 81.6 g/m2    E/E' ratio 16 m/s    AJAY 39 mL/m2    LA Vol 65 cm3    RV/LV Ratio 0.58 cm    AV Velocity " Ratio 0.61     AV index (prosthetic) 0.78     KAREN by Velocity Ratio 1.9 cm²    ASI 1.8 cm/m2    ASI 1.5 cm/m2    Mean e' 0.06 m/s    ZLVIDS 1.25     ZLVIDD 0.24     LV Diastolic Volume 105 mL    Echo EF Estimated 56 %    LV Systolic Volume 47 mL    IVS 0.8 0.6 - 1.1 cm    LVIDd 4.8 3.5 - 6.0 cm    LVIDs 3.4 2.1 - 4.0 cm    LVOT diameter 2.0 cm    PW 0.9 0.6 - 1.1 cm    AV LVOT peak gradient 5 mmHg    LVOT mn grad 2 mmHg    LVOT peak darrin 1.1 m/s    LVOT peak VTI 22.6 cm    RV- elias basal diam 2.8 cm    LA size 3.4 cm    Left Atrium Major Axis 6.0 cm    Left Atrium Minor Axis 5.9 cm    RA Major Axis 5.34 cm    AV valve area 2.5 cm2    AV area by cont VTI 2.4 cm2    AV peak gradient 13 mmHg    AV mean gradient 5 mmHg    Ao peak darrni 1.8 m/s    Ao VTI 28.8 cm    MV Peak A Darrin 1.14 m/s    E wave deceleration time 229 ms    E wave deceleration time 222 ms    MV Peak E Darrin 0.86 m/s    E/A ratio 0.75     LV LATERAL E/E' RATIO 14.3     LV SEPTAL E/E' RATIO 17.2     TDI LATERAL 0.06 m/s    TDI SEPTAL 0.05 m/s    TV peak gradient 35 mmHg    TR Max Darrin 3.0 m/s    Ascending aorta 2.6 cm    STJ 2.3 cm    Sinus 3.0 cm    LA WIDTH 3.8 cm    RA Width 3.66 cm    TAPSE 2.1 cm    TV resting pulmonary artery pressure 51 mmHg    RV TB RVSP 18 mmHg    Est. RA pres 15 mmHg    Narrative      Left Ventricle: The left ventricle is normal in size. Normal wall   thickness. Regional wall motion abnormalities present. There is severely   reduced systolic function with a visually estimated ejection fraction of   15 - 20%. Grade I diastolic dysfunction. Elevated left ventricular filling   pressure. No thrombus observed using contrast enhanced images. Hypokinesis   of all segments except the bases suggests takastsubo CM pattern, but   multivessel CAD cannot be exluded    Right Ventricle: The right ventricle is normal in size measuring 2.8   cm. Wall thickness is normal. Systolic function is normal.    Aortic Valve: There is mild aortic valve  sclerosis. There is moderate   annular calcification present.    Mitral Valve: There is mild regurgitation with a centrally directed   jet.    Aorta: The aortic root is normal in size measuring 3.0 cm. The proximal   ascending aorta is normal in size measuring 2.6 cm.    Pulmonary Artery: There is moderate pulmonary hypertension. The   estimated pulmonary artery systolic pressure is 51 mmHg.    IVC/SVC: Elevated venous pressure at 15 mmHg.    Pericardium: There is no pericardial effusion.

## 2025-06-25 NOTE — ASSESSMENT & PLAN NOTE
Patient transferred for CHB. Remains in CHB and TVP dependent.    Plan:  - telemetry  - maintain TVP in place, daily threshold/rhythm check  - IC consulted for [facility] workup for evaluation for ischemic etiology   - K > 4, Mg > 2  - holding AV manjit blocking agents  - EP consulted, possible PPM placement on 6/26/25 s/p LHC  - NPO @ midnight

## 2025-06-25 NOTE — ASSESSMENT & PLAN NOTE
Patient found to have new systolic heart failure on TTE. She additionally has wall motion abnormalities that could represent old infarct or stress induced cardiomyopathy.    Plan:  - telemetry  - strict I/O's, daily weights  - Lasix 40 mg IV BID, will likely transition to PO lasix once euvolemic   - hold BB, ARB/ARNI, MRA, and SGLt2i at this time but will plan for initiation prior to discharge  - will need ischemic workup, [facility] on 6/25/25

## 2025-06-25 NOTE — EICU
Intervention Initiated From:  COR / KALLIU    Anamika intervened regarding:  Rounding (Video assessment)  VICU Night Rounds Checklist  24H Vital Sign Range:  Temp:  [98 °F (36.7 °C)-98.4 °F (36.9 °C)]   Pulse:  [49-60]   Resp:  [10-36]   BP: (131-189)/(60-82)   SpO2:  [88 %-95 %]     Video rounds

## 2025-06-25 NOTE — ANESTHESIA PREPROCEDURE EVALUATION
[facility]  Anesthesia Pre-Operative Evaluation    Patient Name: Cady Watkins  YOB: 1950  MRN: [id number]    SUBJECTIVE:   06/25/2025    Pre-operative evaluation for Procedure(s) (LRB):  INSERTION, CARDIAC PACEMAKER, DUAL CHAMBER (N/A)    Cady Watkins is a 74 y.o. female with a PMHx significant for HTN, T2DM, migraines, fibromyalgia, who presented to [facility] for symptomatic bradycardia. She was found to have new onset systolic HF and complete heart block. TVP dependent. Planning for LHC on 6/25 then pacemaker on 6/26. Patient now presents for the above procedure(s).    Patient is a J[employer]ism and limited blood consent is in patient's chart. Patient is delirious and alert to person but not place or time at time of examination. Consent given by daughter.     Previous Airway : None documented.    LDA: N  Introducer 06/18/25 Internal Jugular Right (Active)   Line Necessity Review Hemodynamic instability 06/25/25 0701   Site Assessment No drainage;No redness;No swelling;No warmth 06/25/25 1300   Line Securement Device Secured with sutures 06/25/25 0701   Dressing Type CHG impregnated dressing/sponge;Central line dressing 06/25/25 1300   Dressing Status Clean;Dry;Intact 06/25/25 1300   Dressing Intervention Integrity maintained 06/25/25 1300   Date on Dressing 06/24/25 06/25/25 0701   Dressing Due to be Changed 07/01/25 06/25/25 1300   Patency/Care infusing 06/25/25 1300   Number of days: 7       Peripheral IV Single Lumen 06/22/25 0657 22 G Anterior;Right Forearm (Active)   Site Assessment Clean;Dry;Intact 06/25/25 1300   Line Securement Device Antimicrobial Adhesive 06/25/25 0701   Extremity Assessment Distal to IV No abnormal discoloration 06/25/25 1300   Line Status Saline locked;Flushed 06/25/25 1300   Dressing Status Clean;Intact;Dry 06/25/25 1300   Dressing Intervention Integrity maintained 06/25/25 1300   Dressing Change Due 06/26/25 06/25/25 1300    Site Change Due 06/26/25 06/25/25 0701   Reason Not Rotated Not due 06/25/25 1300   Number of days: 3       Peripheral IV 06/25/25 0030 20 G Anterior;Left;Proximal Forearm (Active)   Site Assessment Clean;Dry;Intact 06/25/25 1300   Line Securement Device Antimicrobial Adhesive 06/25/25 0701   Extremity Assessment Distal to IV No abnormal discoloration 06/25/25 1300   Dressing Status Clean;Dry;Intact 06/25/25 1300   Dressing Intervention Integrity maintained 06/25/25 1300   Dressing Change Due 06/29/25 06/25/25 1300   Site Change Due 06/29/25 06/25/25 0701   Reason Not Rotated Not due 06/25/25 1300   Number of days: 0            Pacer Wires 06/18/25 0750 (Active)   Pacer Wire Status Ventricular wires connected to pacer 06/25/25 1300   Site Assessment Clean;Dry;Intact 06/25/25 1300   How Pacer Wires are Secured Ventricular wires secured to dressing 06/25/25 1300   Dressing Status Clean;Dry;Intact 06/25/25 1300   Dressing Intervention Integrity maintained 06/25/25 1300   Dressing Change Due 07/01/25 06/25/25 1300   Number of days: 7       Female External Urinary Catheter w/ Suction 06/24/25 0120 (Active)   Skin no redness;no breakdown 06/25/25 1300   Tolerance no signs/symptoms of discomfort;assisted with appliance change 06/25/25 1300   Suction Continuous suction at 70 mmHg 06/25/25 0701   Date of last wick change 06/24/25 06/25/25 0701   Time of last wick change 1720 06/25/25 0701   Output (mL) 150 mL 06/25/25 0701   Number of days: 1     Echo:  Results for orders placed during the hospital encounter of 06/24/25    Echo    Interpretation Summary    Left Ventricle: The left ventricle is normal in size. Normal wall thickness. Regional wall motion abnormalities present. There is severely reduced systolic function with a visually estimated ejection fraction of 15 - 20%. Grade I diastolic dysfunction. Elevated left ventricular filling pressure. No thrombus observed using contrast enhanced images. Hypokinesis of all  segments except the bases suggests takastsubo CM pattern, but multivessel CAD cannot be exluded    Right Ventricle: The right ventricle is normal in size measuring 2.8 cm. Wall thickness is normal. Systolic function is normal.    Aortic Valve: There is mild aortic valve sclerosis. There is moderate annular calcification present.    Mitral Valve: There is mild regurgitation with a centrally directed jet.    Aorta: The aortic root is normal in size measuring 3.0 cm. The proximal ascending aorta is normal in size measuring 2.6 cm.    Pulmonary Artery: There is moderate pulmonary hypertension. The estimated pulmonary artery systolic pressure is 51 mmHg.    IVC/SVC: Elevated venous pressure at 15 mmHg.    Pericardium: There is no pericardial effusion.      Problem List[1]    Review of patient's allergies indicates:   Allergen Reactions    Advil [ibuprofen] Hives    Penicillins     Codeine     Excedrin aspirin free [acetaminophen-caffeine]        Current Outpatient Medications   Medication Instructions    cyclobenzaprine (FLEXERIL) 10 MG tablet TAKE 1 TABLET BY MOUTH EVERY DAY NIGHTLY AS NEEDED FOR MUSCLE SPASMS    gabapentin (NEURONTIN) 300 mg, Oral, Nightly, at bedtime    lisinopriL (PRINIVIL,ZESTRIL) 20 mg, Oral    metFORMIN (GLUCOPHAGE) 500 MG tablet TAKE 1 TABLET BY MOUTH TWICE A DAY WITH MEALS    metoprolol tartrate (LOPRESSOR) 50 MG tablet TAKE 1 TABLET BY MOUTH TWICE A DAY    oxyCODONE-acetaminophen (PERCOCET)  mg per tablet 1 tablet, Oral, Nightly PRN    rosuvastatin (CRESTOR) 5 mg, Oral, Daily       Past Surgical History:   Procedure Laterality Date     SECTION      CHOLECYSTECTOMY      FOOT SURGERY Bilateral     plantar fasciotomy bilateral, arthroplasty fifth toe bilateral    HERNIA REPAIR      INSERTION, PACEMAKER, TEMPORARY TRANSVENOUS N/A 2025    Procedure: Insertion, Pacemaker, Temporary Transvenous;  Surgeon: Sean Ureña MD;  Location: [facility] CATH LAB/EP;   "Service: Cardiology;  Laterality: N/A;       Social History     Substance and Sexual Activity   Drug Use Not on file     Alcohol Use: Not At Risk (6/19/2025)    AUDIT-C     Frequency of Alcohol Consumption: Never     Average Number of Drinks: Patient does not drink     Frequency of Binge Drinking: Never     Tobacco Use: Low Risk  (6/18/2025)    Patient History     Smoking Tobacco Use: Never     Smokeless Tobacco Use: Never     Passive Exposure: Not on file       OBJECTIVE:     Vital Signs Range:      6/24/2025     1:00 AM 6/24/2025     1:01 AM 6/24/2025     4:29 AM   Vitals - 1 value per visit   SYSTOLIC  174    DIASTOLIC  63    Pulse  60    Temp 36.7 °C (98 °F)     Resp  11    SPO2  95 %    Weight (lb) 147.93     Weight (kg) 67.1     Height   5' 2" (1.575 m)   BMI (Calculated) 27           CBC:   Lab Results   Component Value Date    WBC 6.70 06/25/2025    HGB 12.1 06/25/2025    HCT 35.9 (L) 06/25/2025    MCV 96 06/25/2025     (L) 06/25/2025         CMP:   Sodium   Date Value Ref Range Status   06/25/2025 143 136 - 145 mmol/L Final   09/16/2024 140 136 - 145 mmol/L Final     Potassium   Date Value Ref Range Status   06/25/2025 4.5 3.5 - 5.1 mmol/L Final   09/16/2024 4.6 3.5 - 5.1 mmol/L Final     Chloride   Date Value Ref Range Status   06/25/2025 102 95 - 110 mmol/L Final   09/16/2024 106 95 - 110 mmol/L Final     CO2   Date Value Ref Range Status   06/25/2025 27 23 - 29 mmol/L Final   09/16/2024 25 23 - 29 mmol/L Final     Glucose   Date Value Ref Range Status   06/25/2025 147 (H) 70 - 110 mg/dL Final   09/16/2024 163 (H) 70 - 110 mg/dL Final     BUN   Date Value Ref Range Status   06/25/2025 33 (H) 8 - 23 mg/dL Final     Creatinine   Date Value Ref Range Status   06/25/2025 1.3 0.5 - 1.4 mg/dL Final     Calcium   Date Value Ref Range Status   06/25/2025 8.4 (L) 8.7 - 10.5 mg/dL Final   09/16/2024 10.1 8.7 - 10.5 mg/dL Final     Protein Total   Date Value Ref Range Status   06/25/2025 6.0 6.0 - 8.4 " gm/dL Final     Total Protein   Date Value Ref Range Status   05/14/2024 7.6 6.0 - 8.4 g/dL Final     Albumin   Date Value Ref Range Status   06/25/2025 2.6 (L) 3.5 - 5.2 g/dL Final   05/14/2024 3.7 3.5 - 5.2 g/dL Final     Total Bilirubin   Date Value Ref Range Status   05/14/2024 0.6 0.1 - 1.0 mg/dL Final     Comment:     For infants and newborns, interpretation of results should be based  on gestational age, weight and in agreement with clinical  observations.    Premature Infant recommended reference ranges:  Up to 24 hours.............<8.0 mg/dL  Up to 48 hours............<12.0 mg/dL  3-5 days..................<15.0 mg/dL  6-29 days.................<15.0 mg/dL       Bilirubin Total   Date Value Ref Range Status   06/25/2025 0.7 0.1 - 1.0 mg/dL Final     Comment:     For infants and newborns, interpretation of results should be based   on gestational age, weight and in agreement with clinical   observations.    Premature Infant recommended reference ranges:   0-24 hours:  <8.0 mg/dL   24-48 hours: <12.0 mg/dL   3-5 days:    <15.0 mg/dL   6-29 days:   <15.0 mg/dL     Alkaline Phosphatase   Date Value Ref Range Status   05/14/2024 64 55 - 135 U/L Final     ALP   Date Value Ref Range Status   06/25/2025 41 40 - 150 unit/L Final     AST   Date Value Ref Range Status   06/25/2025 29 11 - 45 unit/L Final     Comment:     *Result may be interfered by visible hemolysis   05/14/2024 23 10 - 40 U/L Final     ALT   Date Value Ref Range Status   06/25/2025 16 10 - 44 unit/L Final   05/14/2024 25 10 - 44 U/L Final     Anion Gap   Date Value Ref Range Status   06/25/2025 14 8 - 16 mmol/L Final     eGFR if    Date Value Ref Range Status   03/04/2022 >60 >60 mL/min/1.73 m^2 Final     eGFR if non    Date Value Ref Range Status   03/04/2022 >60 >60 mL/min/1.73 m^2 Final     Comment:     Calculation used to obtain the estimated glomerular filtration  rate (eGFR) is the CKD-EPI equation.           INR:  Lab Results   Component Value Date    INR 1.0 06/24/2025    PROTIME 11.3 06/24/2025       Cardiac Studies    EKG:   Results for orders placed or performed in visit on 06/24/25   EKG 12-lead    Collection Time: 06/24/25  5:37 AM   Result Value Ref Range    QRS Duration 150 ms    [facility] QTC Calculation 437 ms    Narrative    Test Reason :    Vent. Rate :  50 BPM     Atrial Rate :  50 BPM     P-R Int : 656 ms          QRS Dur : 150 ms      QT Int : 480 ms       P-R-T Axes :  47 211 108 degrees    QTcB Int : 437 ms    Ventricular-paced rhythm  Sinus bradycardia with 3rd degree AV block  Abnormal ECG  When compared with ECG of 20-Jun-2025 07:26,  No significant change was found    Confirmed by Blaine Mccormack ([id number]) on 6/24/2025 2:22:01 PM    Referred By: MARIANELA DOWELL           Confirmed By: Blaine Mccormack       Transthoracic Echo:  Results for orders placed during the hospital encounter of 06/24/25    Echo    Interpretation Summary    Left Ventricle: The left ventricle is normal in size. Normal wall thickness. Regional wall motion abnormalities present. There is severely reduced systolic function with a visually estimated ejection fraction of 15 - 20%. Grade I diastolic dysfunction. Elevated left ventricular filling pressure. No thrombus observed using contrast enhanced images. Hypokinesis of all segments except the bases suggests takastsubo CM pattern, but multivessel CAD cannot be exluded    Right Ventricle: The right ventricle is normal in size measuring 2.8 cm. Wall thickness is normal. Systolic function is normal.    Aortic Valve: There is mild aortic valve sclerosis. There is moderate annular calcification present.    Mitral Valve: There is mild regurgitation with a centrally directed jet.    Aorta: The aortic root is normal in size measuring 3.0 cm. The proximal ascending aorta is normal in size measuring 2.6 cm.    Pulmonary Artery: There is moderate pulmonary hypertension. The  estimated pulmonary artery systolic pressure is 51 mmHg.    IVC/SVC: Elevated venous pressure at 15 mmHg.    Pericardium: There is no pericardial effusion.      Transesophageal Echo:  No results found for this or any previous visit.      Nuclear Stress Test:  No results found for this or any previous visit.      Stress Echo:  No results found for this or any previous visit.      Nuclear Stress Echo:  No results found for this or any previous visit.      Cardiac Catheterization:  No results found for this or any previous visit.      Cardiac Device Check:  No results found for this or any previous visit.      ASSESSMENT/PLAN:                                                                                                                06/25/2025  Cady Watkins is a 74 y.o., female.      Pre-op Assessment    I have reviewed the Patient Summary Reports.    I have reviewed the NPO Status.   I have reviewed the Medications.     Review of Systems  Cardiovascular:     Hypertension    Dysrhythmias                                Hypertension     Disorder of Cardiac Rhythm     Renal/:  Chronic Renal Disease        Kidney Function/Disease             Neurological:    Neuromuscular Disease,  Headaches      Dx of Headaches                         Neuromuscular Disease   Endocrine:  Diabetes    Diabetes                      Psych:  Psychiatric History                Physical Exam  General: Confusion  Alert to person. Pleasantly confused. Does not follow commands.     Anesthesia Plan  Type of Anesthesia, risks & benefits discussed:    Anesthesia Type: Gen ETT, Gen Natural Airway, MAC  Intra-op Monitoring Plan: Standard ASA Monitors  Post Op Pain Control Plan: multimodal analgesia and IV/PO Opioids PRN  Induction:  IV  Airway Plan: Direct and Video  Informed Consent: Informed consent signed with the Patient representative and all parties understand the risks and agree with anesthesia plan.  All questions answered.   ASA  Score: 4  Day of Surgery Review of History & Physical: H&P Update referred to the surgeon/provider.    Ready For Surgery From Anesthesia Perspective.   .             [1]  Patient Active Problem List  Diagnosis    Obesity    Kidney stone    Fibromyalgia    High cholesterol    Hypertension    Insomnia    Migraine    Spasm    Type 2 diabetes with neuropathy    Ventral hernia    Diabetic nephropathy    Opioid dependence    Colon cancer screening declined    Mammogram declined    Complete heart block    Syncope    Acute pulmonary edema    Acute hypoxemic respiratory failure    Hypertensive emergency    Acute systolic heart failure    Bladder mass    ISAIAS (acute kidney injury)    Delirium

## 2025-06-25 NOTE — CARE UPDATE
IC Care Update    Patient seen this morning and chart reviewed. Reports improvement in symptoms since admission. She has continued to diurese well and Cr has also continued to improve. Plan for LHC +/- PCI today.    - Will evaluate with PCI/LHC. Patient is a SAPPHIRE candidate  - Cr 1.3  - Anti-platelet Therapy: Asa  - Access site: R radial  - Allergies: No shellfish / Iodine contrast allergy  - Pre-Hydration: NS  - Pre-Op Med: Bendaryl 50mg pO   - All patient's questions were answered.  -The risks, benefits and alternatives of the procedure were explained to the patient.   -The risks of coronary angiography include but are not limited to: bleeding, infection, heart rhythm abnormalities, allergic reactions, kidney injury and potential need for dialysis, stroke and death.   - Should stenting be indicated, the patient has agreed to dual anti-platelet therapy for 1-consecutive year with a drug-eluting stent and a minimum of 1-month with the use of a bare metal stent  - Additionally, pt is aware that non-compliance is likely to result in stent clotting with heart attack, heart failure, and/or death  -The risks of moderate sedation include hypotension, respiratory depression, arrhythmias, bronchospasm, and death.   - Informed consent was obtained and the  patient is agreeable to proceed with the procedure.    EMILY Campa  Cardiovascular Disease fellow, PGY-4  [facility]

## 2025-06-25 NOTE — TELEPHONE ENCOUNTER
Ms. Watkins takes atorvastatin 80 mg hs. Most recent LDL = 70 mg/dL. No recommendations.    ----- Message from SANDHYA Damon sent at 2025  1:09 PM CDT -----  Regarding: Order for MARLENY WATKINS    Patient Name: MARLENY WATKINS([id number])  Sex: Female  : 1950      PCP: ALVERTO LINDA    Center: [facility]     Types of orders made on 2025: Activity, Blood Bank, Cardiac Cath, Cardiac                                       Services, Case Request, Diet, ECG,                                       Electrophysiology, Imaging, Lab,                                       Medications, Nourishments, Nursing, Point                                       of Care Testing-Docked Device, Respiratory                                       Care, Transfer    Order Date:2025  Ordering User:SAHRA CAMPA [[id number]]  Attending Provider:Mita Matute MD [[id number]]  Authorizing Provider: Sahra Campa MD [[id number]]  Department:[facility] CARDIAC ICU[[id number]]    Order Specific Information  Order: Notify [facility] [Custom: [id number]]  Order #: [id number]: 1    Priority: Routine  Class: Hospital Performed    Associated Diagnoses      I50.20 HFrEF (heart failure with reduced ejection fraction)    Released on: 2025  1:09 PM        Priority: Routine  Class: Hospital Performed    Associated Diagnoses      I50.20 HFrEF (heart failure with reduced ejection fraction)    Released on: 2025  1:09 PM

## 2025-06-25 NOTE — PLAN OF CARE
CICU DAILY GOALS     NPO midnight for cath lab  Pt refused all glucose checks overnight.  A: Awake    RASS: Goal -    Actual - RASS (Johnson Agitation-Sedation Scale): restless   Restraint necessity: Clinical Justification: Treatment Interference  B: Breath   SBT: Not intubated   C: Coordinate A & B, analgesics/sedatives   Pain: managed    SAT: Not intubated  D: Delirium   CAM-ICU:    E: Early(intubated/ Progressive (non-intubated) Mobility   MOVE Screen:   Activity: Activity Management: Arm raise - L1  FAS: Feeding/Nutrition   Diet order: Diet/Nutrition Received: consistent carb/diabetic diet,   Fluid restriction:     Nutritional Supplement Intake: Quantity , Type:   T: Thrombus   DVT prophylaxis: VTE Core Measure: Pharmacological prophylaxis initiated/maintained  H: HOB Elevation   Head of Bed (HOB) Positioning: HOB at 30-45 degrees  U: Ulcer Prophylaxis   GI: no  G: Glucose control   managed Glycemic Management: blood glucose monitored  S: Skin   Bathing/Skin Care: bath, complete;dressed/undressed;incontinence care;linen changed (06/24/25 1800)  Wounds: No  Wound care consulted: No  B: Bowel Function   no issues   I: Indwelling Catheters   Wallis necessity:     CVC necessity: Yes  D: De-escalation Antibx   No  Plan for the day   Cath lab for PCI  Family/Goals of care/Code Status   Code Status: Full Code     No acute events throughout day, VS and assessment per flow sheet, patient progressing towards goals as tolerated, plan of care reviewed with Cady Watkins and family, all concerns addressed, will continue to monitor.    Problem: Diabetes Comorbidity  Goal: Blood Glucose Level Within Targeted Range  Outcome: Progressing     Problem: Acute Kidney Injury/Impairment  Goal: Fluid and Electrolyte Balance  Outcome: Progressing

## 2025-06-25 NOTE — PLAN OF CARE
CICU DAILY GOALS   Pt could not tolerate LHC procedure and was returned to the unit. Procedure will be rescheduled. NPO at midnight tonight for pacemaker placement in AM. 30 mEq K given for replacement.   40 mg Lasix IVP upped to 80 mg.     A: Awake    RASS: Goal -    Actual - RASS (Johnson Agitation-Sedation Scale): restless   Restraint necessity: Clinical Justification: Treatment Interference  B: Breath   SBT: Not intubated   C: Coordinate A & B, analgesics/sedatives   Pain: managed    SAT: Not intubated  D: Delirium   CAM-ICU:    E: Early(intubated/ Progressive (non-intubated) Mobility   MOVE Screen: Pass   Activity: Activity Management: Arm raise - L1, Ankle pumps - L1, Rolling - L1  FAS: Feeding/Nutrition   Diet order: Diet/Nutrition Received: NPO,   Fluid restriction:     Nutritional Supplement Intake: Quantity 1, Type: Boost  T: Thrombus   DVT prophylaxis: VTE Core Measure: Pharmacological prophylaxis initiated/maintained  H: HOB Elevation   Head of Bed (HOB) Positioning: HOB at 30 degrees  U: Ulcer Prophylaxis   GI: yes  G: Glucose control   managed Glycemic Management: blood glucose monitored  S: Skin   Bathing/Skin Care: bath, complete;dressed/undressed;electrode patches/site rotation;incontinence care;linen changed (06/25/25 1700)  Wounds: No  Wound care consulted: No  B: Bowel Function   no issues   I: Indwelling Catheters   Wallis necessity:     CVC necessity: Yes  D: De-escalation Antibx   No  Plan for the day     Family/Goals of care/Code Status   Code Status: Full Code     No acute events throughout day, VS and assessment per flow sheet, patient progressing towards goals as tolerated, plan of care reviewed with Cady Watkins and family, all concerns addressed.

## 2025-06-25 NOTE — EICU
Virtual ICU Quality Rounds    Admit Date: 6/24/2025  Hospital Day: 1    ICU Day: 1d 6h    24H Vital Sign Range:  Temp:  [97.7 °F (36.5 °C)-98.4 °F (36.9 °C)]   Pulse:  [49-56]   Resp:  [9-36]   BP: ()/()   SpO2:  [88 %-98 %]     VICU Surveillance Screening

## 2025-06-25 NOTE — ASSESSMENT & PLAN NOTE
Delirium precautions placed. Currently on precedex due to agitation. Will wean as tolerated. Likely will place Seroquel prn at night for agitation and concern for delirium.

## 2025-06-25 NOTE — HOSPITAL COURSE
"Patient presented from PCP office to [facility] for reported symptomatic bradycardia. Patient had a TVP placed and the decision was made to transfer to [facility] main campus for PPM placement. Patient had a TTE with Ef of 20-25% with evidence of WMA's. Per discussion with IC and EP, the decision was made to do a diagnostic cath to investigate for ischemic etiology. Patient will have a PPM placed after the [facility]. Her hospitalization has been complicated by ISAIAS which has been improving with Lasix 40 mg IV BID. And intermittent delirium which starts at night, delirium precautions and prns are in place for controlling these episodes. She was eventually placed on a IV lasix gtt due to worsening pulmonary edema and inability to lie flat at times. LHC revealed "70% left circumflex stenosis, otherwise diffuse non-obstructive disease is present" no intervention was done Patient had PPM placed successfully with no complications. UA evident of potential UTI will plan to treat with CTX for 3 days. Will continue to work on patient functional status with PT/OT. Patient transitioned from IV to 40 mg BID PO lasix. Toprol dose was up titrated. And Gabapentin was started for neuropathy in her feet.   "

## 2025-06-25 NOTE — PROGRESS NOTES
[facility] - Cardiac Intensive Care  Cardiology  Progress Note    Patient Name: Cady Watkins  MRN: [id number]  Admission Date: 2025  Hospital Length of Stay: 1 days  Code Status: Full Code   Attending Physician: Mita Matute MD   Primary Care Physician: Blaine Benítez MD  Expected Discharge Date: 2025  Principal Problem:<principal problem not specified>    Subjective:     Hospital Course:   Patient presented from PCP office to [facility] for reported symptomatic bradycardia. Patient had a TVP placed and the decision was made to transfer to [facility] main [place] for PPM placement. Patient had a TTE with Ef of 20-25% with evidence of WMA's. Per discussion with IC and EP, the decision was made to do a diagnostic cath to investigate for ischemic etiology. Patient will have a PPM placed after the [facility]. Her hospitalization has been complicated by ISAIAS which has been improving with Lasix 40 mg IV BID. And intermittent delirium which starts at night, delirium precautions and prns are in place for controlling these episodes.     Interval History: Pending [facility] today with IC. NPO @ midnight for PPM placement likely tomorrow. Will maintain TVP in the meantime. Continue with lasix, ISAIAS improving. Delirium precautions placed. On precedex as well due to agitation overnight.         Past Medical History:   Diagnosis Date    Diabetes mellitus, type 2     Fibromyalgia 2016    Hypertension 2016       Past Surgical History:   Procedure Laterality Date     SECTION      CHOLECYSTECTOMY      FOOT SURGERY Bilateral     plantar fasciotomy bilateral, arthroplasty fifth toe bilateral    HERNIA REPAIR      INSERTION, PACEMAKER, TEMPORARY TRANSVENOUS N/A 2025    Procedure: Insertion, Pacemaker, Temporary Transvenous;  Surgeon: Sean Ureña MD;  Location: [facility] CATH LAB/EP;  Service: Cardiology;  Laterality: N/A;       Review of patient's allergies indicates:   Allergen Reactions    Advil  [ibuprofen] Hives    Penicillins     Codeine     Excedrin aspirin free [acetaminophen-caffeine]        No current facility-administered medications on file prior to encounter.     Current Outpatient Medications on File Prior to Encounter   Medication Sig    cyclobenzaprine (FLEXERIL) 10 MG tablet TAKE 1 TABLET BY MOUTH EVERY DAY NIGHTLY AS NEEDED FOR MUSCLE SPASMS    gabapentin (NEURONTIN) 300 MG capsule TAKE 1 CAPSULE BY MOUTH EVERYDAY AT BEDTIME    lisinopriL (PRINIVIL,ZESTRIL) 20 MG tablet TAKE 1 TABLET BY MOUTH EVERY DAY    metFORMIN (GLUCOPHAGE) 500 MG tablet TAKE 1 TABLET BY MOUTH TWICE A DAY WITH MEALS    metoprolol tartrate (LOPRESSOR) 50 MG tablet TAKE 1 TABLET BY MOUTH TWICE A DAY    oxyCODONE-acetaminophen (PERCOCET)  mg per tablet Take 1 tablet by mouth nightly as needed for Pain.    rosuvastatin (CRESTOR) 5 MG tablet Take 1 tablet (5 mg total) by mouth once daily.     Family History       Problem Relation (Age of Onset)    Diabetes Father    Heart disease Sister, Brother          Tobacco Use    Smoking status: Never    Smokeless tobacco: Never   Substance and Sexual Activity    Alcohol use: Yes     Comment: Occasionally    Drug use: Not on file    Sexual activity: Yes     Review of Systems   Constitutional: Positive for malaise/fatigue. Negative for chills and fever.   Cardiovascular:  Positive for dyspnea on exertion, leg swelling and syncope. Negative for chest pain, irregular heartbeat and palpitations.   Respiratory:  Positive for shortness of breath.    Musculoskeletal:  Positive for back pain and stiffness.   Genitourinary: Negative.      Objective:     Vital Signs (Most Recent):  Temp: 97.5 °F (36.4 °C) (06/25/25 0701)  Pulse: (!) 55 (06/25/25 1000)  Resp: (!) 22 (06/25/25 1000)  BP: (!) 133/59 (06/25/25 1000)  SpO2: (!) 94 % (06/25/25 1000) Vital Signs (24h Range):  Temp:  [97.5 °F (36.4 °C)-98.4 °F (36.9 °C)] 97.5 °F (36.4 °C)  Pulse:  [49-56] 55  Resp:  [9-36] 22  SpO2:  [88 %-98 %] 94  "%  BP: ()/() 133/59     Weight: 62.2 kg (137 lb 2 oz)  Body mass index is 25.08 kg/m².    SpO2: (!) 94 %         Intake/Output Summary (Last 24 hours) at 6/25/2025 1028  Last data filed at 6/25/2025 1000  Gross per 24 hour   Intake 1030.01 ml   Output 2200 ml   Net -1169.99 ml       Lines/Drains/Airways       Central Venous Catheter Line  Duration             Introducer 06/18/25 Internal Jugular Right 7 days              Drain  Duration             Female External Urinary Catheter w/ Suction 06/24/25 0120 1 day              Line  Duration                  Pacer Wires 06/18/25 0750 7 days              Peripheral Intravenous Line  Duration             Peripheral IV Single Lumen 06/22/25 0657 22 G Anterior;Right Forearm 3 days    Peripheral IV 06/25/25 0030 20 G Anterior;Left;Proximal Forearm <1 day                     Physical Exam  Vitals reviewed.   Constitutional:       General: She is not in acute distress.  Eyes:      General: No scleral icterus.  Cardiovascular:      Rate and Rhythm: Normal rate and regular rhythm.   Pulmonary:      Effort: Pulmonary effort is normal. No respiratory distress.      Breath sounds: No wheezing.   Abdominal:      Hernia: A hernia is present.   Musculoskeletal:      Right lower leg: No edema.      Left lower leg: No edema.   Skin:     General: Skin is warm and dry.   Neurological:      Mental Status: She is alert.   Psychiatric:         Cognition and Memory: Cognition is impaired.          Significant Labs: ABG: No results for input(s): "PH", "PCO2", "HCO3", "POCSATURATED", "BE" in the last 48 hours., CMP   Recent Labs   Lab 06/24/25  0122 06/24/25  0541 06/24/25  1424 06/25/25  0310    137 141 143   K 5.4* 4.7 4.7 4.5   CL 98 100 100 102   CO2 27 26 31* 27   * 126* 129* 147*   BUN 39* 36* 35* 33*   CREATININE 1.7* 1.5* 1.4 1.3   CALCIUM 8.8 8.7 8.4* 8.4*   PROT 6.5  --  5.8* 6.0   ALBUMIN 2.9*  --  2.6* 2.6*   BILITOT 0.6  --  0.7 0.7   ALKPHOS 50  --  44 41 " "  AST 24  --  25 29   ALT 16  --  16 16   ANIONGAP 12 11 10 14   , CBC   Recent Labs   Lab 06/24/25  0122 06/25/25  0508   WBC 9.78 6.70   HGB 14.0 12.1   HCT 40.7 35.9*    147*   , INR   Recent Labs   Lab 06/24/25  0122   INR 1.0   PROTIME 11.3   , Lipid Panel No results for input(s): "CHOL", "HDL", "LDLCALC", "TRIG", "CHOLHDL" in the last 48 hours., Troponin No results for input(s): "TROPONINIHS" in the last 48 hours., and All pertinent lab results from the last 24 hours have been reviewed.    Significant Imaging: Echocardiogram: Transthoracic echo (TTE) complete (Cupid Only):   Results for orders placed or performed during the hospital encounter of 06/24/25   Echo   Result Value Ref Range    BSA 1.71 m2    LVOT stroke volume 71.0 cm3    LV Systolic Volume Index 28.0 mL/m2    LV Diastolic Volume Index 62.50 mL/m2    LVOT area 3.1 cm2    FS 29.2 28 - 44 %    Left Ventricle Relative Wall Thickness 0.38 cm    LV mass 137.1 g    LV Mass Index 81.6 g/m2    E/E' ratio 16 m/s    AJAY 39 mL/m2    LA Vol 65 cm3    RV/LV Ratio 0.58 cm    AV Velocity Ratio 0.61     AV index (prosthetic) 0.78     KAREN by Velocity Ratio 1.9 cm²    ASI 1.8 cm/m2    ASI 1.5 cm/m2    Mean e' 0.06 m/s    ZLVIDS 1.25     ZLVIDD 0.24     LV Diastolic Volume 105 mL    Echo EF Estimated 56 %    LV Systolic Volume 47 mL    IVS 0.8 0.6 - 1.1 cm    LVIDd 4.8 3.5 - 6.0 cm    LVIDs 3.4 2.1 - 4.0 cm    LVOT diameter 2.0 cm    PW 0.9 0.6 - 1.1 cm    AV LVOT peak gradient 5 mmHg    LVOT mn grad 2 mmHg    LVOT peak leanna 1.1 m/s    LVOT peak VTI 22.6 cm    RV- elias basal diam 2.8 cm    LA size 3.4 cm    Left Atrium Major Axis 6.0 cm    Left Atrium Minor Axis 5.9 cm    RA Major Axis 5.34 cm    AV valve area 2.5 cm2    AV area by cont VTI 2.4 cm2    AV peak gradient 13 mmHg    AV mean gradient 5 mmHg    Ao peak leanna 1.8 m/s    Ao VTI 28.8 cm    MV Peak A Leanna 1.14 m/s    E wave deceleration time 229 ms    E wave deceleration time 222 ms    MV Peak E Leanna 0.86 " m/s    E/A ratio 0.75     LV LATERAL E/E' RATIO 14.3     LV SEPTAL E/E' RATIO 17.2     TDI LATERAL 0.06 m/s    TDI SEPTAL 0.05 m/s    TV peak gradient 35 mmHg    TR Max Darrin 3.0 m/s    Ascending aorta 2.6 cm    STJ 2.3 cm    Sinus 3.0 cm    LA WIDTH 3.8 cm    RA Width 3.66 cm    TAPSE 2.1 cm    TV resting pulmonary artery pressure 51 mmHg    RV TB RVSP 18 mmHg    Est. RA pres 15 mmHg    Narrative      Left Ventricle: The left ventricle is normal in size. Normal wall   thickness. Regional wall motion abnormalities present. There is severely   reduced systolic function with a visually estimated ejection fraction of   15 - 20%. Grade I diastolic dysfunction. Elevated left ventricular filling   pressure. No thrombus observed using contrast enhanced images. Hypokinesis   of all segments except the bases suggests takastsubo CM pattern, but   multivessel CAD cannot be exluded    Right Ventricle: The right ventricle is normal in size measuring 2.8   cm. Wall thickness is normal. Systolic function is normal.    Aortic Valve: There is mild aortic valve sclerosis. There is moderate   annular calcification present.    Mitral Valve: There is mild regurgitation with a centrally directed   jet.    Aorta: The aortic root is normal in size measuring 3.0 cm. The proximal   ascending aorta is normal in size measuring 2.6 cm.    Pulmonary Artery: There is moderate pulmonary hypertension. The   estimated pulmonary artery systolic pressure is 51 mmHg.    IVC/SVC: Elevated venous pressure at 15 mmHg.    Pericardium: There is no pericardial effusion.       Assessment and Plan:         Delirium  Delirium precautions placed. Currently on precedex due to agitation. Will wean as tolerated. Likely will place Seroquel prn at night for agitation and concern for delirium.     ISAIAS (acute kidney injury)  ISAIAS is likely due to pre-renal azotemia due to intravascular volume depletion. Baseline creatinine is 1.0. Most recent creatinine and eGFR are  listed below.  Recent Labs     06/22/25  0627 06/23/25  0456 06/24/25  0122   CREATININE 1.5* 1.9* 1.7*   EGFRNORACEVR 37* 28* 32*      Plan  - ISAIAS is improving  - Avoid nephrotoxins and renally dose meds for GFR listed above  - Monitor urine output, serial BMP, and adjust therapy as needed    Acute systolic heart failure  Patient found to have new systolic heart failure on TTE. She additionally has wall motion abnormalities that could represent old infarct or stress induced cardiomyopathy.    Plan:  - telemetry  - strict I/O's, daily weights  - Lasix 40 mg IV BID, will likely transition to PO lasix once euvolemic   - hold BB, ARB/ARNI, MRA, and SGLt2i at this time but will plan for initiation prior to discharge  - will need ischemic workup, C on 6/25/25    Acute hypoxemic respiratory failure  Patient with Hypoxic Respiratory failure which is Acute.  she is not on home oxygen. Supplemental oxygen was provided and noted-      .   Signs/symptoms of respiratory failure include- tachypnea and increased work of breathing. Contributing diagnoses includes - flash pulmonary edema Labs and images were reviewed. Patient Has not had a recent ABG. Will treat underlying causes and adjust management of respiratory failure as follows- volume optimization and BP control    Complete heart block  Patient transferred for CHB. Remains in CHB and TVP dependent.    Plan:  - telemetry  - maintain TVP in place, daily threshold/rhythm check  - IC consulted for [facility] workup for evaluation for ischemic etiology   - K > 4, Mg > 2  - holding AV manjit blocking agents  - EP consulted, possible PPM placement on 6/26/25 s/p [facility]  - NPO @ midnight     Type 2 diabetes with neuropathy  - SSI  - carb controlled diet when diet resumed    Hypertension  Patient's blood pressure range in the last 24 hours was: BP  Min: 66/44  Max: 186/91.The patient's inpatient anti-hypertensive regimen is listed below:  Current Antihypertensives  hydrALAZINE tablet 10 mg,  Every 8 hours, Oral    Plan  - BP is uncontrolled, will adjust as follows: starting hydral  - Will add on GDMT when renal function improves    High cholesterol  - continue statin          VTE Risk Mitigation (From admission, onward)      None            Patrica Martínez DO  Cardiology  [facility] - Cardiac Int[facility]

## 2025-06-26 ENCOUNTER — ANESTHESIA (OUTPATIENT)
Dept: MEDSURG UNIT | Facility: HOSPITAL | Age: 75
End: 2025-06-26
Payer: MEDICARE

## 2025-06-26 ENCOUNTER — DOCUMENTATION ONLY (OUTPATIENT)
Dept: CARDIAC CATH/INVASIVE PROCEDURES | Facility: HOSPITAL | Age: 75
End: 2025-06-26
Payer: MEDICARE

## 2025-06-26 LAB
ABSOLUTE EOSINOPHIL (OHS): 0.19 K/UL
ABSOLUTE EOSINOPHIL (OHS): 0.29 K/UL
ABSOLUTE MONOCYTE (OHS): 0.74 K/UL (ref 0.3–1)
ABSOLUTE MONOCYTE (OHS): 0.83 K/UL (ref 0.3–1)
ABSOLUTE NEUTROPHIL COUNT (OHS): 4.82 K/UL (ref 1.8–7.7)
ABSOLUTE NEUTROPHIL COUNT (OHS): 9.43 K/UL (ref 1.8–7.7)
ALBUMIN SERPL BCP-MCNC: 2.7 G/DL (ref 3.5–5.2)
ALP SERPL-CCNC: 40 UNIT/L (ref 40–150)
ALT SERPL W/O P-5'-P-CCNC: 14 UNIT/L (ref 10–44)
ANION GAP (OHS): 11 MMOL/L (ref 8–16)
ANION GAP (OHS): 11 MMOL/L (ref 8–16)
AST SERPL-CCNC: 26 UNIT/L (ref 11–45)
BASOPHILS # BLD AUTO: 0.05 K/UL
BASOPHILS # BLD AUTO: 0.06 K/UL
BASOPHILS NFR BLD AUTO: 0.4 %
BASOPHILS NFR BLD AUTO: 0.7 %
BILIRUB SERPL-MCNC: 0.5 MG/DL (ref 0.1–1)
BUN SERPL-MCNC: 29 MG/DL (ref 8–23)
BUN SERPL-MCNC: 30 MG/DL (ref 8–23)
CALCIUM SERPL-MCNC: 7.9 MG/DL (ref 8.7–10.5)
CALCIUM SERPL-MCNC: 8.3 MG/DL (ref 8.7–10.5)
CHLORIDE SERPL-SCNC: 101 MMOL/L (ref 95–110)
CHLORIDE SERPL-SCNC: 104 MMOL/L (ref 95–110)
CO2 SERPL-SCNC: 25 MMOL/L (ref 23–29)
CO2 SERPL-SCNC: 25 MMOL/L (ref 23–29)
CREAT SERPL-MCNC: 1.3 MG/DL (ref 0.5–1.4)
CREAT SERPL-MCNC: 1.4 MG/DL (ref 0.5–1.4)
ERYTHROCYTE [DISTWIDTH] IN BLOOD BY AUTOMATED COUNT: 14.4 % (ref 11.5–14.5)
ERYTHROCYTE [DISTWIDTH] IN BLOOD BY AUTOMATED COUNT: 14.6 % (ref 11.5–14.5)
GFR SERPLBLD CREATININE-BSD FMLA CKD-EPI: 40 ML/MIN/1.73/M2
GFR SERPLBLD CREATININE-BSD FMLA CKD-EPI: 44 ML/MIN/1.73/M2
GLUCOSE SERPL-MCNC: 132 MG/DL (ref 70–110)
GLUCOSE SERPL-MCNC: 204 MG/DL (ref 70–110)
HCT VFR BLD AUTO: 34.4 % (ref 37–48.5)
HCT VFR BLD AUTO: 34.6 % (ref 37–48.5)
HGB BLD-MCNC: 11.6 GM/DL (ref 12–16)
HGB BLD-MCNC: 12 GM/DL (ref 12–16)
IMM GRANULOCYTES # BLD AUTO: 0.02 K/UL (ref 0–0.04)
IMM GRANULOCYTES # BLD AUTO: 0.05 K/UL (ref 0–0.04)
IMM GRANULOCYTES NFR BLD AUTO: 0.2 % (ref 0–0.5)
IMM GRANULOCYTES NFR BLD AUTO: 0.4 % (ref 0–0.5)
LYMPHOCYTES # BLD AUTO: 1.49 K/UL (ref 1–4.8)
LYMPHOCYTES # BLD AUTO: 2.38 K/UL (ref 1–4.8)
MAGNESIUM SERPL-MCNC: 1.6 MG/DL (ref 1.6–2.6)
MAGNESIUM SERPL-MCNC: 1.7 MG/DL (ref 1.6–2.6)
MCH RBC QN AUTO: 32.8 PG (ref 27–31)
MCH RBC QN AUTO: 34.4 PG (ref 27–31)
MCHC RBC AUTO-ENTMCNC: 33.5 G/DL (ref 32–36)
MCHC RBC AUTO-ENTMCNC: 34.9 G/DL (ref 32–36)
MCV RBC AUTO: 98 FL (ref 82–98)
MCV RBC AUTO: 99 FL (ref 82–98)
NUCLEATED RBC (/100WBC) (OHS): 0 /100 WBC
NUCLEATED RBC (/100WBC) (OHS): 0 /100 WBC
PHOSPHATE SERPL-MCNC: 3.2 MG/DL (ref 2.7–4.5)
PLATELET # BLD AUTO: 169 K/UL (ref 150–450)
PLATELET # BLD AUTO: 172 K/UL (ref 150–450)
PMV BLD AUTO: 12 FL (ref 9.2–12.9)
PMV BLD AUTO: 12 FL (ref 9.2–12.9)
POCT GLUCOSE: 145 MG/DL (ref 70–110)
POCT GLUCOSE: 158 MG/DL (ref 70–110)
POCT GLUCOSE: 160 MG/DL (ref 70–110)
POCT GLUCOSE: 212 MG/DL (ref 70–110)
POTASSIUM SERPL-SCNC: 4.2 MMOL/L (ref 3.5–5.1)
POTASSIUM SERPL-SCNC: 4.8 MMOL/L (ref 3.5–5.1)
PROT SERPL-MCNC: 5.7 GM/DL (ref 6–8.4)
RBC # BLD AUTO: 3.49 M/UL (ref 4–5.4)
RBC # BLD AUTO: 3.54 M/UL (ref 4–5.4)
RELATIVE EOSINOPHIL (OHS): 1.6 %
RELATIVE EOSINOPHIL (OHS): 3.5 %
RELATIVE LYMPHOCYTE (OHS): 12.5 % (ref 18–48)
RELATIVE LYMPHOCYTE (OHS): 28.3 % (ref 18–48)
RELATIVE MONOCYTE (OHS): 6.2 % (ref 4–15)
RELATIVE MONOCYTE (OHS): 9.9 % (ref 4–15)
RELATIVE NEUTROPHIL (OHS): 57.4 % (ref 38–73)
RELATIVE NEUTROPHIL (OHS): 78.9 % (ref 38–73)
SODIUM SERPL-SCNC: 137 MMOL/L (ref 136–145)
SODIUM SERPL-SCNC: 140 MMOL/L (ref 136–145)
WBC # BLD AUTO: 11.95 K/UL (ref 3.9–12.7)
WBC # BLD AUTO: 8.4 K/UL (ref 3.9–12.7)

## 2025-06-26 PROCEDURE — 93005 ELECTROCARDIOGRAM TRACING: CPT

## 2025-06-26 PROCEDURE — B2111ZZ FLUOROSCOPY OF MULTIPLE CORONARY ARTERIES USING LOW OSMOLAR CONTRAST: ICD-10-PCS | Performed by: INTERNAL MEDICINE

## 2025-06-26 PROCEDURE — 25000003 PHARM REV CODE 250

## 2025-06-26 PROCEDURE — 27800505 HC CATH, RADIAL ARTERY KIT: Performed by: ANESTHESIOLOGY

## 2025-06-26 PROCEDURE — 33225 L VENTRIC PACING LEAD ADD-ON: CPT | Performed by: INTERNAL MEDICINE

## 2025-06-26 PROCEDURE — 33208 INSRT HEART PM ATRIAL & VENT: CPT | Mod: KX | Performed by: INTERNAL MEDICINE

## 2025-06-26 PROCEDURE — 63600175 PHARM REV CODE 636 W HCPCS: Performed by: STUDENT IN AN ORGANIZED HEALTH CARE EDUCATION/TRAINING PROGRAM

## 2025-06-26 PROCEDURE — 25000003 PHARM REV CODE 250: Performed by: INTERNAL MEDICINE

## 2025-06-26 PROCEDURE — C2621 PMKR, OTHER THAN SING/DUAL: HCPCS | Performed by: INTERNAL MEDICINE

## 2025-06-26 PROCEDURE — 27200677 HC TRANSDUCER MONITOR KIT SINGLE: Performed by: ANESTHESIOLOGY

## 2025-06-26 PROCEDURE — 63600175 PHARM REV CODE 636 W HCPCS: Performed by: INTERNAL MEDICINE

## 2025-06-26 PROCEDURE — C1769 GUIDE WIRE: HCPCS | Performed by: INTERNAL MEDICINE

## 2025-06-26 PROCEDURE — C1894 INTRO/SHEATH, NON-LASER: HCPCS | Performed by: INTERNAL MEDICINE

## 2025-06-26 PROCEDURE — 37000008 HC ANESTHESIA 1ST 15 MINUTES: Performed by: INTERNAL MEDICINE

## 2025-06-26 PROCEDURE — 93010 ELECTROCARDIOGRAM REPORT: CPT | Mod: ,,, | Performed by: INTERNAL MEDICINE

## 2025-06-26 PROCEDURE — 87077 CULTURE AEROBIC IDENTIFY: CPT | Performed by: STUDENT IN AN ORGANIZED HEALTH CARE EDUCATION/TRAINING PROGRAM

## 2025-06-26 PROCEDURE — 33208 INSRT HEART PM ATRIAL & VENT: CPT | Mod: KX,,, | Performed by: INTERNAL MEDICINE

## 2025-06-26 PROCEDURE — 25000003 PHARM REV CODE 250: Performed by: STUDENT IN AN ORGANIZED HEALTH CARE EDUCATION/TRAINING PROGRAM

## 2025-06-26 PROCEDURE — 93454 CORONARY ARTERY ANGIO S&I: CPT | Mod: 26,,, | Performed by: INTERNAL MEDICINE

## 2025-06-26 PROCEDURE — 83735 ASSAY OF MAGNESIUM: CPT | Performed by: STUDENT IN AN ORGANIZED HEALTH CARE EDUCATION/TRAINING PROGRAM

## 2025-06-26 PROCEDURE — C1900 LEAD, CORONARY VENOUS: HCPCS | Performed by: INTERNAL MEDICINE

## 2025-06-26 PROCEDURE — C1730 CATH, EP, 19 OR FEW ELECT: HCPCS | Performed by: INTERNAL MEDICINE

## 2025-06-26 PROCEDURE — 02HL3JZ INSERTION OF PACEMAKER LEAD INTO LEFT VENTRICLE, PERCUTANEOUS APPROACH: ICD-10-PCS | Performed by: INTERNAL MEDICINE

## 2025-06-26 PROCEDURE — 99152 MOD SED SAME PHYS/QHP 5/>YRS: CPT | Performed by: INTERNAL MEDICINE

## 2025-06-26 PROCEDURE — 33225 L VENTRIC PACING LEAD ADD-ON: CPT | Mod: ,,, | Performed by: INTERNAL MEDICINE

## 2025-06-26 PROCEDURE — 99152 MOD SED SAME PHYS/QHP 5/>YRS: CPT | Mod: ,,, | Performed by: INTERNAL MEDICINE

## 2025-06-26 PROCEDURE — 82565 ASSAY OF CREATININE: CPT | Performed by: STUDENT IN AN ORGANIZED HEALTH CARE EDUCATION/TRAINING PROGRAM

## 2025-06-26 PROCEDURE — 27000221 HC OXYGEN, UP TO 24 HOURS

## 2025-06-26 PROCEDURE — C1892 INTRO/SHEATH,FIXED,PEEL-AWAY: HCPCS | Performed by: INTERNAL MEDICINE

## 2025-06-26 PROCEDURE — 20000000 HC ICU ROOM

## 2025-06-26 PROCEDURE — 93454 CORONARY ARTERY ANGIO S&I: CPT | Performed by: INTERNAL MEDICINE

## 2025-06-26 PROCEDURE — 84100 ASSAY OF PHOSPHORUS: CPT

## 2025-06-26 PROCEDURE — 63600175 PHARM REV CODE 636 W HCPCS

## 2025-06-26 PROCEDURE — C1898 LEAD, PMKR, OTHER THAN TRANS: HCPCS | Performed by: INTERNAL MEDICINE

## 2025-06-26 PROCEDURE — B5181ZZ FLUOROSCOPY OF SUPERIOR VENA CAVA USING LOW OSMOLAR CONTRAST: ICD-10-PCS | Performed by: INTERNAL MEDICINE

## 2025-06-26 PROCEDURE — 94761 N-INVAS EAR/PLS OXIMETRY MLT: CPT

## 2025-06-26 PROCEDURE — 02H63JZ INSERTION OF PACEMAKER LEAD INTO RIGHT ATRIUM, PERCUTANEOUS APPROACH: ICD-10-PCS | Performed by: INTERNAL MEDICINE

## 2025-06-26 PROCEDURE — 80053 COMPREHEN METABOLIC PANEL: CPT | Performed by: STUDENT IN AN ORGANIZED HEALTH CARE EDUCATION/TRAINING PROGRAM

## 2025-06-26 PROCEDURE — 25000003 PHARM REV CODE 250: Performed by: NURSE ANESTHETIST, CERTIFIED REGISTERED

## 2025-06-26 PROCEDURE — 85025 COMPLETE CBC W/AUTO DIFF WBC: CPT | Performed by: STUDENT IN AN ORGANIZED HEALTH CARE EDUCATION/TRAINING PROGRAM

## 2025-06-26 PROCEDURE — 63600175 PHARM REV CODE 636 W HCPCS: Performed by: NURSE ANESTHETIST, CERTIFIED REGISTERED

## 2025-06-26 PROCEDURE — 02HK3JZ INSERTION OF PACEMAKER LEAD INTO RIGHT VENTRICLE, PERCUTANEOUS APPROACH: ICD-10-PCS | Performed by: INTERNAL MEDICINE

## 2025-06-26 PROCEDURE — 99231 SBSQ HOSP IP/OBS SF/LOW 25: CPT | Mod: GC,,, | Performed by: INTERNAL MEDICINE

## 2025-06-26 PROCEDURE — 25500020 PHARM REV CODE 255: Performed by: INTERNAL MEDICINE

## 2025-06-26 PROCEDURE — 85025 COMPLETE CBC W/AUTO DIFF WBC: CPT

## 2025-06-26 PROCEDURE — 83735 ASSAY OF MAGNESIUM: CPT

## 2025-06-26 PROCEDURE — B51N1ZZ FLUOROSCOPY OF LEFT UPPER EXTREMITY VEINS USING LOW OSMOLAR CONTRAST: ICD-10-PCS | Performed by: INTERNAL MEDICINE

## 2025-06-26 PROCEDURE — 99900035 HC TECH TIME PER 15 MIN (STAT)

## 2025-06-26 PROCEDURE — 81003 URINALYSIS AUTO W/O SCOPE: CPT | Performed by: STUDENT IN AN ORGANIZED HEALTH CARE EDUCATION/TRAINING PROGRAM

## 2025-06-26 PROCEDURE — 0JH607Z INSERTION OF CARDIAC RESYNCHRONIZATION PACEMAKER PULSE GENERATOR INTO CHEST SUBCUTANEOUS TISSUE AND FASCIA, OPEN APPROACH: ICD-10-PCS | Performed by: INTERNAL MEDICINE

## 2025-06-26 PROCEDURE — 37000009 HC ANESTHESIA EA ADD 15 MINS: Performed by: INTERNAL MEDICINE

## 2025-06-26 DEVICE — IMPLANTABLE DEVICE
Type: IMPLANTABLE DEVICE | Site: HEART | Status: FUNCTIONAL
Brand: AMVIA EDGE HF-T QP

## 2025-06-26 DEVICE — IMPLANTABLE DEVICE
Type: IMPLANTABLE DEVICE | Site: HEART | Status: FUNCTIONAL
Brand: SOLIA S 60

## 2025-06-26 DEVICE — IMPLANTABLE DEVICE
Type: IMPLANTABLE DEVICE | Site: HEART | Status: FUNCTIONAL
Brand: SOLIA S 45

## 2025-06-26 DEVICE — IMPLANTABLE DEVICE
Type: IMPLANTABLE DEVICE | Site: HEART | Status: FUNCTIONAL
Brand: SENTUS PROMRI OTW QP L-85/49

## 2025-06-26 RX ORDER — PHENYLEPHRINE HYDROCHLORIDE 10 MG/ML
INJECTION INTRAVENOUS
Status: DISCONTINUED | OUTPATIENT
Start: 2025-06-26 | End: 2025-06-26

## 2025-06-26 RX ORDER — METHOCARBAMOL 500 MG/1
500 TABLET, FILM COATED ORAL ONCE
Status: COMPLETED | OUTPATIENT
Start: 2025-06-26 | End: 2025-06-26

## 2025-06-26 RX ORDER — OLANZAPINE 5 MG/1
5 TABLET, FILM COATED ORAL DAILY
Status: DISCONTINUED | OUTPATIENT
Start: 2025-06-26 | End: 2025-06-27

## 2025-06-26 RX ORDER — GABAPENTIN 300 MG/1
300 CAPSULE ORAL ONCE
Status: COMPLETED | OUTPATIENT
Start: 2025-06-26 | End: 2025-06-26

## 2025-06-26 RX ORDER — FUROSEMIDE 10 MG/ML
120 INJECTION INTRAMUSCULAR; INTRAVENOUS ONCE
Status: DISCONTINUED | OUTPATIENT
Start: 2025-06-26 | End: 2025-06-26

## 2025-06-26 RX ORDER — PROPOFOL 10 MG/ML
VIAL (ML) INTRAVENOUS CONTINUOUS PRN
Status: DISCONTINUED | OUTPATIENT
Start: 2025-06-26 | End: 2025-06-26

## 2025-06-26 RX ORDER — VANCOMYCIN HYDROCHLORIDE 1 G/20ML
INJECTION, POWDER, LYOPHILIZED, FOR SOLUTION INTRAVENOUS
Status: DISCONTINUED | OUTPATIENT
Start: 2025-06-26 | End: 2025-06-27

## 2025-06-26 RX ORDER — LIDOCAINE HYDROCHLORIDE 20 MG/ML
INJECTION INTRAVENOUS
Status: DISCONTINUED | OUTPATIENT
Start: 2025-06-26 | End: 2025-06-26

## 2025-06-26 RX ORDER — DOXYCYCLINE HYCLATE 100 MG
100 TABLET ORAL EVERY 12 HOURS
Status: COMPLETED | OUTPATIENT
Start: 2025-06-26 | End: 2025-07-01

## 2025-06-26 RX ORDER — SODIUM CHLORIDE 0.9 % (FLUSH) 0.9 %
10 SYRINGE (ML) INJECTION
Status: DISCONTINUED | OUTPATIENT
Start: 2025-06-26 | End: 2025-06-26

## 2025-06-26 RX ORDER — SODIUM CHLORIDE 0.9 G/100ML
INJECTION, SOLUTION IRRIGATION
Status: DISCONTINUED | OUTPATIENT
Start: 2025-06-26 | End: 2025-06-27

## 2025-06-26 RX ORDER — BUPIVACAINE HYDROCHLORIDE 2.5 MG/ML
INJECTION, SOLUTION EPIDURAL; INFILTRATION; INTRACAUDAL; PERINEURAL
Status: DISCONTINUED | OUTPATIENT
Start: 2025-06-26 | End: 2025-06-27

## 2025-06-26 RX ORDER — MAGNESIUM SULFATE HEPTAHYDRATE 40 MG/ML
2 INJECTION, SOLUTION INTRAVENOUS ONCE
Status: COMPLETED | OUTPATIENT
Start: 2025-06-27 | End: 2025-06-27

## 2025-06-26 RX ORDER — OLANZAPINE 5 MG/1
5 TABLET, FILM COATED ORAL DAILY
Status: DISCONTINUED | OUTPATIENT
Start: 2025-06-27 | End: 2025-06-26

## 2025-06-26 RX ORDER — FENTANYL CITRATE 50 UG/ML
INJECTION, SOLUTION INTRAMUSCULAR; INTRAVENOUS
Status: DISCONTINUED | OUTPATIENT
Start: 2025-06-26 | End: 2025-06-26

## 2025-06-26 RX ORDER — SODIUM CHLORIDE 9 MG/ML
INJECTION, SOLUTION INTRAVENOUS CONTINUOUS
Status: DISCONTINUED | OUTPATIENT
Start: 2025-06-26 | End: 2025-06-26

## 2025-06-26 RX ORDER — ETOMIDATE 2 MG/ML
INJECTION INTRAVENOUS
Status: DISCONTINUED | OUTPATIENT
Start: 2025-06-26 | End: 2025-06-26

## 2025-06-26 RX ORDER — HEPARIN SODIUM 1000 [USP'U]/ML
INJECTION, SOLUTION INTRAVENOUS; SUBCUTANEOUS
Status: DISCONTINUED | OUTPATIENT
Start: 2025-06-26 | End: 2025-06-26

## 2025-06-26 RX ORDER — LIDOCAINE HYDROCHLORIDE 20 MG/ML
INJECTION, SOLUTION INFILTRATION; PERINEURAL
Status: DISCONTINUED | OUTPATIENT
Start: 2025-06-26 | End: 2025-06-27

## 2025-06-26 RX ORDER — DIPHENHYDRAMINE HCL 50 MG
50 CAPSULE ORAL ONCE
Status: COMPLETED | OUTPATIENT
Start: 2025-06-26 | End: 2025-06-26

## 2025-06-26 RX ORDER — DEXMEDETOMIDINE HYDROCHLORIDE 100 UG/ML
INJECTION, SOLUTION INTRAVENOUS
Status: DISCONTINUED | OUTPATIENT
Start: 2025-06-26 | End: 2025-06-26

## 2025-06-26 RX ORDER — VASOPRESSIN 20 [USP'U]/ML
INJECTION, SOLUTION INTRAMUSCULAR; SUBCUTANEOUS
Status: DISCONTINUED | OUTPATIENT
Start: 2025-06-26 | End: 2025-06-26

## 2025-06-26 RX ADMIN — DEXMEDETOMIDINE 4 MCG: 200 INJECTION, SOLUTION INTRAVENOUS at 02:06

## 2025-06-26 RX ADMIN — ISOSORBIDE DINITRATE 40 MG: 20 TABLET ORAL at 08:06

## 2025-06-26 RX ADMIN — PROPOFOL 45 MCG/KG/MIN: 10 INJECTION, EMULSION INTRAVENOUS at 01:06

## 2025-06-26 RX ADMIN — VANCOMYCIN HYDROCHLORIDE 1000 MG: 1 INJECTION, POWDER, LYOPHILIZED, FOR SOLUTION INTRAVENOUS at 01:06

## 2025-06-26 RX ADMIN — ETOMIDATE 5 MG: 2 INJECTION, SOLUTION INTRAVENOUS at 02:06

## 2025-06-26 RX ADMIN — GABAPENTIN 300 MG: 300 CAPSULE ORAL at 09:06

## 2025-06-26 RX ADMIN — MUPIROCIN: 20 OINTMENT TOPICAL at 08:06

## 2025-06-26 RX ADMIN — DEXMEDETOMIDINE 4 MCG: 200 INJECTION, SOLUTION INTRAVENOUS at 01:06

## 2025-06-26 RX ADMIN — POLYETHYLENE GLYCOL 3350 17 G: 17 POWDER, FOR SOLUTION ORAL at 08:06

## 2025-06-26 RX ADMIN — SODIUM CHLORIDE: 0.9 INJECTION, SOLUTION INTRAVENOUS at 01:06

## 2025-06-26 RX ADMIN — ATORVASTATIN CALCIUM 20 MG: 20 TABLET, FILM COATED ORAL at 08:06

## 2025-06-26 RX ADMIN — LIDOCAINE HYDROCHLORIDE 100 MG: 20 INJECTION INTRAVENOUS at 01:06

## 2025-06-26 RX ADMIN — OLANZAPINE 5 MG: 5 TABLET, FILM COATED ORAL at 05:06

## 2025-06-26 RX ADMIN — VASOPRESSIN 3 UNITS: 20 INJECTION INTRAVENOUS at 02:06

## 2025-06-26 RX ADMIN — HYDRALAZINE HYDROCHLORIDE 50 MG: 50 TABLET ORAL at 05:06

## 2025-06-26 RX ADMIN — INSULIN ASPART 2 UNITS: 100 INJECTION, SOLUTION INTRAVENOUS; SUBCUTANEOUS at 08:06

## 2025-06-26 RX ADMIN — FUROSEMIDE 80 MG: 10 INJECTION, SOLUTION INTRAVENOUS at 08:06

## 2025-06-26 RX ADMIN — MAGNESIUM SULFATE HEPTAHYDRATE 2 G: 40 INJECTION, SOLUTION INTRAVENOUS at 11:06

## 2025-06-26 RX ADMIN — METHOCARBAMOL 500 MG: 500 TABLET ORAL at 09:06

## 2025-06-26 RX ADMIN — PHENYLEPHRINE HYDROCHLORIDE 50 MCG: 10 INJECTION INTRAVENOUS at 01:06

## 2025-06-26 RX ADMIN — ETOMIDATE 3 MG: 2 INJECTION, SOLUTION INTRAVENOUS at 02:06

## 2025-06-26 RX ADMIN — INSULIN GLARGINE 5 UNITS: 100 INJECTION, SOLUTION SUBCUTANEOUS at 08:06

## 2025-06-26 RX ADMIN — DIPHENHYDRAMINE HYDROCHLORIDE 50 MG: 50 CAPSULE ORAL at 09:06

## 2025-06-26 RX ADMIN — HYDRALAZINE HYDROCHLORIDE 50 MG: 50 TABLET ORAL at 10:06

## 2025-06-26 RX ADMIN — ACETAMINOPHEN 650 MG: 325 TABLET ORAL at 07:06

## 2025-06-26 RX ADMIN — ACETAMINOPHEN 650 MG: 325 TABLET ORAL at 06:06

## 2025-06-26 RX ADMIN — GABAPENTIN 300 MG: 300 CAPSULE ORAL at 08:06

## 2025-06-26 RX ADMIN — ETOMIDATE 5 MG: 2 INJECTION, SOLUTION INTRAVENOUS at 01:06

## 2025-06-26 RX ADMIN — SODIUM CHLORIDE 250 MG: 9 INJECTION, SOLUTION INTRAVENOUS at 08:06

## 2025-06-26 RX ADMIN — FUROSEMIDE 10 MG/HR: 10 INJECTION, SOLUTION INTRAMUSCULAR; INTRAVENOUS at 11:06

## 2025-06-26 RX ADMIN — ASPIRIN 81 MG CHEWABLE TABLET 81 MG: 81 TABLET CHEWABLE at 09:06

## 2025-06-26 RX ADMIN — DOXYCYCLINE HYCLATE 100 MG: 100 TABLET, COATED ORAL at 10:06

## 2025-06-26 NOTE — TRANSFER OF CARE
"Anesthesia Transfer of Care Note    Patient: Cady Watkins    Procedure(s) Performed: Procedure(s) (LRB):  INSERTION, PACEMAKER, BIVENTRICULAR (N/A)    Patient location: PACU    Anesthesia Type: general    Transport from OR: Transported from OR on 6-10 L/min O2 by face mask with adequate spontaneous ventilation    Post pain: adequate analgesia    Post assessment: no apparent anesthetic complications    Post vital signs: stable    Level of consciousness: sedated    Nausea/Vomiting: no nausea/vomiting    Complications: none    Transfer of care protocol was followed      Last vitals: Visit Vitals  /67   Pulse (!) 53   Temp 36.6 °C (97.8 °F)   Resp (!) 21   Ht 5' 2" (1.575 m)   Wt 62.2 kg (137 lb 2 oz)   LMP 04/22/2001   SpO2 95%   BMI 25.08 kg/m²     "

## 2025-06-26 NOTE — EICU
Intervention Initiated From:  COR / KALLIU    Anamika intervened regarding:  Rounding (Video assessment)  VICU Night Rounds Checklist  24H Vital Sign Range:  Temp:  [97.5 °F (36.4 °C)-98.2 °F (36.8 °C)]   Pulse:  [50-56]   Resp:  [9-37]   BP: ()/()   SpO2:  [91 %-99 %]     Video rounds

## 2025-06-26 NOTE — ANESTHESIA PROCEDURE NOTES
Arterial    Diagnosis: Hypertension  Doctor requesting consult: Toño    Patient location during procedure: done in OR  Timeout: 6/26/2025 1:37 PM  Procedure end time: 6/26/2025 1:40 PM    Staffing  Authorizing Provider: Calin Peres MD  Performing Provider: Calin Peres MD    Staffing  Performed by: Calin Peres MD  Authorized by: Calin Peres MD    Anesthesiologist was present at the time of the procedure.    Preanesthetic Checklist  Completed: patient identified, IV checked, site marked, risks and benefits discussed, surgical consent, monitors and equipment checked, pre-op evaluation, timeout performed and anesthesia consent givenArterial  Skin Prep: chlorhexidine gluconate  Local Infiltration: none  Orientation: left  Location: radial    Catheter Size: 20 G  Catheter placement by Anatomical landmarks. Heme positive aspiration all ports. Insertion Attempts: 1  Assessment  Dressing: secured with tape and tegaderm  Patient: Tolerated well

## 2025-06-26 NOTE — BRIEF OP NOTE
Brief Operative Note:    : Perfecto Coburn MD     Referring Physician: Murali Sousa     All Operators: Surgeon(s):  , Austin, MD Jenkins, James S., MD Gillies, Connor M, DO Garikapati, Kiran, MD     Preoperative Diagnosis: Complete heart block [I44.2]  HFrEF (heart failure with reduced ejection fraction) [I50.20]     Postop Diagnosis: Complete heart block [I44.2]  HFrEF (heart failure with reduced ejection fraction) [I50.20]    Treatments/Procedures: Procedure(s) (LRB):  ANGIOGRAM, CORONARY ARTERY (N/A)    Findings: 70% left circumflex stenosis, otherwise diffuse non-obstructive disease is present. See catheterization report for full details.    Estimated Blood loss: 20 cc    Specimens removed: No    Recommendations:   - Routine post-cath care as per orders  - 2 hour bedrest with vasc band orders placed  - Continue aspirin daily    Connor M Gillies

## 2025-06-26 NOTE — ANESTHESIA POSTPROCEDURE EVALUATION
Anesthesia Post Evaluation    Patient: Cady Watkins    Procedure(s) Performed: Procedure(s) (LRB):  INSERTION, PACEMAKER, BIVENTRICULAR (N/A)    Final Anesthesia Type: general      Patient location during evaluation: PACU  Patient participation: Yes- Able to Participate  Level of consciousness: awake and alert and oriented  Post-procedure vital signs: reviewed and stable  Pain management: adequate  Airway patency: patent    PONV status at discharge: No PONV  Anesthetic complications: no      Cardiovascular status: blood pressure returned to baseline and hemodynamically stable  Respiratory status: unassisted, spontaneous ventilation and room air  Hydration status: euvolemic  Follow-up not needed.              Vitals Value Taken Time   /83 06/26/25 17:02   Temp See recovery discharge vitals 06/26/25 17:34   Pulse 88 06/26/25 17:33   Resp 12 06/26/25 17:33   SpO2 91 % 06/26/25 17:33   Vitals shown include unfiled device data.      No case tracking events are documented in the log.      Pain/Jay Score: Pain Rating Prior to Med Admin: 3 (6/26/2025  6:20 AM)  Pain Rating Post Med Admin: 0 (6/25/2025 10:20 PM)  Jay Score: 10 (6/26/2025 11:16 AM)

## 2025-06-26 NOTE — CARE UPDATE
IC Care Update    Patient mildly encephalopathic this AM though appropriately responds to questions. She is lying flat in bed on 2L NC sating 94-96% after 20 minutes. Denies dyspnea while lying flat. Her only complaint is cramping in her legs.  cc,  cc total yesterday. C 1.4 today, from 1.2 yesterday. Will plan to proceed with LHC +/- PCI today.     - Will evaluate with PCI/LHC. Patient is a SAPPHIRE candidate  - Cr 1.4  - Anti-platelet Therapy: Asa  - Access site: R radial  - Allergies: No shellfish / Iodine contrast allergy  - Pre-Hydration: NS  - Pre-Op Med: Bendaryl 50mg pO   - All patient's questions were answered.  -The risks, benefits and alternatives of the procedure were explained to the patient.   -The risks of coronary angiography include but are not limited to: bleeding, infection, heart rhythm abnormalities, allergic reactions, kidney injury and potential need for dialysis, stroke and death.   - Should stenting be indicated, the patient has agreed to dual anti-platelet therapy for 1-consecutive year with a drug-eluting stent and a minimum of 1-month with the use of a bare metal stent  - Additionally, pt is aware that non-compliance is likely to result in stent clotting with heart attack, heart failure, and/or death  -The risks of moderate sedation include hypotension, respiratory depression, arrhythmias, bronchospasm, and death.   - Informed consent was obtained and the  patient is agreeable to proceed with the procedure.     EMILY Campa  Cardiovascular Disease fellow, PGY-4  [facility]

## 2025-06-26 NOTE — ASSESSMENT & PLAN NOTE
Patient found to have new systolic heart failure on TTE. She additionally has wall motion abnormalities that could represent old infarct or stress induced cardiomyopathy.    Plan:  - telemetry  - strict I/O's, daily weights  - Switch from IV lasix 40 mg BID to gtt due to worsening pulmonary edema and Scr  - hold BB, ARB/ARNI, MRA, and SGLt2i at this time but will plan for initiation prior to discharge

## 2025-06-26 NOTE — ASSESSMENT & PLAN NOTE
ISAIAS is likely due to pre-renal azotemia due to intravascular volume depletion. Baseline creatinine is 1.0. Most recent creatinine and eGFR are listed below.  Recent Labs     06/25/25  0310 06/25/25  1626 06/26/25  0751   CREATININE 1.3 1.2 1.4   EGFRNORACEVR 44* 48* 40*        Plan  - ISAIAS is worsening. Will adjust treatment as follows: lasix gtt  - Avoid nephrotoxins and renally dose meds for GFR listed above  - Monitor urine output, serial BMP, and adjust therapy as needed

## 2025-06-26 NOTE — SUBJECTIVE & OBJECTIVE
Interval History: [facility] today with IC revealed no significant stenosis. EP will evaluate for PPM placement. IV lasix gtt initiated. Scr up trending, will continue to monitor.         Past Medical History:   Diagnosis Date    Diabetes mellitus, type 2     Fibromyalgia 2016    Hypertension 2016       Past Surgical History:   Procedure Laterality Date     SECTION      CHOLECYSTECTOMY      FOOT SURGERY Bilateral     plantar fasciotomy bilateral, arthroplasty fifth toe bilateral    HERNIA REPAIR      INSERTION, PACEMAKER, TEMPORARY TRANSVENOUS N/A 2025    Procedure: Insertion, Pacemaker, Temporary Transvenous;  Surgeon: Sean Ureña MD;  Location: [facility] CATH LAB/EP;  Service: Cardiology;  Laterality: N/A;       Review of patient's allergies indicates:   Allergen Reactions    Advil [ibuprofen] Hives    Penicillins     Codeine     Excedrin aspirin free [acetaminophen-caffeine]        No current facility-administered medications on file prior to encounter.     Current Outpatient Medications on File Prior to Encounter   Medication Sig    cyclobenzaprine (FLEXERIL) 10 MG tablet TAKE 1 TABLET BY MOUTH EVERY DAY NIGHTLY AS NEEDED FOR MUSCLE SPASMS    gabapentin (NEURONTIN) 300 MG capsule TAKE 1 CAPSULE BY MOUTH EVERYDAY AT BEDTIME    lisinopriL (PRINIVIL,ZESTRIL) 20 MG tablet TAKE 1 TABLET BY MOUTH EVERY DAY    metFORMIN (GLUCOPHAGE) 500 MG tablet TAKE 1 TABLET BY MOUTH TWICE A DAY WITH MEALS    metoprolol tartrate (LOPRESSOR) 50 MG tablet TAKE 1 TABLET BY MOUTH TWICE A DAY    oxyCODONE-acetaminophen (PERCOCET)  mg per tablet Take 1 tablet by mouth nightly as needed for Pain.    rosuvastatin (CRESTOR) 5 MG tablet Take 1 tablet (5 mg total) by mouth once daily.     Family History       Problem Relation (Age of Onset)    Diabetes Father    Heart disease Sister, Brother          Tobacco Use    Smoking status: Never    Smokeless tobacco: Never   Substance and Sexual Activity    Alcohol use: Yes      Comment: Occasionally    Drug use: Not on file    Sexual activity: Yes     Review of Systems   Constitutional: Positive for malaise/fatigue. Negative for chills and fever.   Cardiovascular:  Positive for dyspnea on exertion, leg swelling and syncope. Negative for chest pain, irregular heartbeat and palpitations.   Respiratory:  Positive for shortness of breath.    Musculoskeletal:  Positive for back pain and stiffness.   Genitourinary: Negative.      Objective:     Vital Signs (Most Recent):  Temp: 97.8 °F (36.6 °C) (06/26/25 0800)  Pulse: (!) 53 (06/26/25 1000)  Resp: (!) 21 (06/26/25 1000)  BP: 126/67 (06/26/25 1000)  SpO2: 95 % (06/26/25 1000) Vital Signs (24h Range):  Temp:  [97.8 °F (36.6 °C)-98.4 °F (36.9 °C)] 97.8 °F (36.6 °C)  Pulse:  [50-61] 53  Resp:  [10-33] 21  SpO2:  [90 %-100 %] 95 %  BP: (110-165)/(53-81) 126/67     Weight: 62.2 kg (137 lb 2 oz)  Body mass index is 25.08 kg/m².    SpO2: 95 %         Intake/Output Summary (Last 24 hours) at 6/26/2025 1315  Last data filed at 6/26/2025 0600  Gross per 24 hour   Intake 438.51 ml   Output 650 ml   Net -211.49 ml       Lines/Drains/Airways       Central Venous Catheter Line  Duration             Introducer 06/18/25 Internal Jugular Right 8 days              Drain  Duration             Female External Urinary Catheter w/ Suction 06/24/25 0120 2 days              Line  Duration                  Pacer Wires 06/18/25 0750 8 days              Peripheral Intravenous Line  Duration             Peripheral IV Single Lumen 06/22/25 0657 22 G Anterior;Right Forearm 4 days    Peripheral IV 06/25/25 0030 20 G Anterior;Left;Proximal Forearm 1 day                     Physical Exam  Vitals reviewed.   Constitutional:       General: She is not in acute distress.  Eyes:      General: No scleral icterus.  Cardiovascular:      Rate and Rhythm: Normal rate and regular rhythm.   Pulmonary:      Effort: Pulmonary effort is normal. No respiratory distress.      Breath sounds: No  "wheezing.   Abdominal:      Hernia: A hernia is present.   Musculoskeletal:      Right lower leg: No edema.      Left lower leg: No edema.   Skin:     General: Skin is warm and dry.   Neurological:      Mental Status: She is alert.   Psychiatric:         Cognition and Memory: Cognition is impaired.          Significant Labs: ABG: No results for input(s): "PH", "PCO2", "HCO3", "POCSATURATED", "BE" in the last 48 hours., CMP   Recent Labs   Lab 06/25/25  0310 06/25/25  1626 06/26/25  0751    140 140   K 4.5 3.6 4.8    100 104   CO2 27 27 25   * 136* 132*   BUN 33* 29* 29*   CREATININE 1.3 1.2 1.4   CALCIUM 8.4* 8.3* 8.3*   PROT 6.0 6.1 5.7*   ALBUMIN 2.6* 2.8* 2.7*   BILITOT 0.7 0.7 0.5   ALKPHOS 41 42 40   AST 29 28 26   ALT 16 17 14   ANIONGAP 14 13 11   , CBC   Recent Labs   Lab 06/25/25  0508 06/26/25  0542   WBC 6.70 8.40   HGB 12.1 12.0   HCT 35.9* 34.4*   * 169   , INR   No results for input(s): "INR", "PROTIME" in the last 48 hours.  , Lipid Panel No results for input(s): "CHOL", "HDL", "LDLCALC", "TRIG", "CHOLHDL" in the last 48 hours., Troponin No results for input(s): "TROPONINIHS" in the last 48 hours., and All pertinent lab results from the last 24 hours have been reviewed.    Significant Imaging: Echocardiogram: Transthoracic echo (TTE) complete (Cupid Only):   Results for orders placed or performed during the hospital encounter of 06/24/25   Echo   Result Value Ref Range    BSA 1.71 m2    LVOT stroke volume 71.0 cm3    LV Systolic Volume Index 28.0 mL/m2    LV Diastolic Volume Index 62.50 mL/m2    LVOT area 3.1 cm2    FS 29.2 28 - 44 %    Left Ventricle Relative Wall Thickness 0.38 cm    LV mass 137.1 g    LV Mass Index 81.6 g/m2    E/E' ratio 16 m/s    AJAY 39 mL/m2    LA Vol 65 cm3    RV/LV Ratio 0.58 cm    AV Velocity Ratio 0.61     AV index (prosthetic) 0.78     KAREN by Velocity Ratio 1.9 cm²    ASI 1.8 cm/m2    ASI 1.5 cm/m2    Mean e' 0.06 m/s    ZLVIDS 1.25     ZLVIDD " 0.24     LV Diastolic Volume 105 mL    Echo EF Estimated 56 %    LV Systolic Volume 47 mL    IVS 0.8 0.6 - 1.1 cm    LVIDd 4.8 3.5 - 6.0 cm    LVIDs 3.4 2.1 - 4.0 cm    LVOT diameter 2.0 cm    PW 0.9 0.6 - 1.1 cm    AV LVOT peak gradient 5 mmHg    LVOT mn grad 2 mmHg    LVOT peak darrin 1.1 m/s    LVOT peak VTI 22.6 cm    RV- elias basal diam 2.8 cm    LA size 3.4 cm    Left Atrium Major Axis 6.0 cm    Left Atrium Minor Axis 5.9 cm    RA Major Axis 5.34 cm    AV valve area 2.5 cm2    AV area by cont VTI 2.4 cm2    AV peak gradient 13 mmHg    AV mean gradient 5 mmHg    Ao peak darrin 1.8 m/s    Ao VTI 28.8 cm    MV Peak A Darrin 1.14 m/s    E wave deceleration time 229 ms    E wave deceleration time 222 ms    MV Peak E Darrin 0.86 m/s    E/A ratio 0.75     LV LATERAL E/E' RATIO 14.3     LV SEPTAL E/E' RATIO 17.2     TDI LATERAL 0.06 m/s    TDI SEPTAL 0.05 m/s    TV peak gradient 35 mmHg    TR Max Darrin 3.0 m/s    Ascending aorta 2.6 cm    STJ 2.3 cm    Sinus 3.0 cm    LA WIDTH 3.8 cm    RA Width 3.66 cm    TAPSE 2.1 cm    TV resting pulmonary artery pressure 51 mmHg    RV TB RVSP 18 mmHg    Est. RA pres 15 mmHg    Narrative      Left Ventricle: The left ventricle is normal in size. Normal wall   thickness. Regional wall motion abnormalities present. There is severely   reduced systolic function with a visually estimated ejection fraction of   15 - 20%. Grade I diastolic dysfunction. Elevated left ventricular filling   pressure. No thrombus observed using contrast enhanced images. Hypokinesis   of all segments except the bases suggests takastsubo CM pattern, but   multivessel CAD cannot be exluded    Right Ventricle: The right ventricle is normal in size measuring 2.8   cm. Wall thickness is normal. Systolic function is normal.    Aortic Valve: There is mild aortic valve sclerosis. There is moderate   annular calcification present.    Mitral Valve: There is mild regurgitation with a centrally directed   jet.    Aorta: The aortic  root is normal in size measuring 3.0 cm. The proximal   ascending aorta is normal in size measuring 2.6 cm.    Pulmonary Artery: There is moderate pulmonary hypertension. The   estimated pulmonary artery systolic pressure is 51 mmHg.    IVC/SVC: Elevated venous pressure at 15 mmHg.    Pericardium: There is no pericardial effusion.

## 2025-06-26 NOTE — PLAN OF CARE
CICU DAILY GOALS       A: Awake    RASS: Goal -    Actual - RASS (Johnson Agitation-Sedation Scale): restless   Restraint necessity: Clinical Justification: Treatment Interference  B: Breath   SBT: Not intubated   C: Coordinate A & B, analgesics/sedatives   Pain: managed    SAT: Not intubated  D: Delirium   CAM-ICU:    E: Early(intubated/ Progressive (non-intubated) Mobility   MOVE Screen: Pass   Activity: Activity Management: Rolling - L1, Ankle pumps - L1, Arm raise - L1  FAS: Feeding/Nutrition   Diet order: Diet/Nutrition Received: NPO,   Fluid restriction:    T: Thrombus   DVT prophylaxis: VTE Core Measure: Pharmacological prophylaxis initiated/maintained  H: HOB Elevation   Head of Bed (HOB) Positioning: HOB elevated  U: Ulcer Prophylaxis   GI: no  G: Glucose control   managed Glycemic Management: blood glucose monitored  S: Skin   Bathing/Skin Care: bath, complete;electrode patches/site rotation;linen changed (06/26/25 0301)  Wounds: No  Wound care consulted: No  B: Bowel Function   no issues   I: Indwelling Catheters   Wallis necessity:     CVC necessity: Yes  D: De-escalation Antibx   No  Plan for the day   monitor  Family/Goals of care/Code Status   Code Status: Full Code     No acute events throughout day, VS and assessment per flow sheet, patient progressing towards goals as tolerated, plan of care reviewed with Cady Watkins and family, all concerns addressed, will continue to monitor.

## 2025-06-26 NOTE — PROGRESS NOTES
"[facility] - Cath Lab  Cardiology  Progress Note    Patient Name: Cady Watkins  MRN: [id number]  Admission Date: 2025  Hospital Length of Stay: 2 days  Code Status: Full Code   Attending Physician: Mita Matute MD   Primary Care Physician: Blaine Benítez MD  Expected Discharge Date: 2025  Principal Problem:<principal problem not specified>    Subjective:     Hospital Course:   Patient presented from PCP office to [facility] for reported symptomatic bradycardia. Patient had a TVP placed and the decision was made to transfer to [facility] main [place] for PPM placement. Patient had a TTE with Ef of 20-25% with evidence of WMA's. Per discussion with IC and EP, the decision was made to do a diagnostic cath to investigate for ischemic etiology. Patient will have a PPM placed after the [facility]. Her hospitalization has been complicated by ISAIAS which has been improving with Lasix 40 mg IV BID. And intermittent delirium which starts at night, delirium precautions and prns are in place for controlling these episodes. She was eventually placed on a IV lasix gtt due to worsening pulmonary edema and inability to lie flat at times. LHC revealed "70% left circumflex stenosis, otherwise diffuse non-obstructive disease is present". Per discussions with EP, will plan for PPM placement on 25.    Interval History: [facility] today with IC revealed no significant stenosis. EP will evaluate for PPM placement. IV lasix gtt initiated. Scr up trending, will continue to monitor.         Past Medical History:   Diagnosis Date    Diabetes mellitus, type 2     Fibromyalgia 2016    Hypertension 2016       Past Surgical History:   Procedure Laterality Date     SECTION      CHOLECYSTECTOMY      FOOT SURGERY Bilateral     plantar fasciotomy bilateral, arthroplasty fifth toe bilateral    HERNIA REPAIR      INSERTION, PACEMAKER, TEMPORARY TRANSVENOUS N/A 2025    Procedure: Insertion, Pacemaker, Temporary " Transvenous;  Surgeon: Sean Ureña MD;  Location: [facility] CATH LAB/EP;  Service: Cardiology;  Laterality: N/A;       Review of patient's allergies indicates:   Allergen Reactions    Advil [ibuprofen] Hives    Penicillins     Codeine     Excedrin aspirin free [acetaminophen-caffeine]        No current facility-administered medications on file prior to encounter.     Current Outpatient Medications on File Prior to Encounter   Medication Sig    cyclobenzaprine (FLEXERIL) 10 MG tablet TAKE 1 TABLET BY MOUTH EVERY DAY NIGHTLY AS NEEDED FOR MUSCLE SPASMS    gabapentin (NEURONTIN) 300 MG capsule TAKE 1 CAPSULE BY MOUTH EVERYDAY AT BEDTIME    lisinopriL (PRINIVIL,ZESTRIL) 20 MG tablet TAKE 1 TABLET BY MOUTH EVERY DAY    metFORMIN (GLUCOPHAGE) 500 MG tablet TAKE 1 TABLET BY MOUTH TWICE A DAY WITH MEALS    metoprolol tartrate (LOPRESSOR) 50 MG tablet TAKE 1 TABLET BY MOUTH TWICE A DAY    oxyCODONE-acetaminophen (PERCOCET)  mg per tablet Take 1 tablet by mouth nightly as needed for Pain.    rosuvastatin (CRESTOR) 5 MG tablet Take 1 tablet (5 mg total) by mouth once daily.     Family History       Problem Relation (Age of Onset)    Diabetes Father    Heart disease Sister, Brother          Tobacco Use    Smoking status: Never    Smokeless tobacco: Never   Substance and Sexual Activity    Alcohol use: Yes     Comment: Occasionally    Drug use: Not on file    Sexual activity: Yes     Review of Systems   Constitutional: Positive for malaise/fatigue. Negative for chills and fever.   Cardiovascular:  Positive for dyspnea on exertion, leg swelling and syncope. Negative for chest pain, irregular heartbeat and palpitations.   Respiratory:  Positive for shortness of breath.    Musculoskeletal:  Positive for back pain and stiffness.   Genitourinary: Negative.      Objective:     Vital Signs (Most Recent):  Temp: 97.8 °F (36.6 °C) (06/26/25 0800)  Pulse: (!) 53 (06/26/25 1000)  Resp: (!) 21 (06/26/25 1000)  BP: 126/67 (06/26/25  "1000)  SpO2: 95 % (06/26/25 1000) Vital Signs (24h Range):  Temp:  [97.8 °F (36.6 °C)-98.4 °F (36.9 °C)] 97.8 °F (36.6 °C)  Pulse:  [50-61] 53  Resp:  [10-33] 21  SpO2:  [90 %-100 %] 95 %  BP: (110-165)/(53-81) 126/67     Weight: 62.2 kg (137 lb 2 oz)  Body mass index is 25.08 kg/m².    SpO2: 95 %         Intake/Output Summary (Last 24 hours) at 6/26/2025 1315  Last data filed at 6/26/2025 0600  Gross per 24 hour   Intake 438.51 ml   Output 650 ml   Net -211.49 ml       Lines/Drains/Airways       Central Venous Catheter Line  Duration             Introducer 06/18/25 Internal Jugular Right 8 days              Drain  Duration             Female External Urinary Catheter w/ Suction 06/24/25 0120 2 days              Line  Duration                  Pacer Wires 06/18/25 0750 8 days              Peripheral Intravenous Line  Duration             Peripheral IV Single Lumen 06/22/25 0657 22 G Anterior;Right Forearm 4 days    Peripheral IV 06/25/25 0030 20 G Anterior;Left;Proximal Forearm 1 day                     Physical Exam  Vitals reviewed.   Constitutional:       General: She is not in acute distress.  Eyes:      General: No scleral icterus.  Cardiovascular:      Rate and Rhythm: Normal rate and regular rhythm.   Pulmonary:      Effort: Pulmonary effort is normal. No respiratory distress.      Breath sounds: No wheezing.   Abdominal:      Hernia: A hernia is present.   Musculoskeletal:      Right lower leg: No edema.      Left lower leg: No edema.   Skin:     General: Skin is warm and dry.   Neurological:      Mental Status: She is alert.   Psychiatric:         Cognition and Memory: Cognition is impaired.          Significant Labs: ABG: No results for input(s): "PH", "PCO2", "HCO3", "POCSATURATED", "BE" in the last 48 hours., CMP   Recent Labs   Lab 06/25/25  0310 06/25/25  1626 06/26/25  0751    140 140   K 4.5 3.6 4.8    100 104   CO2 27 27 25   * 136* 132*   BUN 33* 29* 29*   CREATININE 1.3 1.2 1.4 " "  CALCIUM 8.4* 8.3* 8.3*   PROT 6.0 6.1 5.7*   ALBUMIN 2.6* 2.8* 2.7*   BILITOT 0.7 0.7 0.5   ALKPHOS 41 42 40   AST 29 28 26   ALT 16 17 14   ANIONGAP 14 13 11   , CBC   Recent Labs   Lab 06/25/25  0508 06/26/25  0542   WBC 6.70 8.40   HGB 12.1 12.0   HCT 35.9* 34.4*   * 169   , INR   No results for input(s): "INR", "PROTIME" in the last 48 hours.  , Lipid Panel No results for input(s): "CHOL", "HDL", "LDLCALC", "TRIG", "CHOLHDL" in the last 48 hours., Troponin No results for input(s): "TROPONINIHS" in the last 48 hours., and All pertinent lab results from the last 24 hours have been reviewed.    Significant Imaging: Echocardiogram: Transthoracic echo (TTE) complete (Cupid Only):   Results for orders placed or performed during the hospital encounter of 06/24/25   Echo   Result Value Ref Range    BSA 1.71 m2    LVOT stroke volume 71.0 cm3    LV Systolic Volume Index 28.0 mL/m2    LV Diastolic Volume Index 62.50 mL/m2    LVOT area 3.1 cm2    FS 29.2 28 - 44 %    Left Ventricle Relative Wall Thickness 0.38 cm    LV mass 137.1 g    LV Mass Index 81.6 g/m2    E/E' ratio 16 m/s    AJAY 39 mL/m2    LA Vol 65 cm3    RV/LV Ratio 0.58 cm    AV Velocity Ratio 0.61     AV index (prosthetic) 0.78     KAREN by Velocity Ratio 1.9 cm²    ASI 1.8 cm/m2    ASI 1.5 cm/m2    Mean e' 0.06 m/s    ZLVIDS 1.25     ZLVIDD 0.24     LV Diastolic Volume 105 mL    Echo EF Estimated 56 %    LV Systolic Volume 47 mL    IVS 0.8 0.6 - 1.1 cm    LVIDd 4.8 3.5 - 6.0 cm    LVIDs 3.4 2.1 - 4.0 cm    LVOT diameter 2.0 cm    PW 0.9 0.6 - 1.1 cm    AV LVOT peak gradient 5 mmHg    LVOT mn grad 2 mmHg    LVOT peak leanna 1.1 m/s    LVOT peak VTI 22.6 cm    RV- elias basal diam 2.8 cm    LA size 3.4 cm    Left Atrium Major Axis 6.0 cm    Left Atrium Minor Axis 5.9 cm    RA Major Axis 5.34 cm    AV valve area 2.5 cm2    AV area by cont VTI 2.4 cm2    AV peak gradient 13 mmHg    AV mean gradient 5 mmHg    Ao peak leanna 1.8 m/s    Ao VTI 28.8 cm    MV Peak A " Darrin 1.14 m/s    E wave deceleration time 229 ms    E wave deceleration time 222 ms    MV Peak E Darrin 0.86 m/s    E/A ratio 0.75     LV LATERAL E/E' RATIO 14.3     LV SEPTAL E/E' RATIO 17.2     TDI LATERAL 0.06 m/s    TDI SEPTAL 0.05 m/s    TV peak gradient 35 mmHg    TR Max Darrin 3.0 m/s    Ascending aorta 2.6 cm    STJ 2.3 cm    Sinus 3.0 cm    LA WIDTH 3.8 cm    RA Width 3.66 cm    TAPSE 2.1 cm    TV resting pulmonary artery pressure 51 mmHg    RV TB RVSP 18 mmHg    Est. RA pres 15 mmHg    Narrative      Left Ventricle: The left ventricle is normal in size. Normal wall   thickness. Regional wall motion abnormalities present. There is severely   reduced systolic function with a visually estimated ejection fraction of   15 - 20%. Grade I diastolic dysfunction. Elevated left ventricular filling   pressure. No thrombus observed using contrast enhanced images. Hypokinesis   of all segments except the bases suggests takastsubo CM pattern, but   multivessel CAD cannot be exluded    Right Ventricle: The right ventricle is normal in size measuring 2.8   cm. Wall thickness is normal. Systolic function is normal.    Aortic Valve: There is mild aortic valve sclerosis. There is moderate   annular calcification present.    Mitral Valve: There is mild regurgitation with a centrally directed   jet.    Aorta: The aortic root is normal in size measuring 3.0 cm. The proximal   ascending aorta is normal in size measuring 2.6 cm.    Pulmonary Artery: There is moderate pulmonary hypertension. The   estimated pulmonary artery systolic pressure is 51 mmHg.    IVC/SVC: Elevated venous pressure at 15 mmHg.    Pericardium: There is no pericardial effusion.       Assessment and Plan:         Delirium  Delirium precautions placed. Currently on precedex due to agitation. Will wean as tolerated. Likely will place Seroquel prn at night for agitation and concern for delirium.     ISAIAS (acute kidney injury)  ISAIAS is likely due to pre-renal azotemia due  "to intravascular volume depletion. Baseline creatinine is 1.0. Most recent creatinine and eGFR are listed below.  Recent Labs     06/25/25  0310 06/25/25  1626 06/26/25  0751   CREATININE 1.3 1.2 1.4   EGFRNORACEVR 44* 48* 40*        Plan  - ISAIAS is worsening. Will adjust treatment as follows: lasix gtt  - Avoid nephrotoxins and renally dose meds for GFR listed above  - Monitor urine output, serial BMP, and adjust therapy as needed    Acute systolic heart failure  Patient found to have new systolic heart failure on TTE. She additionally has wall motion abnormalities that could represent old infarct or stress induced cardiomyopathy.    Plan:  - telemetry  - strict I/O's, daily weights  - Switch from IV lasix 40 mg BID to gtt due to worsening pulmonary edema and Scr  - hold BB, ARB/ARNI, MRA, and SGLt2i at this time but will plan for initiation prior to discharge      Acute hypoxemic respiratory failure  Patient with Hypoxic Respiratory failure which is Acute.  she is not on home oxygen. Supplemental oxygen was provided and noted-      .   Signs/symptoms of respiratory failure include- tachypnea and increased work of breathing. Contributing diagnoses includes - flash pulmonary edema Labs and images were reviewed. Patient Has not had a recent ABG. Will treat underlying causes and adjust management of respiratory failure as follows- volume optimization and BP control    Complete heart block  Patient transferred for CHB. Remains in CHB and TVP dependent.    Plan:  - telemetry  - maintain TVP in place, daily threshold/rhythm check  - [facility] with no significant stenosis "70% left circumflex stenosis, otherwise diffuse non-obstructive disease is present"  - EP to evaluate for PPM placement today   - K > 4, Mg > 2  - holding AV manjit blocking agents  - NPO @ midnight     Type 2 diabetes with neuropathy  - SSI  - carb controlled diet when diet resumed    Hypertension  Patient's blood pressure range in the last 24 hours was: BP  " Min: 66/44  Max: 186/91.The patient's inpatient anti-hypertensive regimen is listed below:  Current Antihypertensives  hydrALAZINE tablet 10 mg, Every 8 hours, Oral    Plan  - BP is uncontrolled, will adjust as follows: starting hydral  - Will add on GDMT when renal function improves    High cholesterol  - continue statin          VTE Risk Mitigation (From admission, onward)      None            Patrica Martínez DO  Cardiology  [facility] - Cath Lab

## 2025-06-26 NOTE — ASSESSMENT & PLAN NOTE
"Patient transferred for CHB. Remains in CHB and TVP dependent.    Plan:  - telemetry  - maintain TVP in place, daily threshold/rhythm check  - C with no significant stenosis "70% left circumflex stenosis, otherwise diffuse non-obstructive disease is present"  - EP to evaluate for PPM placement today   - K > 4, Mg > 2  - holding AV manjit blocking agents  - NPO @ midnight   "

## 2025-06-27 PROBLEM — N39.0 UTI (URINARY TRACT INFECTION): Status: ACTIVE | Noted: 2025-06-27

## 2025-06-27 LAB
ABSOLUTE EOSINOPHIL (OHS): 0.58 K/UL
ABSOLUTE MONOCYTE (OHS): 0.77 K/UL (ref 0.3–1)
ABSOLUTE NEUTROPHIL COUNT (OHS): 6.21 K/UL (ref 1.8–7.7)
ALBUMIN SERPL BCP-MCNC: 2.7 G/DL (ref 3.5–5.2)
ALBUMIN SERPL BCP-MCNC: 2.7 G/DL (ref 3.5–5.2)
ALBUMIN SERPL BCP-MCNC: 2.8 G/DL (ref 3.5–5.2)
ALP SERPL-CCNC: 41 UNIT/L (ref 40–150)
ALP SERPL-CCNC: 42 UNIT/L (ref 40–150)
ALP SERPL-CCNC: 47 UNIT/L (ref 40–150)
ALT SERPL W/O P-5'-P-CCNC: 15 UNIT/L (ref 10–44)
ALT SERPL W/O P-5'-P-CCNC: 16 UNIT/L (ref 10–44)
ALT SERPL W/O P-5'-P-CCNC: 18 UNIT/L (ref 10–44)
ANION GAP (OHS): 10 MMOL/L (ref 8–16)
ANION GAP (OHS): 11 MMOL/L (ref 8–16)
ANION GAP (OHS): 13 MMOL/L (ref 8–16)
AST SERPL-CCNC: 28 UNIT/L (ref 11–45)
AST SERPL-CCNC: 29 UNIT/L (ref 11–45)
AST SERPL-CCNC: 30 UNIT/L (ref 11–45)
BACTERIA #/AREA URNS AUTO: ABNORMAL /HPF
BASOPHILS # BLD AUTO: 0.07 K/UL
BASOPHILS NFR BLD AUTO: 0.8 %
BILIRUB SERPL-MCNC: 0.4 MG/DL (ref 0.1–1)
BILIRUB SERPL-MCNC: 0.5 MG/DL (ref 0.1–1)
BILIRUB SERPL-MCNC: 0.5 MG/DL (ref 0.1–1)
BILIRUB UR QL STRIP.AUTO: NEGATIVE
BUN SERPL-MCNC: 25 MG/DL (ref 8–23)
BUN SERPL-MCNC: 26 MG/DL (ref 8–23)
BUN SERPL-MCNC: 29 MG/DL (ref 8–23)
CALCIUM SERPL-MCNC: 7.8 MG/DL (ref 8.7–10.5)
CALCIUM SERPL-MCNC: 8.2 MG/DL (ref 8.7–10.5)
CALCIUM SERPL-MCNC: 8.4 MG/DL (ref 8.7–10.5)
CHLORIDE SERPL-SCNC: 101 MMOL/L (ref 95–110)
CHLORIDE SERPL-SCNC: 101 MMOL/L (ref 95–110)
CHLORIDE SERPL-SCNC: 98 MMOL/L (ref 95–110)
CLARITY UR: ABNORMAL
CO2 SERPL-SCNC: 26 MMOL/L (ref 23–29)
CO2 SERPL-SCNC: 28 MMOL/L (ref 23–29)
CO2 SERPL-SCNC: 28 MMOL/L (ref 23–29)
COLOR UR AUTO: ABNORMAL
CREAT SERPL-MCNC: 1.2 MG/DL (ref 0.5–1.4)
CREAT SERPL-MCNC: 1.2 MG/DL (ref 0.5–1.4)
CREAT SERPL-MCNC: 1.4 MG/DL (ref 0.5–1.4)
ERYTHROCYTE [DISTWIDTH] IN BLOOD BY AUTOMATED COUNT: 14.6 % (ref 11.5–14.5)
GFR SERPLBLD CREATININE-BSD FMLA CKD-EPI: 40 ML/MIN/1.73/M2
GFR SERPLBLD CREATININE-BSD FMLA CKD-EPI: 48 ML/MIN/1.73/M2
GFR SERPLBLD CREATININE-BSD FMLA CKD-EPI: 48 ML/MIN/1.73/M2
GLUCOSE SERPL-MCNC: 137 MG/DL (ref 70–110)
GLUCOSE SERPL-MCNC: 142 MG/DL (ref 70–110)
GLUCOSE SERPL-MCNC: 147 MG/DL (ref 70–110)
GLUCOSE UR QL STRIP: NEGATIVE
HCT VFR BLD AUTO: 33.9 % (ref 37–48.5)
HGB BLD-MCNC: 12 GM/DL (ref 12–16)
HGB UR QL STRIP: ABNORMAL
IMM GRANULOCYTES # BLD AUTO: 0.04 K/UL (ref 0–0.04)
IMM GRANULOCYTES NFR BLD AUTO: 0.4 % (ref 0–0.5)
KETONES UR QL STRIP: NEGATIVE
LEUKOCYTE ESTERASE UR QL STRIP: ABNORMAL
LYMPHOCYTES # BLD AUTO: 1.35 K/UL (ref 1–4.8)
MAGNESIUM SERPL-MCNC: 2.3 MG/DL (ref 1.6–2.6)
MCH RBC QN AUTO: 35.2 PG (ref 27–31)
MCHC RBC AUTO-ENTMCNC: 35.4 G/DL (ref 32–36)
MCV RBC AUTO: 99 FL (ref 82–98)
MICROSCOPIC COMMENT: ABNORMAL
NITRITE UR QL STRIP: POSITIVE
NUCLEATED RBC (/100WBC) (OHS): 0 /100 WBC
OHS QRS DURATION: 104 MS
OHS QTC CALCULATION: 418 MS
PH UR STRIP: 7 [PH]
PHOSPHATE SERPL-MCNC: 3.6 MG/DL (ref 2.7–4.5)
PLATELET # BLD AUTO: 165 K/UL (ref 150–450)
PMV BLD AUTO: 11.6 FL (ref 9.2–12.9)
POCT GLUCOSE: 143 MG/DL (ref 70–110)
POCT GLUCOSE: 177 MG/DL (ref 70–110)
POCT GLUCOSE: 189 MG/DL (ref 70–110)
POTASSIUM SERPL-SCNC: 3.7 MMOL/L (ref 3.5–5.1)
POTASSIUM SERPL-SCNC: 3.8 MMOL/L (ref 3.5–5.1)
POTASSIUM SERPL-SCNC: 4.1 MMOL/L (ref 3.5–5.1)
PROT SERPL-MCNC: 5.7 GM/DL (ref 6–8.4)
PROT SERPL-MCNC: 5.9 GM/DL (ref 6–8.4)
PROT SERPL-MCNC: 6 GM/DL (ref 6–8.4)
PROT UR QL STRIP: NEGATIVE
RBC # BLD AUTO: 3.41 M/UL (ref 4–5.4)
RBC #/AREA URNS AUTO: 13 /HPF (ref 0–4)
RELATIVE EOSINOPHIL (OHS): 6.4 %
RELATIVE LYMPHOCYTE (OHS): 15 % (ref 18–48)
RELATIVE MONOCYTE (OHS): 8.5 % (ref 4–15)
RELATIVE NEUTROPHIL (OHS): 68.9 % (ref 38–73)
SODIUM SERPL-SCNC: 138 MMOL/L (ref 136–145)
SODIUM SERPL-SCNC: 139 MMOL/L (ref 136–145)
SODIUM SERPL-SCNC: 139 MMOL/L (ref 136–145)
SP GR UR STRIP: 1.01
SQUAMOUS #/AREA URNS AUTO: 1 /HPF
UROBILINOGEN UR STRIP-ACNC: NEGATIVE EU/DL
WBC # BLD AUTO: 9.02 K/UL (ref 3.9–12.7)
WBC #/AREA URNS AUTO: >100 /HPF (ref 0–5)
WBC CLUMPS UR QL AUTO: ABNORMAL

## 2025-06-27 PROCEDURE — 25000003 PHARM REV CODE 250: Performed by: INTERNAL MEDICINE

## 2025-06-27 PROCEDURE — 83735 ASSAY OF MAGNESIUM: CPT

## 2025-06-27 PROCEDURE — 84100 ASSAY OF PHOSPHORUS: CPT

## 2025-06-27 PROCEDURE — 80053 COMPREHEN METABOLIC PANEL: CPT

## 2025-06-27 PROCEDURE — 99231 SBSQ HOSP IP/OBS SF/LOW 25: CPT | Mod: GC,,, | Performed by: INTERNAL MEDICINE

## 2025-06-27 PROCEDURE — 25000003 PHARM REV CODE 250: Performed by: STUDENT IN AN ORGANIZED HEALTH CARE EDUCATION/TRAINING PROGRAM

## 2025-06-27 PROCEDURE — 25000003 PHARM REV CODE 250

## 2025-06-27 PROCEDURE — 63600175 PHARM REV CODE 636 W HCPCS

## 2025-06-27 PROCEDURE — 63600175 PHARM REV CODE 636 W HCPCS: Performed by: STUDENT IN AN ORGANIZED HEALTH CARE EDUCATION/TRAINING PROGRAM

## 2025-06-27 PROCEDURE — 94761 N-INVAS EAR/PLS OXIMETRY MLT: CPT

## 2025-06-27 PROCEDURE — 82040 ASSAY OF SERUM ALBUMIN: CPT

## 2025-06-27 PROCEDURE — 85025 COMPLETE CBC W/AUTO DIFF WBC: CPT

## 2025-06-27 PROCEDURE — 27000221 HC OXYGEN, UP TO 24 HOURS

## 2025-06-27 PROCEDURE — 20000000 HC ICU ROOM

## 2025-06-27 PROCEDURE — 99900035 HC TECH TIME PER 15 MIN (STAT)

## 2025-06-27 RX ORDER — FUROSEMIDE 10 MG/ML
80 INJECTION INTRAMUSCULAR; INTRAVENOUS EVERY 8 HOURS
Status: DISCONTINUED | OUTPATIENT
Start: 2025-06-27 | End: 2025-06-28

## 2025-06-27 RX ORDER — METHOCARBAMOL 500 MG/1
500 TABLET, FILM COATED ORAL 2 TIMES DAILY
Status: DISCONTINUED | OUTPATIENT
Start: 2025-06-27 | End: 2025-07-08 | Stop reason: HOSPADM

## 2025-06-27 RX ORDER — POTASSIUM CHLORIDE 750 MG/1
30 CAPSULE, EXTENDED RELEASE ORAL ONCE
Status: COMPLETED | OUTPATIENT
Start: 2025-06-27 | End: 2025-06-27

## 2025-06-27 RX ORDER — GABAPENTIN 300 MG/1
300 CAPSULE ORAL 3 TIMES DAILY
Status: DISCONTINUED | OUTPATIENT
Start: 2025-06-27 | End: 2025-07-08 | Stop reason: HOSPADM

## 2025-06-27 RX ORDER — GABAPENTIN 300 MG/1
300 CAPSULE ORAL 2 TIMES DAILY
Status: DISCONTINUED | OUTPATIENT
Start: 2025-06-27 | End: 2025-06-27

## 2025-06-27 RX ORDER — CEFTRIAXONE 1 G/1
1 INJECTION, POWDER, FOR SOLUTION INTRAMUSCULAR; INTRAVENOUS
Status: DISCONTINUED | OUTPATIENT
Start: 2025-06-27 | End: 2025-07-01

## 2025-06-27 RX ORDER — CAPSAICIN 0.03 G/100G
CREAM TOPICAL 2 TIMES DAILY
Status: DISCONTINUED | OUTPATIENT
Start: 2025-06-27 | End: 2025-07-08 | Stop reason: HOSPADM

## 2025-06-27 RX ORDER — VALSARTAN 40 MG/1
40 TABLET ORAL 2 TIMES DAILY
Status: DISCONTINUED | OUTPATIENT
Start: 2025-06-28 | End: 2025-07-08 | Stop reason: HOSPADM

## 2025-06-27 RX ORDER — HYDROMORPHONE HYDROCHLORIDE 1 MG/ML
0.2 INJECTION, SOLUTION INTRAMUSCULAR; INTRAVENOUS; SUBCUTANEOUS ONCE
Status: COMPLETED | OUTPATIENT
Start: 2025-06-27 | End: 2025-06-27

## 2025-06-27 RX ADMIN — ISOSORBIDE DINITRATE 40 MG: 20 TABLET ORAL at 08:06

## 2025-06-27 RX ADMIN — ATORVASTATIN CALCIUM 20 MG: 20 TABLET, FILM COATED ORAL at 08:06

## 2025-06-27 RX ADMIN — INSULIN GLARGINE 5 UNITS: 100 INJECTION, SOLUTION SUBCUTANEOUS at 08:06

## 2025-06-27 RX ADMIN — METHOCARBAMOL 500 MG: 500 TABLET ORAL at 08:06

## 2025-06-27 RX ADMIN — MUPIROCIN: 20 OINTMENT TOPICAL at 08:06

## 2025-06-27 RX ADMIN — DOXYCYCLINE HYCLATE 100 MG: 100 TABLET, COATED ORAL at 08:06

## 2025-06-27 RX ADMIN — HYDRALAZINE HYDROCHLORIDE 50 MG: 50 TABLET ORAL at 06:06

## 2025-06-27 RX ADMIN — GABAPENTIN 300 MG: 300 CAPSULE ORAL at 08:06

## 2025-06-27 RX ADMIN — ISOSORBIDE DINITRATE 40 MG: 20 TABLET ORAL at 02:06

## 2025-06-27 RX ADMIN — HYDRALAZINE HYDROCHLORIDE 50 MG: 50 TABLET ORAL at 02:06

## 2025-06-27 RX ADMIN — POLYETHYLENE GLYCOL 3350 17 G: 17 POWDER, FOR SOLUTION ORAL at 08:06

## 2025-06-27 RX ADMIN — HYDROMORPHONE HYDROCHLORIDE 0.2 MG: 1 INJECTION, SOLUTION INTRAMUSCULAR; INTRAVENOUS; SUBCUTANEOUS at 01:06

## 2025-06-27 RX ADMIN — POTASSIUM CHLORIDE 30 MEQ: 750 CAPSULE, EXTENDED RELEASE ORAL at 06:06

## 2025-06-27 RX ADMIN — OLANZAPINE 5 MG: 5 TABLET, FILM COATED ORAL at 08:06

## 2025-06-27 RX ADMIN — POTASSIUM CHLORIDE 30 MEQ: 750 CAPSULE, EXTENDED RELEASE ORAL at 02:06

## 2025-06-27 RX ADMIN — GABAPENTIN 300 MG: 300 CAPSULE ORAL at 11:06

## 2025-06-27 RX ADMIN — CEFTRIAXONE 1 G: 1 INJECTION, POWDER, FOR SOLUTION INTRAMUSCULAR; INTRAVENOUS at 09:06

## 2025-06-27 RX ADMIN — GABAPENTIN 300 MG: 300 CAPSULE ORAL at 02:06

## 2025-06-27 RX ADMIN — SODIUM CHLORIDE 250 MG: 9 INJECTION, SOLUTION INTRAVENOUS at 08:06

## 2025-06-27 RX ADMIN — CAPSAICIN: 0.25 CREAM TOPICAL at 08:06

## 2025-06-27 RX ADMIN — FUROSEMIDE 80 MG: 10 INJECTION, SOLUTION INTRAVENOUS at 10:06

## 2025-06-27 RX ADMIN — FUROSEMIDE 80 MG: 10 INJECTION, SOLUTION INTRAVENOUS at 02:06

## 2025-06-27 RX ADMIN — CAPSAICIN: 0.25 CREAM TOPICAL at 09:06

## 2025-06-27 RX ADMIN — LIDOCAINE 1 PATCH: 50 PATCH CUTANEOUS at 02:06

## 2025-06-27 RX ADMIN — ASPIRIN 81 MG CHEWABLE TABLET 81 MG: 81 TABLET CHEWABLE at 08:06

## 2025-06-27 RX ADMIN — METHOCARBAMOL 500 MG: 500 TABLET ORAL at 11:06

## 2025-06-27 RX ADMIN — FUROSEMIDE 80 MG: 10 INJECTION, SOLUTION INTRAVENOUS at 09:06

## 2025-06-27 NOTE — PROGRESS NOTES
Cardiac Catheterization Laboratory    The planned LHC was aborted due to the fact that the patient was unable to lie flat for the procedure without becoming more short of breath.

## 2025-06-27 NOTE — ASSESSMENT & PLAN NOTE
"Patient transferred for CHB. Remains in CHB and TVP dependent.    Plan:  - telemetry  - [facility] with no significant stenosis "70% left circumflex stenosis, otherwise diffuse non-obstructive disease is present"  - S/p PPM placement with no complications   - Doxycycline s/p PPM placement for 5 days   - precautions   - K > 4, Mg > 2  - holding AV manjit blocking agents    "

## 2025-06-27 NOTE — ASSESSMENT & PLAN NOTE
ISAIAS is likely due to pre-renal azotemia due to intravascular volume depletion. Baseline creatinine is 1.0. Most recent creatinine and eGFR are listed below.  Recent Labs     06/26/25  2056 06/27/25  0452 06/27/25  0735   CREATININE 1.3 1.2 1.2   EGFRNORACEVR 44* 48* 48*        Plan  - ISAIAS is improving  - Avoid nephrotoxins and renally dose meds for GFR listed above  - Monitor urine output, serial BMP, and adjust therapy as needed

## 2025-06-27 NOTE — ASSESSMENT & PLAN NOTE
Patient UA evident of many bacteria and > 100 WBC's. Will plan on treating for Acute cystitis with 3 days of CTX

## 2025-06-27 NOTE — PROGRESS NOTES
"[facility]  Cardiology  Progress Note    Patient Name: Cady Watkins  MRN: [id number]  Admission Date: 2025  Hospital Length of Stay: 3 days  Code Status: Full Code   Attending Physician: Mita Matute MD   Primary Care Physician: Blaine Benítez MD  Expected Discharge Date: 2025  Principal Problem:Complete heart block    Subjective:     Hospital Course:   Patient presented from PCP office to [facility] for reported symptomatic bradycardia. Patient had a TVP placed and the decision was made to transfer to [facility] main [place] for PPM placement. Patient had a TTE with Ef of 20-25% with evidence of WMA's. Per discussion with IC and EP, the decision was made to do a diagnostic cath to investigate for ischemic etiology. Patient will have a PPM placed after the [facility]. Her hospitalization has been complicated by ISAIAS which has been improving with Lasix 40 mg IV BID. And intermittent delirium which starts at night, delirium precautions and prns are in place for controlling these episodes. She was eventually placed on a IV lasix gtt due to worsening pulmonary edema and inability to lie flat at times. LHC revealed "70% left circumflex stenosis, otherwise diffuse non-obstructive disease is present". Per discussions with EP, will plan for PPM placement on 25. Patient had PPM placed successfully with no complications. UA evident of potential UTI will plan to treat with CTX for 3 days. Will continue to work on patient functional status with PT/OT    Interval History: Patient still debilitated in bed. Will plan for PT/OT evaluation. 2.6 L of UOP. Started CTX for UTI. Will continue with Lasix 80 mg TID         Past Medical History:   Diagnosis Date    Diabetes mellitus, type 2     Fibromyalgia 2016    Hypertension 2016       Past Surgical History:   Procedure Laterality Date     SECTION      CHOLECYSTECTOMY      CORONARY ANGIOGRAPHY N/A 2025    Procedure: " ANGIOGRAM, CORONARY ARTERY;  Surgeon: Perfecto Coburn MD;  Location: [facility] CATH LAB;  Service: Cardiology;  Laterality: N/A;    FOOT SURGERY Bilateral     plantar fasciotomy bilateral, arthroplasty fifth toe bilateral    HERNIA REPAIR      IMPLANTATION OF BIVENTRICULAR HEART PACEMAKER N/A 6/26/2025    Procedure: INSERTION, PACEMAKER, BIVENTRICULAR;  Surgeon: Jason Lundberg MD;  Location: [facility] EP LAB;  Service: Cardiology;  Laterality: N/A;  CHB, CRT-P (LBAP vs CS), Biotronik, Anes, SK, CICU 3084    INSERTION, PACEMAKER, TEMPORARY TRANSVENOUS N/A 6/18/2025    Procedure: Insertion, Pacemaker, Temporary Transvenous;  Surgeon: Sean Ureña MD;  Location: [facility] CATH LAB/EP;  Service: Cardiology;  Laterality: N/A;       Review of patient's allergies indicates:   Allergen Reactions    Advil [ibuprofen] Hives    Penicillins     Codeine     Excedrin aspirin free [acetaminophen-caffeine]        No current facility-administered medications on file prior to encounter.     Current Outpatient Medications on File Prior to Encounter   Medication Sig    cyclobenzaprine (FLEXERIL) 10 MG tablet TAKE 1 TABLET BY MOUTH EVERY DAY NIGHTLY AS NEEDED FOR MUSCLE SPASMS    gabapentin (NEURONTIN) 300 MG capsule TAKE 1 CAPSULE BY MOUTH EVERYDAY AT BEDTIME    lisinopriL (PRINIVIL,ZESTRIL) 20 MG tablet TAKE 1 TABLET BY MOUTH EVERY DAY    metFORMIN (GLUCOPHAGE) 500 MG tablet TAKE 1 TABLET BY MOUTH TWICE A DAY WITH MEALS    metoprolol tartrate (LOPRESSOR) 50 MG tablet TAKE 1 TABLET BY MOUTH TWICE A DAY    oxyCODONE-acetaminophen (PERCOCET)  mg per tablet Take 1 tablet by mouth nightly as needed for Pain.    rosuvastatin (CRESTOR) 5 MG tablet Take 1 tablet (5 mg total) by mouth once daily.     Family History       Problem Relation (Age of Onset)    Diabetes Father    Heart disease Sister, Brother          Tobacco Use    Smoking status: Never    Smokeless tobacco: Never   Substance and Sexual Activity    Alcohol use: Yes     Comment:  Occasionally    Drug use: Not on file    Sexual activity: Yes     Review of Systems   Constitutional: Positive for malaise/fatigue. Negative for chills and fever.   Cardiovascular:  Positive for dyspnea on exertion, leg swelling and syncope. Negative for chest pain, irregular heartbeat and palpitations.   Respiratory:  Positive for shortness of breath.    Musculoskeletal:  Positive for back pain and stiffness.   Genitourinary: Negative.      Objective:     Vital Signs (Most Recent):  Temp: 98.1 °F (36.7 °C) (06/27/25 1101)  Pulse: (!) 122 (06/27/25 1244)  Resp: 18 (06/27/25 1244)  BP: (!) 102/56 (06/27/25 1101)  SpO2: 97 % (06/27/25 1244) Vital Signs (24h Range):  Temp:  [97.3 °F (36.3 °C)-98.3 °F (36.8 °C)] 98.1 °F (36.7 °C)  Pulse:  [] 122  Resp:  [10-34] 18  SpO2:  [89 %-99 %] 97 %  BP: ()/(55-74) 102/56  Arterial Line BP: ()/(37-64) 102/49     Weight: 60.8 kg (134 lb)  Body mass index is 24.51 kg/m².    SpO2: 97 %         Intake/Output Summary (Last 24 hours) at 6/27/2025 1317  Last data filed at 6/27/2025 1128  Gross per 24 hour   Intake 1098.59 ml   Output 2960 ml   Net -1861.41 ml       Lines/Drains/Airways       Drain  Duration                  Urethral Catheter 06/26/25 2200 Non-latex <1 day              Peripheral Intravenous Line  Duration             Peripheral IV Single Lumen 20 G 1 1/4 in Right;Anterior Forearm -- days    Peripheral IV 06/25/25 0030 20 G Anterior;Left;Proximal Forearm 2 days                     Physical Exam  Vitals reviewed.   Constitutional:       General: She is not in acute distress.  Eyes:      General: No scleral icterus.  Cardiovascular:      Rate and Rhythm: Normal rate and regular rhythm.   Pulmonary:      Effort: Pulmonary effort is normal. No respiratory distress.      Breath sounds: No wheezing.   Abdominal:      Hernia: A hernia is present.   Musculoskeletal:      Right lower leg: No edema.      Left lower leg: No edema.   Skin:     General: Skin is warm  "and dry.   Neurological:      Mental Status: She is alert.   Psychiatric:         Cognition and Memory: Cognition is impaired.          Significant Labs: ABG: No results for input(s): "PH", "PCO2", "HCO3", "POCSATURATED", "BE" in the last 48 hours., CMP   Recent Labs   Lab 06/26/25  0751 06/26/25 2056 06/27/25  0452 06/27/25  0735    137 139 138   K 4.8 4.2 3.7 3.8    101 101 101   CO2 25 25 28 26   * 204* 142* 147*   BUN 29* 30* 26* 25*   CREATININE 1.4 1.3 1.2 1.2   CALCIUM 8.3* 7.9* 8.2* 7.8*   PROT 5.7*  --  5.9* 5.7*   ALBUMIN 2.7*  --  2.7* 2.7*   BILITOT 0.5  --  0.5 0.5   ALKPHOS 40  --  42 41   AST 26  --  29 28   ALT 14  --  18 16   ANIONGAP 11 11 10 11   , CBC   Recent Labs   Lab 06/26/25  0542 06/26/25 2056 06/27/25  0452   WBC 8.40 11.95 9.02   HGB 12.0 11.6* 12.0   HCT 34.4* 34.6* 33.9*    172 165   , INR   No results for input(s): "INR", "PROTIME" in the last 48 hours.  , Lipid Panel No results for input(s): "CHOL", "HDL", "LDLCALC", "TRIG", "CHOLHDL" in the last 48 hours., Troponin No results for input(s): "TROPONINIHS" in the last 48 hours., and All pertinent lab results from the last 24 hours have been reviewed.    Significant Imaging: Echocardiogram: Transthoracic echo (TTE) complete (Cupid Only):   Results for orders placed or performed during the hospital encounter of 06/24/25   Echo   Result Value Ref Range    BSA 1.71 m2    LVOT stroke volume 71.0 cm3    LV Systolic Volume Index 28.0 mL/m2    LV Diastolic Volume Index 62.50 mL/m2    LVOT area 3.1 cm2    FS 29.2 28 - 44 %    Left Ventricle Relative Wall Thickness 0.38 cm    LV mass 137.1 g    LV Mass Index 81.6 g/m2    E/E' ratio 16 m/s    AJAY 39 mL/m2    LA Vol 65 cm3    RV/LV Ratio 0.58 cm    AV Velocity Ratio 0.61     AV index (prosthetic) 0.78     KAREN by Velocity Ratio 1.9 cm²    ASI 1.8 cm/m2    ASI 1.5 cm/m2    Mean e' 0.06 m/s    ZLVIDS 1.25     ZLVIDD 0.24     LV Diastolic Volume 105 mL    Echo EF Estimated " 56 %    LV Systolic Volume 47 mL    IVS 0.8 0.6 - 1.1 cm    LVIDd 4.8 3.5 - 6.0 cm    LVIDs 3.4 2.1 - 4.0 cm    LVOT diameter 2.0 cm    PW 0.9 0.6 - 1.1 cm    AV LVOT peak gradient 5 mmHg    LVOT mn grad 2 mmHg    LVOT peak darrin 1.1 m/s    LVOT peak VTI 22.6 cm    RV- elias basal diam 2.8 cm    LA size 3.4 cm    Left Atrium Major Axis 6.0 cm    Left Atrium Minor Axis 5.9 cm    RA Major Axis 5.34 cm    AV valve area 2.5 cm2    AV area by cont VTI 2.4 cm2    AV peak gradient 13 mmHg    AV mean gradient 5 mmHg    Ao peak darrin 1.8 m/s    Ao VTI 28.8 cm    MV Peak A Darrin 1.14 m/s    E wave deceleration time 229 ms    E wave deceleration time 222 ms    MV Peak E Darrin 0.86 m/s    E/A ratio 0.75     LV LATERAL E/E' RATIO 14.3     LV SEPTAL E/E' RATIO 17.2     TDI LATERAL 0.06 m/s    TDI SEPTAL 0.05 m/s    TV peak gradient 35 mmHg    TR Max Darrin 3.0 m/s    Ascending aorta 2.6 cm    STJ 2.3 cm    Sinus 3.0 cm    LA WIDTH 3.8 cm    RA Width 3.66 cm    TAPSE 2.1 cm    TV resting pulmonary artery pressure 51 mmHg    RV TB RVSP 18 mmHg    Est. RA pres 15 mmHg    Narrative      Left Ventricle: The left ventricle is normal in size. Normal wall   thickness. Regional wall motion abnormalities present. There is severely   reduced systolic function with a visually estimated ejection fraction of   15 - 20%. Grade I diastolic dysfunction. Elevated left ventricular filling   pressure. No thrombus observed using contrast enhanced images. Hypokinesis   of all segments except the bases suggests takastsubo CM pattern, but   multivessel CAD cannot be exluded    Right Ventricle: The right ventricle is normal in size measuring 2.8   cm. Wall thickness is normal. Systolic function is normal.    Aortic Valve: There is mild aortic valve sclerosis. There is moderate   annular calcification present.    Mitral Valve: There is mild regurgitation with a centrally directed   jet.    Aorta: The aortic root is normal in size measuring 3.0 cm. The proximal  "  ascending aorta is normal in size measuring 2.6 cm.    Pulmonary Artery: There is moderate pulmonary hypertension. The   estimated pulmonary artery systolic pressure is 51 mmHg.    IVC/SVC: Elevated venous pressure at 15 mmHg.    Pericardium: There is no pericardial effusion.       Assessment and Plan:         * Complete heart block  Patient transferred for CHB. Remains in CHB and TVP dependent.    Plan:  - telemetry  - [facility] with no significant stenosis "70% left circumflex stenosis, otherwise diffuse non-obstructive disease is present"  - S/p PPM placement with no complications   - Doxycycline s/p PPM placement for 5 days   - precautions   - K > 4, Mg > 2  - holding AV manjit blocking agents      UTI (urinary tract infection)  Patient UA evident of many bacteria and > 100 WBC's. Will plan on treating for Acute cystitis with 3 days of CTX    Delirium  Delirium precautions placed. Currently on precedex due to agitation. Will wean as tolerated. Likely will place Seroquel prn at night for agitation and concern for delirium.     ISAIAS (acute kidney injury)  ISAIAS is likely due to pre-renal azotemia due to intravascular volume depletion. Baseline creatinine is 1.0. Most recent creatinine and eGFR are listed below.  Recent Labs     06/26/25  2056 06/27/25  0452 06/27/25  0735   CREATININE 1.3 1.2 1.2   EGFRNORACEVR 44* 48* 48*        Plan  - ISAIAS is improving  - Avoid nephrotoxins and renally dose meds for GFR listed above  - Monitor urine output, serial BMP, and adjust therapy as needed    Acute systolic heart failure  Patient found to have new systolic heart failure on TTE. She additionally has wall motion abnormalities that could represent old infarct or stress induced cardiomyopathy.    Plan:  - telemetry  - strict I/O's, daily weights  - Switch from IV lasix 40 mg BID to gtt due to worsening pulmonary edema and Scr  - hold BB, ARB/ARNI, MRA, and SGLt2i at this time but will plan for initiation prior to " discharge      Acute hypoxemic respiratory failure  Patient with Hypoxic Respiratory failure which is Acute.  she is not on home oxygen. Supplemental oxygen was provided and noted-      .   Signs/symptoms of respiratory failure include- tachypnea and increased work of breathing. Contributing diagnoses includes - flash pulmonary edema Labs and images were reviewed. Patient Has not had a recent ABG. Will treat underlying causes and adjust management of respiratory failure as follows- volume optimization and BP control    Type 2 diabetes with neuropathy  - SSI  - carb controlled diet when diet resumed    Hypertension  Patient's blood pressure range in the last 24 hours was: BP  Min: 66/44  Max: 186/91.The patient's inpatient anti-hypertensive regimen is listed below:  Current Antihypertensives  hydrALAZINE tablet 10 mg, Every 8 hours, Oral    Plan  - BP is uncontrolled, will adjust as follows: starting hydral  - Will add on GDMT when renal function improves    High cholesterol  - continue statin          VTE Risk Mitigation (From admission, onward)      None            Patrica Martínez DO  Cardiology  [facility]

## 2025-06-27 NOTE — EICU
Intervention Initiated From:  COR / KALLIU    Anamika intervened regarding:  Rounding (Video assessment)  VICU Night Rounds Checklist  24H Vital Sign Range:  Temp:  [97.3 °F (36.3 °C)-98.4 °F (36.9 °C)]   Pulse:  []   Resp:  [10-34]   BP: (110-165)/(53-81)   SpO2:  [89 %-100 %]   Arterial Line BP: (147-177)/(48-64)     Video rounds

## 2025-06-27 NOTE — SUBJECTIVE & OBJECTIVE
Interval History: Patient still debilitated in bed. Will plan for PT/OT evaluation. 2.6 L of UOP. Started CTX for UTI. Will continue with Lasix 80 mg TID         Past Medical History:   Diagnosis Date    Diabetes mellitus, type 2     Fibromyalgia 2016    Hypertension 2016       Past Surgical History:   Procedure Laterality Date     SECTION      CHOLECYSTECTOMY      CORONARY ANGIOGRAPHY N/A 2025    Procedure: ANGIOGRAM, CORONARY ARTERY;  Surgeon: Perfecto Coburn MD;  Location: [facility] CATH LAB;  Service: Cardiology;  Laterality: N/A;    FOOT SURGERY Bilateral     plantar fasciotomy bilateral, arthroplasty fifth toe bilateral    HERNIA REPAIR      IMPLANTATION OF BIVENTRICULAR HEART PACEMAKER N/A 2025    Procedure: INSERTION, PACEMAKER, BIVENTRICULAR;  Surgeon: Jason Lundberg MD;  Location: [facility] EP LAB;  Service: Cardiology;  Laterality: N/A;  CHB, CRT-P (LBAP vs CS), Biotronik, Anes, SK, CICU 3084    INSERTION, PACEMAKER, TEMPORARY TRANSVENOUS N/A 2025    Procedure: Insertion, Pacemaker, Temporary Transvenous;  Surgeon: Sean Ureña MD;  Location: [facility] CATH LAB/EP;  Service: Cardiology;  Laterality: N/A;       Review of patient's allergies indicates:   Allergen Reactions    Advil [ibuprofen] Hives    Penicillins     Codeine     Excedrin aspirin free [acetaminophen-caffeine]        No current facility-administered medications on file prior to encounter.     Current Outpatient Medications on File Prior to Encounter   Medication Sig    cyclobenzaprine (FLEXERIL) 10 MG tablet TAKE 1 TABLET BY MOUTH EVERY DAY NIGHTLY AS NEEDED FOR MUSCLE SPASMS    gabapentin (NEURONTIN) 300 MG capsule TAKE 1 CAPSULE BY MOUTH EVERYDAY AT BEDTIME    lisinopriL (PRINIVIL,ZESTRIL) 20 MG tablet TAKE 1 TABLET BY MOUTH EVERY DAY    metFORMIN (GLUCOPHAGE) 500 MG tablet TAKE 1 TABLET BY MOUTH TWICE A DAY WITH MEALS    metoprolol tartrate (LOPRESSOR) 50 MG tablet TAKE 1 TABLET BY MOUTH TWICE A DAY     oxyCODONE-acetaminophen (PERCOCET)  mg per tablet Take 1 tablet by mouth nightly as needed for Pain.    rosuvastatin (CRESTOR) 5 MG tablet Take 1 tablet (5 mg total) by mouth once daily.     Family History       Problem Relation (Age of Onset)    Diabetes Father    Heart disease Sister, Brother          Tobacco Use    Smoking status: Never    Smokeless tobacco: Never   Substance and Sexual Activity    Alcohol use: Yes     Comment: Occasionally    Drug use: Not on file    Sexual activity: Yes     Review of Systems   Constitutional: Positive for malaise/fatigue. Negative for chills and fever.   Cardiovascular:  Positive for dyspnea on exertion, leg swelling and syncope. Negative for chest pain, irregular heartbeat and palpitations.   Respiratory:  Positive for shortness of breath.    Musculoskeletal:  Positive for back pain and stiffness.   Genitourinary: Negative.      Objective:     Vital Signs (Most Recent):  Temp: 98.1 °F (36.7 °C) (06/27/25 1101)  Pulse: (!) 122 (06/27/25 1244)  Resp: 18 (06/27/25 1244)  BP: (!) 102/56 (06/27/25 1101)  SpO2: 97 % (06/27/25 1244) Vital Signs (24h Range):  Temp:  [97.3 °F (36.3 °C)-98.3 °F (36.8 °C)] 98.1 °F (36.7 °C)  Pulse:  [] 122  Resp:  [10-34] 18  SpO2:  [89 %-99 %] 97 %  BP: ()/(55-74) 102/56  Arterial Line BP: ()/(37-64) 102/49     Weight: 60.8 kg (134 lb)  Body mass index is 24.51 kg/m².    SpO2: 97 %         Intake/Output Summary (Last 24 hours) at 6/27/2025 1317  Last data filed at 6/27/2025 1128  Gross per 24 hour   Intake 1098.59 ml   Output 2960 ml   Net -1861.41 ml       Lines/Drains/Airways       Drain  Duration                  Urethral Catheter 06/26/25 2200 Non-latex <1 day              Peripheral Intravenous Line  Duration             Peripheral IV Single Lumen 20 G 1 1/4 in Right;Anterior Forearm -- days    Peripheral IV 06/25/25 0030 20 G Anterior;Left;Proximal Forearm 2 days                     Physical Exam  Vitals reviewed.  "  Constitutional:       General: She is not in acute distress.  Eyes:      General: No scleral icterus.  Cardiovascular:      Rate and Rhythm: Normal rate and regular rhythm.   Pulmonary:      Effort: Pulmonary effort is normal. No respiratory distress.      Breath sounds: No wheezing.   Abdominal:      Hernia: A hernia is present.   Musculoskeletal:      Right lower leg: No edema.      Left lower leg: No edema.   Skin:     General: Skin is warm and dry.   Neurological:      Mental Status: She is alert.   Psychiatric:         Cognition and Memory: Cognition is impaired.          Significant Labs: ABG: No results for input(s): "PH", "PCO2", "HCO3", "POCSATURATED", "BE" in the last 48 hours., CMP   Recent Labs   Lab 06/26/25  0751 06/26/25 2056 06/27/25  0452 06/27/25  0735    137 139 138   K 4.8 4.2 3.7 3.8    101 101 101   CO2 25 25 28 26   * 204* 142* 147*   BUN 29* 30* 26* 25*   CREATININE 1.4 1.3 1.2 1.2   CALCIUM 8.3* 7.9* 8.2* 7.8*   PROT 5.7*  --  5.9* 5.7*   ALBUMIN 2.7*  --  2.7* 2.7*   BILITOT 0.5  --  0.5 0.5   ALKPHOS 40  --  42 41   AST 26  --  29 28   ALT 14  --  18 16   ANIONGAP 11 11 10 11   , CBC   Recent Labs   Lab 06/26/25  0542 06/26/25 2056 06/27/25  0452   WBC 8.40 11.95 9.02   HGB 12.0 11.6* 12.0   HCT 34.4* 34.6* 33.9*    172 165   , INR   No results for input(s): "INR", "PROTIME" in the last 48 hours.  , Lipid Panel No results for input(s): "CHOL", "HDL", "LDLCALC", "TRIG", "CHOLHDL" in the last 48 hours., Troponin No results for input(s): "TROPONINIHS" in the last 48 hours., and All pertinent lab results from the last 24 hours have been reviewed.    Significant Imaging: Echocardiogram: Transthoracic echo (TTE) complete (Cupid Only):   Results for orders placed or performed during the hospital encounter of 06/24/25   Echo   Result Value Ref Range    BSA 1.71 m2    LVOT stroke volume 71.0 cm3    LV Systolic Volume Index 28.0 mL/m2    LV Diastolic Volume Index 62.50 " mL/m2    LVOT area 3.1 cm2    FS 29.2 28 - 44 %    Left Ventricle Relative Wall Thickness 0.38 cm    LV mass 137.1 g    LV Mass Index 81.6 g/m2    E/E' ratio 16 m/s    AJAY 39 mL/m2    LA Vol 65 cm3    RV/LV Ratio 0.58 cm    AV Velocity Ratio 0.61     AV index (prosthetic) 0.78     KAREN by Velocity Ratio 1.9 cm²    ASI 1.8 cm/m2    ASI 1.5 cm/m2    Mean e' 0.06 m/s    ZLVIDS 1.25     ZLVIDD 0.24     LV Diastolic Volume 105 mL    Echo EF Estimated 56 %    LV Systolic Volume 47 mL    IVS 0.8 0.6 - 1.1 cm    LVIDd 4.8 3.5 - 6.0 cm    LVIDs 3.4 2.1 - 4.0 cm    LVOT diameter 2.0 cm    PW 0.9 0.6 - 1.1 cm    AV LVOT peak gradient 5 mmHg    LVOT mn grad 2 mmHg    LVOT peak leanna 1.1 m/s    LVOT peak VTI 22.6 cm    RV- elias basal diam 2.8 cm    LA size 3.4 cm    Left Atrium Major Axis 6.0 cm    Left Atrium Minor Axis 5.9 cm    RA Major Axis 5.34 cm    AV valve area 2.5 cm2    AV area by cont VTI 2.4 cm2    AV peak gradient 13 mmHg    AV mean gradient 5 mmHg    Ao peak leanna 1.8 m/s    Ao VTI 28.8 cm    MV Peak A Leanna 1.14 m/s    E wave deceleration time 229 ms    E wave deceleration time 222 ms    MV Peak E Leanna 0.86 m/s    E/A ratio 0.75     LV LATERAL E/E' RATIO 14.3     LV SEPTAL E/E' RATIO 17.2     TDI LATERAL 0.06 m/s    TDI SEPTAL 0.05 m/s    TV peak gradient 35 mmHg    TR Max Leanna 3.0 m/s    Ascending aorta 2.6 cm    STJ 2.3 cm    Sinus 3.0 cm    LA WIDTH 3.8 cm    RA Width 3.66 cm    TAPSE 2.1 cm    TV resting pulmonary artery pressure 51 mmHg    RV TB RVSP 18 mmHg    Est. RA pres 15 mmHg    Narrative      Left Ventricle: The left ventricle is normal in size. Normal wall   thickness. Regional wall motion abnormalities present. There is severely   reduced systolic function with a visually estimated ejection fraction of   15 - 20%. Grade I diastolic dysfunction. Elevated left ventricular filling   pressure. No thrombus observed using contrast enhanced images. Hypokinesis   of all segments except the bases suggests takastsubo  CM pattern, but   multivessel CAD cannot be exluded    Right Ventricle: The right ventricle is normal in size measuring 2.8   cm. Wall thickness is normal. Systolic function is normal.    Aortic Valve: There is mild aortic valve sclerosis. There is moderate   annular calcification present.    Mitral Valve: There is mild regurgitation with a centrally directed   jet.    Aorta: The aortic root is normal in size measuring 3.0 cm. The proximal   ascending aorta is normal in size measuring 2.6 cm.    Pulmonary Artery: There is moderate pulmonary hypertension. The   estimated pulmonary artery systolic pressure is 51 mmHg.    IVC/SVC: Elevated venous pressure at 15 mmHg.    Pericardium: There is no pericardial effusion.

## 2025-06-27 NOTE — PLAN OF CARE
[facility]  Discharge Reassessment    Primary Care Provider: Blaine Benítez MD    Expected Discharge Date: 7/1/2025    Reassessment (most recent)       Discharge Reassessment - 06/27/25 1423          Discharge Reassessment    Assessment Type Discharge Planning Reassessment     Did the patient's condition or plan change since previous assessment? No     Discharge Plan A Home Health     Discharge Plan B Home     DME Needed Upon Discharge  other (see comments)   TBD    Transition of Care Barriers None     Why the patient remains in the hospital Requires continued medical care        Post-Acute Status    Discharge Delays None known at this time                     Pt continues to require inpatient medical care.  PT/OT to eval and treat.  CM to continue to follow for care coordination needs.    Discharge Plan A and Plan B have been determined by review of patient's clinical status, future medical and therapeutic needs, and coverage/benefits for post-acute care in coordination with multidisciplinary team members.     Alyx Shelton RN, BSN  Case Management  [phone]

## 2025-06-27 NOTE — EICU
Virtual ICU Quality Rounds    Admit Date: 6/24/2025  Hospital Day: 3    ICU Day: 3d 14h    24H Vital Sign Range:  Temp:  [97.3 °F (36.3 °C)-98.3 °F (36.8 °C)]   Pulse:  []   Resp:  [10-34]   BP: ()/(55-74)   SpO2:  [89 %-99 %]   Arterial Line BP: ()/(37-64)     VICU Surveillance Screening    Daily review of  line necessity(optional): Urinary catheter in place    [place] Indications : Critically ill in the intensive care unit requiring hourly urine output monitoring --80mg Lasix TID    LDA reconciliation : Yes

## 2025-06-27 NOTE — PLAN OF CARE
CICU DAILY GOALS       A: Awake    RASS: Goal -    Actual - RASS (Johnson Agitation-Sedation Scale): drowsy   Restraint necessity: Clinical Justification: Treatment Interference  B: Breath   SBT: Not intubated   C: Coordinate A & B, analgesics/sedatives   Pain: managed    SAT: Not intubated  D: Delirium   CAM-ICU:     Activity: Activity Management: Arm raise - L1, Ankle pumps - L1, Leg kicks - L2  FAS: Feeding/Nutrition   Diet order: Diet/Nutrition Received: regular,   Fluid restriction:    T: Thrombus   DVT prophylaxis: VTE Core Measure: Pharmacological prophylaxis initiated/maintained  H: HOB Elevation   Head of Bed (HOB) Positioning: HOB at 20-30 degrees  U: Ulcer Prophylaxis   GI: no  G: Glucose control   managed Glycemic Management: blood glucose monitored  S: Skin   Bathing/Skin Care: dressed/undressed;electrode patches/site rotation;bath, complete;incontinence care;linen changed (06/26/25 2301)  Wounds: No  Wound care consulted: No  B: Bowel Function   no issues   I: Indwelling Catheters   Wallis necessity:      Urethral Catheter 06/26/25 2200 Non-latex-Reason for Continuing Urinary Catheterization: Critically ill in ICU and requiring hourly monitoring of intake/output   CVC necessity: No  D: De-escalation Antibx   No  Plan for the day   monitor  Family/Goals of care/Code Status   Code Status: Full Code     No acute events throughout day, VS and assessment per flow sheet, patient progressing towards goals as tolerated, plan of care reviewed with Cady Watkins and family, all concerns addressed, will continue to monitor.

## 2025-06-27 NOTE — PLAN OF CARE
CICU DAILY GOALS       A: Awake    RASS: Goal -    Actual - RASS (Johnson Agitation-Sedation Scale): alert and calm   Restraint necessity: Clinical Justification: Treatment Interference  B: Breath   SBT: Not intubated   C: Coordinate A & B, analgesics/sedatives   Pain: managed    SAT: Not intubated  D: Delirium   CAM-ICU:    E: Early(intubated/ Progressive (non-intubated) Mobility   MOVE Screen: Pass   Activity: Activity Management: Arm raise - L1  FAS: Feeding/Nutrition   Diet order: Diet/Nutrition Received: regular,   Fluid restriction:     Nutritional Supplement Intake: Quantity 2, Type: Boost  T: Thrombus   DVT prophylaxis: VTE Core Measure: Pharmacological prophylaxis initiated/maintained  H: HOB Elevation   Head of Bed (HOB) Positioning: HOB at 30-45 degrees  U: Ulcer Prophylaxis   GI: no  G: Glucose control   managed Glycemic Management: blood glucose monitored  S: Skin   Bathing/Skin Care: dressed/undressed;electrode patches/site rotation;bath, complete;incontinence care;linen changed (06/26/25 2301)  Wounds: No  Wound care consulted: No  B: Bowel Function   diarrhea   I: Indwelling Catheters   Wallis necessity:      Urethral Catheter 06/26/25 2200 Non-latex-Reason for Continuing Urinary Catheterization: Critically ill in ICU and requiring hourly monitoring of intake/output   CVC necessity: Yes  D: De-escalation Antibx   Yes  Plan for the day   Plans to step down once patients pain in BLE is controlled.   Family/Goals of care/Code Status   Code Status: Full Code     No acute events throughout day, VS and assessment per flow sheet, patient progressing towards goals as tolerated, plan of care reviewed with Cady Watkins and family, all concerns addressed, will continue to monitor.

## 2025-06-28 PROBLEM — R29.898 MUSCULAR DECONDITIONING: Status: ACTIVE | Noted: 2025-06-28

## 2025-06-28 LAB
ABSOLUTE EOSINOPHIL (OHS): 0.72 K/UL
ABSOLUTE MONOCYTE (OHS): 1.1 K/UL (ref 0.3–1)
ABSOLUTE NEUTROPHIL COUNT (OHS): 7.45 K/UL (ref 1.8–7.7)
ALBUMIN SERPL BCP-MCNC: 2.9 G/DL (ref 3.5–5.2)
ALBUMIN SERPL BCP-MCNC: 3 G/DL (ref 3.5–5.2)
ALP SERPL-CCNC: 42 UNIT/L (ref 40–150)
ALP SERPL-CCNC: 45 UNIT/L (ref 40–150)
ALT SERPL W/O P-5'-P-CCNC: 16 UNIT/L (ref 10–44)
ALT SERPL W/O P-5'-P-CCNC: 16 UNIT/L (ref 10–44)
ANION GAP (OHS): 12 MMOL/L (ref 8–16)
ANION GAP (OHS): 13 MMOL/L (ref 8–16)
AST SERPL-CCNC: 28 UNIT/L (ref 11–45)
AST SERPL-CCNC: 34 UNIT/L (ref 11–45)
BASOPHILS # BLD AUTO: 0.1 K/UL
BASOPHILS NFR BLD AUTO: 0.9 %
BILIRUB SERPL-MCNC: 0.6 MG/DL (ref 0.1–1)
BILIRUB SERPL-MCNC: 0.6 MG/DL (ref 0.1–1)
BUN SERPL-MCNC: 27 MG/DL (ref 8–23)
BUN SERPL-MCNC: 27 MG/DL (ref 8–23)
CALCIUM SERPL-MCNC: 8.5 MG/DL (ref 8.7–10.5)
CALCIUM SERPL-MCNC: 8.5 MG/DL (ref 8.7–10.5)
CHLORIDE SERPL-SCNC: 100 MMOL/L (ref 95–110)
CHLORIDE SERPL-SCNC: 100 MMOL/L (ref 95–110)
CO2 SERPL-SCNC: 26 MMOL/L (ref 23–29)
CO2 SERPL-SCNC: 27 MMOL/L (ref 23–29)
CREAT SERPL-MCNC: 1.2 MG/DL (ref 0.5–1.4)
CREAT SERPL-MCNC: 1.3 MG/DL (ref 0.5–1.4)
ERYTHROCYTE [DISTWIDTH] IN BLOOD BY AUTOMATED COUNT: 14.5 % (ref 11.5–14.5)
GFR SERPLBLD CREATININE-BSD FMLA CKD-EPI: 44 ML/MIN/1.73/M2
GFR SERPLBLD CREATININE-BSD FMLA CKD-EPI: 48 ML/MIN/1.73/M2
GLUCOSE SERPL-MCNC: 133 MG/DL (ref 70–110)
GLUCOSE SERPL-MCNC: 154 MG/DL (ref 70–110)
HCT VFR BLD AUTO: 38.1 % (ref 37–48.5)
HGB BLD-MCNC: 13.1 GM/DL (ref 12–16)
HOLD SPECIMEN: NORMAL
IMM GRANULOCYTES # BLD AUTO: 0.08 K/UL (ref 0–0.04)
IMM GRANULOCYTES NFR BLD AUTO: 0.7 % (ref 0–0.5)
LYMPHOCYTES # BLD AUTO: 2.06 K/UL (ref 1–4.8)
MAGNESIUM SERPL-MCNC: 2 MG/DL (ref 1.6–2.6)
MCH RBC QN AUTO: 33.2 PG (ref 27–31)
MCHC RBC AUTO-ENTMCNC: 34.4 G/DL (ref 32–36)
MCV RBC AUTO: 97 FL (ref 82–98)
NUCLEATED RBC (/100WBC) (OHS): 0 /100 WBC
PHOSPHATE SERPL-MCNC: 3.8 MG/DL (ref 2.7–4.5)
PLATELET # BLD AUTO: 172 K/UL (ref 150–450)
PMV BLD AUTO: 11.9 FL (ref 9.2–12.9)
POCT GLUCOSE: 155 MG/DL (ref 70–110)
POCT GLUCOSE: 160 MG/DL (ref 70–110)
POCT GLUCOSE: 186 MG/DL (ref 70–110)
POCT GLUCOSE: 192 MG/DL (ref 70–110)
POTASSIUM SERPL-SCNC: 4.2 MMOL/L (ref 3.5–5.1)
POTASSIUM SERPL-SCNC: 5.1 MMOL/L (ref 3.5–5.1)
PROT SERPL-MCNC: 6.3 GM/DL (ref 6–8.4)
PROT SERPL-MCNC: 6.7 GM/DL (ref 6–8.4)
RBC # BLD AUTO: 3.95 M/UL (ref 4–5.4)
RELATIVE EOSINOPHIL (OHS): 6.3 %
RELATIVE LYMPHOCYTE (OHS): 17.9 % (ref 18–48)
RELATIVE MONOCYTE (OHS): 9.6 % (ref 4–15)
RELATIVE NEUTROPHIL (OHS): 64.6 % (ref 38–73)
SODIUM SERPL-SCNC: 138 MMOL/L (ref 136–145)
SODIUM SERPL-SCNC: 140 MMOL/L (ref 136–145)
WBC # BLD AUTO: 11.51 K/UL (ref 3.9–12.7)

## 2025-06-28 PROCEDURE — 97530 THERAPEUTIC ACTIVITIES: CPT

## 2025-06-28 PROCEDURE — 25000003 PHARM REV CODE 250

## 2025-06-28 PROCEDURE — 97112 NEUROMUSCULAR REEDUCATION: CPT

## 2025-06-28 PROCEDURE — 97161 PT EVAL LOW COMPLEX 20 MIN: CPT

## 2025-06-28 PROCEDURE — 84100 ASSAY OF PHOSPHORUS: CPT

## 2025-06-28 PROCEDURE — 85025 COMPLETE CBC W/AUTO DIFF WBC: CPT

## 2025-06-28 PROCEDURE — 63600175 PHARM REV CODE 636 W HCPCS

## 2025-06-28 PROCEDURE — 20600001 HC STEP DOWN PRIVATE ROOM

## 2025-06-28 PROCEDURE — 63600175 PHARM REV CODE 636 W HCPCS: Performed by: STUDENT IN AN ORGANIZED HEALTH CARE EDUCATION/TRAINING PROGRAM

## 2025-06-28 PROCEDURE — 80053 COMPREHEN METABOLIC PANEL: CPT

## 2025-06-28 PROCEDURE — 94761 N-INVAS EAR/PLS OXIMETRY MLT: CPT

## 2025-06-28 PROCEDURE — 99231 SBSQ HOSP IP/OBS SF/LOW 25: CPT | Mod: GC,,, | Performed by: INTERNAL MEDICINE

## 2025-06-28 PROCEDURE — 25000003 PHARM REV CODE 250: Performed by: STUDENT IN AN ORGANIZED HEALTH CARE EDUCATION/TRAINING PROGRAM

## 2025-06-28 PROCEDURE — 27100171 HC OXYGEN HIGH FLOW UP TO 24 HOURS

## 2025-06-28 PROCEDURE — 83735 ASSAY OF MAGNESIUM: CPT

## 2025-06-28 PROCEDURE — 99900035 HC TECH TIME PER 15 MIN (STAT)

## 2025-06-28 PROCEDURE — 82947 ASSAY GLUCOSE BLOOD QUANT: CPT

## 2025-06-28 PROCEDURE — 27000221 HC OXYGEN, UP TO 24 HOURS

## 2025-06-28 RX ORDER — METOPROLOL SUCCINATE 25 MG/1
25 TABLET, EXTENDED RELEASE ORAL DAILY
Status: DISCONTINUED | OUTPATIENT
Start: 2025-06-29 | End: 2025-06-28

## 2025-06-28 RX ORDER — METOPROLOL TARTRATE 25 MG/1
25 TABLET, FILM COATED ORAL 2 TIMES DAILY
Status: COMPLETED | OUTPATIENT
Start: 2025-06-28 | End: 2025-06-28

## 2025-06-28 RX ORDER — FUROSEMIDE 40 MG/1
40 TABLET ORAL 2 TIMES DAILY
Status: DISCONTINUED | OUTPATIENT
Start: 2025-06-28 | End: 2025-07-08 | Stop reason: HOSPADM

## 2025-06-28 RX ORDER — METOPROLOL SUCCINATE 50 MG/1
50 TABLET, EXTENDED RELEASE ORAL DAILY
Status: DISCONTINUED | OUTPATIENT
Start: 2025-06-29 | End: 2025-07-08 | Stop reason: HOSPADM

## 2025-06-28 RX ADMIN — METHOCARBAMOL 500 MG: 500 TABLET ORAL at 08:06

## 2025-06-28 RX ADMIN — CEFTRIAXONE 1 G: 1 INJECTION, POWDER, FOR SOLUTION INTRAMUSCULAR; INTRAVENOUS at 10:06

## 2025-06-28 RX ADMIN — FUROSEMIDE 80 MG: 10 INJECTION, SOLUTION INTRAVENOUS at 05:06

## 2025-06-28 RX ADMIN — VALSARTAN 40 MG: 40 TABLET, FILM COATED ORAL at 08:06

## 2025-06-28 RX ADMIN — FUROSEMIDE 40 MG: 40 TABLET ORAL at 06:06

## 2025-06-28 RX ADMIN — METOPROLOL SUCCINATE 12.5 MG: 25 TABLET, EXTENDED RELEASE ORAL at 08:06

## 2025-06-28 RX ADMIN — POLYETHYLENE GLYCOL 3350 17 G: 17 POWDER, FOR SOLUTION ORAL at 08:06

## 2025-06-28 RX ADMIN — CAPSAICIN: 0.25 CREAM TOPICAL at 08:06

## 2025-06-28 RX ADMIN — GABAPENTIN 300 MG: 300 CAPSULE ORAL at 08:06

## 2025-06-28 RX ADMIN — METOPROLOL TARTRATE 25 MG: 25 TABLET, FILM COATED ORAL at 08:06

## 2025-06-28 RX ADMIN — DOXYCYCLINE HYCLATE 100 MG: 100 TABLET, COATED ORAL at 08:06

## 2025-06-28 RX ADMIN — INSULIN GLARGINE 5 UNITS: 100 INJECTION, SOLUTION SUBCUTANEOUS at 08:06

## 2025-06-28 RX ADMIN — GABAPENTIN 300 MG: 300 CAPSULE ORAL at 03:06

## 2025-06-28 RX ADMIN — MUPIROCIN: 20 OINTMENT TOPICAL at 08:06

## 2025-06-28 RX ADMIN — ASPIRIN 81 MG CHEWABLE TABLET 81 MG: 81 TABLET CHEWABLE at 08:06

## 2025-06-28 RX ADMIN — ATORVASTATIN CALCIUM 20 MG: 20 TABLET, FILM COATED ORAL at 08:06

## 2025-06-28 RX ADMIN — HYDRALAZINE HYDROCHLORIDE: 25 TABLET ORAL at 08:06

## 2025-06-28 NOTE — PLAN OF CARE
"Hospital Medicine ICU Acceptance Note    Date of Admit: 6/24/2025  Date of Transfer / Stepdown: 6/28/2025  ICU team stepping patient down: CCU  Accepting  team: [facility]    Brief History of Present Illness:      Cady Watkins is a 74F with HTN, HLD, T2DM w neuropathy, chronic pain on opioids, and fibromyalgia who initially presented from PCP office to [facility] ED for reported symptomatic bradycardia w rate 30s. She was in her normal state of health until 3 weeks prior when she had an episode of syncope, while in her kitchen, she felt very off, walked to sofa and blacked out reportedly for 3 mins. Thereafter, she's had fatigue, decreased appetite, and increasing SOB.  In ED, found to have complete heart block, new onset cardiomyopathy ischemic vs Takotsubo, Hypertensive emergency (SBP 230s), and ISAIAS.  Started on nitro gtt, had an emergent TVP placed and then transferred to [facility] CCU.   Hospital/ICU Course:   CCU course:   Transferred in from [facility] to CCU.   IC and EP consulted, and decision made for LHC to eval ischemia, which was done after ISAIAS improved and pt diuresed, and LHC revealed "70% left circumflex stenosis, otherwise diffuse non-obstructive disease is present" no intervention needed.   Diuresed on Lasix IVP and Lasix IV gtt @10cc/hr 6/26-6/27.  Intermittent delirium which starts at night; given haldol 5mg IV on 6/24 then olanzapine 5mg on 6/26 and 6/27 am then d/c-ed; on delirium precautions.  Patient had PPM placed successfully with no complications. UA evident of potential UTI will plan to treat with CTX for 3 days. Will continue to work on patient functional status with PT/OT.       Consultants and Procedures:     Consultants:  [facility]- Interventional Cardiology and EP  [facility] (prior to transfer here)- Interventional Cardiology, Urology, and Nephrology    Procedures:    none    Transfer Information:     Diet:  Regular --> change to diabetic, heart healthy     Physical Activity:  PT/OT    A/P:  Acute " HFrEF/NICMP, c/w Takotsubo? TTE showed HFrEF with low EF of 20-25% with evidence of WMAs.   - diuresed w Lasix IV, near euvolemic and switched to PO Lasix 40 BID to start 6/28 pm  - Toprol 50   - Valsartan 40 BID    Complete heart block s/p temp TVP then PPM- TSH wnl. Doxy ppx x 5d, post-PPM restrictions    CAD-  LCx 70% stenosis and non-obstructive diffuse disease  - ASA 81  - Atorva 20  - BB    ISAIAS, cardiorenal  resolving    UTI - CTX 1g IV q24, UCx with GNR, f/u sens/spec    DM2 controlled (A1C 6.1 on 9/2024) with neuropathy  F/u new A1C  Insulin basal 5u QD with LDSSI qACHS    Agitation/delirium  Anxiety  At [facility] prior to transfer, given Ativan 0.5 then Zyprexa IM x 2, then required Propofol and Fentanyl for sedation per cardiology.     Hypotension  At [facility], Likely secondary to precedex drip given for agitation, required intermittent use of levo to maintain pressures.    Bladder Mass  - Bladder US with 3.0 cm mass like lesion in right dependent bladder.  - Consulted Urology for further evaluation.  - Urology ordered CT Renal Stone Study unable to visualize bladder mass.  - CT Urogram inpt vs outpt   - Needs close follow up with urology.    Pain  - Gabapentin 300 TID  - Lido patch   - Robaxin 500 BID  - Capsaicin BID    Folate Deficiency  Vitamin B12 Deficiency, resolved  Macrocytosis  - Should be on weekly B12 injections  - B12 above 2000. Folate on lower side of normal. Will recommend replacement outpatient.    Deconditioning - f/u PT/OT    Patient has been accepted by Hospital Medicine Team C, who will assume care of the patient upon arrival to the floor from the ICU. Please contact ICU team with any concerns prior to arrival.     Valery Gallegos MD  Hospital Medicine Staff

## 2025-06-28 NOTE — PLAN OF CARE
CICU DAILY GOALS       A: Awake    RASS: Goal -    Actual - RASS (Johnson Agitation-Sedation Scale): alert and calm   Restraint necessity: Clinical Justification: Treatment Interference  B: Breath   SBT: Not intubated   C: Coordinate A & B, analgesics/sedatives   Pain: managed    SAT: Not intubated  D: Delirium   CAM-ICU:  negative   Activity: Activity Management: Rolling - L1  FAS: Feeding/Nutrition   Diet order: Diet/Nutrition Received: regular,   Fluid restriction:     Nutritional Supplement Intake: Quantity 1, Type: Boost  T: Thrombus   DVT prophylaxis: VTE Core Measure: Pharmacological prophylaxis initiated/maintained  H: HOB Elevation   Head of Bed (HOB) Positioning: HOB at 30 degrees  U: Ulcer Prophylaxis   GI: no  G: Glucose control   managed Glycemic Management: blood glucose monitored  S: Skin   Bathing/Skin Care: bath, complete;dressed/undressed;electrode patches/site rotation;linen changed (06/27/25 2301)  Wounds: No  Wound care consulted: No  B: Bowel Function   no issues   I: Indwelling Catheters   Wallis necessity:      Urethral Catheter 06/26/25 2200 Non-latex-Reason for Continuing Urinary Catheterization: Critically ill in ICU and requiring hourly monitoring of intake/output   CVC necessity: No  D: De-escalation Antibx   No  Plan for the day   Step down  Family/Goals of care/Code Status   Code Status: Full Code     No acute events throughout day, VS and assessment per flow sheet, patient progressing towards goals as tolerated, plan of care reviewed with Cady Watkins and family, all concerns addressed, will continue to monitor.

## 2025-06-28 NOTE — PLAN OF CARE
PT eval completed- see note for details, goals and POC established.     Problem: Physical Therapy  Goal: Physical Therapy Goal  Description: Goals to be met by: 25     Patient will increase functional independence with mobility by performin. Supine to sit with MInimal Assistance  2. Sit to stand transfer with Minimal Assistance  3. Bed to chair transfer with Minimal Assistance using LRAD  4. Gait  x 50 feet with Minimal Assistance using LRAD.   5. Ascend/descend 2 stair with no Handrails Moderate Assistance using HHA.   6. Lower extremity exercise program x30 reps per handout, with supervision    Outcome: Progressing   2025

## 2025-06-28 NOTE — EICU
Virtual ICU Quality Rounds    Admit Date: 6/24/2025  Hospital Day: 4    ICU Day: 4d 12h    24H Vital Sign Range:  Temp:  [97.9 °F (36.6 °C)-100.1 °F (37.8 °C)]   Pulse:  []   Resp:  [4-35]   BP: ()/(52-80)   SpO2:  [92 %-100 %]     VICU Surveillance Screening    Daily review of  line necessity(optional): Urinary catheter in place    Wallis Indications : Strict I&O-Lasix BID, consider removal    LDA reconciliation : Yes    Wallis best practices : Prompt alternatives(external catheters, in/ou cath, bladder scanning)

## 2025-06-28 NOTE — SUBJECTIVE & OBJECTIVE
Interval History: Transitioned to PO laisx. Will monitor UCX for sensitivities. Will plan to step down to medicine. Work with PT/OT today         Past Medical History:   Diagnosis Date    Diabetes mellitus, type 2     Fibromyalgia 2016    Hypertension 2016       Past Surgical History:   Procedure Laterality Date     SECTION      CHOLECYSTECTOMY      CORONARY ANGIOGRAPHY N/A 2025    Procedure: ANGIOGRAM, CORONARY ARTERY;  Surgeon: Perfecto Coburn MD;  Location: [facility] CATH LAB;  Service: Cardiology;  Laterality: N/A;    FOOT SURGERY Bilateral     plantar fasciotomy bilateral, arthroplasty fifth toe bilateral    HERNIA REPAIR      IMPLANTATION OF BIVENTRICULAR HEART PACEMAKER N/A 2025    Procedure: INSERTION, PACEMAKER, BIVENTRICULAR;  Surgeon: Jason Lundberg MD;  Location: [facility] EP LAB;  Service: Cardiology;  Laterality: N/A;  CHB, CRT-P (LBAP vs CS), Biotronik, Anes, SK, CICU 3084    INSERTION, PACEMAKER, TEMPORARY TRANSVENOUS N/A 2025    Procedure: Insertion, Pacemaker, Temporary Transvenous;  Surgeon: Sean Ureña MD;  Location: [facility] CATH LAB/EP;  Service: Cardiology;  Laterality: N/A;       Review of patient's allergies indicates:   Allergen Reactions    Advil [ibuprofen] Hives    Penicillins     Codeine     Excedrin aspirin free [acetaminophen-caffeine]        No current facility-administered medications on file prior to encounter.     Current Outpatient Medications on File Prior to Encounter   Medication Sig    cyclobenzaprine (FLEXERIL) 10 MG tablet TAKE 1 TABLET BY MOUTH EVERY DAY NIGHTLY AS NEEDED FOR MUSCLE SPASMS    gabapentin (NEURONTIN) 300 MG capsule TAKE 1 CAPSULE BY MOUTH EVERYDAY AT BEDTIME    lisinopriL (PRINIVIL,ZESTRIL) 20 MG tablet TAKE 1 TABLET BY MOUTH EVERY DAY    metFORMIN (GLUCOPHAGE) 500 MG tablet TAKE 1 TABLET BY MOUTH TWICE A DAY WITH MEALS    metoprolol tartrate (LOPRESSOR) 50 MG tablet TAKE 1 TABLET BY MOUTH TWICE A DAY    oxyCODONE-acetaminophen  (PERCOCET)  mg per tablet Take 1 tablet by mouth nightly as needed for Pain.    rosuvastatin (CRESTOR) 5 MG tablet Take 1 tablet (5 mg total) by mouth once daily.     Family History       Problem Relation (Age of Onset)    Diabetes Father    Heart disease Sister, Brother          Tobacco Use    Smoking status: Never    Smokeless tobacco: Never   Substance and Sexual Activity    Alcohol use: Yes     Comment: Occasionally    Drug use: Not on file    Sexual activity: Yes     Review of Systems   Constitutional: Positive for malaise/fatigue. Negative for chills and fever.   Cardiovascular:  Positive for dyspnea on exertion, leg swelling and syncope. Negative for chest pain, irregular heartbeat and palpitations.   Respiratory:  Positive for shortness of breath.    Musculoskeletal:  Positive for back pain and stiffness.   Genitourinary: Negative.      Objective:     Vital Signs (Most Recent):  Temp: 97.9 °F (36.6 °C) (06/28/25 0800)  Pulse: (!) 119 (06/28/25 1000)  Resp: (!) 35 (06/28/25 1000)  BP: (!) 99/56 (06/28/25 1000)  SpO2: 95 % (06/28/25 1000) Vital Signs (24h Range):  Temp:  [97.9 °F (36.6 °C)-100.1 °F (37.8 °C)] 97.9 °F (36.6 °C)  Pulse:  [] 119  Resp:  [4-35] 35  SpO2:  [92 %-100 %] 95 %  BP: ()/(52-80) 99/56     Weight: 62.6 kg (138 lb 0.1 oz)  Body mass index is 25.24 kg/m².    SpO2: 95 %         Intake/Output Summary (Last 24 hours) at 6/28/2025 1203  Last data filed at 6/28/2025 1000  Gross per 24 hour   Intake 1158.9 ml   Output 2690 ml   Net -1531.1 ml       Lines/Drains/Airways       Drain  Duration                  Urethral Catheter 06/26/25 2200 Non-latex 1 day              Peripheral Intravenous Line  Duration             Peripheral IV Single Lumen 20 G 1 1/4 in Right;Anterior Forearm -- days    Peripheral IV 06/28/25 0605 20 G Left Antecubital <1 day                     Physical Exam  Vitals reviewed.   Constitutional:       General: She is not in acute distress.  Eyes:      General:  "No scleral icterus.  Cardiovascular:      Rate and Rhythm: Normal rate and regular rhythm.   Pulmonary:      Effort: Pulmonary effort is normal. No respiratory distress.      Breath sounds: No wheezing.   Abdominal:      Hernia: A hernia is present.   Musculoskeletal:      Right lower leg: No edema.      Left lower leg: No edema.   Skin:     General: Skin is warm and dry.   Neurological:      Mental Status: She is alert.   Psychiatric:         Cognition and Memory: Cognition is impaired.          Significant Labs: ABG: No results for input(s): "PH", "PCO2", "HCO3", "POCSATURATED", "BE" in the last 48 hours., CMP   Recent Labs   Lab 06/27/25  1458 06/28/25  0514 06/28/25  0810    138 140   K 4.1 5.1 4.2   CL 98 100 100   CO2 28 26 27   * 133* 154*   BUN 29* 27* 27*   CREATININE 1.4 1.3 1.2   CALCIUM 8.4* 8.5* 8.5*   PROT 6.0 6.7 6.3   ALBUMIN 2.8* 3.0* 2.9*   BILITOT 0.4 0.6 0.6   ALKPHOS 47 45 42   AST 30 34 28   ALT 15 16 16   ANIONGAP 13 12 13   , CBC   Recent Labs   Lab 06/26/25  2056 06/27/25  0452 06/28/25  0514   WBC 11.95 9.02 11.51   HGB 11.6* 12.0 13.1   HCT 34.6* 33.9* 38.1    165 172   , INR   No results for input(s): "INR", "PROTIME" in the last 48 hours.  , Lipid Panel No results for input(s): "CHOL", "HDL", "LDLCALC", "TRIG", "CHOLHDL" in the last 48 hours., Troponin No results for input(s): "TROPONINIHS" in the last 48 hours., and All pertinent lab results from the last 24 hours have been reviewed.    Significant Imaging: Echocardiogram: Transthoracic echo (TTE) complete (Cupid Only):   Results for orders placed or performed during the hospital encounter of 06/24/25   Echo   Result Value Ref Range    BSA 1.71 m2    LVOT stroke volume 71.0 cm3    LV Systolic Volume Index 28.0 mL/m2    LV Diastolic Volume Index 62.50 mL/m2    LVOT area 3.1 cm2    FS 29.2 28 - 44 %    Left Ventricle Relative Wall Thickness 0.38 cm    LV mass 137.1 g    LV Mass Index 81.6 g/m2    E/E' ratio 16 m/s    " AJAY 39 mL/m2    LA Vol 65 cm3    RV/LV Ratio 0.58 cm    AV Velocity Ratio 0.61     AV index (prosthetic) 0.78     KAREN by Velocity Ratio 1.9 cm²    ASI 1.8 cm/m2    ASI 1.5 cm/m2    Mean e' 0.06 m/s    ZLVIDS 1.25     ZLVIDD 0.24     LV Diastolic Volume 105 mL    Echo EF Estimated 56 %    LV Systolic Volume 47 mL    IVS 0.8 0.6 - 1.1 cm    LVIDd 4.8 3.5 - 6.0 cm    LVIDs 3.4 2.1 - 4.0 cm    LVOT diameter 2.0 cm    PW 0.9 0.6 - 1.1 cm    AV LVOT peak gradient 5 mmHg    LVOT mn grad 2 mmHg    LVOT peak leanna 1.1 m/s    LVOT peak VTI 22.6 cm    RV- elias basal diam 2.8 cm    LA size 3.4 cm    Left Atrium Major Axis 6.0 cm    Left Atrium Minor Axis 5.9 cm    RA Major Axis 5.34 cm    AV valve area 2.5 cm2    AV area by cont VTI 2.4 cm2    AV peak gradient 13 mmHg    AV mean gradient 5 mmHg    Ao peak leanna 1.8 m/s    Ao VTI 28.8 cm    MV Peak A Leanna 1.14 m/s    E wave deceleration time 229 ms    E wave deceleration time 222 ms    MV Peak E Leanna 0.86 m/s    E/A ratio 0.75     LV LATERAL E/E' RATIO 14.3     LV SEPTAL E/E' RATIO 17.2     TDI LATERAL 0.06 m/s    TDI SEPTAL 0.05 m/s    TV peak gradient 35 mmHg    TR Max Leanna 3.0 m/s    Ascending aorta 2.6 cm    STJ 2.3 cm    Sinus 3.0 cm    LA WIDTH 3.8 cm    RA Width 3.66 cm    TAPSE 2.1 cm    TV resting pulmonary artery pressure 51 mmHg    RV TB RVSP 18 mmHg    Est. RA pres 15 mmHg    Narrative      Left Ventricle: The left ventricle is normal in size. Normal wall   thickness. Regional wall motion abnormalities present. There is severely   reduced systolic function with a visually estimated ejection fraction of   15 - 20%. Grade I diastolic dysfunction. Elevated left ventricular filling   pressure. No thrombus observed using contrast enhanced images. Hypokinesis   of all segments except the bases suggests takastsubo CM pattern, but   multivessel CAD cannot be exluded    Right Ventricle: The right ventricle is normal in size measuring 2.8   cm. Wall thickness is normal. Systolic  function is normal.    Aortic Valve: There is mild aortic valve sclerosis. There is moderate   annular calcification present.    Mitral Valve: There is mild regurgitation with a centrally directed   jet.    Aorta: The aortic root is normal in size measuring 3.0 cm. The proximal   ascending aorta is normal in size measuring 2.6 cm.    Pulmonary Artery: There is moderate pulmonary hypertension. The   estimated pulmonary artery systolic pressure is 51 mmHg.    IVC/SVC: Elevated venous pressure at 15 mmHg.    Pericardium: There is no pericardial effusion.

## 2025-06-28 NOTE — ASSESSMENT & PLAN NOTE
"Patient transferred for CHB. Remains in CHB and TVP dependent.    Plan:  - telemetry  - [facility] with no significant stenosis "70% left circumflex stenosis, otherwise diffuse non-obstructive disease is present"  - S/p PPM placement with no complications   - Doxycycline s/p PPM placement for 5 days   - precautions   - K > 4, Mg > 2      "

## 2025-06-28 NOTE — ASSESSMENT & PLAN NOTE
Delirium precautions placed. Currently on precedex due to agitation. Will wean as tolerated. Likely will place Seroquel prn at night for agitation and concern for delirium. Possible component of delerium from UTI as well.

## 2025-06-28 NOTE — EICU
ELLIEU Night Rounds Checklist  24H Vital Sign Range:  Temp:  [98.1 °F (36.7 °C)-100.1 °F (37.8 °C)]   Pulse:  []   Resp:  [4-29]   BP: ()/(52-73)   SpO2:  [91 %-99 %]   Arterial Line BP: ()/(38-49)     Video rounds

## 2025-06-28 NOTE — PROGRESS NOTES
"[facility]  Cardiology  Progress Note    Patient Name: Cady Watkins  MRN: [id number]  Admission Date: 2025  Hospital Length of Stay: 4 days  Code Status: Full Code   Attending Physician: Mita Matute MD   Primary Care Physician: Blaine Benítez MD  Expected Discharge Date: 2025  Principal Problem:Complete heart block    Subjective:     Hospital Course:   Patient presented from PCP office to [facility] for reported symptomatic bradycardia. Patient had a TVP placed and the decision was made to transfer to [facility] main [place] for PPM placement. Patient had a TTE with Ef of 20-25% with evidence of WMA's. Per discussion with IC and EP, the decision was made to do a diagnostic cath to investigate for ischemic etiology. Patient will have a PPM placed after the [facility]. Her hospitalization has been complicated by ISAIAS which has been improving with Lasix 40 mg IV BID. And intermittent delirium which starts at night, delirium precautions and prns are in place for controlling these episodes. She was eventually placed on a IV lasix gtt due to worsening pulmonary edema and inability to lie flat at times. LHC revealed "70% left circumflex stenosis, otherwise diffuse non-obstructive disease is present" no intervention was done Patient had PPM placed successfully with no complications. UA evident of potential UTI will plan to treat with CTX for 3 days. Will continue to work on patient functional status with PT/OT. Patient transitioned from IV to 40 mg BID PO lasix. Toprol dose was up titrated. And Gabapentin was started for neuropathy in her feet.     Interval History: Transitioned to PO laisx. Will monitor UCX for sensitivities. Will plan to step down to medicine. Work with PT/OT today         Past Medical History:   Diagnosis Date    Diabetes mellitus, type 2     Fibromyalgia 2016    Hypertension 2016       Past Surgical History:   Procedure Laterality Date     SECTION  "     CHOLECYSTECTOMY      CORONARY ANGIOGRAPHY N/A 6/26/2025    Procedure: ANGIOGRAM, CORONARY ARTERY;  Surgeon: Perfecto Coburn MD;  Location: [facility] CATH LAB;  Service: Cardiology;  Laterality: N/A;    FOOT SURGERY Bilateral     plantar fasciotomy bilateral, arthroplasty fifth toe bilateral    HERNIA REPAIR      IMPLANTATION OF BIVENTRICULAR HEART PACEMAKER N/A 6/26/2025    Procedure: INSERTION, PACEMAKER, BIVENTRICULAR;  Surgeon: Jason Lundberg MD;  Location: [facility] EP LAB;  Service: Cardiology;  Laterality: N/A;  CHB, CRT-P (LBAP vs CS), Biotronik, Anes, SK, CICU 3084    INSERTION, PACEMAKER, TEMPORARY TRANSVENOUS N/A 6/18/2025    Procedure: Insertion, Pacemaker, Temporary Transvenous;  Surgeon: Sean Ureña MD;  Location: [facility] CATH LAB/EP;  Service: Cardiology;  Laterality: N/A;       Review of patient's allergies indicates:   Allergen Reactions    Advil [ibuprofen] Hives    Penicillins     Codeine     Excedrin aspirin free [acetaminophen-caffeine]        No current facility-administered medications on file prior to encounter.     Current Outpatient Medications on File Prior to Encounter   Medication Sig    cyclobenzaprine (FLEXERIL) 10 MG tablet TAKE 1 TABLET BY MOUTH EVERY DAY NIGHTLY AS NEEDED FOR MUSCLE SPASMS    gabapentin (NEURONTIN) 300 MG capsule TAKE 1 CAPSULE BY MOUTH EVERYDAY AT BEDTIME    lisinopriL (PRINIVIL,ZESTRIL) 20 MG tablet TAKE 1 TABLET BY MOUTH EVERY DAY    metFORMIN (GLUCOPHAGE) 500 MG tablet TAKE 1 TABLET BY MOUTH TWICE A DAY WITH MEALS    metoprolol tartrate (LOPRESSOR) 50 MG tablet TAKE 1 TABLET BY MOUTH TWICE A DAY    oxyCODONE-acetaminophen (PERCOCET)  mg per tablet Take 1 tablet by mouth nightly as needed for Pain.    rosuvastatin (CRESTOR) 5 MG tablet Take 1 tablet (5 mg total) by mouth once daily.     Family History       Problem Relation (Age of Onset)    Diabetes Father    Heart disease Sister, Brother          Tobacco Use    Smoking status: Never    Smokeless tobacco:  Never   Substance and Sexual Activity    Alcohol use: Yes     Comment: Occasionally    Drug use: Not on file    Sexual activity: Yes     Review of Systems   Constitutional: Positive for malaise/fatigue. Negative for chills and fever.   Cardiovascular:  Positive for dyspnea on exertion, leg swelling and syncope. Negative for chest pain, irregular heartbeat and palpitations.   Respiratory:  Positive for shortness of breath.    Musculoskeletal:  Positive for back pain and stiffness.   Genitourinary: Negative.      Objective:     Vital Signs (Most Recent):  Temp: 97.9 °F (36.6 °C) (06/28/25 0800)  Pulse: (!) 119 (06/28/25 1000)  Resp: (!) 35 (06/28/25 1000)  BP: (!) 99/56 (06/28/25 1000)  SpO2: 95 % (06/28/25 1000) Vital Signs (24h Range):  Temp:  [97.9 °F (36.6 °C)-100.1 °F (37.8 °C)] 97.9 °F (36.6 °C)  Pulse:  [] 119  Resp:  [4-35] 35  SpO2:  [92 %-100 %] 95 %  BP: ()/(52-80) 99/56     Weight: 62.6 kg (138 lb 0.1 oz)  Body mass index is 25.24 kg/m².    SpO2: 95 %         Intake/Output Summary (Last 24 hours) at 6/28/2025 1203  Last data filed at 6/28/2025 1000  Gross per 24 hour   Intake 1158.9 ml   Output 2690 ml   Net -1531.1 ml       Lines/Drains/Airways       Drain  Duration                  Urethral Catheter 06/26/25 2200 Non-latex 1 day              Peripheral Intravenous Line  Duration             Peripheral IV Single Lumen 20 G 1 1/4 in Right;Anterior Forearm -- days    Peripheral IV 06/28/25 0605 20 G Left Antecubital <1 day                     Physical Exam  Vitals reviewed.   Constitutional:       General: She is not in acute distress.  Eyes:      General: No scleral icterus.  Cardiovascular:      Rate and Rhythm: Normal rate and regular rhythm.   Pulmonary:      Effort: Pulmonary effort is normal. No respiratory distress.      Breath sounds: No wheezing.   Abdominal:      Hernia: A hernia is present.   Musculoskeletal:      Right lower leg: No edema.      Left lower leg: No edema.   Skin:      "General: Skin is warm and dry.   Neurological:      Mental Status: She is alert.   Psychiatric:         Cognition and Memory: Cognition is impaired.          Significant Labs: ABG: No results for input(s): "PH", "PCO2", "HCO3", "POCSATURATED", "BE" in the last 48 hours., CMP   Recent Labs   Lab 06/27/25  1458 06/28/25  0514 06/28/25  0810    138 140   K 4.1 5.1 4.2   CL 98 100 100   CO2 28 26 27   * 133* 154*   BUN 29* 27* 27*   CREATININE 1.4 1.3 1.2   CALCIUM 8.4* 8.5* 8.5*   PROT 6.0 6.7 6.3   ALBUMIN 2.8* 3.0* 2.9*   BILITOT 0.4 0.6 0.6   ALKPHOS 47 45 42   AST 30 34 28   ALT 15 16 16   ANIONGAP 13 12 13   , CBC   Recent Labs   Lab 06/26/25  2056 06/27/25  0452 06/28/25  0514   WBC 11.95 9.02 11.51   HGB 11.6* 12.0 13.1   HCT 34.6* 33.9* 38.1    165 172   , INR   No results for input(s): "INR", "PROTIME" in the last 48 hours.  , Lipid Panel No results for input(s): "CHOL", "HDL", "LDLCALC", "TRIG", "CHOLHDL" in the last 48 hours., Troponin No results for input(s): "TROPONINIHS" in the last 48 hours., and All pertinent lab results from the last 24 hours have been reviewed.    Significant Imaging: Echocardiogram: Transthoracic echo (TTE) complete (Cupid Only):   Results for orders placed or performed during the hospital encounter of 06/24/25   Echo   Result Value Ref Range    BSA 1.71 m2    LVOT stroke volume 71.0 cm3    LV Systolic Volume Index 28.0 mL/m2    LV Diastolic Volume Index 62.50 mL/m2    LVOT area 3.1 cm2    FS 29.2 28 - 44 %    Left Ventricle Relative Wall Thickness 0.38 cm    LV mass 137.1 g    LV Mass Index 81.6 g/m2    E/E' ratio 16 m/s    AJAY 39 mL/m2    LA Vol 65 cm3    RV/LV Ratio 0.58 cm    AV Velocity Ratio 0.61     AV index (prosthetic) 0.78     KAREN by Velocity Ratio 1.9 cm²    ASI 1.8 cm/m2    ASI 1.5 cm/m2    Mean e' 0.06 m/s    ZLVIDS 1.25     ZLVIDD 0.24     LV Diastolic Volume 105 mL    Echo EF Estimated 56 %    LV Systolic Volume 47 mL    IVS 0.8 0.6 - 1.1 cm    " LVIDd 4.8 3.5 - 6.0 cm    LVIDs 3.4 2.1 - 4.0 cm    LVOT diameter 2.0 cm    PW 0.9 0.6 - 1.1 cm    AV LVOT peak gradient 5 mmHg    LVOT mn grad 2 mmHg    LVOT peak darrin 1.1 m/s    LVOT peak VTI 22.6 cm    RV- elias basal diam 2.8 cm    LA size 3.4 cm    Left Atrium Major Axis 6.0 cm    Left Atrium Minor Axis 5.9 cm    RA Major Axis 5.34 cm    AV valve area 2.5 cm2    AV area by cont VTI 2.4 cm2    AV peak gradient 13 mmHg    AV mean gradient 5 mmHg    Ao peak darrin 1.8 m/s    Ao VTI 28.8 cm    MV Peak A Darrin 1.14 m/s    E wave deceleration time 229 ms    E wave deceleration time 222 ms    MV Peak E Darrin 0.86 m/s    E/A ratio 0.75     LV LATERAL E/E' RATIO 14.3     LV SEPTAL E/E' RATIO 17.2     TDI LATERAL 0.06 m/s    TDI SEPTAL 0.05 m/s    TV peak gradient 35 mmHg    TR Max Darrin 3.0 m/s    Ascending aorta 2.6 cm    STJ 2.3 cm    Sinus 3.0 cm    LA WIDTH 3.8 cm    RA Width 3.66 cm    TAPSE 2.1 cm    TV resting pulmonary artery pressure 51 mmHg    RV TB RVSP 18 mmHg    Est. RA pres 15 mmHg    Narrative      Left Ventricle: The left ventricle is normal in size. Normal wall   thickness. Regional wall motion abnormalities present. There is severely   reduced systolic function with a visually estimated ejection fraction of   15 - 20%. Grade I diastolic dysfunction. Elevated left ventricular filling   pressure. No thrombus observed using contrast enhanced images. Hypokinesis   of all segments except the bases suggests takastsubo CM pattern, but   multivessel CAD cannot be exluded    Right Ventricle: The right ventricle is normal in size measuring 2.8   cm. Wall thickness is normal. Systolic function is normal.    Aortic Valve: There is mild aortic valve sclerosis. There is moderate   annular calcification present.    Mitral Valve: There is mild regurgitation with a centrally directed   jet.    Aorta: The aortic root is normal in size measuring 3.0 cm. The proximal   ascending aorta is normal in size measuring 2.6 cm.    Pulmonary  "Artery: There is moderate pulmonary hypertension. The   estimated pulmonary artery systolic pressure is 51 mmHg.    IVC/SVC: Elevated venous pressure at 15 mmHg.    Pericardium: There is no pericardial effusion.       Assessment and Plan:     * Complete heart block  Patient transferred for CHB. Remains in CHB and TVP dependent.    Plan:  - telemetry  - [facility] with no significant stenosis "70% left circumflex stenosis, otherwise diffuse non-obstructive disease is present"  - S/p PPM placement with no complications   - Doxycycline s/p PPM placement for 5 days   - precautions   - K > 4, Mg > 2        UTI (urinary tract infection)  Patient UA evident of many bacteria and > 100 WBC's. Will plan on treating for Acute cystitis with 3 days of CTX    Delirium  Delirium precautions placed. Currently on precedex due to agitation. Will wean as tolerated. Likely will place Seroquel prn at night for agitation and concern for delirium. Possible component of delerium from UTI as well.     ISAIAS (acute kidney injury)  ISAIAS is likely due to pre-renal azotemia due to intravascular volume depletion. Baseline creatinine is 1.0. Most recent creatinine and eGFR are listed below.  Recent Labs     06/26/25  2056 06/27/25  0452 06/27/25  0735   CREATININE 1.3 1.2 1.2   EGFRNORACEVR 44* 48* 48*        Plan  - ISAIAS is improving  - Avoid nephrotoxins and renally dose meds for GFR listed above  - Monitor urine output, serial BMP, and adjust therapy as needed    Acute systolic heart failure  Patient found to have new systolic heart failure on TTE. She additionally has wall motion abnormalities that could represent old infarct or stress induced cardiomyopathy.    Plan:  - telemetry  - strict I/O's, daily weights  - Switch from IV lasix 40 mg BID to gtt due to worsening pulmonary edema and Scr  - hold BB, ARB/ARNI, MRA, and SGLt2i at this time but will plan for initiation prior to discharge      Acute hypoxemic respiratory failure  Patient with Hypoxic " Respiratory failure which is Acute.  she is not on home oxygen. Supplemental oxygen was provided and noted-      .   Signs/symptoms of respiratory failure include- tachypnea and increased work of breathing. Contributing diagnoses includes - flash pulmonary edema Labs and images were reviewed. Patient Has not had a recent ABG. Will treat underlying causes and adjust management of respiratory failure as follows- volume optimization and BP control    Type 2 diabetes with neuropathy  - SSI  - carb controlled diet when diet resumed    Hypertension  Patient's blood pressure range in the last 24 hours was: BP  Min: 66/44  Max: 186/91.The patient's inpatient anti-hypertensive regimen is listed below:  Current Antihypertensives  hydrALAZINE tablet 10 mg, Every 8 hours, Oral    Plan  - BP is uncontrolled, will adjust as follows: starting hydral  - Will add on GDMT when renal function improves    High cholesterol  - continue statin          VTE Risk Mitigation (From admission, onward)      None            Patrica Martínez DO  Cardiology  [facility] - Card[facility]

## 2025-06-28 NOTE — PT/OT/SLP EVAL
"Physical Therapy Evaluation and Treatment    Patient Name:  Cady Watkins   MRN:  [id number]    Recommendations:     Discharge Recommendations: Moderate Intensity Therapy   Discharge Equipment Recommendations: bedside commode, wheelchair   Barriers to discharge: None    Assessment:     Cady Watkins is a 74 y.o. female admitted with a medical diagnosis of Complete heart block.  She presents with the following impairments/functional limitations: weakness, impaired endurance, impaired self care skills, impaired functional mobility, gait instability, impaired balance, decreased lower extremity function, decreased upper extremity function, decreased coordination     Pt lethargic and needed moderate encouragement to participate in therapy evaluation. Pt educated on pacemaker precautions prior to start of treatment with pt verbalizing understanding. Pt needed mod A of 2 persons to complete 2 sit <> stand trial this session. Pt unable to take steps due to BLE weakness and fatigue. Patient currently demonstrates a need for moderate intensity therapy on a daily basis post acute secondary to a decline in functional status due to surgical procedure    Rehab Prognosis: Good; patient would benefit from acute skilled PT services to address these deficits and reach maximum level of function.    Recent Surgery: Procedure(s) (LRB):  INSERTION, PACEMAKER, BIVENTRICULAR (N/A) 2 Days Post-Op    Plan:     During this hospitalization, patient to be seen 4 x/week to address the identified rehab impairments via gait training, therapeutic activities, therapeutic exercises, neuromuscular re-education and progress toward the following goals:    Plan of Care Expires:  07/28/25    Subjective     Chief Complaint: Pain from neuropathy in both feet  Patient/Family Comments/goals: "Why can't I walk?"  Pain/Comfort:  Pain Rating 1:  (Pt did not rate)  Location - Side 1: Bilateral  Location - Orientation 1: generalized  Location 1: foot  Pain " Addressed 1: Reposition, Distraction    Patients cultural, spiritual, Spiritism conflicts given the current situation: no    Patient History:     Living Environment: Pt lives alone (but will be d/c to daughters house who lives in [facility] with 2 BRIAN. Bathroom: tub/shower combo   Prior Level of Function: IND with mobility and ADLs  DME owned: RW  Caregiver Assistance: daughter    Objective:     Additional Staff Present: rebekah Campbell    Communicated with RN prior to session.  Patient found up in chair with telemetry, blood pressure cuff, pulse ox (continuous), lazo catheter  upon PT entry to room.    General Precautions: Standard, fall, pacemaker  Orthopedic Precautions:N/A   Braces: Sling and swathe (at night)  Respiratory Status: Nasal cannula, flow 2 L/min    Exams:  Gross Motor Coordination:  WFL  Sensation:    -       Impaired  Pt report neuropathy in both feet  RLE ROM: WFL  RLE Strength: grossly 3+/5  LLE ROM: WFL  LLE Strength: grossly 3+/5    Functional Mobility:    Bed Mobility:   N/T 2/2 pt sitting up in chair upon PT arrival     Transfers:   Sit <> Stand Transfer: moderate assistance and of 2 persons from using Vimal HHA x2 Trials    Balance:   Standing balance:   POOR: Needs MODERATE assist to maintain  Pt needed mod A of 2 persons to stand using HHA.  Pt stood with posterior lean initially, forward flexed posture and downward gaze  Manual cues for anterior weight shift and upright posture, verbal cues for forward gaze   Pt stood ~10 seconds on 1 trial and ~45 secs on 2nd trial                 Gait:  Pt unable to take steps this session        AM-PAC 6 CLICK MOBILITY  Total Score:11       Treatment & Education:  Pt educated on pacemaker precautions and on wearing sling and swathe at night  Pt educated on tip to reduce fall risk and safety with mobility and using call button for assistance from nursing staff with OOB mobility.  Pt educated on sitting up in chair 2-4 hours of the day  All questions answered  within the scope of PT.  White board updated accordingly.    Patient left up in chair with all lines intact, call button in reach, RN notified, and daughter present.    GOALS:   Multidisciplinary Problems       Physical Therapy Goals          Problem: Physical Therapy    Goal Priority Disciplines Outcome Interventions   Physical Therapy Goal     PT, PT/OT Progressing    Description: Goals to be met by: 25     Patient will increase functional independence with mobility by performin. Supine to sit with MInimal Assistance  2. Sit to stand transfer with Minimal Assistance  3. Bed to chair transfer with Minimal Assistance using LRAD  4. Gait  x 50 feet with Minimal Assistance using LRAD.   5. Ascend/descend 2 stair with no Handrails Moderate Assistance using HHA.   6. Lower extremity exercise program x30 reps per handout, with supervision                         DME Justifications:   Cady requires a commode for home use because she is confined to a single room.  Cady Watkins has a mobility limitation that significantly impairs her ability to participate in one or more mobility related activities of daily living (MRADLs) such as toileting, feeding, dressing, grooming, and bathing in customary locations in the home.  The mobility limitation cannot be sufficiently resolved by the use of a cane or walker.   The use of a manual wheelchair will significantly improve the patients ability to participate in MRADLS and the patient will use it on regular basis in the home.  Cady Watkins has expressed her willingness to use a manual wheelchair in the home. Patients upper body strength is sufficient for propulsion.  She also has a caregiver who is available, willing, and able to provide assistance with the wheelchair when needed.      History:     Past Medical History:   Diagnosis Date    Diabetes mellitus, type 2     Fibromyalgia 2016    Hypertension 2016       Past Surgical History:   Procedure  Laterality Date     SECTION      CHOLECYSTECTOMY      CORONARY ANGIOGRAPHY N/A 2025    Procedure: ANGIOGRAM, CORONARY ARTERY;  Surgeon: Perfecto Coburn MD;  Location: [facility] CATH LAB;  Service: Cardiology;  Laterality: N/A;    FOOT SURGERY Bilateral     plantar fasciotomy bilateral, arthroplasty fifth toe bilateral    HERNIA REPAIR      IMPLANTATION OF BIVENTRICULAR HEART PACEMAKER N/A 2025    Procedure: INSERTION, PACEMAKER, BIVENTRICULAR;  Surgeon: Jason Lundberg MD;  Location: [facility] EP LAB;  Service: Cardiology;  Laterality: N/A;  CHB, CRT-P (LBAP vs CS), Biotronik, Anes, SK, CICU 3084    INSERTION, PACEMAKER, TEMPORARY TRANSVENOUS N/A 2025    Procedure: Insertion, Pacemaker, Temporary Transvenous;  Surgeon: Sean Ureña MD;  Location: [facility] CATH LAB/EP;  Service: Cardiology;  Laterality: N/A;       Time Tracking:     PT Received On: 25  PT Start Time: 1337     PT Stop Time: 1356  PT Total Time (min): 19 min     Billable Minutes: Evaluation 10 and Neuromuscular Re-education 9      2025

## 2025-06-29 PROBLEM — B96.20 E. COLI UTI (URINARY TRACT INFECTION): Status: ACTIVE | Noted: 2025-06-27

## 2025-06-29 PROBLEM — I25.10 CORONARY ARTERY DISEASE INVOLVING NATIVE CORONARY ARTERY: Status: ACTIVE | Noted: 2025-06-29

## 2025-06-29 LAB
ABSOLUTE EOSINOPHIL (OHS): 0.65 K/UL
ABSOLUTE MONOCYTE (OHS): 0.81 K/UL (ref 0.3–1)
ABSOLUTE NEUTROPHIL COUNT (OHS): 4.53 K/UL (ref 1.8–7.7)
ALBUMIN SERPL BCP-MCNC: 2.8 G/DL (ref 3.5–5.2)
ALP SERPL-CCNC: 47 UNIT/L (ref 40–150)
ALT SERPL W/O P-5'-P-CCNC: 14 UNIT/L (ref 10–44)
ANION GAP (OHS): 9 MMOL/L (ref 8–16)
AST SERPL-CCNC: 25 UNIT/L (ref 11–45)
BACTERIA BLD CULT: NORMAL
BACTERIA BLD CULT: NORMAL
BASOPHILS # BLD AUTO: 0.07 K/UL
BASOPHILS NFR BLD AUTO: 0.8 %
BILIRUB SERPL-MCNC: 0.6 MG/DL (ref 0.1–1)
BUN SERPL-MCNC: 30 MG/DL (ref 8–23)
CALCIUM SERPL-MCNC: 9 MG/DL (ref 8.7–10.5)
CHLORIDE SERPL-SCNC: 98 MMOL/L (ref 95–110)
CO2 SERPL-SCNC: 28 MMOL/L (ref 23–29)
CREAT SERPL-MCNC: 1.1 MG/DL (ref 0.5–1.4)
EAG (OHS): 143 MG/DL (ref 68–131)
ERYTHROCYTE [DISTWIDTH] IN BLOOD BY AUTOMATED COUNT: 13.9 % (ref 11.5–14.5)
GFR SERPLBLD CREATININE-BSD FMLA CKD-EPI: 53 ML/MIN/1.73/M2
GLUCOSE SERPL-MCNC: 155 MG/DL (ref 70–110)
HBA1C MFR BLD: 6.6 % (ref 4–5.6)
HCT VFR BLD AUTO: 34.8 % (ref 37–48.5)
HGB BLD-MCNC: 11.5 GM/DL (ref 12–16)
IMM GRANULOCYTES # BLD AUTO: 0.04 K/UL (ref 0–0.04)
IMM GRANULOCYTES NFR BLD AUTO: 0.5 % (ref 0–0.5)
LYMPHOCYTES # BLD AUTO: 2.26 K/UL (ref 1–4.8)
MAGNESIUM SERPL-MCNC: 1.8 MG/DL (ref 1.6–2.6)
MCH RBC QN AUTO: 31.8 PG (ref 27–31)
MCHC RBC AUTO-ENTMCNC: 33 G/DL (ref 32–36)
MCV RBC AUTO: 96 FL (ref 82–98)
NUCLEATED RBC (/100WBC) (OHS): 0 /100 WBC
PHOSPHATE SERPL-MCNC: 3.4 MG/DL (ref 2.7–4.5)
PLATELET # BLD AUTO: 164 K/UL (ref 150–450)
PMV BLD AUTO: 11.5 FL (ref 9.2–12.9)
POCT GLUCOSE: 130 MG/DL (ref 70–110)
POCT GLUCOSE: 230 MG/DL (ref 70–110)
POCT GLUCOSE: 283 MG/DL (ref 70–110)
POTASSIUM SERPL-SCNC: 4.4 MMOL/L (ref 3.5–5.1)
PROT SERPL-MCNC: 6.3 GM/DL (ref 6–8.4)
RBC # BLD AUTO: 3.62 M/UL (ref 4–5.4)
RELATIVE EOSINOPHIL (OHS): 7.8 %
RELATIVE LYMPHOCYTE (OHS): 27 % (ref 18–48)
RELATIVE MONOCYTE (OHS): 9.7 % (ref 4–15)
RELATIVE NEUTROPHIL (OHS): 54.2 % (ref 38–73)
SODIUM SERPL-SCNC: 135 MMOL/L (ref 136–145)
WBC # BLD AUTO: 8.36 K/UL (ref 3.9–12.7)

## 2025-06-29 PROCEDURE — 25000003 PHARM REV CODE 250

## 2025-06-29 PROCEDURE — 63600175 PHARM REV CODE 636 W HCPCS

## 2025-06-29 PROCEDURE — 85025 COMPLETE CBC W/AUTO DIFF WBC: CPT

## 2025-06-29 PROCEDURE — 97165 OT EVAL LOW COMPLEX 30 MIN: CPT

## 2025-06-29 PROCEDURE — 25000003 PHARM REV CODE 250: Performed by: HOSPITALIST

## 2025-06-29 PROCEDURE — 83036 HEMOGLOBIN GLYCOSYLATED A1C: CPT | Performed by: HOSPITALIST

## 2025-06-29 PROCEDURE — 82040 ASSAY OF SERUM ALBUMIN: CPT

## 2025-06-29 PROCEDURE — 36415 COLL VENOUS BLD VENIPUNCTURE: CPT

## 2025-06-29 PROCEDURE — 83735 ASSAY OF MAGNESIUM: CPT

## 2025-06-29 PROCEDURE — 84100 ASSAY OF PHOSPHORUS: CPT

## 2025-06-29 PROCEDURE — 20600001 HC STEP DOWN PRIVATE ROOM

## 2025-06-29 PROCEDURE — 25000003 PHARM REV CODE 250: Performed by: STUDENT IN AN ORGANIZED HEALTH CARE EDUCATION/TRAINING PROGRAM

## 2025-06-29 PROCEDURE — 36415 COLL VENOUS BLD VENIPUNCTURE: CPT | Performed by: HOSPITALIST

## 2025-06-29 PROCEDURE — 80053 COMPREHEN METABOLIC PANEL: CPT | Performed by: HOSPITALIST

## 2025-06-29 PROCEDURE — 97530 THERAPEUTIC ACTIVITIES: CPT

## 2025-06-29 PROCEDURE — 94761 N-INVAS EAR/PLS OXIMETRY MLT: CPT

## 2025-06-29 RX ORDER — LANOLIN ALCOHOL/MO/W.PET/CERES
400 CREAM (GRAM) TOPICAL DAILY
Status: DISCONTINUED | OUTPATIENT
Start: 2025-06-29 | End: 2025-06-30

## 2025-06-29 RX ORDER — PANTOPRAZOLE SODIUM 40 MG/1
40 TABLET, DELAYED RELEASE ORAL DAILY
Status: DISCONTINUED | OUTPATIENT
Start: 2025-06-29 | End: 2025-07-08 | Stop reason: HOSPADM

## 2025-06-29 RX ADMIN — VALSARTAN 40 MG: 40 TABLET, FILM COATED ORAL at 08:06

## 2025-06-29 RX ADMIN — FUROSEMIDE 40 MG: 40 TABLET ORAL at 09:06

## 2025-06-29 RX ADMIN — CAPSAICIN: 0.25 CREAM TOPICAL at 09:06

## 2025-06-29 RX ADMIN — ACETAMINOPHEN 650 MG: 325 TABLET ORAL at 12:06

## 2025-06-29 RX ADMIN — MUPIROCIN: 20 OINTMENT TOPICAL at 09:06

## 2025-06-29 RX ADMIN — METHOCARBAMOL 500 MG: 500 TABLET ORAL at 09:06

## 2025-06-29 RX ADMIN — Medication 400 MG: at 11:06

## 2025-06-29 RX ADMIN — POLYETHYLENE GLYCOL 3350 17 G: 17 POWDER, FOR SOLUTION ORAL at 09:06

## 2025-06-29 RX ADMIN — GABAPENTIN 300 MG: 300 CAPSULE ORAL at 09:06

## 2025-06-29 RX ADMIN — INSULIN GLARGINE 5 UNITS: 100 INJECTION, SOLUTION SUBCUTANEOUS at 09:06

## 2025-06-29 RX ADMIN — METOPROLOL SUCCINATE 50 MG: 50 TABLET, EXTENDED RELEASE ORAL at 09:06

## 2025-06-29 RX ADMIN — CAPSAICIN: 0.25 CREAM TOPICAL at 08:06

## 2025-06-29 RX ADMIN — ASPIRIN 81 MG CHEWABLE TABLET 81 MG: 81 TABLET CHEWABLE at 09:06

## 2025-06-29 RX ADMIN — GABAPENTIN 300 MG: 300 CAPSULE ORAL at 03:06

## 2025-06-29 RX ADMIN — VALSARTAN 40 MG: 40 TABLET, FILM COATED ORAL at 09:06

## 2025-06-29 RX ADMIN — FUROSEMIDE 40 MG: 40 TABLET ORAL at 06:06

## 2025-06-29 RX ADMIN — DOXYCYCLINE HYCLATE 100 MG: 100 TABLET, COATED ORAL at 08:06

## 2025-06-29 RX ADMIN — GABAPENTIN 300 MG: 300 CAPSULE ORAL at 08:06

## 2025-06-29 RX ADMIN — CEFTRIAXONE 1 G: 1 INJECTION, POWDER, FOR SOLUTION INTRAMUSCULAR; INTRAVENOUS at 10:06

## 2025-06-29 RX ADMIN — METHOCARBAMOL 500 MG: 500 TABLET ORAL at 08:06

## 2025-06-29 RX ADMIN — LIDOCAINE 1 PATCH: 50 PATCH CUTANEOUS at 03:06

## 2025-06-29 RX ADMIN — ATORVASTATIN CALCIUM 20 MG: 20 TABLET, FILM COATED ORAL at 09:06

## 2025-06-29 RX ADMIN — DOXYCYCLINE HYCLATE 100 MG: 100 TABLET, COATED ORAL at 09:06

## 2025-06-29 RX ADMIN — PANTOPRAZOLE SODIUM 40 MG: 40 TABLET, DELAYED RELEASE ORAL at 03:06

## 2025-06-29 RX ADMIN — INSULIN ASPART 2 UNITS: 100 INJECTION, SOLUTION INTRAVENOUS; SUBCUTANEOUS at 12:06

## 2025-06-29 NOTE — PLAN OF CARE
Problem: Occupational Therapy  Goal: Occupational Therapy Goal  Description: Goals to be met by: 7/29/25     Patient will increase functional independence with ADLs by performing:    UE Dressing with independence  LE Dressing with minimal assistance and AD as needed.  Grooming while standing at sink with Minimal Assistance  and AD as needed  Toileting from toilet with contact guard Assistance for hygiene and clothing management.   Sitting at edge of bed x10 minutes with supervision  Supine to sit with independence   Step transfer with minimal assistance and AD prn  Toilet transfer to toilet with with contact guard and AD prn  Increased functional strength to WNL for BUEs  Upper extremity exercise program x10 reps per handout, with assistance as needed.    6/29/2025 1054 by Saurav Tran OT  Outcome: Progressing

## 2025-06-29 NOTE — SUBJECTIVE & OBJECTIVE
Interval History: Feels relatively well, just weak/deconditioned, requiring two person assist for standing, PT/OT rec Mod Intensity. Family interested in [facility] facility in [place], prior very positive experience there and near daughter who lives in [place].     Review of Systems   All other systems reviewed and are negative.    Objective:     Vital Signs (Most Recent):  Temp: 98.3 °F (36.8 °C) (06/29/25 0830)  Pulse: 74 (06/29/25 0830)  Resp: 18 (06/29/25 0830)  BP: (!) 152/74 (06/29/25 0830)  SpO2: 97 % (06/29/25 0830) Vital Signs (24h Range):  Temp:  [96 °F (35.6 °C)-98.3 °F (36.8 °C)] 98.3 °F (36.8 °C)  Pulse:  [] 74  Resp:  [11-24] 18  SpO2:  [94 %-99 %] 97 %  BP: ()/(55-74) 152/74     Weight: 62.6 kg (138 lb 0.1 oz)  Body mass index is 25.24 kg/m².    Intake/Output Summary (Last 24 hours) at 6/29/2025 1023  Last data filed at 6/28/2025 1924  Gross per 24 hour   Intake 240 ml   Output 240 ml   Net 0 ml         Physical Exam  Vitals reviewed.               Significant Labs: All pertinent labs within the past 24 hours have been reviewed.    Significant Imaging: I have reviewed all pertinent imaging results/findings within the past 24 hours.

## 2025-06-29 NOTE — HPI
74F with HTN, HLD, T2DM w neuropathy, chronic pain on opioids, and fibromyalgia who initially presented from PCP office to [facility] ED for reported symptomatic bradycardia w rate 30s. She was in her normal state of health until 3 weeks prior when she had an episode of syncope, while in her kitchen, she felt very off, walked to sofa and blacked out reportedly for 3 mins. Thereafter, she's had fatigue, decreased appetite, and increasing SOB.  In ED, found to have complete heart block, new onset cardiomyopathy ischemic vs Takotsubo, Hypertensive emergency (SBP 230s), and ISAIAS.  Started on nitro gtt, had an emergent TVP placed and then transferred to [facility] CCU.

## 2025-06-29 NOTE — ASSESSMENT & PLAN NOTE
DM2 controlled (A1C 6.1 on 9/2024) with neuropathy  F/u new A1C  Insulin basal 5u QD with LDSSI qACHS  - Gabapentin 300 TID

## 2025-06-29 NOTE — PLAN OF CARE
Pt has episodes of confusion did not know where she was or how she got to hospital. Pt has lazo placed. With c/o pain to the left heal and left side. Lidocaine patch place on side for pain relief. Plan of care reviewed with patient. Call light within reach, fall precautions maintained bed alarm set. Patient made aware. Nurse instructed patient to call for assistance. Patient verbalized understanding. Telemetry monitor throughout shift. NAD noted. Will continue to monitor and continue plan of care.         Problem: Adult Inpatient Plan of Care  Goal: Plan of Care Review  Outcome: Ongoing  Goal: Patient-Specific Goal (Individualized)  Outcome: Ongoing  Goal: Absence of Hospital-Acquired Illness or Injury  Outcome: Ongoing  Goal: Optimal Comfort and Wellbeing  Outcome: Ongoing  Goal: Readiness for Transition of Care  Outcome: Ongoing     Problem: Diabetes Comorbidity  Goal: Blood Glucose Level Within Targeted Range  Outcome: Ongoing     Problem: Acute Kidney Injury/Impairment  Goal: Fluid and Electrolyte Balance  Outcome: Ongoing  Goal: Improved Oral Intake  Outcome: Ongoing  Goal: Effective Renal Function  Outcome: Ongoing     Problem: Infection  Goal: Absence of Infection Signs and Symptoms  Outcome: Ongoing     Problem: Skin Injury Risk Increased  Goal: Skin Health and Integrity  Outcome: Ongoing     Problem: Fall Injury Risk  Goal: Absence of Fall and Fall-Related Injury  Outcome: Ongoing     Problem: Wound  Goal: Optimal Coping  Outcome: Ongoing  Goal: Optimal Functional Ability  Outcome: Ongoing  Goal: Absence of Infection Signs and Symptoms  Outcome: Ongoing  Goal: Improved Oral Intake  Outcome: Ongoing  Goal: Optimal Pain Control and Function  Outcome: Ongoing  Goal: Skin Health and Integrity  Outcome: Ongoing  Goal: Optimal Wound Healing  Outcome: Ongoing

## 2025-06-29 NOTE — PT/OT/SLP EVAL
Occupational Therapy   Evaluation/treamtnet    Name: Cady Watkins  MRN: [id number]  Admitting Diagnosis: Complete heart block  Recent Surgery: Procedure(s) (LRB):  INSERTION, PACEMAKER, BIVENTRICULAR (N/A) 3 Days Post-Op    Recommendations:     Discharge Recommendations: Moderate Intensity Therapy  Discharge Equipment Recommendations:  bedside commode, wheelchair  Barriers to discharge:  Other (Comment) (increased skilled  A needed)    Assessment:     Cady Watkins is a 74 y.o. female with a medical diagnosis of Complete heart block.  She presents with decreased self-care and functional mobility independence 2/2 AMS. Performance deficits affecting function: weakness, gait instability, decreased upper extremity function, impaired endurance, impaired balance, impaired cardiopulmonary response to activity, decreased lower extremity function, impaired coordination, decreased safety awareness, impaired self care skills, impaired cognition, pain, impaired functional mobility, decreased coordination. Pt A&O x3 this date with situational confusion observed and difficulty reporting daughters living environment. This date pt educated on pacemaker precautions with intermittent verbal and tactile cues provided for adherence. Pt with RUE strength WFL with distal LUE strength WFL. Pt with c/o L heel pain this date leading to increased encouragement needed for step progression with mobility 2/2 decreased weight bearing tolerance. Pt with increased assistance needed for mobility activities with decreased problem solving observed. Due to increased assistance needed, pt is functioning below her PLOF of independent and would continue to benefit from skilled OT services. Pt remains high fall risk at this time. Patient currently demonstrates a need for moderate intensity therapy on a daily basis post acute secondary to a decline in functional status due to illness       Additional Staff Present: Therapy tech utilized during  session due to patient complexity and required physical assistance to ensure patient safety    Rehab Prognosis: Good; patient would benefit from acute skilled OT services to address these deficits and reach maximum level of function.       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 2/2 difficulty recalling living environment  Living Environment: pt lives alone though reports plans to stay with daughter following acute admission in [facility], 0 BRIAN, t/s  Previous level of function: independent  Roles and Routines: unknown  Equipment Used at Home: walker, rolling  Assistance upon Discharge: Unknown    Pain/Comfort:  Pain Rating 1: 7/10  Location - Side 1: Bilateral  Location - Orientation 1: generalized  Location 1: foot  Pain Addressed 1: Reposition, Distraction  Pain Rating Post-Intervention 1: 7/10  Pain Addressed 2: Reposition, Distraction, Nurse notified    Patients cultural, spiritual, Cheondoism conflicts given the current situation: no    Objective:     Communicated with: nursing prior to session.  Patient found HOB elevated with telemetry, bed alarm, lazo catheter upon OT entry to room.    General Precautions: Standard, fall, pacemaker  Orthopedic Precautions: N/A  Braces: Sling and swathe (at night)  Respiratory Status: Room air    Occupational Performance:    Bed Mobility:    Patient completed Rolling/Turning to Right with moderate assistance  Patient completed Scooting/Bridging with moderate assistance  Patient completed Supine to Sit with minimum assistance and at trunk  Pt seated EOB with CGA to minimal assistance 2/2 posterior lean    Functional  Mobility/Transfers:  Patient completed Sit <> Stand Transfer with moderate assistance and of 2 persons  with  hand-held assist   Patient completed Bed <> Chair Transfer using Step Transfer technique with moderate assistance and of 2 persons with hand-held assist and maximal v/cs for step progression and mechanics 2/2 pt with short step length and decreased weight bearing tolerance of LLE    Activities of Daily Living:  Upper Body Dressing: maximal assistance to charles gown as robe  Lower Body Dressing: maximal assistance to adjust footwear while seated EOB   Toileting: maximal assistance for rear nikita care 2/2 slight BM incontinence while pt static standing with minimal assistance for postural control    Cognitive/Visual Perceptual:  Cognitive/Psychosocial Skills:     -       Oriented to: Person, Place, and Time   -       Follows Commands/attention:Follows two-step commands and with intermittent re-education of commands  -       Communication: clear/fluent  -       Memory: Impaired STM and Impaired LTM  -       Safety awareness/insight to disability: mildly impaired   -       Mood/Affect/Coping skills/emotional control: Pleasant  Visual/Perceptual:      -Intact      Physical Exam:  Balance:    -       fair sitting and standing balance  Sensation:    -       Intact  Upper Extremity Range of Motion:     -       Right Upper Extremity: WFL  -       Left Upper Extremity: WFL except shoulder flexion not assessed >90* 2/2 pacemaker precautions  Upper Extremity Strength:    -       Right Upper Extremity: WFL except shoulder flexion 4/5  -       Left Upper Extremity: not assessed 2/2 pacemaker precautions   Strength:    -       Right Upper Extremity: WFL  -       Left Upper Extremity: WFL  Fine Motor Coordination:    -       Intact  Gross motor coordination:   WFL    AMPAC 6 Click ADL:  AMPAC Total Score: 16    Treatment & Education:  Session this date targeted initial OT evaluation, therapeutic activities, and self-care  activities to increase pt's independence  Pt educated on OT roles, POC ,call button for assistance  Pt educated on importance of staff assistance with mobility activities  Pt educated on pacemaker precautions with pt verbalizing understanding    Patient left up in chair with all lines intact, call button in reach, chair alarm on, and nursing notified    GOALS:   Multidisciplinary Problems       Occupational Therapy Goals          Problem: Occupational Therapy    Goal Priority Disciplines Outcome Interventions   Occupational Therapy Goal     OT, PT/OT Progressing    Description: Goals to be met by: 7/29/25     Patient will increase functional independence with ADLs by performing:    UE Dressing with independence  LE Dressing with minimal assistance and AD as needed.  Grooming while standing at sink with Minimal Assistance  and AD as needed  Toileting from toilet with contact guard Assistance for hygiene and clothing management.   Sitting at edge of bed x10 minutes with supervision  Supine to sit with independence   Step transfer with minimal assistance and AD prn  Toilet transfer to toilet with with contact guard and AD prn  Increased functional strength to WNL for BUEs  Upper extremity exercise program x10 reps per handout, with assistance as needed.                         DME Justifications:   Cady requires a commode for home use   Cady Watkins has a mobility limitation that significantly impairs her ability to participate in one or more mobility related activities of daily living (MRADLs) such as toileting, feeding, dressing, grooming, and bathing in customary locations in the home.  The mobility limitation cannot be sufficiently resolved by the use of a cane or walker.   The use of a manual wheelchair will significantly improve the patients ability to participate in MRADLS and the patient will use it on regular basis in the home.  Cady Watkins has expressed her willingness to use a manual  wheelchair in the home. Patients upper body strength is sufficient for propulsion.  She also has a caregiver who is available, willing, and able to provide assistance with the wheelchair when needed.      History:     Past Medical History:   Diagnosis Date    Diabetes mellitus, type 2     Fibromyalgia 2016    Hypertension 2016         Past Surgical History:   Procedure Laterality Date     SECTION      CHOLECYSTECTOMY      CORONARY ANGIOGRAPHY N/A 2025    Procedure: ANGIOGRAM, CORONARY ARTERY;  Surgeon: Perfecto Coburn MD;  Location: [facility] CATH LAB;  Service: Cardiology;  Laterality: N/A;    FOOT SURGERY Bilateral     plantar fasciotomy bilateral, arthroplasty fifth toe bilateral    HERNIA REPAIR      IMPLANTATION OF BIVENTRICULAR HEART PACEMAKER N/A 2025    Procedure: INSERTION, PACEMAKER, BIVENTRICULAR;  Surgeon: Jason Lundberg MD;  Location: [facility] EP LAB;  Service: Cardiology;  Laterality: N/A;  CHB, CRT-P (LBAP vs CS), Biotronik, Anes, SK, CICU 3084    INSERTION, PACEMAKER, TEMPORARY TRANSVENOUS N/A 2025    Procedure: Insertion, Pacemaker, Temporary Transvenous;  Surgeon: Sean Ureña MD;  Location: [facility] CATH LAB/EP;  Service: Cardiology;  Laterality: N/A;       Time Tracking:     OT Date of Treatment: 25  OT Start Time: 947  OT Stop Time: 1008  OT Total Time (min): 21 min    Billable Minutes:Evaluation 8  Therapeutic Activity 13    2025

## 2025-06-29 NOTE — PLAN OF CARE
Problem: Adult Inpatient Plan of Care  Goal: Absence of Hospital-Acquired Illness or Injury  Intervention: Prevent Infection  Flowsheets (Taken 6/28/2025 2218)  Infection Prevention: rest/sleep promoted  Goal: Optimal Comfort and Wellbeing  Intervention: Monitor Pain and Promote Comfort  Flowsheets (Taken 6/28/2025 2218)  Pain Management Interventions:   pillow support provided   position adjusted   quiet environment facilitated   care clustered  Intervention: Provide Person-Centered Care  Flowsheets (Taken 6/28/2025 2218)  Trust Relationship/Rapport:   care explained   questions encouraged

## 2025-06-29 NOTE — ASSESSMENT & PLAN NOTE
Complete heart block s/p temp TVP then PPM- TSH wnl. Doxy ppx x 5d, post-PPM restrictions  Regular --> change to diabetic, heart healthy

## 2025-06-29 NOTE — ASSESSMENT & PLAN NOTE
Acute HFrEF/NICMP, c/w Takotsubo? TTE showed HFrEF with low EF of 20-25% with evidence of WMAs.   - diuresed w Lasix IV, near euvolemic and switched to PO Lasix 40 BID to start 6/28 pm  - Toprol 50   - Valsartan 40 BID

## 2025-06-29 NOTE — ASSESSMENT & PLAN NOTE
- Bladder US with 3.0 cm mass like lesion in right dependent bladder.  - Consulted Urology for further evaluation.  - Urology ordered CT Renal Stone Study unable to visualize bladder mass.  - CT Urogram inpt vs outpt   - Needs close follow up with urology.

## 2025-06-29 NOTE — HOSPITAL COURSE
"CCU course:   Transferred in from [facility] to CCU.   IC and EP consulted, and decision made for C to eval ischemia, which was done after ISAIAS improved and pt diuresed, and LHC revealed "70% left circumflex stenosis, otherwise diffuse non-obstructive disease is present" no intervention needed.   Diuresed on Lasix IVP and Lasix IV gtt @10cc/hr 6/26-6/27.  Intermittent delirium which starts at night; given haldol 5mg IV on 6/24 then olanzapine 5mg on 6/26 and 6/27 am then d/c-ed; on delirium precautions.  Patient had PPM placed successfully with no complications. UA evident of potential UTI will plan to treat with CTX for 3 days. Will continue to work on patient functional status with PT/OT.      After stepdown to [facility] on 6/28.  Acute exquisite left heel pain, small areas of erythema on top of foot, consulted podiatry 6/30.  XR and CT negative. L great toe developed erythema and blister.  VAS Duplex and ABIs abnormal with diffuse disease and several significant stenoses (prelim report in Media tab in Epic), consulted [facility]URG 7/2.  Also c/o new intermittent LLQ Abdominal and L upper thigh pain, 10/10, no identifiable triggers, present for 3 days but voiced to MD on 7/2, same day as worsening of her L heel and 1st toe pain.      7/3-  PVD. On ASA, systemic heparin, high intensity statin. Heparin gtt hold on call to OR. LLE angiogram planned Monday.  /62  Pulse 60  . . Pt has large hernia which interferes with walking.  Will ask PT/OT to eval for support device.     7/4- BS high 319,  started aspart 5 ac, and increased SS, on consistent carb diet. Pt eating better. Takes metformin at home.     7/5 - - > 135-> 138. Reduced insulin dose. LE angiogram planned Monday.  Left Great toe lesion slight improvement, left heel less painful and less erythremic.   7/6- -194  7/7-   this am. left lower extremity angiography today.  Planning dc to [facility] after procedure when stable.  Prelim SNF " orders done  - balloon angioplasty done    7/8- s/p angiogram and balloon. Per vasc surgery. Recommend single agent antiplatelet agent with oral anticoagulant along with high-dose statin therapy. May mobilize from my standpoint.  Dc heparin.  Plan for SNF at [facility]/ wbc up - likely a stress demargination. No other SIRS, pt denies new symptoms. ROS neg.  Will add lab to SNF orders.  Weight bearing- nl. able to walk with FALL precautions.

## 2025-06-29 NOTE — PROGRESS NOTES
"[facility] - Cardiology [facility] Medicine  Progress Note    Patient Name: Cady Watkins  MRN: [id number]  Patient Class: IP- Inpatient   Admission Date: 6/24/2025  Length of Stay: 5 days  Attending Physician: Valery Gallegos MD  Primary Care Provider: Blaine Benítez MD        Subjective     Principal Problem:Complete heart block          HPI:  74F with HTN, HLD, T2DM w neuropathy, chronic pain on opioids, and fibromyalgia who initially presented from PCP office to [facility] ED for reported symptomatic bradycardia w rate 30s. She was in her normal state of health until 3 weeks prior when she had an episode of syncope, while in her kitchen, she felt very off, walked to sofa and blacked out reportedly for 3 mins. Thereafter, she's had fatigue, decreased appetite, and increasing SOB.  In ED, found to have complete heart block, new onset cardiomyopathy ischemic vs Takotsubo, Hypertensive emergency (SBP 230s), and ISAIAS.  Started on nitro gtt, had an emergent TVP placed and then transferred to [facility] CCU.          Overview/Hospital Course:  CCU course:   Transferred in from [facility] to CCU.   IC and EP consulted, and decision made for C to eval ischemia, which was done after ISAIAS improved and pt diuresed, and LHC revealed "70% left circumflex stenosis, otherwise diffuse non-obstructive disease is present" no intervention needed.   Diuresed on Lasix IVP and Lasix IV gtt @10cc/hr 6/26-6/27.  Intermittent delirium which starts at night; given haldol 5mg IV on 6/24 then olanzapine 5mg on 6/26 and 6/27 am then d/c-ed; on delirium precautions.  Patient had PPM placed successfully with no complications. UA evident of potential UTI will plan to treat with CTX for 3 days. Will continue to work on patient functional status with PT/OT.             Interval History: Feels relatively well, just weak/deconditioned, requiring two person assist for standing, PT/OT rec Mod Intensity. Family interested in [facility] " in [place], prior very positive experience there and near daughter who lives in [place].     Review of Systems   All other systems reviewed and are negative.    Objective:     Vital Signs (Most Recent):  Temp: 98.3 °F (36.8 °C) (06/29/25 0830)  Pulse: 74 (06/29/25 0830)  Resp: 18 (06/29/25 0830)  BP: (!) 152/74 (06/29/25 0830)  SpO2: 97 % (06/29/25 0830) Vital Signs (24h Range):  Temp:  [96 °F (35.6 °C)-98.3 °F (36.8 °C)] 98.3 °F (36.8 °C)  Pulse:  [] 74  Resp:  [11-24] 18  SpO2:  [94 %-99 %] 97 %  BP: ()/(55-74) 152/74     Weight: 62.6 kg (138 lb 0.1 oz)  Body mass index is 25.24 kg/m².    Intake/Output Summary (Last 24 hours) at 6/29/2025 1023  Last data filed at 6/28/2025 1924  Gross per 24 hour   Intake 240 ml   Output 240 ml   Net 0 ml         Physical Exam  Vitals reviewed.               Significant Labs: All pertinent labs within the past 24 hours have been reviewed.    Significant Imaging: I have reviewed all pertinent imaging results/findings within the past 24 hours.      Assessment & Plan  Complete heart block  Complete heart block s/p temp TVP then PPM- TSH wnl. Doxy ppx x 5d, post-PPM restrictions  Regular --> change to diabetic, heart healthy      Acute systolic heart failure  Acute HFrEF/NICMP, c/w Takotsubo? TTE showed HFrEF with low EF of 20-25% with evidence of WMAs.   - diuresed w Lasix IV, near euvolemic and switched to PO Lasix 40 BID to start 6/28 pm  - Toprol 50   - Valsartan 40 BID  Coronary artery disease involving native coronary artery   CAD-  LCx 70% stenosis and non-obstructive diffuse disease  - ASA 81  - Atorva 20  - BB  Acute hypoxemic respiratory failure  Resolving  Bladder mass  - Bladder US with 3.0 cm mass like lesion in right dependent bladder.  - Consulted Urology for further evaluation.  - Urology ordered CT Renal Stone Study unable to visualize bladder mass.  - CT Urogram inpt vs outpt   - Needs close follow up with urology.    Fibromyalgia  - Lido patch   -  Robaxin 500 BID  - Capsaicin BID    High cholesterol  Atorva 20    Type 2 diabetes with neuropathy  DM2 controlled (A1C 6.1 on 9/2024) with neuropathy  F/u new A1C  Insulin basal 5u QD with LDSSI qACHS  - Gabapentin 300 TID  E. coli UTI (urinary tract infection)  Pan-sensitive UTI, CTX IV x 3d    Hypertensive emergency  Hypertension  Resolved  ISAIAS (acute kidney injury)  Resolved. cardiorenal  Delirium  Resolved    Muscular deconditioning  PT/OT rec Mod  Likely needs SNF    VTE Risk Mitigation (From admission, onward)      None        Agitation/delirium  Anxiety  At [facility] prior to transfer, given Ativan 0.5 then Zyprexa IM x 2, then required Propofol and Fentanyl for sedation per cardiology.      Hypotension  At [place], Likely secondary to precedex drip given for agitation, required intermittent use of levo to maintain pressures.    Folate Deficiency  Vitamin B12 Deficiency, resolved  Macrocytosis  - Should be on weekly B12 injections  - B12 above 2000. Folate on lower side of normal. Will recommend replacement outpatient.    Discharge Planning   LOKESH: 7/1/2025     Code Status: Full Code   Medical Readiness for Discharge Date:   Discharge Plan A: Home Health   Discharge Delays: None known at this time      SDOH Screening:  The patient was screened for utility difficulties, food insecurity, transport difficulties, housing insecurity, and interpersonal safety and there were no concerns identified this admission.                Please place Justification for DME      Valery Gallegos MD  Department of Hospital Medicine   [facility] - Cardiology Stepdown

## 2025-06-29 NOTE — PLAN OF CARE
Problem: Occupational Therapy  Goal: Occupational Therapy Goal  Description: Goals to be met by: 7/29/25     Patient will increase functional independence with ADLs by performing:    UE Dressing with Minimal Assistance.  LE Dressing with Moderate Assistance and Assistive Devices as needed.  Grooming while seated at sink with Minimal Assistance.  Toileting from bedside commode with Moderate Assistance for hygiene and clothing management.   Sitting at edge of bed x10 minutes with Contact Guard Assistance.  Supine to sit with Contact Guard Assistance and use of bedrail as needed.  Stand pivot transfers with Maximum Assistance.  Toilet transfer to bedside commode with Maximum Assistance.  Increased functional strength to 5/5 for BUEs.  Upper extremity exercise program x10 reps per handout, with assistance as needed.    Outcome: Progressing

## 2025-06-30 DIAGNOSIS — R55 SYNCOPE, UNSPECIFIED SYNCOPE TYPE: Primary | ICD-10-CM

## 2025-06-30 PROBLEM — M79.672 LEFT FOOT PAIN: Status: ACTIVE | Noted: 2025-06-30

## 2025-06-30 PROBLEM — I73.9 PAD (PERIPHERAL ARTERY DISEASE): Status: ACTIVE | Noted: 2025-06-30

## 2025-06-30 LAB
ABSOLUTE EOSINOPHIL (OHS): 0.54 K/UL
ABSOLUTE MONOCYTE (OHS): 0.96 K/UL (ref 0.3–1)
ABSOLUTE NEUTROPHIL COUNT (OHS): 6.18 K/UL (ref 1.8–7.7)
ALBUMIN SERPL BCP-MCNC: 2.5 G/DL (ref 3.5–5.2)
ALBUMIN SERPL BCP-MCNC: 2.6 G/DL (ref 3.5–5.2)
ALP SERPL-CCNC: 37 UNIT/L (ref 40–150)
ALP SERPL-CCNC: 47 UNIT/L (ref 40–150)
ALT SERPL W/O P-5'-P-CCNC: 14 UNIT/L (ref 10–44)
ALT SERPL W/O P-5'-P-CCNC: 15 UNIT/L (ref 10–44)
ANION GAP (OHS): 10 MMOL/L (ref 8–16)
ANION GAP (OHS): 9 MMOL/L (ref 8–16)
AST SERPL-CCNC: 21 UNIT/L (ref 11–45)
AST SERPL-CCNC: 25 UNIT/L (ref 11–45)
BACTERIA UR CULT: ABNORMAL
BASOPHILS # BLD AUTO: 0.09 K/UL
BASOPHILS NFR BLD AUTO: 0.9 %
BILIRUB SERPL-MCNC: 0.5 MG/DL (ref 0.1–1)
BILIRUB SERPL-MCNC: 0.5 MG/DL (ref 0.1–1)
BUN SERPL-MCNC: 29 MG/DL (ref 8–23)
BUN SERPL-MCNC: 32 MG/DL (ref 8–23)
CALCIUM SERPL-MCNC: 8.5 MG/DL (ref 8.7–10.5)
CALCIUM SERPL-MCNC: 8.5 MG/DL (ref 8.7–10.5)
CHLORIDE SERPL-SCNC: 100 MMOL/L (ref 95–110)
CHLORIDE SERPL-SCNC: 100 MMOL/L (ref 95–110)
CO2 SERPL-SCNC: 25 MMOL/L (ref 23–29)
CO2 SERPL-SCNC: 27 MMOL/L (ref 23–29)
CREAT SERPL-MCNC: 0.9 MG/DL (ref 0.5–1.4)
CREAT SERPL-MCNC: 1.1 MG/DL (ref 0.5–1.4)
CRP SERPL-MCNC: 10.5 MG/L
ERYTHROCYTE [DISTWIDTH] IN BLOOD BY AUTOMATED COUNT: 13.5 % (ref 11.5–14.5)
GFR SERPLBLD CREATININE-BSD FMLA CKD-EPI: 53 ML/MIN/1.73/M2
GFR SERPLBLD CREATININE-BSD FMLA CKD-EPI: >60 ML/MIN/1.73/M2
GLUCOSE SERPL-MCNC: 120 MG/DL (ref 70–110)
GLUCOSE SERPL-MCNC: 149 MG/DL (ref 70–110)
HCT VFR BLD AUTO: 37 % (ref 37–48.5)
HGB BLD-MCNC: 12.4 GM/DL (ref 12–16)
IMM GRANULOCYTES # BLD AUTO: 0.07 K/UL (ref 0–0.04)
IMM GRANULOCYTES NFR BLD AUTO: 0.7 % (ref 0–0.5)
LYMPHOCYTES # BLD AUTO: 1.78 K/UL (ref 1–4.8)
MAGNESIUM SERPL-MCNC: 1.7 MG/DL (ref 1.6–2.6)
MCH RBC QN AUTO: 31.6 PG (ref 27–31)
MCHC RBC AUTO-ENTMCNC: 33.5 G/DL (ref 32–36)
MCV RBC AUTO: 94 FL (ref 82–98)
NUCLEATED RBC (/100WBC) (OHS): 0 /100 WBC
PHOSPHATE SERPL-MCNC: 2.8 MG/DL (ref 2.7–4.5)
PLATELET # BLD AUTO: 174 K/UL (ref 150–450)
PMV BLD AUTO: 12.3 FL (ref 9.2–12.9)
POCT GLUCOSE: 173 MG/DL (ref 70–110)
POCT GLUCOSE: 206 MG/DL (ref 70–110)
POTASSIUM SERPL-SCNC: 4 MMOL/L (ref 3.5–5.1)
POTASSIUM SERPL-SCNC: 4 MMOL/L (ref 3.5–5.1)
PROT SERPL-MCNC: 5.5 GM/DL (ref 6–8.4)
PROT SERPL-MCNC: 5.8 GM/DL (ref 6–8.4)
RBC # BLD AUTO: 3.92 M/UL (ref 4–5.4)
RELATIVE EOSINOPHIL (OHS): 5.6 %
RELATIVE LYMPHOCYTE (OHS): 18.5 % (ref 18–48)
RELATIVE MONOCYTE (OHS): 10 % (ref 4–15)
RELATIVE NEUTROPHIL (OHS): 64.3 % (ref 38–73)
SODIUM SERPL-SCNC: 134 MMOL/L (ref 136–145)
SODIUM SERPL-SCNC: 137 MMOL/L (ref 136–145)
URATE SERPL-MCNC: 7.2 MG/DL (ref 2.4–5.7)
WBC # BLD AUTO: 9.62 K/UL (ref 3.9–12.7)

## 2025-06-30 PROCEDURE — 84100 ASSAY OF PHOSPHORUS: CPT | Performed by: HOSPITALIST

## 2025-06-30 PROCEDURE — 86140 C-REACTIVE PROTEIN: CPT | Performed by: HOSPITALIST

## 2025-06-30 PROCEDURE — 83735 ASSAY OF MAGNESIUM: CPT | Performed by: HOSPITALIST

## 2025-06-30 PROCEDURE — 20600001 HC STEP DOWN PRIVATE ROOM

## 2025-06-30 PROCEDURE — 93922 UPR/L XTREMITY ART 2 LEVELS: CPT | Performed by: SURGERY

## 2025-06-30 PROCEDURE — 93925 LOWER EXTREMITY STUDY: CPT | Performed by: SURGERY

## 2025-06-30 PROCEDURE — 25000003 PHARM REV CODE 250: Performed by: HOSPITALIST

## 2025-06-30 PROCEDURE — 25000003 PHARM REV CODE 250

## 2025-06-30 PROCEDURE — 25000003 PHARM REV CODE 250: Performed by: STUDENT IN AN ORGANIZED HEALTH CARE EDUCATION/TRAINING PROGRAM

## 2025-06-30 PROCEDURE — 84132 ASSAY OF SERUM POTASSIUM: CPT | Performed by: HOSPITALIST

## 2025-06-30 PROCEDURE — 36415 COLL VENOUS BLD VENIPUNCTURE: CPT | Performed by: HOSPITALIST

## 2025-06-30 PROCEDURE — 84550 ASSAY OF BLOOD/URIC ACID: CPT | Performed by: HOSPITALIST

## 2025-06-30 PROCEDURE — 85025 COMPLETE CBC W/AUTO DIFF WBC: CPT

## 2025-06-30 PROCEDURE — 63600175 PHARM REV CODE 636 W HCPCS: Performed by: HOSPITALIST

## 2025-06-30 PROCEDURE — 99223 1ST HOSP IP/OBS HIGH 75: CPT | Mod: ,,, | Performed by: PODIATRIST

## 2025-06-30 PROCEDURE — 63600175 PHARM REV CODE 636 W HCPCS

## 2025-06-30 RX ORDER — LIDOCAINE AND PRILOCAINE 25; 25 MG/G; MG/G
CREAM TOPICAL ONCE
Status: COMPLETED | OUTPATIENT
Start: 2025-06-30 | End: 2025-06-30

## 2025-06-30 RX ORDER — LANOLIN ALCOHOL/MO/W.PET/CERES
800 CREAM (GRAM) TOPICAL DAILY
Status: DISCONTINUED | OUTPATIENT
Start: 2025-06-30 | End: 2025-07-08 | Stop reason: HOSPADM

## 2025-06-30 RX ORDER — MAGNESIUM SULFATE HEPTAHYDRATE 40 MG/ML
2 INJECTION, SOLUTION INTRAVENOUS ONCE
Status: COMPLETED | OUTPATIENT
Start: 2025-06-30 | End: 2025-06-30

## 2025-06-30 RX ADMIN — METOPROLOL SUCCINATE 50 MG: 50 TABLET, EXTENDED RELEASE ORAL at 08:06

## 2025-06-30 RX ADMIN — MAGNESIUM SULFATE HEPTAHYDRATE 2 G: 40 INJECTION, SOLUTION INTRAVENOUS at 09:06

## 2025-06-30 RX ADMIN — DOXYCYCLINE HYCLATE 100 MG: 100 TABLET, COATED ORAL at 08:06

## 2025-06-30 RX ADMIN — PANTOPRAZOLE SODIUM 40 MG: 40 TABLET, DELAYED RELEASE ORAL at 08:06

## 2025-06-30 RX ADMIN — METHOCARBAMOL 500 MG: 500 TABLET ORAL at 08:06

## 2025-06-30 RX ADMIN — ATORVASTATIN CALCIUM 20 MG: 20 TABLET, FILM COATED ORAL at 08:06

## 2025-06-30 RX ADMIN — GABAPENTIN 300 MG: 300 CAPSULE ORAL at 02:06

## 2025-06-30 RX ADMIN — MUPIROCIN: 20 OINTMENT TOPICAL at 09:06

## 2025-06-30 RX ADMIN — INSULIN GLARGINE 5 UNITS: 100 INJECTION, SOLUTION SUBCUTANEOUS at 09:06

## 2025-06-30 RX ADMIN — LIDOCAINE 1 PATCH: 50 PATCH CUTANEOUS at 02:06

## 2025-06-30 RX ADMIN — Medication 800 MG: at 08:06

## 2025-06-30 RX ADMIN — ASPIRIN 81 MG CHEWABLE TABLET 81 MG: 81 TABLET CHEWABLE at 08:06

## 2025-06-30 RX ADMIN — GABAPENTIN 300 MG: 300 CAPSULE ORAL at 08:06

## 2025-06-30 RX ADMIN — FUROSEMIDE 40 MG: 40 TABLET ORAL at 05:06

## 2025-06-30 RX ADMIN — CAPSAICIN: 0.25 CREAM TOPICAL at 08:06

## 2025-06-30 RX ADMIN — INSULIN ASPART 2 UNITS: 100 INJECTION, SOLUTION INTRAVENOUS; SUBCUTANEOUS at 11:06

## 2025-06-30 RX ADMIN — FUROSEMIDE 40 MG: 40 TABLET ORAL at 08:06

## 2025-06-30 RX ADMIN — VALSARTAN 40 MG: 40 TABLET, FILM COATED ORAL at 08:06

## 2025-06-30 RX ADMIN — LIDOCAINE AND PRILOCAINE: 25; 25 CREAM TOPICAL at 05:06

## 2025-06-30 RX ADMIN — POLYETHYLENE GLYCOL 3350 17 G: 17 POWDER, FOR SOLUTION ORAL at 08:06

## 2025-06-30 RX ADMIN — CEFTRIAXONE 1 G: 1 INJECTION, POWDER, FOR SOLUTION INTRAMUSCULAR; INTRAVENOUS at 11:06

## 2025-06-30 NOTE — PROGRESS NOTES
"[facility] - Cardiology [facility] Medicine  Progress Note    Patient Name: Cady Watkins  MRN: [id number]  Patient Class: IP- Inpatient   Admission Date: 6/24/2025  Length of Stay: 6 days  Attending Physician: Valery Gallegos MD  Primary Care Provider: Blaine Benítez MD        Subjective     Principal Problem:Complete heart block          HPI:  74F with HTN, HLD, T2DM w neuropathy, chronic pain on opioids, and fibromyalgia who initially presented from PCP office to [facility] ED for reported symptomatic bradycardia w rate 30s. She was in her normal state of health until 3 weeks prior when she had an episode of syncope, while in her kitchen, she felt very off, walked to sofa and blacked out reportedly for 3 mins. Thereafter, she's had fatigue, decreased appetite, and increasing SOB.  In ED, found to have complete heart block, new onset cardiomyopathy ischemic vs Takotsubo, Hypertensive emergency (SBP 230s), and ISAIAS.  Started on nitro gtt, had an emergent TVP placed and then transferred to [facility].          Overview/Hospital Course:  CCU course:   Transferred in from [facility] to CCU.   IC and EP consulted, and decision made for LHC to eval ischemia, which was done after ISAIAS improved and pt diuresed, and LHC revealed "70% left circumflex stenosis, otherwise diffuse non-obstructive disease is present" no intervention needed.   Diuresed on Lasix IVP and Lasix IV gtt @10cc/hr 6/26-6/27.  Intermittent delirium which starts at night; given haldol 5mg IV on 6/24 then olanzapine 5mg on 6/26 and 6/27 am then d/c-ed; on delirium precautions.  Patient had PPM placed successfully with no complications. UA evident of potential UTI will plan to treat with CTX for 3 days. Will continue to work on patient functional status with PT/OT.      After stepdown to [facility] on 6/28, continued to do well.  Main issue is new deconditioning/debility, working with PT/OT, requiring two person assist for standing, rec Mod Intensity. " Family interested in [facility] facility in [place], prior very positive experience there and near daughter who lives in [place].          Interval History: NAEON, painful foot, PT/OT and likely needs SNF    Review of Systems   All other systems reviewed and are negative.    Objective:     Vital Signs (Most Recent):  Temp: 97.7 °F (36.5 °C) (06/30/25 0714)  Pulse: 70 (06/30/25 0714)  Resp: 17 (06/30/25 0714)  BP: (!) 142/70 (06/30/25 0714)  SpO2: 95 % (06/30/25 0714) Vital Signs (24h Range):  Temp:  [97.1 °F (36.2 °C)-99 °F (37.2 °C)] 97.7 °F (36.5 °C)  Pulse:  [63-80] 70  Resp:  [16-18] 17  SpO2:  [93 %-95 %] 95 %  BP: (120-155)/(59-75) 142/70     Weight: 60.2 kg (132 lb 11.5 oz)  Body mass index is 24.27 kg/m².    Intake/Output Summary (Last 24 hours) at 6/30/2025 0913  Last data filed at 6/30/2025 0400  Gross per 24 hour   Intake 240 ml   Output 1400 ml   Net -1160 ml         Physical Exam  Vitals reviewed.               Significant Labs: All pertinent labs within the past 24 hours have been reviewed.    Significant Imaging: I have reviewed all pertinent imaging results/findings within the past 24 hours.      Assessment & Plan  Complete heart block  Complete heart block s/p temp TVP then PPM- TSH wnl. Doxy ppx x 5d, post-PPM restrictions  Left foot pain  Acute exquisite severe Left heel pain, with erythema and bogginess/fluctuance, denies h/o any XR negative  - f/u podiatry consult, appreciate  - f/u CT non-con   - f/u VAS MIKAEL and US arterial    Acute systolic heart failure  Acute HFrEF/NICMP, c/w Takotsubo? TTE showed HFrEF with low EF of 20-25% with evidence of WMAs.   - diuresed w Lasix IV, near euvolemic and switched to oral  - new Lasix 40 PO BID for maintenance, will need Rx for this + sliding scale eventually upon d/c  - Toprol 50   - Valsartan 40 BID  Coronary artery disease involving native coronary artery   CAD-  LCx 70% stenosis and non-obstructive diffuse disease  - ASA 81  - Atorva 20  - BB  Acute  hypoxemic respiratory failure  Resolving  Bladder mass  - Bladder US with 3.0 cm mass like lesion in right dependent bladder.  - Consulted Urology for further evaluation.  - Urology ordered CT Renal Stone Study unable to visualize bladder mass.  - CT Urogram inpt vs outpt   - Needs close follow up with urology.    Fibromyalgia  - Lido patch   - Robaxin 500 BID  - Capsaicin BID    High cholesterol  Atorva 20    Type 2 diabetes with neuropathy  DM2 controlled (A1C 6.1 on 9/2024) with neuropathy  F/u new A1C  Insulin basal 5u QD with LDSSI qACHS  - Gabapentin 300 TID  E. coli UTI (urinary tract infection)  Pan-sensitive UTI, CTX IV x 3d    Hypertensive emergency  Hypertension  Resolved  ISAIAS (acute kidney injury)  Resolved. cardiorenal  Delirium  Resolved    Muscular deconditioning  PT/OT rec Mod  Likely needs SNF    VTE Risk Mitigation (From admission, onward)      None            Discharge Planning   LOKESH: 7/1/2025     Code Status: Full Code   Medical Readiness for Discharge Date:   Discharge Plan A: Home Health   Discharge Delays: None known at this time              Please place Justification for DME      Valery Gallegos MD  Department of Hospital Medicine   [facility] - Cardiology Stepdown

## 2025-06-30 NOTE — ASSESSMENT & PLAN NOTE
Acute exquisite severe Left heel pain, with erythema and bogginess/fluctuance, denies h/o any XR negative  - f/u podiatry consult, appreciate  - f/u CT non-con   - f/u VAS MIKAEL and US arterial

## 2025-06-30 NOTE — PLAN OF CARE
Problem: Adult Inpatient Plan of Care  Goal: Absence of Hospital-Acquired Illness or Injury  Outcome: Progressing     Problem: Adult Inpatient Plan of Care  Goal: Readiness for Transition of Care  Outcome: Progressing     Problem: Diabetes Comorbidity  Goal: Blood Glucose Level Within Targeted Range  Outcome: Progressing     Problem: Acute Kidney Injury/Impairment  Goal: Effective Renal Function  Outcome: Progressing     Problem: Fall Injury Risk  Goal: Absence of Fall and Fall-Related Injury  Outcome: Progressing     Problem: Wound  Goal: Skin Health and Integrity  Outcome: Progressing

## 2025-06-30 NOTE — SUBJECTIVE & OBJECTIVE
Interval History: NAEON, painful foot, PT/OT and likely needs SNF    Review of Systems   All other systems reviewed and are negative.    Objective:     Vital Signs (Most Recent):  Temp: 97.7 °F (36.5 °C) (06/30/25 0714)  Pulse: 70 (06/30/25 0714)  Resp: 17 (06/30/25 0714)  BP: (!) 142/70 (06/30/25 0714)  SpO2: 95 % (06/30/25 0714) Vital Signs (24h Range):  Temp:  [97.1 °F (36.2 °C)-99 °F (37.2 °C)] 97.7 °F (36.5 °C)  Pulse:  [63-80] 70  Resp:  [16-18] 17  SpO2:  [93 %-95 %] 95 %  BP: (120-155)/(59-75) 142/70     Weight: 60.2 kg (132 lb 11.5 oz)  Body mass index is 24.27 kg/m².    Intake/Output Summary (Last 24 hours) at 6/30/2025 0913  Last data filed at 6/30/2025 0400  Gross per 24 hour   Intake 240 ml   Output 1400 ml   Net -1160 ml         Physical Exam  Vitals reviewed.               Significant Labs: All pertinent labs within the past 24 hours have been reviewed.    Significant Imaging: I have reviewed all pertinent imaging results/findings within the past 24 hours.

## 2025-06-30 NOTE — PLAN OF CARE
06/30/25 1340   Rounds   Attendance ;Charge nurse  (unit-based [facility])   Discharge Plan A Home Health   Why the patient remains in the hospital Requires continued medical care  (IV abx)   Transition of Care Barriers None     Pt still on IV abx.  LOKESH 7/1.      Eve Wahl LMSW  [facility]  [id number]

## 2025-06-30 NOTE — HPI
""74F with HTN, HLD, T2DM w neuropathy, chronic pain on opioids, and fibromyalgia who initially presented from PCP office to [facility] ED for reported symptomatic bradycardia w rate 30s. She was in her normal state of health until 3 weeks prior when she had an episode of syncope, while in her kitchen, she felt very off, walked to sofa and blacked out reportedly for 3 mins. Thereafter, she's had fatigue, decreased appetite, and increasing SOB. In ED, found to have complete heart block, new onset cardiomyopathy ischemic vs Takotsubo, Hypertensive emergency (SBP 230s), and ISAIAS. Started on nitro gtt, had an emergent TVP placed and then transferred to [facility].":    Podiatry consulted for left heel pain that began several days ago while in the hospital. Does not know how it happened. Pain is 7/10 at rest, feeling like "lighting" shooting down the foot. Has had no trauma or falls. Left foot is sensitive to touch, blanket sometimes triggers pain. Admits to history of trauma with left 4th&5th toe fracture 3 years ago. Admits to redness to heel. No wounds or lesions. Denies N/V/F/C.   "

## 2025-06-30 NOTE — ASSESSMENT & PLAN NOTE
Acute HFrEF/NICMP, c/w Takotsubo? TTE showed HFrEF with low EF of 20-25% with evidence of WMAs.   - diuresed w Lasix IV, near euvolemic and switched to oral  - new Lasix 40 PO BID for maintenance, will need Rx for this + sliding scale eventually upon d/c  - Toprol 50   - Valsartan 40 BID

## 2025-06-30 NOTE — CONSULTS
"[facility] - Cardiology Stepdown  Podiatry  Consult Note    Patient Name: Cady Watkins  MRN: [id number]  Admission Date: 6/24/2025  Hospital Length of Stay: 6 days  Attending Physician: Valery Gallegos MD  Primary Care Provider: Blaine Benítez MD     Inpatient consult to Podiatry  Consult performed by: Diomedes Hernandez MD  Consult ordered by: Valery Gallegos MD        Subjective:     History of Present Illness:  "74F with HTN, HLD, T2DM w neuropathy, chronic pain on opioids, and fibromyalgia who initially presented from PCP office to [facility] ED for reported symptomatic bradycardia w rate 30s. She was in her normal state of health until 3 weeks prior when she had an episode of syncope, while in her kitchen, she felt very off, walked to sofa and blacked out reportedly for 3 mins. Thereafter, she's had fatigue, decreased appetite, and increasing SOB. In ED, found to have complete heart block, new onset cardiomyopathy ischemic vs Takotsubo, Hypertensive emergency (SBP 230s), and ISAIAS. Started on nitro gtt, had an emergent TVP placed and then transferred to [facility] CCU.":    Podiatry consulted for left heel pain that began several days ago while in the hospital. Does not know how it happened. Pain is 7/10 at rest, feeling like "lighting" shooting down the foot. Has had no trauma or falls. Left foot is sensitive to touch, blanket sometimes triggers pain. Admits to history of trauma with left 4th&5th toe fracture 3 years ago. Admits to redness to heel. No wounds or lesions. Denies N/V/F/C.     Scheduled Meds:   aspirin  81 mg Oral Daily    atorvastatin  20 mg Oral Daily    capsaicin   Topical (Top) BID    cefTRIAXone (Rocephin) IV (PEDS and ADULTS)  1 g Intravenous Q24H    doxycycline  100 mg Oral Q12H    furosemide  40 mg Oral BID    gabapentin  300 mg Oral TID    insulin glargine U-100  5 Units Subcutaneous Daily    LIDOcaine  1 patch Transdermal Q24H    magnesium oxide  800 mg Oral Daily    methocarbamoL  500 mg " Oral BID    metoprolol succinate  50 mg Oral Daily    mupirocin   Topical (Top) Daily    pantoprazole  40 mg Oral Daily    polyethylene glycol  17 g Oral Daily    valsartan  40 mg Oral BID     Continuous Infusions:  PRN Meds:  Current Facility-Administered Medications:     acetaminophen, 650 mg, Oral, Q6H PRN    dextrose 50%, 12.5 g, Intravenous, PRN    dextrose 50%, 25 g, Intravenous, PRN    glucagon (human recombinant), 1 mg, Intramuscular, PRN    glucose, 16 g, Oral, PRN    glucose, 24 g, Oral, PRN    insulin aspart U-100, 0-5 Units, Subcutaneous, Q6H PRN    naloxone, 0.02 mg, Intravenous, PRN    sodium chloride 0.9%, 10 mL, Intravenous, PRN    Review of patient's allergies indicates:   Allergen Reactions    Advil [ibuprofen] Hives    Penicillins     Codeine     Excedrin aspirin free [acetaminophen-caffeine]         Past Medical History:   Diagnosis Date    Diabetes mellitus, type 2     Fibromyalgia 2016    Hypertension 2016     Past Surgical History:   Procedure Laterality Date     SECTION      CHOLECYSTECTOMY      CORONARY ANGIOGRAPHY N/A 2025    Procedure: ANGIOGRAM, CORONARY ARTERY;  Surgeon: Perfecto Coburn MD;  Location: [facility] CATH LAB;  Service: Cardiology;  Laterality: N/A;    FOOT SURGERY Bilateral     plantar fasciotomy bilateral, arthroplasty fifth toe bilateral    HERNIA REPAIR      IMPLANTATION OF BIVENTRICULAR HEART PACEMAKER N/A 2025    Procedure: INSERTION, PACEMAKER, BIVENTRICULAR;  Surgeon: Jason Lundberg MD;  Location: [facility] EP LAB;  Service: Cardiology;  Laterality: N/A;  CHB, CRT-P (LBAP vs CS), Biotronik, Anes, SK, CICU 3084    INSERTION, PACEMAKER, TEMPORARY TRANSVENOUS N/A 2025    Procedure: Insertion, Pacemaker, Temporary Transvenous;  Surgeon: Sean Ureña MD;  Location: [facility] CATH LAB/EP;  Service: Cardiology;  Laterality: N/A;       Family History       Problem Relation (Age of Onset)    Diabetes Father    Heart disease Sister, Brother           Tobacco Use    Smoking status: Never    Smokeless tobacco: Never   Substance and Sexual Activity    Alcohol use: Yes     Comment: Occasionally    Drug use: Not on file    Sexual activity: Yes     Review of Systems   Constitutional:  Negative for fever.   Respiratory:  Negative for shortness of breath.    Skin:  Positive for color change. Negative for wound.     Objective:     Vital Signs (Most Recent):  Temp: 97.4 °F (36.3 °C) (06/30/25 1108)  Pulse: 64 (06/30/25 1108)  Resp: 18 (06/30/25 1108)  BP: (!) 114/58 (06/30/25 1108)  SpO2: (!) 93 % (06/30/25 1108) Vital Signs (24h Range):  Temp:  [97.4 °F (36.3 °C)-99 °F (37.2 °C)] 97.4 °F (36.3 °C)  Pulse:  [63-80] 64  Resp:  [16-18] 18  SpO2:  [93 %-95 %] 93 %  BP: (114-155)/(58-75) 114/58     Weight: 60.2 kg (132 lb 11.5 oz)  Body mass index is 24.27 kg/m².    Foot Exam    General  General Appearance: appears stated age and healthy   Affect: appropriate       Right Foot/Ankle     Inspection and Palpation  Ecchymosis: none  Tenderness: none   Swelling: none   Skin Exam: skin not intact     Neurovascular  Dorsalis pedis: 1+  Posterior tibial: 1+    Comments  No gross deformity or open wounds, lesions    Left Foot/Ankle      Inspection and Palpation  Ecchymosis: none  Tenderness: fifth metatarsal base, plantar fascia and calcaneus tenderness (Non specific tendneress throughout foot, with most severe being)  Swelling: plantar fascia   Skin Exam: blister, skin changes, abnormal color and erythema; no callus, no drainage, no dry skin, no cellulitis and no ulcer     Comments  Left plantar heel is mildly erythematous, and severely tender to palpation. Several rash/irratation like skin lesions on dorsal foot without  breaks in the skin. Left hallux with small blister at distal tip. No clinical signs of infection including drainage, malodor, fluctuance, crepitus.     Clinical media:          Laboratory:  All pertinent labs reviewed within the last 24 hours.    Diagnostic  Results:  I have reviewed all pertinent imaging results/findings within the past 24 hours.  Assessment/Plan:     Orthopedic  Left foot pain  74 y.o. female presenting for heart block, with new PPM placed. Podiatry consulted for new onset left heel pain. VSS, Afebrile. WBC wnl. CRP 10.5. Radiographs of left foot demonstrate No acute fracture, dislocation, or bone destruction seen.  No foreign body seen.  Clinical exam: No open wounds or gross deformity seen on, though patient is very tender throughout. Discussed physical exam and imaging with patient in detail, possible neuropathy vs ischemic pain vs fibromyalgia    Plan:  - Imaging: CT scan of left foot and arterial US of left foot ordered, further intervention pending results  - No surgical intervention at this time  - Ordered EMLA cream for pain control  - Weight bearing status: WBAT  - Recommend off loading boots for both heels at all times while laying in bed   - Podiatry will continue to follow           Thank you for your consult. I will follow-up with patient. Please contact us if you have any additional questions.    Diomedes Hernandez MD  Podiatry  [facility] - Cardiology Stepdown

## 2025-06-30 NOTE — CARE UPDATE
Unit [facility] Care Support Interaction      I have reviewed the chart of Cady Watkins who is hospitalized for Complete heart block. The patient is currently located in the following unit: CSU        I have assisted the primary physician in management of the following:      MRSA Decolonization - CHG ordered, mupirocin ordered by primary team on admit (ordered QD, has already completed 5 day course)  Wallis- consider removal if appropriate, indication critically ill in ICU, now stepped down.      Skylar Monae PA-C  Unit Based [facility]

## 2025-06-30 NOTE — PLAN OF CARE
Problem: Adult Inpatient Plan of Care  Goal: Absence of Hospital-Acquired Illness or Injury  Intervention: Prevent Infection  Flowsheets (Taken 6/29/2025 2218)  Infection Prevention: rest/sleep promoted  Goal: Optimal Comfort and Wellbeing  Intervention: Monitor Pain and Promote Comfort  Flowsheets (Taken 6/29/2025 2218)  Pain Management Interventions:   pillow support provided   position adjusted   quiet environment facilitated   care clustered  Intervention: Provide Person-Centered Care  Flowsheets (Taken 6/29/2025 2218)  Trust Relationship/Rapport:   care explained   questions encouraged

## 2025-06-30 NOTE — ASSESSMENT & PLAN NOTE
74 y.o. female presenting for heart block, with new PPM placed. Podiatry consulted for new onset left heel pain. VSS, Afebrile. WBC wnl. CRP 10.5. Radiographs of left foot demonstrate No acute fracture, dislocation, or bone destruction seen.  No foreign body seen.  Clinical exam: No open wounds or gross deformity seen on, though patient is very tender throughout. Discussed physical exam and imaging with patient in detail, possible neuropathy vs ischemic pain vs fibromyalgia    Plan:  - Imaging: CT scan of left foot and arterial US of left foot ordered, further intervention pending results  - No surgical intervention at this time  - Weight bearing status: WBAT  - Recommend off loading boots for both heels at all times while laying in bed   - Podiatry will continue to follow

## 2025-06-30 NOTE — SUBJECTIVE & OBJECTIVE
Scheduled Meds:   aspirin  81 mg Oral Daily    atorvastatin  20 mg Oral Daily    capsaicin   Topical (Top) BID    cefTRIAXone (Rocephin) IV (PEDS and ADULTS)  1 g Intravenous Q24H    doxycycline  100 mg Oral Q12H    furosemide  40 mg Oral BID    gabapentin  300 mg Oral TID    insulin glargine U-100  5 Units Subcutaneous Daily    LIDOcaine  1 patch Transdermal Q24H    magnesium oxide  800 mg Oral Daily    methocarbamoL  500 mg Oral BID    metoprolol succinate  50 mg Oral Daily    mupirocin   Topical (Top) Daily    pantoprazole  40 mg Oral Daily    polyethylene glycol  17 g Oral Daily    valsartan  40 mg Oral BID     Continuous Infusions:  PRN Meds:  Current Facility-Administered Medications:     acetaminophen, 650 mg, Oral, Q6H PRN    dextrose 50%, 12.5 g, Intravenous, PRN    dextrose 50%, 25 g, Intravenous, PRN    glucagon (human recombinant), 1 mg, Intramuscular, PRN    glucose, 16 g, Oral, PRN    glucose, 24 g, Oral, PRN    insulin aspart U-100, 0-5 Units, Subcutaneous, Q6H PRN    naloxone, 0.02 mg, Intravenous, PRN    sodium chloride 0.9%, 10 mL, Intravenous, PRN    Review of patient's allergies indicates:   Allergen Reactions    Advil [ibuprofen] Hives    Penicillins     Codeine     Excedrin aspirin free [acetaminophen-caffeine]         Past Medical History:   Diagnosis Date    Diabetes mellitus, type 2     Fibromyalgia 2016    Hypertension 2016     Past Surgical History:   Procedure Laterality Date     SECTION      CHOLECYSTECTOMY      CORONARY ANGIOGRAPHY N/A 2025    Procedure: ANGIOGRAM, CORONARY ARTERY;  Surgeon: Perfecto Coburn MD;  Location: [facility] CATH LAB;  Service: Cardiology;  Laterality: N/A;    FOOT SURGERY Bilateral     plantar fasciotomy bilateral, arthroplasty fifth toe bilateral    HERNIA REPAIR      IMPLANTATION OF BIVENTRICULAR HEART PACEMAKER N/A 2025    Procedure: INSERTION, PACEMAKER, BIVENTRICULAR;  Surgeon: Jason Lundberg MD;  Location: [facility] EP LAB;  Service:  Cardiology;  Laterality: N/A;  CHB, CRT-P (LBAP vs CS), Biotronik, Anes, SK, CICU 3084    INSERTION, PACEMAKER, TEMPORARY TRANSVENOUS N/A 6/18/2025    Procedure: Insertion, Pacemaker, Temporary Transvenous;  Surgeon: Sean Ureña MD;  Location: [facility] CATH LAB/EP;  Service: Cardiology;  Laterality: N/A;       Family History       Problem Relation (Age of Onset)    Diabetes Father    Heart disease Sister, Brother          Tobacco Use    Smoking status: Never    Smokeless tobacco: Never   Substance and Sexual Activity    Alcohol use: Yes     Comment: Occasionally    Drug use: Not on file    Sexual activity: Yes     Review of Systems   Constitutional:  Negative for fever.   Respiratory:  Negative for shortness of breath.    Skin:  Positive for color change. Negative for wound.     Objective:     Vital Signs (Most Recent):  Temp: 97.4 °F (36.3 °C) (06/30/25 1108)  Pulse: 64 (06/30/25 1108)  Resp: 18 (06/30/25 1108)  BP: (!) 114/58 (06/30/25 1108)  SpO2: (!) 93 % (06/30/25 1108) Vital Signs (24h Range):  Temp:  [97.4 °F (36.3 °C)-99 °F (37.2 °C)] 97.4 °F (36.3 °C)  Pulse:  [63-80] 64  Resp:  [16-18] 18  SpO2:  [93 %-95 %] 93 %  BP: (114-155)/(58-75) 114/58     Weight: 60.2 kg (132 lb 11.5 oz)  Body mass index is 24.27 kg/m².    Foot Exam    General  General Appearance: appears stated age and healthy   Affect: appropriate       Right Foot/Ankle     Inspection and Palpation  Ecchymosis: none  Tenderness: none   Swelling: none   Skin Exam: skin not intact     Neurovascular  Dorsalis pedis: 1+  Posterior tibial: 1+    Comments  No gross deformity or open wounds, lesions    Left Foot/Ankle      Inspection and Palpation  Ecchymosis: none  Tenderness: fifth metatarsal base, plantar fascia and calcaneus tenderness (Non specific tendneress throughout foot, with most severe being)  Swelling: plantar fascia   Skin Exam: blister, skin changes, abnormal color and erythema; no callus, no drainage, no dry skin, no cellulitis and no  ulcer     Comments  Left plantar heel is mildly erythematous, and severely tender to palpation. Several rash like skin lesions throughout foot without  breaks in the skin. Left hallux with small blister at distal tip. No clinical signs of infection including drainage, malodor, fluctuance, crepitus.     Clinical media:          Laboratory:  All pertinent labs reviewed within the last 24 hours.    Diagnostic Results:  I have reviewed all pertinent imaging results/findings within the past 24 hours.

## 2025-06-30 NOTE — PLAN OF CARE
[facility] - Cardiology Stepdown  Discharge Reassessment    Primary Care Provider: Blaine Benítez MD    Expected Discharge Date: 7/3/2025    Reassessment (most recent)       Discharge Reassessment - 06/30/25 1534          Discharge Reassessment    Assessment Type Discharge Planning Reassessment     Did the patient's condition or plan change since previous assessment? Yes     Discharge Plan discussed with: Adult children     Communicated LOKESH with patient/caregiver Date not available/Unable to determine     Discharge Plan A Skilled Nursing Facility     Discharge Plan B Home Health     Transition of Care Barriers None     Why the patient remains in the hospital Requires continued medical care        Post-Acute Status    Post-Acute Authorization Placement     Post-Acute Placement Status Referrals Sent                   SW met with pt's daughter Marilyn at bedside to review discharge recommendation of SNF and is agreeable to plan (pt was IMAN at test).  Marilyn stated that she lives in [place] so would prefer a facility in [place], specifically [facility].    Patient/family provided list of facilities in-network with patient's payor plan. Providers that are owned, operated, or affiliated with [facility] are included on the list.     Notified that referral sent to below listed facilities from in-network list based on proximity to home/family support:   [facility]    Patient/family instructed to identify preference.    Preferred Facility: (if more than 1, listed in order of descending preference)  [facility]    If an additional preferred facility not listed above is identified, additional referral to be sent. If above facilities unable to accept, will send additional referrals to in-network providers.     Discharge Plan A and Plan B have been determined by review of patient's clinical status, future medical and therapeutic needs, and coverage/benefits for post-acute care in  coordination with multidisciplinary team members.  Will continue to follow.      UPDATE 3:58 PM  PASRR completed and saved to Epic.      Eve Wahl LMSW  [facility]  [id number]

## 2025-07-01 LAB
ABSOLUTE EOSINOPHIL (OHS): 0.61 K/UL
ABSOLUTE MONOCYTE (OHS): 0.93 K/UL (ref 0.3–1)
ABSOLUTE NEUTROPHIL COUNT (OHS): 4.93 K/UL (ref 1.8–7.7)
ALBUMIN SERPL BCP-MCNC: 2.4 G/DL (ref 3.5–5.2)
ALP SERPL-CCNC: 46 UNIT/L (ref 40–150)
ALT SERPL W/O P-5'-P-CCNC: 16 UNIT/L (ref 10–44)
ANION GAP (OHS): 8 MMOL/L (ref 8–16)
AST SERPL-CCNC: 26 UNIT/L (ref 11–45)
BASOPHILS # BLD AUTO: 0.11 K/UL
BASOPHILS NFR BLD AUTO: 1.2 %
BILIRUB SERPL-MCNC: 0.4 MG/DL (ref 0.1–1)
BUN SERPL-MCNC: 26 MG/DL (ref 8–23)
CALCIUM SERPL-MCNC: 8.4 MG/DL (ref 8.7–10.5)
CHLORIDE SERPL-SCNC: 99 MMOL/L (ref 95–110)
CO2 SERPL-SCNC: 29 MMOL/L (ref 23–29)
CREAT SERPL-MCNC: 0.9 MG/DL (ref 0.5–1.4)
ERYTHROCYTE [DISTWIDTH] IN BLOOD BY AUTOMATED COUNT: 13.4 % (ref 11.5–14.5)
GFR SERPLBLD CREATININE-BSD FMLA CKD-EPI: >60 ML/MIN/1.73/M2
GLUCOSE SERPL-MCNC: 135 MG/DL (ref 70–110)
HCT VFR BLD AUTO: 35.8 % (ref 37–48.5)
HGB BLD-MCNC: 12 GM/DL (ref 12–16)
IMM GRANULOCYTES # BLD AUTO: 0.06 K/UL (ref 0–0.04)
IMM GRANULOCYTES NFR BLD AUTO: 0.7 % (ref 0–0.5)
LYMPHOCYTES # BLD AUTO: 2.39 K/UL (ref 1–4.8)
MAGNESIUM SERPL-MCNC: 1.7 MG/DL (ref 1.6–2.6)
MCH RBC QN AUTO: 31.9 PG (ref 27–31)
MCHC RBC AUTO-ENTMCNC: 33.5 G/DL (ref 32–36)
MCV RBC AUTO: 95 FL (ref 82–98)
NUCLEATED RBC (/100WBC) (OHS): 0 /100 WBC
PLATELET # BLD AUTO: 182 K/UL (ref 150–450)
PMV BLD AUTO: 11.7 FL (ref 9.2–12.9)
POCT GLUCOSE: 142 MG/DL (ref 70–110)
POCT GLUCOSE: 184 MG/DL (ref 70–110)
POCT GLUCOSE: 231 MG/DL (ref 70–110)
POTASSIUM SERPL-SCNC: 3.7 MMOL/L (ref 3.5–5.1)
PROT SERPL-MCNC: 5.3 GM/DL (ref 6–8.4)
RBC # BLD AUTO: 3.76 M/UL (ref 4–5.4)
RELATIVE EOSINOPHIL (OHS): 6.8 %
RELATIVE LYMPHOCYTE (OHS): 26.5 % (ref 18–48)
RELATIVE MONOCYTE (OHS): 10.3 % (ref 4–15)
RELATIVE NEUTROPHIL (OHS): 54.5 % (ref 38–73)
SODIUM SERPL-SCNC: 136 MMOL/L (ref 136–145)
WBC # BLD AUTO: 9.03 K/UL (ref 3.9–12.7)

## 2025-07-01 PROCEDURE — 25000003 PHARM REV CODE 250: Performed by: STUDENT IN AN ORGANIZED HEALTH CARE EDUCATION/TRAINING PROGRAM

## 2025-07-01 PROCEDURE — 25000003 PHARM REV CODE 250

## 2025-07-01 PROCEDURE — 94761 N-INVAS EAR/PLS OXIMETRY MLT: CPT

## 2025-07-01 PROCEDURE — 83735 ASSAY OF MAGNESIUM: CPT | Performed by: HOSPITALIST

## 2025-07-01 PROCEDURE — 85025 COMPLETE CBC W/AUTO DIFF WBC: CPT

## 2025-07-01 PROCEDURE — 97530 THERAPEUTIC ACTIVITIES: CPT

## 2025-07-01 PROCEDURE — 80053 COMPREHEN METABOLIC PANEL: CPT | Performed by: HOSPITALIST

## 2025-07-01 PROCEDURE — 63600175 PHARM REV CODE 636 W HCPCS: Performed by: HOSPITALIST

## 2025-07-01 PROCEDURE — 20600001 HC STEP DOWN PRIVATE ROOM

## 2025-07-01 PROCEDURE — 36415 COLL VENOUS BLD VENIPUNCTURE: CPT | Performed by: HOSPITALIST

## 2025-07-01 PROCEDURE — 63600175 PHARM REV CODE 636 W HCPCS

## 2025-07-01 PROCEDURE — 25000003 PHARM REV CODE 250: Performed by: HOSPITALIST

## 2025-07-01 RX ORDER — POTASSIUM CHLORIDE 20 MEQ/1
40 TABLET, EXTENDED RELEASE ORAL ONCE
Status: COMPLETED | OUTPATIENT
Start: 2025-07-01 | End: 2025-07-01

## 2025-07-01 RX ORDER — MAGNESIUM SULFATE HEPTAHYDRATE 40 MG/ML
2 INJECTION, SOLUTION INTRAVENOUS ONCE
Status: COMPLETED | OUTPATIENT
Start: 2025-07-01 | End: 2025-07-01

## 2025-07-01 RX ADMIN — FUROSEMIDE 40 MG: 40 TABLET ORAL at 08:07

## 2025-07-01 RX ADMIN — INSULIN ASPART 2 UNITS: 100 INJECTION, SOLUTION INTRAVENOUS; SUBCUTANEOUS at 09:07

## 2025-07-01 RX ADMIN — CEFTRIAXONE 1 G: 1 INJECTION, POWDER, FOR SOLUTION INTRAMUSCULAR; INTRAVENOUS at 10:07

## 2025-07-01 RX ADMIN — GABAPENTIN 300 MG: 300 CAPSULE ORAL at 08:07

## 2025-07-01 RX ADMIN — Medication 800 MG: at 08:07

## 2025-07-01 RX ADMIN — METHOCARBAMOL 500 MG: 500 TABLET ORAL at 08:07

## 2025-07-01 RX ADMIN — VALSARTAN 40 MG: 40 TABLET, FILM COATED ORAL at 09:07

## 2025-07-01 RX ADMIN — DOXYCYCLINE HYCLATE 100 MG: 100 TABLET, COATED ORAL at 08:07

## 2025-07-01 RX ADMIN — FUROSEMIDE 40 MG: 40 TABLET ORAL at 05:07

## 2025-07-01 RX ADMIN — METHOCARBAMOL 500 MG: 500 TABLET ORAL at 09:07

## 2025-07-01 RX ADMIN — PANTOPRAZOLE SODIUM 40 MG: 40 TABLET, DELAYED RELEASE ORAL at 08:07

## 2025-07-01 RX ADMIN — GABAPENTIN 300 MG: 300 CAPSULE ORAL at 02:07

## 2025-07-01 RX ADMIN — POTASSIUM CHLORIDE 40 MEQ: 1500 TABLET, EXTENDED RELEASE ORAL at 08:07

## 2025-07-01 RX ADMIN — POLYETHYLENE GLYCOL 3350 17 G: 17 POWDER, FOR SOLUTION ORAL at 08:07

## 2025-07-01 RX ADMIN — GABAPENTIN 300 MG: 300 CAPSULE ORAL at 09:07

## 2025-07-01 RX ADMIN — ASPIRIN 81 MG CHEWABLE TABLET 81 MG: 81 TABLET CHEWABLE at 08:07

## 2025-07-01 RX ADMIN — METOPROLOL SUCCINATE 50 MG: 50 TABLET, EXTENDED RELEASE ORAL at 08:07

## 2025-07-01 RX ADMIN — INSULIN GLARGINE 5 UNITS: 100 INJECTION, SOLUTION SUBCUTANEOUS at 08:07

## 2025-07-01 RX ADMIN — CAPSAICIN: 0.25 CREAM TOPICAL at 08:07

## 2025-07-01 RX ADMIN — VALSARTAN 40 MG: 40 TABLET, FILM COATED ORAL at 08:07

## 2025-07-01 RX ADMIN — MUPIROCIN: 20 OINTMENT TOPICAL at 08:07

## 2025-07-01 RX ADMIN — MAGNESIUM SULFATE HEPTAHYDRATE 2 G: 40 INJECTION, SOLUTION INTRAVENOUS at 08:07

## 2025-07-01 RX ADMIN — ATORVASTATIN CALCIUM 20 MG: 20 TABLET, FILM COATED ORAL at 08:07

## 2025-07-01 RX ADMIN — CAPSAICIN: 0.25 CREAM TOPICAL at 09:07

## 2025-07-01 NOTE — PLAN OF CARE
07/01/25 1127   Post-Acute Status   Post-Acute Authorization Placement   Post-Acute Placement Status Pending post-acute provider review/more information requested     Referral under review with [facility].      Eve Wahl LMSW  [facility]  [id number]

## 2025-07-01 NOTE — PROGRESS NOTES
"[facility] - Cardiology [facility] Medicine  Progress Note    Patient Name: Cady Watkins  MRN: [id number]  Patient Class: IP- Inpatient   Admission Date: 6/24/2025  Length of Stay: 7 days  Attending Physician: Valery Gallegos MD  Primary Care Provider: Blaine Benítez MD        Subjective     Principal Problem:Complete heart block          HPI:  74F with HTN, HLD, T2DM w neuropathy, chronic pain on opioids, and fibromyalgia who initially presented from PCP office to [facility] ED for reported symptomatic bradycardia w rate 30s. She was in her normal state of health until 3 weeks prior when she had an episode of syncope, while in her kitchen, she felt very off, walked to sofa and blacked out reportedly for 3 mins. Thereafter, she's had fatigue, decreased appetite, and increasing SOB.  In ED, found to have complete heart block, new onset cardiomyopathy ischemic vs Takotsubo, Hypertensive emergency (SBP 230s), and ISAIAS.  Started on nitro gtt, had an emergent TVP placed and then transferred to [facility] CCU.          Overview/Hospital Course:  CCU course:   Transferred in from [facility] to CCU.   IC and EP consulted, and decision made for LHC to eval ischemia, which was done after ISAIAS improved and pt diuresed, and LHC revealed "70% left circumflex stenosis, otherwise diffuse non-obstructive disease is present" no intervention needed.   Diuresed on Lasix IVP and Lasix IV gtt @10cc/hr 6/26-6/27.  Intermittent delirium which starts at night; given haldol 5mg IV on 6/24 then olanzapine 5mg on 6/26 and 6/27 am then d/c-ed; on delirium precautions.  Patient had PPM placed successfully with no complications. UA evident of potential UTI will plan to treat with CTX for 3 days. Will continue to work on patient functional status with PT/OT.      After stepdown to [facility] on 6/28.  Acute exquisite left heel pain, consulted podiatry 6/30.      New deconditioning/debility, working with PT/OT, prior ambulatory, now requiring " two person assist for standing, rec Mod Intensity. Family interested in [facility] facility in [place], prior very positive experience there and near daughter who lives in [place].          Interval History: doing well except L heel pain, CT done and pending, f/u podiatry recs. SW working on SNF placement for PT/OT    Review of Systems   All other systems reviewed and are negative.    Objective:     Vital Signs (Most Recent):  Temp: 97 °F (36.1 °C) (07/01/25 0710)  Pulse: 61 (07/01/25 0710)  Resp: 17 (07/01/25 0710)  BP: (!) 156/72 (07/01/25 0710)  SpO2: (!) 94 % (07/01/25 0710) Vital Signs (24h Range):  Temp:  [97 °F (36.1 °C)-98 °F (36.7 °C)] 97 °F (36.1 °C)  Pulse:  [] 61  Resp:  [16-18] 17  SpO2:  [93 %-100 %] 94 %  BP: (114-156)/(58-72) 156/72     Weight: 60.2 kg (132 lb 11.5 oz)  Body mass index is 24.27 kg/m².    Intake/Output Summary (Last 24 hours) at 7/1/2025 0921  Last data filed at 7/1/2025 0635  Gross per 24 hour   Intake 462 ml   Output 775 ml   Net -313 ml         Physical Exam  Vitals reviewed.   Constitutional:       General: She is not in acute distress.     Appearance: She is not toxic-appearing.      Comments: thin   Abdominal:      Comments: Extremely large ventral hernia   Neurological:      General: No focal deficit present.      Mental Status: She is oriented to person, place, and time.      Motor: Weakness present.   Psychiatric:         Mood and Affect: Mood normal.         Behavior: Behavior normal.               Significant Labs: All pertinent labs within the past 24 hours have been reviewed.    Significant Imaging: I have reviewed all pertinent imaging results/findings within the past 24 hours.      Assessment & Plan  Complete heart block  Complete heart block s/p temp TVP then PPM- TSH wnl. Doxy ppx x 5d, post-PPM restrictions  Left foot pain  Acute exquisite severe Left heel pain, with erythema and bogginess/fluctuance, denies h/o any XR negative  - f/u podiatry consult,  appreciate  - f/u CT non-con   - f/u VAS MIKAEL and US arterial  - EMLA cream per podiatry  - off loading boots for both heels at all times while laying in bed   - WBAT    Acute systolic heart failure  Acute HFrEF/NICMP, c/w Takotsubo? TTE showed HFrEF with low EF of 20-25% with evidence of WMAs.   - diuresed w Lasix IV, near euvolemic and switched to oral  - new Lasix 40 PO BID for maintenance, will need Rx for this + sliding scale eventually upon d/c  - Toprol 50   - Valsartan 40 BID  Coronary artery disease involving native coronary artery   CAD-  LCx 70% stenosis and non-obstructive diffuse disease  - ASA 81  - Atorva 20  - BB  Acute hypoxemic respiratory failure  Resolving  Bladder mass  - Bladder US with 3.0 cm mass like lesion in right dependent bladder.  - Consulted Urology for further evaluation.  - Urology ordered CT Renal Stone Study unable to visualize bladder mass.  - CT Urogram inpt vs outpt   - Needs close follow up with urology.    Fibromyalgia  - Lido patch   - Robaxin 500 BID  - Capsaicin BID    High cholesterol  Atorva 20    Type 2 diabetes with neuropathy  DM2 controlled (A1C 6.1 on 9/2024) with neuropathy  F/u new A1C  Insulin basal 5u QD with LDSSI qACHS  - Gabapentin 300 TID  E. coli UTI (urinary tract infection)  Pan-sensitive UTI, CTX IV x 3d    Hypertensive emergency  Hypertension  Resolved  ISAIAS (acute kidney injury)  Resolved. cardiorenal  Delirium  Resolved    Muscular deconditioning  PT/OT rec Mod  Likely needs SNF    PAD (peripheral artery disease)      VTE Risk Mitigation (From admission, onward)      None            Discharge Planning   LOKESH: 7/3/2025     Code Status: Full Code   Medical Readiness for Discharge Date:   Discharge Plan A: Skilled Nursing Facility   Discharge Delays: None known at this time              Please place Justification for DME      Valery Gallegos MD  Department of Hospital Medicine   [facility] - Cardiology Stepdown

## 2025-07-01 NOTE — ASSESSMENT & PLAN NOTE
Acute exquisite severe Left heel pain, with erythema and bogginess/fluctuance, denies h/o any XR negative  - f/u podiatry consult, appreciate  - f/u CT non-con   - f/u VAS MIKAEL and US arterial  - EMLA cream per podiatry  - off loading boots for both heels at all times while laying in bed   - WBAT

## 2025-07-01 NOTE — PLAN OF CARE
07/01/25 1336   Rounds   Attendance ;Assigned nurse;Charge nurse  (unit-based LAUREN)   Discharge Plan A Skilled Nursing Facility   Why the patient remains in the hospital Requires continued medical care   Transition of Care Barriers None     Pending podiatry consult for a potential foot abscess.  SNF referrals sent yesterday.  LOKESH 7/3.      Eve Wahl LMSW  [facility]  [id number]

## 2025-07-01 NOTE — PT/OT/SLP PROGRESS
"Physical Therapy  Treatment    Patient Name: Cady Watkins   MRN: [id number]    Recommendations:     Discharge Recommendations: Moderate Intensity Therapy  Discharge Equipment Recommendations: wheelchair, bedside commode  Barriers to Discharge: Increased level of assist and Decreased caregiver support    Assessment:     Cady Watkins is a 74 y.o. female admitted with a medical diagnosis of Complete heart block. She presents with the following impairments/functional limitations: weakness, impaired endurance, impaired self care skills, impaired functional mobility, gait instability, impaired balance, decreased coordination, decreased upper extremity function, decreased lower extremity function, pain, impaired skin, orthopedic precautions. PT continues to progress with PT goals completing x2 Sit to stand trials with Sophia and CGA from bed compared to ModAx2 on eval. Pt remains limited 2/2 pain during WB in L foot. RN notified about heel pain and redness.     Rehab Prognosis: Good; patient continues to require acute skilled PT services to address these deficits and reach maximum level of function.  Recent Surgery: Procedure(s) (LRB):  INSERTION, PACEMAKER, BIVENTRICULAR (N/A) 5 Days Post-Op    Plan:     During this hospitalization, patient to be seen 4 x/week to address the identified rehab impairments via gait training, therapeutic activities, therapeutic exercises, neuromuscular re-education and progress toward the following goals:    Plan of Care Expires:  08/28/25    Subjective     Chief Complaint: "My feel are sore"  Patient/Family Comments/Goals: to go home  Pain/Comfort:  Pain Rating 1: 8/10  Location - Side 1: Bilateral  Location 1: heel  Pain Addressed 1: Reposition, Distraction  Pain Rating Post-Intervention 1: other (see comments) (pt did not rate)    Objective:     Communicated with RN prior to session. Patient found HOB elevated with telemetry upon PT entry to room.     General Precautions: Standard, " fall, pacemaker  Orthopedic Precautions: N/A  Braces: N/A  Respiratory Status: Room air    Functional Mobility:  Bed Mobility:    Scooting: minimum assistance  Supine to Sit: minimum assistance for trunk management and maintenance of precautions  Sit to Supine: minimum assistance for trunk management and maintenance of precautions  Transfers:    Sit to Stand: x 2 attempts from bed contact guard assistance minimum assistance with no AD with cues for hand placement and  weight bearing precautions, pt able to remain standing x 30 sec on first trial and then 10sec on second trial. Limited 2/2 pain in L foot    AM-PAC 6 CLICK MOBILITY  Turning over in bed (including adjusting bedclothes, sheets and blankets)?: 3  Sitting down on and standing up from a chair with arms (e.g., wheelchair, bedside commode, etc.): 3  Moving from lying on back to sitting on the side of the bed?: 3  Moving to and from a bed to a chair (including a wheelchair)?: 3  Need to walk in hospital room?: 2  Climbing 3-5 steps with a railing?: 2  Basic Mobility Total Score: 16     Therapeutic Exercises and Education:  Whiteboard updated  Patient educated on:  role of acute care PT and PT plan of care, safety while in hospital including calling nurse for mobility, and call light usage, pacemaker precautions.  The importance of maximal participation in therapy session in order to reduce negative effects of prolonged sedentary positioning.   Answered all questions within PT scope of practice and addressed functional mobility concerns.    Patient left HOB elevated with all lines intact, call bell in reach, daughter present, bed in lowest locked position, and 3/4 bed rails elevated.    GOALS:   Multidisciplinary Problems       Physical Therapy Goals          Problem: Physical Therapy    Goal Priority Disciplines Outcome Interventions   Physical Therapy Goal     PT, PT/OT Progressing    Description: Goals to be met by: 7/28/25     Patient will increase  functional independence with mobility by performin. Supine to sit with MInimal Assistance  2. Sit to stand transfer with Minimal Assistance  3. Bed to chair transfer with Minimal Assistance using LRAD  4. Gait  x 50 feet with Minimal Assistance using LRAD.   5. Ascend/descend 2 stair with no Handrails Moderate Assistance using HHA.   6. Lower extremity exercise program x30 reps per handout, with supervision                         DME Justifications:   Cady requires a commode for home use because she is confined to a single room.  Cady Watkins has a mobility limitation that significantly impairs her ability to participate in one or more mobility related activities of daily living (MRADLs) such as toileting, feeding, dressing, grooming, and bathing in customary locations in the home.  The mobility limitation cannot be sufficiently resolved by the use of a cane or walker.   The use of a manual wheelchair will significantly improve the patients ability to participate in MRADLS and the patient will use it on regular basis in the home.  Cady Watkins has expressed her willingness to use a manual wheelchair in the home. Patients upper body strength is sufficient for propulsion.  She also has a caregiver who is available, willing, and able to provide assistance with the wheelchair when needed.      Time Tracking:     PT Received On: 25  PT Start Time: 1513     PT Stop Time: 1537  PT Total Time (min): 24 min     Billable Minutes: Therapeutic Activity 24     Treatment Type: Treatment  PT/PTA: PT     Number of PTA visits since last PT visit: 0     2025

## 2025-07-01 NOTE — SUBJECTIVE & OBJECTIVE
Interval History: doing well except L heel pain, CT done and pending, f/u podiatry recs. SW working on SNF placement for PT/OT    Review of Systems   All other systems reviewed and are negative.    Objective:     Vital Signs (Most Recent):  Temp: 97 °F (36.1 °C) (07/01/25 0710)  Pulse: 61 (07/01/25 0710)  Resp: 17 (07/01/25 0710)  BP: (!) 156/72 (07/01/25 0710)  SpO2: (!) 94 % (07/01/25 0710) Vital Signs (24h Range):  Temp:  [97 °F (36.1 °C)-98 °F (36.7 °C)] 97 °F (36.1 °C)  Pulse:  [] 61  Resp:  [16-18] 17  SpO2:  [93 %-100 %] 94 %  BP: (114-156)/(58-72) 156/72     Weight: 60.2 kg (132 lb 11.5 oz)  Body mass index is 24.27 kg/m².    Intake/Output Summary (Last 24 hours) at 7/1/2025 0921  Last data filed at 7/1/2025 0635  Gross per 24 hour   Intake 462 ml   Output 775 ml   Net -313 ml         Physical Exam  Vitals reviewed.   Constitutional:       General: She is not in acute distress.     Appearance: She is not toxic-appearing.      Comments: thin   Abdominal:      Comments: Extremely large ventral hernia   Neurological:      General: No focal deficit present.      Mental Status: She is oriented to person, place, and time.      Motor: Weakness present.   Psychiatric:         Mood and Affect: Mood normal.         Behavior: Behavior normal.               Significant Labs: All pertinent labs within the past 24 hours have been reviewed.    Significant Imaging: I have reviewed all pertinent imaging results/findings within the past 24 hours.

## 2025-07-01 NOTE — PLAN OF CARE
"   07/01/25 1351   Post-Acute Status   Post-Acute Authorization Placement   Post-Acute Placement Status Pending medical clearance/testing     Pt accepted to [facility].  SW relayed this to pt's daughter Marilyn who confirmed that [facility] is still first choice.      142 received and saved to [facility].    Will continue to follow.      UPDATE 2:28 PM  Request for SNF auth faxed to [facility]:    Your fax has been successfully sent to [phone] at [phone].  ------------------------------------------------------------  From: [id number]  ------------------------------------------------------------  7/1/2025 2:25:17 PM Transmission Record   Sent to [phone] with remote ID "[id number]      "   Result: (0/339;0/0) Success   Page record: 1 - 4   Elapsed time: 01:28 on channel 67        UPDATE 3:49 PM  BENY informed by Cuca with N that SNF auth has been approved #[id number].        Eve Wahl, [facility]  [facility]  [id number]    "

## 2025-07-01 NOTE — ASSESSMENT & PLAN NOTE
Patient just had diverticulitis about 4 weeks ago and would like to check on some of the after effects. She is on a soft diet, but for the last couple days she has she has not had a BP. She has the urge to go but it is mostly gel. No stomach pain. Can she take something over the counter, or change her diet. Please call patient back and discuss this with her.     Resolved

## 2025-07-01 NOTE — PLAN OF CARE
Problem: Adult Inpatient Plan of Care  Goal: Absence of Hospital-Acquired Illness or Injury  Intervention: Prevent Infection  Flowsheets (Taken 6/30/2025 2025)  Infection Prevention: rest/sleep promoted  Goal: Optimal Comfort and Wellbeing  Intervention: Monitor Pain and Promote Comfort  Flowsheets (Taken 6/30/2025 2025)  Pain Management Interventions:   pillow support provided   position adjusted   quiet environment facilitated   care clustered  Intervention: Provide Person-Centered Care  Flowsheets (Taken 6/30/2025 2025)  Trust Relationship/Rapport:   care explained   questions encouraged

## 2025-07-02 ENCOUNTER — DOCUMENTATION ONLY (OUTPATIENT)
Dept: ELECTROPHYSIOLOGY | Facility: CLINIC | Age: 75
End: 2025-07-02
Payer: MEDICARE

## 2025-07-02 ENCOUNTER — DOCUMENTATION ONLY (OUTPATIENT)
Dept: CARDIOLOGY | Facility: HOSPITAL | Age: 75
End: 2025-07-02
Payer: MEDICARE

## 2025-07-02 ENCOUNTER — PATIENT MESSAGE (OUTPATIENT)
Dept: ELECTROPHYSIOLOGY | Facility: CLINIC | Age: 75
End: 2025-07-02
Payer: MEDICARE

## 2025-07-02 PROBLEM — Z75.8 DISCHARGE PLANNING ISSUES: Status: ACTIVE | Noted: 2025-07-02

## 2025-07-02 PROBLEM — I75.022: Status: ACTIVE | Noted: 2025-07-02

## 2025-07-02 PROBLEM — R10.32 LEFT LOWER QUADRANT ABDOMINAL PAIN: Status: ACTIVE | Noted: 2025-07-02

## 2025-07-02 LAB
ABSOLUTE EOSINOPHIL (OHS): 0.55 K/UL
ABSOLUTE MONOCYTE (OHS): 1 K/UL (ref 0.3–1)
ABSOLUTE NEUTROPHIL COUNT (OHS): 5.07 K/UL (ref 1.8–7.7)
ALBUMIN SERPL BCP-MCNC: 2.6 G/DL (ref 3.5–5.2)
ALP SERPL-CCNC: 55 UNIT/L (ref 40–150)
ALT SERPL W/O P-5'-P-CCNC: 16 UNIT/L (ref 10–44)
ANION GAP (OHS): 10 MMOL/L (ref 8–16)
APTT PPP: 25.7 SECONDS (ref 21–32)
AST SERPL-CCNC: 24 UNIT/L (ref 11–45)
BASOPHILS # BLD AUTO: 0.11 K/UL
BASOPHILS NFR BLD AUTO: 1.3 %
BILIRUB SERPL-MCNC: 0.2 MG/DL (ref 0.1–1)
BUN SERPL-MCNC: 34 MG/DL (ref 8–23)
CALCIUM SERPL-MCNC: 8.5 MG/DL (ref 8.7–10.5)
CHLORIDE SERPL-SCNC: 97 MMOL/L (ref 95–110)
CO2 SERPL-SCNC: 30 MMOL/L (ref 23–29)
CREAT SERPL-MCNC: 1 MG/DL (ref 0.5–1.4)
ERYTHROCYTE [DISTWIDTH] IN BLOOD BY AUTOMATED COUNT: 13.7 % (ref 11.5–14.5)
GFR SERPLBLD CREATININE-BSD FMLA CKD-EPI: 60 ML/MIN/1.73/M2
GLUCOSE SERPL-MCNC: 135 MG/DL (ref 70–110)
HCT VFR BLD AUTO: 38.1 % (ref 37–48.5)
HGB BLD-MCNC: 12.6 GM/DL (ref 12–16)
IMM GRANULOCYTES # BLD AUTO: 0.06 K/UL (ref 0–0.04)
IMM GRANULOCYTES NFR BLD AUTO: 0.7 % (ref 0–0.5)
LYMPHOCYTES # BLD AUTO: 1.98 K/UL (ref 1–4.8)
MAGNESIUM SERPL-MCNC: 2.2 MG/DL (ref 1.6–2.6)
MCH RBC QN AUTO: 32.2 PG (ref 27–31)
MCHC RBC AUTO-ENTMCNC: 33.1 G/DL (ref 32–36)
MCV RBC AUTO: 97 FL (ref 82–98)
NUCLEATED RBC (/100WBC) (OHS): 0 /100 WBC
PLATELET # BLD AUTO: 203 K/UL (ref 150–450)
PMV BLD AUTO: 11.5 FL (ref 9.2–12.9)
POCT GLUCOSE: 182 MG/DL (ref 70–110)
POCT GLUCOSE: 186 MG/DL (ref 70–110)
POCT GLUCOSE: 196 MG/DL (ref 70–110)
POCT GLUCOSE: 273 MG/DL (ref 70–110)
POTASSIUM SERPL-SCNC: 4.6 MMOL/L (ref 3.5–5.1)
PROT SERPL-MCNC: 5.9 GM/DL (ref 6–8.4)
RBC # BLD AUTO: 3.91 M/UL (ref 4–5.4)
RELATIVE EOSINOPHIL (OHS): 6.3 %
RELATIVE LYMPHOCYTE (OHS): 22.6 % (ref 18–48)
RELATIVE MONOCYTE (OHS): 11.4 % (ref 4–15)
RELATIVE NEUTROPHIL (OHS): 57.7 % (ref 38–73)
SODIUM SERPL-SCNC: 137 MMOL/L (ref 136–145)
WBC # BLD AUTO: 8.77 K/UL (ref 3.9–12.7)

## 2025-07-02 PROCEDURE — 25000003 PHARM REV CODE 250

## 2025-07-02 PROCEDURE — 82040 ASSAY OF SERUM ALBUMIN: CPT | Performed by: HOSPITALIST

## 2025-07-02 PROCEDURE — 25000003 PHARM REV CODE 250: Performed by: STUDENT IN AN ORGANIZED HEALTH CARE EDUCATION/TRAINING PROGRAM

## 2025-07-02 PROCEDURE — 83735 ASSAY OF MAGNESIUM: CPT | Performed by: HOSPITALIST

## 2025-07-02 PROCEDURE — 20600001 HC STEP DOWN PRIVATE ROOM

## 2025-07-02 PROCEDURE — 97535 SELF CARE MNGMENT TRAINING: CPT

## 2025-07-02 PROCEDURE — 94761 N-INVAS EAR/PLS OXIMETRY MLT: CPT

## 2025-07-02 PROCEDURE — 36415 COLL VENOUS BLD VENIPUNCTURE: CPT | Performed by: HOSPITALIST

## 2025-07-02 PROCEDURE — 85730 THROMBOPLASTIN TIME PARTIAL: CPT | Performed by: HOSPITALIST

## 2025-07-02 PROCEDURE — 25000003 PHARM REV CODE 250: Performed by: HOSPITALIST

## 2025-07-02 PROCEDURE — 85025 COMPLETE CBC W/AUTO DIFF WBC: CPT

## 2025-07-02 PROCEDURE — 63600175 PHARM REV CODE 636 W HCPCS: Performed by: HOSPITALIST

## 2025-07-02 PROCEDURE — 99233 SBSQ HOSP IP/OBS HIGH 50: CPT | Mod: ,,, | Performed by: PODIATRIST

## 2025-07-02 RX ORDER — ATORVASTATIN CALCIUM 40 MG/1
80 TABLET, FILM COATED ORAL DAILY
Status: DISCONTINUED | OUTPATIENT
Start: 2025-07-03 | End: 2025-07-08 | Stop reason: HOSPADM

## 2025-07-02 RX ORDER — OXYCODONE HYDROCHLORIDE 5 MG/1
5 TABLET ORAL EVERY 6 HOURS PRN
Refills: 0 | Status: DISCONTINUED | OUTPATIENT
Start: 2025-07-02 | End: 2025-07-08 | Stop reason: HOSPADM

## 2025-07-02 RX ORDER — LIDOCAINE AND PRILOCAINE 25; 25 MG/G; MG/G
CREAM TOPICAL ONCE
Status: COMPLETED | OUTPATIENT
Start: 2025-07-02 | End: 2025-07-02

## 2025-07-02 RX ORDER — HYDROMORPHONE HYDROCHLORIDE 1 MG/ML
0.2 INJECTION, SOLUTION INTRAMUSCULAR; INTRAVENOUS; SUBCUTANEOUS EVERY 6 HOURS PRN
Status: DISCONTINUED | OUTPATIENT
Start: 2025-07-02 | End: 2025-07-08 | Stop reason: HOSPADM

## 2025-07-02 RX ORDER — ACETAMINOPHEN 500 MG
1000 TABLET ORAL EVERY 8 HOURS
Status: DISCONTINUED | OUTPATIENT
Start: 2025-07-02 | End: 2025-07-08 | Stop reason: HOSPADM

## 2025-07-02 RX ORDER — HEPARIN SODIUM,PORCINE/D5W 25000/250
0-40 INTRAVENOUS SOLUTION INTRAVENOUS CONTINUOUS
Status: DISCONTINUED | OUTPATIENT
Start: 2025-07-02 | End: 2025-07-08

## 2025-07-02 RX ORDER — LIDOCAINE AND PRILOCAINE 25; 25 MG/G; MG/G
CREAM TOPICAL
Status: DISCONTINUED | OUTPATIENT
Start: 2025-07-02 | End: 2025-07-08 | Stop reason: HOSPADM

## 2025-07-02 RX ADMIN — GABAPENTIN 300 MG: 300 CAPSULE ORAL at 08:07

## 2025-07-02 RX ADMIN — VALSARTAN 40 MG: 40 TABLET, FILM COATED ORAL at 09:07

## 2025-07-02 RX ADMIN — GABAPENTIN 300 MG: 300 CAPSULE ORAL at 09:07

## 2025-07-02 RX ADMIN — FUROSEMIDE 40 MG: 40 TABLET ORAL at 09:07

## 2025-07-02 RX ADMIN — METHOCARBAMOL 500 MG: 500 TABLET ORAL at 08:07

## 2025-07-02 RX ADMIN — METHOCARBAMOL 500 MG: 500 TABLET ORAL at 09:07

## 2025-07-02 RX ADMIN — GABAPENTIN 300 MG: 300 CAPSULE ORAL at 04:07

## 2025-07-02 RX ADMIN — Medication 800 MG: at 09:07

## 2025-07-02 RX ADMIN — INSULIN ASPART 3 UNITS: 100 INJECTION, SOLUTION INTRAVENOUS; SUBCUTANEOUS at 04:07

## 2025-07-02 RX ADMIN — CAPSAICIN: 0.25 CREAM TOPICAL at 08:07

## 2025-07-02 RX ADMIN — ATORVASTATIN CALCIUM 20 MG: 20 TABLET, FILM COATED ORAL at 09:07

## 2025-07-02 RX ADMIN — INSULIN GLARGINE 5 UNITS: 100 INJECTION, SOLUTION SUBCUTANEOUS at 09:07

## 2025-07-02 RX ADMIN — PANTOPRAZOLE SODIUM 40 MG: 40 TABLET, DELAYED RELEASE ORAL at 09:07

## 2025-07-02 RX ADMIN — CAPSAICIN: 0.25 CREAM TOPICAL at 09:07

## 2025-07-02 RX ADMIN — ASPIRIN 81 MG CHEWABLE TABLET 81 MG: 81 TABLET CHEWABLE at 09:07

## 2025-07-02 RX ADMIN — FUROSEMIDE 40 MG: 40 TABLET ORAL at 05:07

## 2025-07-02 RX ADMIN — MUPIROCIN: 20 OINTMENT TOPICAL at 09:07

## 2025-07-02 RX ADMIN — LIDOCAINE 1 PATCH: 50 PATCH CUTANEOUS at 04:07

## 2025-07-02 RX ADMIN — LIDOCAINE AND PRILOCAINE: 25; 25 CREAM TOPICAL at 09:07

## 2025-07-02 RX ADMIN — ACETAMINOPHEN 1000 MG: 500 TABLET ORAL at 10:07

## 2025-07-02 RX ADMIN — VALSARTAN 40 MG: 40 TABLET, FILM COATED ORAL at 08:07

## 2025-07-02 RX ADMIN — ATORVASTATIN CALCIUM 60 MG: 40 TABLET, FILM COATED ORAL at 06:07

## 2025-07-02 RX ADMIN — HEPARIN SODIUM 12 UNITS/KG/HR: 10000 INJECTION, SOLUTION INTRAVENOUS at 08:07

## 2025-07-02 RX ADMIN — METOPROLOL SUCCINATE 50 MG: 50 TABLET, EXTENDED RELEASE ORAL at 09:07

## 2025-07-02 NOTE — PT/OT/SLP PROGRESS
Occupational Therapy   Treatment    Name: Cady Watkins  MRN: [id number]  Admitting Diagnosis:  Complete heart block  6 Days Post-Op    Recommendations:     Discharge Recommendations: Moderate Intensity Therapy  Discharge Equipment Recommendations:  wheelchair, bedside commode  Barriers to discharge:   (increased level of assistance needed at this time)    Assessment:     Cady Watkins is a 74 y.o. female with a medical diagnosis of Complete heart block. Performance deficits affecting function are weakness, impaired endurance, impaired self care skills, impaired functional mobility, gait instability, impaired balance. Patient limited due to foot pain on L LE (redness on heel and big toe). Patient agreed to EOB activity and toileting with [facility]. Patient would benefit from continued skilled acute OT 4x/wk to improve functional mobility, increase independence with ADLs, and address established goals. Recommending moderate intensity therapy once medically appropriate for discharge to increase maximal independence, reduce burden of care, and ensure safety.     Rehab Prognosis:  Good; patient would benefit from acute skilled OT services to address these deficits and reach maximum level of function.       Plan:     Patient to be seen 4 x/week to address the above listed problems via self-care/home management, therapeutic activities, therapeutic exercises  Plan of Care Expires: 07/29/25  Plan of Care Reviewed with: patient, daughter, friend    Subjective     Chief Complaint: L foot pain  Patient/Family Comments/goals: Patient agreed to therapy  Pain/Comfort:  Pain Rating 1:  (Patient did not rate, but indicates pain)  Location - Orientation 1: generalized  Location 1:  (L foot and L flank)  Pain Addressed 1: Reposition, Distraction  Pain Rating Post-Intervention 1:  (patient did not rate, but indicates pain)    Objective:     Communicated with: NSG prior to session.  Patient found HOB elevated with telemetry upon OT  entry to room.    General Precautions: Standard, fall, pacemaker    Orthopedic Precautions:N/A  Braces: N/A  Respiratory Status: Room air     Occupational Performance:     Bed Mobility:    Patient completed Supine to Sit with minimum assistance with HOB elevated  Patient completed Sit to Supine with minimum assistance with HOB flat    Functional Mobility/Transfers:  Patient completed Sit <> Stand Transfer with minimum assistance  with  hand-held assist   Patient completed Toilet Transfer bed<>BSC Stand Pivot technique with minimum assistance with  hand-held assist    Activities of Daily Living:  Upper Body Dressing: total assistance Patient, daughter, and friend educated on donning and doffing UE sling correctly for wearing it at night due to Pacemaker precautions as patient sat EOB  Toileting: total assistance Patient urinated and had a bowel movement on BSC. Patient needed to be cleaned. New PureWick on at end of session.       [facility] 6 Click ADL: 15    Treatment & Education:  Role of OT and POC  ADL retraining  Functional mobility training  Safety  Importance EOB/OOB activity    Patient left HOB elevated with all lines intact, call button in reach, and all needs met.     GOALS:   Multidisciplinary Problems       Occupational Therapy Goals          Problem: Occupational Therapy    Goal Priority Disciplines Outcome Interventions   Occupational Therapy Goal     OT, PT/OT Progressing    Description: Goals to be met by: 7/29/25     Patient will increase functional independence with ADLs by performing:    UE Dressing with independence  LE Dressing with minimal assistance and AD as needed.  Grooming while standing at sink with Minimal Assistance  and AD as needed  Toileting from toilet with contact guard Assistance for hygiene and clothing management.   Sitting at edge of bed x10 minutes with supervision  Supine to sit with independence   Step transfer with minimal assistance and AD prn  Toilet transfer to toilet with  with contact guard and AD prn  Increased functional strength to WNL for BUEs  Upper extremity exercise program x10 reps per handout, with assistance as needed.                         Time Tracking:     OT Date of Treatment: 07/02/25  OT Start Time: 1515  OT Stop Time: 1555  OT Total Time (min): 40 min    Billable Minutes:Self Care/Home Management 40               7/2/2025

## 2025-07-02 NOTE — ASSESSMENT & PLAN NOTE
- Bladder US with 3.0 cm mass like lesion in right dependent bladder.  - Consulted Urology for further evaluation.  - Urology ordered CT Renal Stone Study - unable to visualize bladder mass.  - CT Urogram inpt vs outpt   - consider in new clinical context with new Abd pain, see above  - Needs close follow up with urology, referral placed.

## 2025-07-02 NOTE — ASSESSMENT & PLAN NOTE
Concern for embolic/atheroembolic phenomenon, cardiac in nature.     Recommend ASA, systemic heparin, high intensity statin  Will plan for LLE angiogram during this admission, timing tbd

## 2025-07-02 NOTE — PROGRESS NOTES
Asked to check wound while pt is in hospital by SUNNY Urena NP as pt may be going to Skilled Nursing.  Interrogation to be done by [employer] rep later today.    S/P PPM implanted 6/26/2025.  Removed aquacel dressing.    There is an area medial to incision over Xiphoid where the adhesive caused possible allergic reaction/skin tear.    Incision free of redness, swelling or drainage.   Small area of dermabond to lateral aspect of incision came off on the removed aquacel dressing. Incision still well approximated       See pictures.    Discussed with Haroldo PARR  Cleansed sites with normal saline.   Covered both sites with mepilex dressings. Haroldo PARR will notify Hospitalist to have dressings changed daily until healed.    Reviewed s/s of infection, arm restrictions and home monitoring with pt.  Issued printed wound care instructions.   RTC in 3 mos      Pt asked me to send wound care instructions thru the Portal for her daughter to read.

## 2025-07-02 NOTE — ASSESSMENT & PLAN NOTE
"Started decades ago after an elective cholecystectomy, she was at home recovering and her "stomach burst," reportedly "wasn't wrapped" by surgeon after the operation, and pt was too afraid to go to any more surgery for a repair so she's been living with it.  It's been enlarging over time.      "

## 2025-07-02 NOTE — CARE UPDATE
MRI L foot cancelled. Arterial studies demonstrate PVD, Vascular Surgery consulted. Please continue with plan from progress note 7/2. Podiatry will continue to follow peripherally. Please reach out with any questions.    Diomedes Hernandez DPM PGY-1  Podiatric Medicine & Surgery  [facility]  Secure Chat Preferred  [phone]

## 2025-07-02 NOTE — PROGRESS NOTES
[facility] - [facility]iology Stepdown  Podiatry  Progress Note    Patient Name: Cady Watkins  MRN: [id number]  Admission Date: 6/24/2025  Hospital Length of Stay: 8 days  Attending Physician: Valery Gallegos MD  Primary Care Provider: Blaine Benítez MD     Subjective:     Interval History: NAEON. VSS, afebrile. CT scan without significant findings. Pending arterial studies. Pain improved with gabapentin.     Follow-up For: Procedure(s) (LRB):  INSERTION, PACEMAKER, BIVENTRICULAR (N/A)    Post-Operative Day: 6 Days Post-Op    Scheduled Meds:   aspirin  81 mg Oral Daily    atorvastatin  20 mg Oral Daily    capsaicin   Topical (Top) BID    furosemide  40 mg Oral BID    gabapentin  300 mg Oral TID    insulin glargine U-100  5 Units Subcutaneous Daily    LIDOcaine  1 patch Transdermal Q24H    LIDOcaine-prilocaine   Topical (Top) Once    magnesium oxide  800 mg Oral Daily    methocarbamoL  500 mg Oral BID    metoprolol succinate  50 mg Oral Daily    mupirocin   Topical (Top) Daily    pantoprazole  40 mg Oral Daily    polyethylene glycol  17 g Oral Daily    valsartan  40 mg Oral BID     Continuous Infusions:  PRN Meds:  Current Facility-Administered Medications:     acetaminophen, 650 mg, Oral, Q6H PRN    dextrose 50%, 12.5 g, Intravenous, PRN    dextrose 50%, 25 g, Intravenous, PRN    glucagon (human recombinant), 1 mg, Intramuscular, PRN    glucose, 16 g, Oral, PRN    glucose, 24 g, Oral, PRN    insulin aspart U-100, 0-5 Units, Subcutaneous, Q6H PRN    LIDOcaine-prilocaine, , Topical (Top), PRN    naloxone, 0.02 mg, Intravenous, PRN    sodium chloride 0.9%, 10 mL, Intravenous, PRN    Review of Systems   Constitutional:  Negative for fever.   Respiratory:  Negative for shortness of breath.    Skin:  Positive for color change. Negative for wound.     Objective:     Vital Signs (Most Recent):  Temp: 98 °F (36.7 °C) (07/02/25 0730)  Pulse: 70 (07/02/25 0730)  Resp: 16 (07/02/25 0730)  BP: (!) 141/73 (07/02/25  0730)  SpO2: (!) 93 % (07/02/25 0730) Vital Signs (24h Range):  Temp:  [97.5 °F (36.4 °C)-98 °F (36.7 °C)] 98 °F (36.7 °C)  Pulse:  [60-71] 70  Resp:  [16-18] 16  SpO2:  [93 %-100 %] 93 %  BP: (111-141)/(59-73) 141/73     Weight: 60.2 kg (132 lb 11.5 oz)  Body mass index is 24.27 kg/m².    Foot Exam    General  General Appearance: appears stated age and healthy   Affect: appropriate       Right Foot/Ankle     Inspection and Palpation  Ecchymosis: none  Tenderness: none   Swelling: none   Skin Exam: skin not intact     Neurovascular  Dorsalis pedis: 1+  Posterior tibial: 1+    Comments  No gross deformity or open wounds, lesions    Left Foot/Ankle      Inspection and Palpation  Ecchymosis: none  Tenderness: fifth metatarsal base, plantar fascia and calcaneus tenderness (Non specific tendneress throughout foot, with most severe being)  Swelling: plantar fascia   Skin Exam: blister, skin changes, abnormal color and erythema; no callus, no drainage, no dry skin, no cellulitis and no ulcer     Comments  Left plantar heel is mildly erythematous, and severely tender to palpation. Several rash like skin lesions throughout foot without  breaks in the skin. Left hallux with small blister at distal tip. No clinical signs of infection including drainage, malodor, fluctuance, crepitus.     Clinical media:      Laboratory:  All pertinent labs reviewed within the last 24 hours.    Diagnostic Results:  I have reviewed all pertinent imaging results/findings within the past 24 hours.  Assessment/Plan:     Orthopedic  Left foot pain  74 y.o. female presenting for heart block, with new PPM placed. Podiatry consulted for new onset left heel pain. VSS, Afebrile. WBC wnl. CRP 10.5. Radiographs of left foot demonstrate No acute fracture, dislocation, or bone destruction seen.  No foreign body seen.  Clinical exam: No open wounds or gross deformity seen on, though patient is very tender throughout. Discussed physical exam and imaging with  patient in detail, possible neuropathy vs ischemic pain vs fibromyalgia. Highly suspect Neuropathy.     Plan:  - Imaging: CT scan reviewed, no acute process seen  - Arterial US of left foot ordered, further intervention pending results  - No surgical intervention at this time  - Weight bearing status: WBAT  - Recommend off loading boots for both heels at all times while laying in bed   - Podiatry will continue to follow           Diomedes Hernandez MD  Podiatry  [facility] - [facility]iology Stepdown

## 2025-07-02 NOTE — SUBJECTIVE & OBJECTIVE
Subjective:     Interval History: NAEON. VSS, afebrile. CT scan without significant findings. Pending arterial studies. Pain improved with gabapentin.     Follow-up For: Procedure(s) (LRB):  INSERTION, PACEMAKER, BIVENTRICULAR (N/A)    Post-Operative Day: 6 Days Post-Op    Scheduled Meds:   aspirin  81 mg Oral Daily    atorvastatin  20 mg Oral Daily    capsaicin   Topical (Top) BID    furosemide  40 mg Oral BID    gabapentin  300 mg Oral TID    insulin glargine U-100  5 Units Subcutaneous Daily    LIDOcaine  1 patch Transdermal Q24H    LIDOcaine-prilocaine   Topical (Top) Once    magnesium oxide  800 mg Oral Daily    methocarbamoL  500 mg Oral BID    metoprolol succinate  50 mg Oral Daily    mupirocin   Topical (Top) Daily    pantoprazole  40 mg Oral Daily    polyethylene glycol  17 g Oral Daily    valsartan  40 mg Oral BID     Continuous Infusions:  PRN Meds:  Current Facility-Administered Medications:     acetaminophen, 650 mg, Oral, Q6H PRN    dextrose 50%, 12.5 g, Intravenous, PRN    dextrose 50%, 25 g, Intravenous, PRN    glucagon (human recombinant), 1 mg, Intramuscular, PRN    glucose, 16 g, Oral, PRN    glucose, 24 g, Oral, PRN    insulin aspart U-100, 0-5 Units, Subcutaneous, Q6H PRN    LIDOcaine-prilocaine, , Topical (Top), PRN    naloxone, 0.02 mg, Intravenous, PRN    sodium chloride 0.9%, 10 mL, Intravenous, PRN    Review of Systems   Constitutional:  Negative for fever.   Respiratory:  Negative for shortness of breath.    Skin:  Positive for color change. Negative for wound.     Objective:     Vital Signs (Most Recent):  Temp: 98 °F (36.7 °C) (07/02/25 0730)  Pulse: 70 (07/02/25 0730)  Resp: 16 (07/02/25 0730)  BP: (!) 141/73 (07/02/25 0730)  SpO2: (!) 93 % (07/02/25 0730) Vital Signs (24h Range):  Temp:  [97.5 °F (36.4 °C)-98 °F (36.7 °C)] 98 °F (36.7 °C)  Pulse:  [60-71] 70  Resp:  [16-18] 16  SpO2:  [93 %-100 %] 93 %  BP: (111-141)/(59-73) 141/73     Weight: 60.2 kg (132 lb 11.5 oz)  Body mass  index is 24.27 kg/m².    Foot Exam    General  General Appearance: appears stated age and healthy   Affect: appropriate       Right Foot/Ankle     Inspection and Palpation  Ecchymosis: none  Tenderness: none   Swelling: none   Skin Exam: skin not intact     Neurovascular  Dorsalis pedis: 1+  Posterior tibial: 1+    Comments  No gross deformity or open wounds, lesions    Left Foot/Ankle      Inspection and Palpation  Ecchymosis: none  Tenderness: fifth metatarsal base, plantar fascia and calcaneus tenderness (Non specific tendneress throughout foot, with most severe being)  Swelling: plantar fascia   Skin Exam: blister, skin changes, abnormal color and erythema; no callus, no drainage, no dry skin, no cellulitis and no ulcer     Comments  Left plantar heel is mildly erythematous, and severely tender to palpation. Several rash like skin lesions throughout foot without  breaks in the skin. Left hallux with small blister at distal tip. No clinical signs of infection including drainage, malodor, fluctuance, crepitus.     Clinical media:      Laboratory:  All pertinent labs reviewed within the last 24 hours.    Diagnostic Results:  I have reviewed all pertinent imaging results/findings within the past 24 hours.

## 2025-07-02 NOTE — HPI
"This patient is a 74F with PMHx significant for HTN, HLD, NIDDM, fibromyalgia, chronic pain on opioids, presenting as transfer from OSH with symptomatic bradycardia. In ED, found to have complete heart block, new onset cardiomyopathy ischemic vs Takotsubo, Hypertensive emergency (SBP 230s), and ISAIAS.  Started on nitro gtt, had an emergent TVP placed and then transferred to our institution. LHC revealed "70% left circumflex stenosis, otherwise diffuse non-obstructive disease is present" no intervention needed.  Pacemaker placed. Vascular surgery consulted after new c/o of acute exquisite left heel pain with concomitant small areas of erythema on top of foot L great toe developed erythema and blister.  Non smoker, no hx of pedal ulceration, rest pain or previous claudication.  "

## 2025-07-02 NOTE — SUBJECTIVE & OBJECTIVE
Interval History: see updated S/O above    Review of Systems   All other systems reviewed and are negative.    Objective:     Vital Signs (Most Recent):  Temp: 97.5 °F (36.4 °C) (07/02/25 1229)  Pulse: 67 (07/02/25 1229)  Resp: 18 (07/02/25 1229)  BP: 130/69 (07/02/25 1229)  SpO2: (!) 93 % (07/02/25 1229) Vital Signs (24h Range):  Temp:  [97.5 °F (36.4 °C)-98 °F (36.7 °C)] 97.5 °F (36.4 °C)  Pulse:  [60-71] 67  Resp:  [16-18] 18  SpO2:  [93 %-100 %] 93 %  BP: (130-141)/(59-73) 130/69     Weight: 60.2 kg (132 lb 11.5 oz)  Body mass index is 24.27 kg/m².    Intake/Output Summary (Last 24 hours) at 7/2/2025 1413  Last data filed at 7/2/2025 0900  Gross per 24 hour   Intake 640 ml   Output 500 ml   Net 140 ml         Physical Exam  Vitals reviewed.   Constitutional:       General: She is not in acute distress.     Appearance: She is not toxic-appearing.      Comments: thin   Abdominal:      Comments: TTP on LEFT middle aspect of Extremely large ventral hernia, soft, no rebound/guard, possible TTP anterior L thigh   Musculoskeletal:      Comments: LEFT foot - linear erythema of mid superior aspect of foot with small scab, L heel erythema and exquisite TTP with warmth, L great toe with 1cm blister on distal aspect and surrounding erythema with nomal tempurature   Neurological:      General: No focal deficit present.      Mental Status: She is oriented to person, place, and time.      Motor: Weakness present.   Psychiatric:         Mood and Affect: Mood normal.         Behavior: Behavior normal.               Significant Labs: All pertinent labs within the past 24 hours have been reviewed.    Significant Imaging: I have reviewed all pertinent imaging results/findings within the past 24 hours.

## 2025-07-02 NOTE — PROGRESS NOTES
Inpatient device interrogation and/or reprogramming orders received; the industry representative was contacted and provided with patient's name and room number.       1 week post PPM implant device check done by [employer]VAS with [employer].

## 2025-07-02 NOTE — PLAN OF CARE
07/02/25 1313   Post-Acute Status   Post-Acute Authorization Placement   Post-Acute Placement Status Pending medical clearance/testing     BENY confirmed with Rosanna at [facility] ([phone]) that they received auth and can take pt today pending medical clearance.  Per Dr Gallegos pt is not ready for discharge today due to still getting work-up with podiatry.  BENY relayed this back to Rosanna.  Will continue to follow.      Eve Wahl, [facility]  [facility]  [id number]

## 2025-07-02 NOTE — PROGRESS NOTES
"[facility] - Cardiology [facility] Medicine  Progress Note    Patient Name: Cady Watkins  MRN: [id number]  Patient Class: IP- Inpatient   Admission Date: 6/24/2025  Length of Stay: 8 days  Attending Physician: Valery Gallegos MD  Primary Care Provider: Blaine Benítez MD        Subjective     Principal Problem:Complete heart block        HPI:  74F with HTN, HLD, T2DM w neuropathy, chronic pain on opioids, and fibromyalgia who initially presented from PCP office to [facility] ED for reported symptomatic bradycardia w rate 30s. She was in her normal state of health until 3 weeks prior when she had an episode of syncope, while in her kitchen, she felt very off, walked to sofa and blacked out reportedly for 3 mins. Thereafter, she's had fatigue, decreased appetite, and increasing SOB.  In ED, found to have complete heart block, new onset cardiomyopathy ischemic vs Takotsubo, Hypertensive emergency (SBP 230s), and ISAIAS.  Started on nitro gtt, had an emergent TVP placed and then transferred to [facility].          Overview/Hospital Course:  CCU course:   Transferred in from [facility] to CCU.   IC and EP consulted, and decision made for LHC to eval ischemia, which was done after ISAIAS improved and pt diuresed, and LHC revealed "70% left circumflex stenosis, otherwise diffuse non-obstructive disease is present" no intervention needed.   Diuresed on Lasix IVP and Lasix IV gtt @10cc/hr 6/26-6/27.  Intermittent delirium which starts at night; given haldol 5mg IV on 6/24 then olanzapine 5mg on 6/26 and 6/27 am then d/c-ed; on delirium precautions.  Patient had PPM placed successfully with no complications. UA evident of potential UTI will plan to treat with CTX for 3 days. Will continue to work on patient functional status with PT/OT.      After stepdown to [facility] on 6/28.  Acute exquisite left heel pain, small areas of erythema on top of foot, consulted podiatry 6/30.  XR and CT negative. L great toe developed " erythema and blister.  VAS Duplex and ABIs abnormal with diffuse disease and several significant stenoses (prelim report in Media tab in Epic), consulted VSURG 7/2.  Also c/o new intermittent LLQ Abdominal and L upper thigh pain, 10/10, no identifiable triggers, present for 3 days but voiced to MD on 7/2, same day as worsening of her L heel and 1st toe pain.             Interval History: see updated S/O above.  States pain of L foot and toe is worse today, and states it looks worse too.  Only some relief with combo of emla and capsaicin topicals. Also mentioned 3 days of 10/10 left lower abdominal vs ventral hernia pain.     Friend from childhood at bedside.     Review of Systems   All other systems reviewed and are negative.    Objective:     Vital Signs (Most Recent):  Temp: 97.5 °F (36.4 °C) (07/02/25 1229)  Pulse: 67 (07/02/25 1229)  Resp: 18 (07/02/25 1229)  BP: 130/69 (07/02/25 1229)  SpO2: (!) 93 % (07/02/25 1229) Vital Signs (24h Range):  Temp:  [97.5 °F (36.4 °C)-98 °F (36.7 °C)] 97.5 °F (36.4 °C)  Pulse:  [60-71] 67  Resp:  [16-18] 18  SpO2:  [93 %-100 %] 93 %  BP: (130-141)/(59-73) 130/69     Weight: 60.2 kg (132 lb 11.5 oz)  Body mass index is 24.27 kg/m².    Intake/Output Summary (Last 24 hours) at 7/2/2025 1413  Last data filed at 7/2/2025 0900  Gross per 24 hour   Intake 640 ml   Output 500 ml   Net 140 ml         Physical Exam  Vitals reviewed.   Constitutional:       General: She is not in acute distress.     Appearance: She is not toxic-appearing.      Comments: thin   Abdominal:      Comments: TTP on LEFT middle aspect of Extremely large ventral hernia, soft, no rebound/guard, possible TTP anterior L thigh   Musculoskeletal:      Comments: LEFT foot - linear erythema of mid superior aspect of foot with small scab, L heel erythema and exquisite TTP with warmth, L great toe with 1cm blister on distal aspect and surrounding erythema with nomal tempurature   Neurological:      General: No focal  "deficit present.      Mental Status: She is oriented to person, place, and time.      Motor: Weakness present.   Psychiatric:         Mood and Affect: Mood normal.         Behavior: Behavior normal.               Significant Labs: All pertinent labs within the past 24 hours have been reviewed.    Significant Imaging: I have reviewed all pertinent imaging results/findings within the past 24 hours.      Assessment & Plan  Complete heart block  Complete heart block s/p temp TVP then PPM- TSH wnl. Doxy ppx x 5d, post-PPM restrictions  Left foot pain  PAD (peripheral artery disease)  Acute exquisite severe Left heel pain, with erythema and bogginess/fluctuance, denies h/o any gout/CPPD, recent broken toes (healed).  Great toe with erythema/blister.  XR negative.  CT unremarkable, no abscess/OM other.  Considered checking an MRI foot, in discussion with Podiatry, however the abnormal VAS MIKAEL and U/S returned in the meantime so consulting VSURG first  - f/u Vsurg consult, appreciate  - Already on atherosclerosis meds for new Dx CAD as below  - f/u podiatry consult, appreciate  - EMLA cream and capsaicin cream, some relief with combo  - off loading boots for both heels at all times while laying in bed   - WBAT  Left lower quadrant abdominal pain  And possible left thigh pain  All Sx on LEFT side, same as the LEFT foot and toe  Unclear etiology  F/u VSurg consult as above  May need repeat CT vs CTA ABd/Pelvis however if Vsurg is doing peripheral angiogram this would cover it    Ventral hernia  Started decades ago after an elective cholecystectomy, she was at home recovering and her "stomach burst," reportedly "wasn't wrapped" by surgeon after the operation, and pt was too afraid to go to any more surgery for a repair so she's been living with it.  It's been enlarging over time.      Bladder mass  - Bladder US with 3.0 cm mass like lesion in right dependent bladder.  - Consulted Urology for further evaluation.  - Urology " ordered CT Renal Stone Study - unable to visualize bladder mass.  - CT Urogram inpt vs outpt   - consider in new clinical context with new Abd pain, see above  - Needs close follow up with urology, referral placed.    Acute systolic heart failure  Acute HFrEF/NICMP, c/w Takotsubo? TTE showed HFrEF with low EF of 20-25% with evidence of WMAs.   - diuresed w Lasix IV, near euvolemic and switched to oral  - new Lasix 40 PO BID for maintenance, will need Rx for this + sliding scale eventually upon d/c  - Toprol 50   - Valsartan 40 BID  Coronary artery disease involving native coronary artery   CAD-  LCx 70% stenosis and non-obstructive diffuse disease  - ASA 81  - Atorva 20  - BB  Acute hypoxemic respiratory failure  Resolving  Fibromyalgia  - Lido patch   - Robaxin 500 BID  - Capsaicin BID    High cholesterol  Atorva 20    Type 2 diabetes with neuropathy  DM2 controlled (A1C 6.1 on 9/2024) with neuropathy  F/u new A1C  Insulin basal 5u QD with LDSSI qACHS  - Gabapentin 300 TID  E. coli UTI (urinary tract infection)  Pan-sensitive UTI, CTX IV x 3d    Hypertensive emergency  Hypertension  Resolved  ISAIAS (acute kidney injury)  Resolved. cardiorenal  Delirium  Resolved    Muscular deconditioning  PT/OT rec Mod  Likely needs SNF    Discharge planning issues  Dispo - independent/ambulatory prior to admit (albeit with large ventral hernia), now new deconditioning/debility requiring two person assist for standing, very motivated and engaged, PT/OT rec Mod Intensity. Family interested in [facility] facility in [place], prior very positive experience there and would be near daughter who lives in [place].     VTE Risk Mitigation (From admission, onward)      None            Discharge Planning   LOKESH: 7/3/2025     Code Status: Full Code   Medical Readiness for Discharge Date:   Discharge Plan A: Skilled Nursing Facility   Discharge Delays: None known at this time              Please place Justification for DME      Valery Gallegos,  MD  Department of Hospital Medicine   [facility] - Cardiology Stepdown

## 2025-07-02 NOTE — ASSESSMENT & PLAN NOTE
Dispo - independent/ambulatory prior to admit (albeit with large ventral hernia), now new deconditioning/debility requiring two person assist for standing, very motivated and engaged, PT/OT rec Mod Intensity. Family interested in [facility] facility in [place], prior very positive experience there and would be near daughter who lives in [place].

## 2025-07-02 NOTE — ASSESSMENT & PLAN NOTE
74 y.o. female presenting for heart block, with new PPM placed. Podiatry consulted for new onset left heel pain. VSS, Afebrile. WBC wnl. CRP 10.5. Radiographs of left foot demonstrate No acute fracture, dislocation, or bone destruction seen.  No foreign body seen.  Clinical exam: No open wounds or gross deformity seen on, though patient is very tender throughout. Discussed physical exam and imaging with patient in detail, possible neuropathy vs ischemic pain vs fibromyalgia. Highly suspect Neuropathy.     Plan:  - Imaging: CT scan reviewed   - Arterial US of left foot ordered, further intervention pending results  - No surgical intervention at this time  - Weight bearing status: WBAT  - Recommend off loading boots for both heels at all times while laying in bed   - Podiatry will continue to follow

## 2025-07-02 NOTE — ASSESSMENT & PLAN NOTE
And possible left thigh pain  All Sx on LEFT side, same as the LEFT foot and toe  Unclear etiology  F/u VSurg consult as above  May need repeat CT vs CTA ABd/Pelvis however if Vsurg is doing peripheral angiogram this would cover it

## 2025-07-02 NOTE — CARE UPDATE
Discussed with Vsurg consult Dr Angel.  LLE pain 2/2 PAD (peripheral artery disease), concern for embolic/atheroembolic phenomenon, cardiac in nature.   - continue ASA  - start hep gtt  - high intensity statin: increased to atorva 80 qam (from 20) with one time 60 now  - plan for LLE angiogram during this admission, timing tbd

## 2025-07-02 NOTE — PLAN OF CARE
Problem: Fall Injury Risk  Goal: Absence of Fall and Fall-Related Injury  Outcome: Progressing     Problem: Wound  Goal: Optimal Coping  Outcome: Progressing  Goal: Optimal Functional Ability  Outcome: Progressing  Goal: Absence of Infection Signs and Symptoms  Outcome: Progressing  Goal: Improved Oral Intake  Outcome: Progressing

## 2025-07-02 NOTE — PLAN OF CARE
Problem: Adult Inpatient Plan of Care  Goal: Plan of Care Review  7/2/2025 0445 by Toña Peña RN  Outcome: Progressing  7/2/2025 0202 by Toña Peña RN  Outcome: Progressing  Goal: Patient-Specific Goal (Individualized)  7/2/2025 0445 by Toña Peña RN  Outcome: Progressing  7/2/2025 0202 by Toña Peña RN  Outcome: Progressing  Goal: Absence of Hospital-Acquired Illness or Injury  7/2/2025 0445 by Toña Peña RN  Outcome: Progressing  7/2/2025 0202 by Toña Peña RN  Outcome: Progressing  Goal: Optimal Comfort and Wellbeing  7/2/2025 0445 by Toña Peña RN  Outcome: Progressing  7/2/2025 0202 by Toña Peña RN  Outcome: Progressing  Goal: Readiness for Transition of Care  7/2/2025 0445 by Toña Peña RN  Outcome: Progressing  7/2/2025 0202 by Toña Peña RN  Outcome: Progressing     Problem: Diabetes Comorbidity  Goal: Blood Glucose Level Within Targeted Range  7/2/2025 0445 by Toña Peña RN  Outcome: Progressing  7/2/2025 0202 by Toña Peña RN  Outcome: Progressing     Problem: Acute Kidney Injury/Impairment  Goal: Fluid and Electrolyte Balance  7/2/2025 0445 by Toña Peña RN  Outcome: Progressing  7/2/2025 0202 by Toña Peña RN  Outcome: Progressing  Goal: Improved Oral Intake  7/2/2025 0445 by Toña Peña RN  Outcome: Progressing  7/2/2025 0202 by Toña Peña RN  Outcome: Progressing  Goal: Effective Renal Function  7/2/2025 0445 by Toña Peña RN  Outcome: Progressing  7/2/2025 0202 by Toña Peña RN  Outcome: Progressing     Problem: Infection  Goal: Absence of Infection Signs and Symptoms  7/2/2025 0445 by Toña Peña RN  Outcome: Progressing  7/2/2025 0202 by Toña Peña RN  Outcome: Progressing     Problem: Skin Injury Risk Increased  Goal: Skin Health and Integrity  7/2/2025 0445 by Casey  SANDHYA Ding  Outcome: Progressing  7/2/2025 0202 by Toña Peña RN  Outcome: Progressing     Problem: Fall Injury Risk  Goal: Absence of Fall and Fall-Related Injury  7/2/2025 0445 by Toña Peña RN  Outcome: Progressing  7/2/2025 0202 by Toña Peña RN  Outcome: Progressing     Problem: Wound  Goal: Optimal Coping  7/2/2025 0445 by Toña Peña RN  Outcome: Progressing  7/2/2025 0202 by Toña Peña RN  Outcome: Progressing  Goal: Optimal Functional Ability  7/2/2025 0445 by Toña Peña RN  Outcome: Progressing  7/2/2025 0202 by Toña Peña RN  Outcome: Progressing  Goal: Absence of Infection Signs and Symptoms  7/2/2025 0445 by Toña Peña RN  Outcome: Progressing  7/2/2025 0202 by Toña Peña RN  Outcome: Progressing  Goal: Improved Oral Intake  7/2/2025 0445 by Toña Peña RN  Outcome: Progressing  7/2/2025 0202 by Toña Peña RN  Outcome: Progressing  Goal: Optimal Pain Control and Function  7/2/2025 0445 by Toña Peña RN  Outcome: Progressing  7/2/2025 0202 by Toña Peña RN  Outcome: Progressing  Goal: Skin Health and Integrity  7/2/2025 0445 by Toña Peña RN  Outcome: Progressing  7/2/2025 0202 by Toña Peña RN  Outcome: Progressing  Goal: Optimal Wound Healing  7/2/2025 0445 by Toña Peña RN  Outcome: Progressing  7/2/2025 0202 by Toña Peña RN  Outcome: Progressing

## 2025-07-02 NOTE — CONSULTS
"[facility] - [facility] Step[facility]  Vascular Surgery  Consult Note    Consults  Subjective:     Reason for consult: "Painful Left heel and 1st toe, abnormal VAS U/S (scanning prelim reports into Media tab in Epic); initially admitted to CCU with [facility]B s/p PPM"    History of Present Illness: This patient is a 74F with PMHx significant for HTN, HLD, NIDDM, fibromyalgia, chronic pain on opioids, presenting as transfer from OSH with symptomatic bradycardia. In ED, found to have complete heart block, new onset cardiomyopathy ischemic vs Takotsubo, Hypertensive emergency (SBP 230s), and ISAIAS.  Started on nitro gtt, had an emergent TVP placed and then transferred to our institution. LHC revealed "70% left circumflex stenosis, otherwise diffuse non-obstructive disease is present" no intervention needed.  Pacemaker placed. Vascular surgery consulted after new c/o of acute exquisite left heel pain with concomitant small areas of erythema on top of foot L great toe developed erythema and blister.  Non smoker, no hx of pedal ulceration, rest pain or previous claudication.    Review of Systems   Constitutional:  Negative for chills and fever.   Respiratory:  Negative for chest tightness.    Cardiovascular:  Negative for chest pain.   Gastrointestinal:  Negative for abdominal pain.   Musculoskeletal:  Negative for back pain and myalgias.   Neurological:  Negative for weakness and numbness.     Objective:     Vital Signs (Most Recent):  Temp: 97.8 °F (36.6 °C) (07/02/25 1541)  Pulse: 67 (07/02/25 1541)  Resp: 18 (07/02/25 1229)  BP: 138/65 (07/02/25 1541)  SpO2: 96 % (07/02/25 1541) Vital Signs (24h Range):  Temp:  [97.5 °F (36.4 °C)-98 °F (36.7 °C)] 97.8 °F (36.6 °C)  Pulse:  [60-74] 67  Resp:  [16-18] 18  SpO2:  [93 %-100 %] 96 %  BP: (130-141)/(59-73) 138/65     Weight: 60.2 kg (132 lb 11.5 oz)  Body mass index is 24.27 kg/m².      Physical Exam  Constitutional:       General: She is not in acute distress.  HENT:      Head: " Normocephalic.      Mouth/Throat:      Mouth: Mucous membranes are moist.   Cardiovascular:      Rate and Rhythm: Normal rate.      Comments:   Palpable femoral pulses bilaterally 2+   R biphasic DP  L monophasic DP, 1st toe ulcer  Warm  Pulmonary:      Effort: Pulmonary effort is normal.   Abdominal:      General: Abdomen is flat.   Musculoskeletal:         General: Tenderness present.      Cervical back: Neck supple.      Right lower leg: No edema.      Left lower leg: No edema.   Neurological:      Mental Status: She is alert and oriented to person, place, and time.      Sensory: No sensory deficit.      Motor: No weakness.     Significant Labs:  All pertinent labs from the last 24 hours have been reviewed.    Significant Diagnostics:  I have reviewed all pertinent imaging results/findings within the past 24 hours.    Assessment/Plan:     PAD (peripheral artery disease)  Concern for embolic/atheroembolic phenomenon, cardiac in nature.     Recommend ASA, systemic heparin, high intensity statin  Will plan for LLE angiogram during this admission, timing tbd        Thank you for your consult.     Hector Angel MD  Vascular Surgery Fellow  [facility] - [facility]iology Stepdown

## 2025-07-02 NOTE — ASSESSMENT & PLAN NOTE
Acute exquisite severe Left heel pain, with erythema and bogginess/fluctuance, denies h/o any gout/CPPD, recent broken toes (healed).  Great toe with erythema/blister.  XR negative.  CT unremarkable, no abscess/OM other.  Considered checking an MRI foot, in discussion with Podiatry, however the abnormal VAS MIKAEL and U/S returned in the meantime so consulting VSURG first  - f/u Vsurg consult, appreciate  - Already on atherosclerosis meds for new Dx CAD as below  - f/u podiatry consult, appreciate  - EMLA cream and capsaicin cream, some relief with combo  - off loading boots for both heels at all times while laying in bed   - WBAT

## 2025-07-03 ENCOUNTER — ANESTHESIA EVENT (OUTPATIENT)
Dept: SURGERY | Facility: HOSPITAL | Age: 75
DRG: 242 | End: 2025-07-03
Payer: MEDICARE

## 2025-07-03 LAB
ABSOLUTE EOSINOPHIL (OHS): 0.46 K/UL
ABSOLUTE MONOCYTE (OHS): 0.89 K/UL (ref 0.3–1)
ABSOLUTE NEUTROPHIL COUNT (OHS): 3.55 K/UL (ref 1.8–7.7)
ALBUMIN SERPL BCP-MCNC: 2.4 G/DL (ref 3.5–5.2)
ALP SERPL-CCNC: 59 UNIT/L (ref 40–150)
ALT SERPL W/O P-5'-P-CCNC: 16 UNIT/L (ref 10–44)
ANION GAP (OHS): 9 MMOL/L (ref 8–16)
APTT PPP: 53.5 SECONDS (ref 21–32)
APTT PPP: 57.2 SECONDS (ref 21–32)
AST SERPL-CCNC: 23 UNIT/L (ref 11–45)
BASOPHILS # BLD AUTO: 0.1 K/UL
BASOPHILS NFR BLD AUTO: 1.4 %
BILIRUB SERPL-MCNC: 0.2 MG/DL (ref 0.1–1)
BUN SERPL-MCNC: 32 MG/DL (ref 8–23)
CALCIUM SERPL-MCNC: 8.3 MG/DL (ref 8.7–10.5)
CHLORIDE SERPL-SCNC: 100 MMOL/L (ref 95–110)
CO2 SERPL-SCNC: 27 MMOL/L (ref 23–29)
CREAT SERPL-MCNC: 1.1 MG/DL (ref 0.5–1.4)
ERYTHROCYTE [DISTWIDTH] IN BLOOD BY AUTOMATED COUNT: 14.7 % (ref 11.5–14.5)
GFR SERPLBLD CREATININE-BSD FMLA CKD-EPI: 53 ML/MIN/1.73/M2
GLUCOSE SERPL-MCNC: 251 MG/DL (ref 70–110)
HCT VFR BLD AUTO: 29.4 % (ref 37–48.5)
HGB BLD-MCNC: 11 GM/DL (ref 12–16)
IMM GRANULOCYTES # BLD AUTO: 0.05 K/UL (ref 0–0.04)
IMM GRANULOCYTES NFR BLD AUTO: 0.7 % (ref 0–0.5)
LYMPHOCYTES # BLD AUTO: 1.9 K/UL (ref 1–4.8)
MAGNESIUM SERPL-MCNC: 2 MG/DL (ref 1.6–2.6)
MCH RBC QN AUTO: 37.5 PG (ref 27–31)
MCHC RBC AUTO-ENTMCNC: 37.4 G/DL (ref 32–36)
MCV RBC AUTO: 100 FL (ref 82–98)
NUCLEATED RBC (/100WBC) (OHS): 0 /100 WBC
PLATELET # BLD AUTO: 208 K/UL (ref 150–450)
PMV BLD AUTO: 11.7 FL (ref 9.2–12.9)
POCT GLUCOSE: 208 MG/DL (ref 70–110)
POCT GLUCOSE: 213 MG/DL (ref 70–110)
POCT GLUCOSE: 218 MG/DL (ref 70–110)
POCT GLUCOSE: 282 MG/DL (ref 70–110)
POCT GLUCOSE: 348 MG/DL (ref 70–110)
POTASSIUM SERPL-SCNC: 3.9 MMOL/L (ref 3.5–5.1)
PROT SERPL-MCNC: 5.4 GM/DL (ref 6–8.4)
RBC # BLD AUTO: 2.93 M/UL (ref 4–5.4)
RELATIVE EOSINOPHIL (OHS): 6.6 %
RELATIVE LYMPHOCYTE (OHS): 27.3 % (ref 18–48)
RELATIVE MONOCYTE (OHS): 12.8 % (ref 4–15)
RELATIVE NEUTROPHIL (OHS): 51.2 % (ref 38–73)
SODIUM SERPL-SCNC: 136 MMOL/L (ref 136–145)
WBC # BLD AUTO: 6.95 K/UL (ref 3.9–12.7)

## 2025-07-03 PROCEDURE — 63600175 PHARM REV CODE 636 W HCPCS: Performed by: PHYSICIAN ASSISTANT

## 2025-07-03 PROCEDURE — 85025 COMPLETE CBC W/AUTO DIFF WBC: CPT

## 2025-07-03 PROCEDURE — 25000003 PHARM REV CODE 250: Performed by: HOSPITALIST

## 2025-07-03 PROCEDURE — 36415 COLL VENOUS BLD VENIPUNCTURE: CPT | Performed by: HOSPITALIST

## 2025-07-03 PROCEDURE — 85730 THROMBOPLASTIN TIME PARTIAL: CPT | Performed by: HOSPITALIST

## 2025-07-03 PROCEDURE — 84460 ALANINE AMINO (ALT) (SGPT): CPT | Performed by: HOSPITALIST

## 2025-07-03 PROCEDURE — 97116 GAIT TRAINING THERAPY: CPT | Mod: CQ

## 2025-07-03 PROCEDURE — 25000003 PHARM REV CODE 250: Performed by: STUDENT IN AN ORGANIZED HEALTH CARE EDUCATION/TRAINING PROGRAM

## 2025-07-03 PROCEDURE — 25000003 PHARM REV CODE 250

## 2025-07-03 PROCEDURE — 97110 THERAPEUTIC EXERCISES: CPT | Mod: CQ

## 2025-07-03 PROCEDURE — 20600001 HC STEP DOWN PRIVATE ROOM

## 2025-07-03 PROCEDURE — 94761 N-INVAS EAR/PLS OXIMETRY MLT: CPT

## 2025-07-03 PROCEDURE — 83735 ASSAY OF MAGNESIUM: CPT | Performed by: HOSPITALIST

## 2025-07-03 RX ORDER — ONDANSETRON 4 MG/1
4 TABLET, ORALLY DISINTEGRATING ORAL EVERY 8 HOURS PRN
Status: DISCONTINUED | OUTPATIENT
Start: 2025-07-03 | End: 2025-07-08 | Stop reason: HOSPADM

## 2025-07-03 RX ORDER — ONDANSETRON HYDROCHLORIDE 2 MG/ML
4 INJECTION, SOLUTION INTRAVENOUS ONCE
Status: COMPLETED | OUTPATIENT
Start: 2025-07-03 | End: 2025-07-03

## 2025-07-03 RX ADMIN — ATORVASTATIN CALCIUM 80 MG: 40 TABLET, FILM COATED ORAL at 08:07

## 2025-07-03 RX ADMIN — INSULIN ASPART 2 UNITS: 100 INJECTION, SOLUTION INTRAVENOUS; SUBCUTANEOUS at 10:07

## 2025-07-03 RX ADMIN — ACETAMINOPHEN 1000 MG: 500 TABLET ORAL at 05:07

## 2025-07-03 RX ADMIN — ONDANSETRON 4 MG: 4 TABLET, ORALLY DISINTEGRATING ORAL at 05:07

## 2025-07-03 RX ADMIN — FUROSEMIDE 40 MG: 40 TABLET ORAL at 06:07

## 2025-07-03 RX ADMIN — GABAPENTIN 300 MG: 300 CAPSULE ORAL at 08:07

## 2025-07-03 RX ADMIN — PANTOPRAZOLE SODIUM 40 MG: 40 TABLET, DELAYED RELEASE ORAL at 08:07

## 2025-07-03 RX ADMIN — CAPSAICIN: 0.25 CREAM TOPICAL at 09:07

## 2025-07-03 RX ADMIN — INSULIN ASPART 3 UNITS: 100 INJECTION, SOLUTION INTRAVENOUS; SUBCUTANEOUS at 03:07

## 2025-07-03 RX ADMIN — METHOCARBAMOL 500 MG: 500 TABLET ORAL at 08:07

## 2025-07-03 RX ADMIN — INSULIN ASPART 2 UNITS: 100 INJECTION, SOLUTION INTRAVENOUS; SUBCUTANEOUS at 11:07

## 2025-07-03 RX ADMIN — FUROSEMIDE 40 MG: 40 TABLET ORAL at 08:07

## 2025-07-03 RX ADMIN — ACETAMINOPHEN 1000 MG: 500 TABLET ORAL at 09:07

## 2025-07-03 RX ADMIN — INSULIN GLARGINE 5 UNITS: 100 INJECTION, SOLUTION SUBCUTANEOUS at 08:07

## 2025-07-03 RX ADMIN — Medication 800 MG: at 08:07

## 2025-07-03 RX ADMIN — GABAPENTIN 300 MG: 300 CAPSULE ORAL at 02:07

## 2025-07-03 RX ADMIN — METHOCARBAMOL 500 MG: 500 TABLET ORAL at 09:07

## 2025-07-03 RX ADMIN — GABAPENTIN 300 MG: 300 CAPSULE ORAL at 09:07

## 2025-07-03 RX ADMIN — LIDOCAINE 1 PATCH: 50 PATCH CUTANEOUS at 02:07

## 2025-07-03 RX ADMIN — ACETAMINOPHEN 1000 MG: 500 TABLET ORAL at 02:07

## 2025-07-03 RX ADMIN — VALSARTAN 40 MG: 40 TABLET, FILM COATED ORAL at 08:07

## 2025-07-03 RX ADMIN — INSULIN ASPART 2 UNITS: 100 INJECTION, SOLUTION INTRAVENOUS; SUBCUTANEOUS at 08:07

## 2025-07-03 RX ADMIN — VALSARTAN 40 MG: 40 TABLET, FILM COATED ORAL at 09:07

## 2025-07-03 RX ADMIN — ONDANSETRON 4 MG: 2 INJECTION INTRAMUSCULAR; INTRAVENOUS at 12:07

## 2025-07-03 RX ADMIN — METOPROLOL SUCCINATE 50 MG: 50 TABLET, EXTENDED RELEASE ORAL at 08:07

## 2025-07-03 RX ADMIN — ASPIRIN 81 MG CHEWABLE TABLET 81 MG: 81 TABLET CHEWABLE at 08:07

## 2025-07-03 RX ADMIN — CAPSAICIN: 0.25 CREAM TOPICAL at 08:07

## 2025-07-03 NOTE — SUBJECTIVE & OBJECTIVE
Interval History: see updated S/O above    Review of Systems   Constitutional:  Positive for activity change. Negative for fever.   Gastrointestinal:  Negative for abdominal distention, diarrhea and nausea.        Large ventral hernia, chronic   Musculoskeletal:  Positive for gait problem.   All other systems reviewed and are negative.    Objective:     Vital Signs (Most Recent):  Temp: 98 °F (36.7 °C) (07/03/25 0730)  Pulse: 60 (07/03/25 0730)  Resp: 16 (07/03/25 0730)  BP: 134/62 (07/03/25 0730)  SpO2: 97 % (07/03/25 0730) Vital Signs (24h Range):  Temp:  [97.1 °F (36.2 °C)-98 °F (36.7 °C)] 98 °F (36.7 °C)  Pulse:  [60-74] 60  Resp:  [16-18] 16  SpO2:  [93 %-97 %] 97 %  BP: (107-146)/(48-69) 134/62     Weight: 60.2 kg (132 lb 11.5 oz)  Body mass index is 24.27 kg/m².    Intake/Output Summary (Last 24 hours) at 7/3/2025 0910  Last data filed at 7/2/2025 2207  Gross per 24 hour   Intake --   Output 601 ml   Net -601 ml         Physical Exam  Vitals reviewed.   Constitutional:       General: She is not in acute distress.     Appearance: She is not toxic-appearing.      Comments: thin   Cardiovascular:      Rate and Rhythm: Normal rate.   Abdominal:      General: There is no distension.      Tenderness: There is abdominal tenderness (related to hernia, mild).      Comments: TTP on LEFT middle aspect of Extremely large ventral hernia, soft, no rebound/guard, possible TTP anterior L thigh   Musculoskeletal:         General: Swelling (feet, right heel tendersess with mild erythema. left large toe, has smal peripheral ischemic lesion below at tip.) present.      Comments: LEFT foot - linear erythema of mid superior aspect of foot with small scab, L heel erythema and exquisite TTP with warmth, L great toe with 1cm blister on distal aspect and surrounding erythema with nomal tempurature   Neurological:      General: No focal deficit present.      Mental Status: She is oriented to person, place, and time.      Motor:  Weakness present.   Psychiatric:         Mood and Affect: Mood normal.         Behavior: Behavior normal.             Significant Labs: All pertinent labs within the past 24 hours have been reviewed.    Significant Imaging: I have reviewed all pertinent imaging results/findings within the past 24 hours.

## 2025-07-03 NOTE — PROGRESS NOTES
"[facility] Cardiology [facility] Medicine  Progress Note    Patient Name: Cady Watkins  MRN: [id number]  Patient Class: IP- Inpatient   Admission Date: 6/24/2025  Length of Stay: 9 days  Attending Physician: Mandy Pérez MD  Primary Care Provider: Blaine Benítez MD        Subjective     Principal Problem:Complete heart block  Acute Condition:  PVD    HPI:  74F with HTN, HLD, T2DM w neuropathy, chronic pain on opioids, and fibromyalgia who initially presented from PCP office to [facility] ED for reported symptomatic bradycardia w rate 30s. She was in her normal state of health until 3 weeks prior when she had an episode of syncope, while in her kitchen, she felt very off, walked to sofa and blacked out reportedly for 3 mins. Thereafter, she's had fatigue, decreased appetite, and increasing SOB.  In ED, found to have complete heart block, new onset cardiomyopathy ischemic vs Takotsubo, Hypertensive emergency (SBP 230s), and ISAIAS.  Started on nitro gtt, had an emergent TVP placed and then transferred to [facility] CCU.              Overview/Hospital Course:  CCU course:   Transferred in from [facility] to CCU.   IC and EP consulted, and decision made for C to eval ischemia, which was done after ISAIAS improved and pt diuresed, and LHC revealed "70% left circumflex stenosis, otherwise diffuse non-obstructive disease is present" no intervention needed.   Diuresed on Lasix IVP and Lasix IV gtt @10cc/hr 6/26-6/27.  Intermittent delirium which starts at night; given haldol 5mg IV on 6/24 then olanzapine 5mg on 6/26 and 6/27 am then d/c-ed; on delirium precautions.  Patient had PPM placed successfully with no complications. UA evident of potential UTI will plan to treat with CTX for 3 days. Will continue to work on patient functional status with PT/OT.      After stepdown to [facility] on 6/28.  Acute exquisite left heel pain, small areas of erythema on top of foot, consulted podiatry 6/30.  XR and CT negative. L " great toe developed erythema and blister.  VAS Duplex and ABIs abnormal with diffuse disease and several significant stenoses (prelim report in Media tab in Epic), consulted VSURG 7/2.  Also c/o new intermittent LLQ Abdominal and L upper thigh pain, 10/10, no identifiable triggers, present for 3 days but voiced to MD on 7/2, same day as worsening of her L heel and 1st toe pain.      7/3-  PVD. On ASA, systemic heparin, high intensity statin. Heparin gtt hold on call to OR. LLE angiogram TODAY. /62  Pulse 60  . .            Interval History: see updated S/O above    Review of Systems   All other systems reviewed and are negative.    Objective:     Vital Signs (Most Recent):  Temp: 98 °F (36.7 °C) (07/03/25 0730)  Pulse: 60 (07/03/25 0730)  Resp: 16 (07/03/25 0730)  BP: 134/62 (07/03/25 0730)  SpO2: 97 % (07/03/25 0730) Vital Signs (24h Range):  Temp:  [97.1 °F (36.2 °C)-98 °F (36.7 °C)] 98 °F (36.7 °C)  Pulse:  [60-74] 60  Resp:  [16-18] 16  SpO2:  [93 %-97 %] 97 %  BP: (107-146)/(48-69) 134/62     Weight: 60.2 kg (132 lb 11.5 oz)  Body mass index is 24.27 kg/m².    Intake/Output Summary (Last 24 hours) at 7/3/2025 0910  Last data filed at 7/2/2025 2207  Gross per 24 hour   Intake --   Output 601 ml   Net -601 ml         Physical Exam  Vitals reviewed.   Constitutional:       General: She is not in acute distress.     Appearance: She is not toxic-appearing.      Comments: thin   Abdominal:      Comments: TTP on LEFT middle aspect of Extremely large ventral hernia, soft, no rebound/guard, possible TTP anterior L thigh   Musculoskeletal:      Comments: LEFT foot - linear erythema of mid superior aspect of foot with small scab, L heel erythema and exquisite TTP with warmth, L great toe with 1cm blister on distal aspect and surrounding erythema with nomal tempurature   Neurological:      General: No focal deficit present.      Mental Status: She is oriented to person, place, and time.      Motor: Weakness  "present.   Psychiatric:         Mood and Affect: Mood normal.         Behavior: Behavior normal.               Significant Labs: All pertinent labs within the past 24 hours have been reviewed.    Significant Imaging: I have reviewed all pertinent imaging results/findings within the past 24 hours.      Assessment & Plan  Complete heart block  Complete heart block s/p temp TVP then PPM- TSH wnl. Doxy ppx x 5d, post-PPM restrictions.     RESOLVED  Left foot pain  PAD (peripheral artery disease)    Acute exquisite severe Left heel pain, with erythema and bogginess/fluctuance, denies h/o any gout/CPPD, recent broken toes (healed).  Great toe with erythema/blister.  XR negative.  CT unremarkable, no abscess/OM other.  Considered checking an MRI foot, in discussion with Podiatry, however the abnormal VAS MIKAEL and U/S returned in the meantime so consulting VSURG first  - f/u Vsurg consult, appreciate  - Already on atherosclerosis meds for new Dx CAD as below  - f/u podiatry consult, appreciate  - EMLA cream and capsaicin cream, some relief with combo  - off loading boots for both heels at all times while laying in bed   - WBAT  Left lower quadrant abdominal pain  Due to large ventral hernia. Pain was positional and I was able to relieve it by re-positioning the hernia on a pillow on 7/3.     And possible left thigh pain  All Sx on LEFT side, same as the LEFT foot and toe  Unclear etiology  F/u VSurg consult as above  May need repeat CT vs CTA ABd/Pelvis however if Vsurg is doing peripheral angiogram this would cover it    Ventral hernia  Started decades ago after an elective cholecystectomy, she was at home recovering and her "stomach burst," reportedly "wasn't wrapped" by surgeon after the operation, and pt was too afraid to go to any more surgery for a repair so she's been living with it.  It's been enlarging over time.    Pain was positional and I was able to relieve it by re-positioning the hernia on a pillow on 7/3. "     Bladder mass  - Bladder US with 3.0 cm mass like lesion in right dependent bladder.  - Consulted Urology for further evaluation.  - Urology ordered CT Renal Stone Study - unable to visualize bladder mass.  - CT Urogram inpt vs outpt   - consider in new clinical context with new Abd pain, see above  - Needs close follow up with urology, referral placed.    Acute systolic heart failure  Acute HFrEF/NICMP, c/w Takotsubo? TTE showed HFrEF with low EF of 20-25% with evidence of WMAs.   - diuresed w Lasix IV, near euvolemic and switched to oral  - new Lasix 40 PO BID for maintenance, will need Rx for this + sliding scale eventually upon d/c  - Toprol 50   - Valsartan 40 BID    7/3- stable  Coronary artery disease involving native coronary artery   CAD-  LCx 70% stenosis and non-obstructive diffuse disease  - ASA 81  - Atorva 20  - BB  Acute hypoxemic respiratory failure  Resolving  Fibromyalgia  - Lido patch   - Robaxin 500 BID  - Capsaicin BID    High cholesterol  Atorva 20    Type 2 diabetes with neuropathy  DM2 controlled (A1C 6.1 on 9/2024) with neuropathy  F/u new A1C  Insulin basal 5u QD with LDSSI qACHS  - Gabapentin 300 TID    Recent Labs     07/01/25  2110 07/02/25  0217 07/02/25  0657 07/02/25  1609 07/02/25  2024 07/03/25  0626   POCTGLUCOSE 231* 196* 182* 273* 186* 218*       E. coli UTI (urinary tract infection)  Pan-sensitive UTI, CTX IV x 3d    Hypertensive emergency  Hypertension  Resolved    ISAIAS (acute kidney injury)  Resolved. cardiorenal  Delirium  Resolved    Muscular deconditioning  PT/OT rec Mod  Likely needs SNF    Discharge planning issues  Dispo - independent/ambulatory prior to admit (albeit with large ventral hernia), now new deconditioning/debility requiring two person assist for standing, very motivated and engaged, PT/OT rec Mod Intensity. Family interested in [facility] facility in [place], prior very positive experience there and would be near daughter who lives in [place].      Atheroembolism of lower extremity, left  Angiogram per Vascular Surgery planned 7/3.     VTE Risk Mitigation (From admission, onward)           Ordered     heparin 25,000 units in dextrose 5% (100 units/ml) IV bolus from bag LOW INTENSITY nomogram - [facility]  As needed (PRN)        Question:  Heparin Infusion Adjustment (DO NOT MODIFY ANSWER)  Answer:  [email or web address]\[email or web address]\Images\Pharmacy\HeparinInfusions\heparin LOW INTENSITY nomogram for [facility] [id number]    07/02/25 1720     heparin 25,000 units in dextrose 5% (100 units/ml) IV bolus from bag LOW INTENSITY nomogram - [facility]  As needed (PRN)        Question:  Heparin Infusion Adjustment (DO NOT MODIFY ANSWER)  Answer:  [email or web address]\epic\Images\Pharmacy\HeparinInfusions\heparin LOW INTENSITY nomogram for [facility] [id number].pdf    07/02/25 1720     heparin 25,000 units in dextrose 5% 250 mL (100 units/mL) infusion LOW INTENSITY nomogram - [facility]  Continuous        Question:  Begin at (units/kg/hr)  Answer:  12 07/02/25 1720                    Discharge Planning   LOKESH: 7/3/2025     Code Status: Full Code   Medical Readiness for Discharge Date:   Discharge Plan A: Skilled Nursing Facility   Discharge Delays: None known at this time        Mandy Pérez MD  Department of Hospital Medicine   [facility] - Cardiology Stepdown

## 2025-07-03 NOTE — ASSESSMENT & PLAN NOTE
Due to large ventral hernia. Pain was positional and I was able to relieve it by re-positioning the hernia on a pillow on 7/3.     And possible left thigh pain  All Sx on LEFT side, same as the LEFT foot and toe  Unclear etiology  F/u VSurg consult as above  May need repeat CT vs CTA ABd/Pelvis however if Vsurg is doing peripheral angiogram this would cover it

## 2025-07-03 NOTE — ASSESSMENT & PLAN NOTE
"Started decades ago after an elective cholecystectomy, she was at home recovering and her "stomach burst," reportedly "wasn't wrapped" by surgeon after the operation, and pt was too afraid to go to any more surgery for a repair so she's been living with it.  It's been enlarging over time.    Pain was positional and I was able to relieve it by re-positioning the hernia on a pillow on 7/3.     "

## 2025-07-03 NOTE — ASSESSMENT & PLAN NOTE
Dispo - independent/ambulatory prior to admit (albeit with large ventral hernia), now new deconditioning/debility requiring two person assist for standing, very motivated and engaged, PT/OT rec Mod Intensity. Family interested in [facility] facility in [place], prior very positive experience there and would be near daughter who lives in [place].

## 2025-07-03 NOTE — PROGRESS NOTES
[facility] - [facility] Stepdown  Vascular Surgery  Progress Note    Patient Name: Cady Watkins  MRN: [id number]  Admission Date: 2025  Primary Care Provider: Blaine Benítez MD    Subjective:     Interval History: No acute events overnight - plan go angiogram TODAY.     Post-Op Info:  Procedure(s) (LRB):  INSERTION, PACEMAKER, BIVENTRICULAR (N/A)   7 Days Post-Op   [Prescriptions Prior to Admission]    [Prescriptions Prior to Admission]  Medications Prior to Admission   Medication Sig Dispense Refill Last Dose/Taking    cyclobenzaprine (FLEXERIL) 10 MG tablet TAKE 1 TABLET BY MOUTH EVERY DAY NIGHTLY AS NEEDED FOR MUSCLE SPASMS 30 tablet 0 Taking    gabapentin (NEURONTIN) 300 MG capsule TAKE 1 CAPSULE BY MOUTH EVERYDAY AT BEDTIME 90 capsule 3 Taking    lisinopriL (PRINIVIL,ZESTRIL) 20 MG tablet TAKE 1 TABLET BY MOUTH EVERY DAY 90 tablet 3 Taking    metFORMIN (GLUCOPHAGE) 500 MG tablet TAKE 1 TABLET BY MOUTH TWICE A DAY WITH MEALS 180 tablet 3 Taking    metoprolol tartrate (LOPRESSOR) 50 MG tablet TAKE 1 TABLET BY MOUTH TWICE A  tablet 3 Taking    oxyCODONE-acetaminophen (PERCOCET)  mg per tablet Take 1 tablet by mouth nightly as needed for Pain. 30 tablet 0 Taking As Needed    rosuvastatin (CRESTOR) 5 MG tablet Take 1 tablet (5 mg total) by mouth once daily. 90 tablet 3 Taking       Review of patient's allergies indicates:   Allergen Reactions    Advil [ibuprofen] Hives    Penicillins     Codeine     Excedrin aspirin free [acetaminophen-caffeine]        Past Medical History:   Diagnosis Date    Diabetes mellitus, type 2     Fibromyalgia 2016    Hypertension 2016     Past Surgical History:   Procedure Laterality Date     SECTION      CHOLECYSTECTOMY      CORONARY ANGIOGRAPHY N/A 2025    Procedure: ANGIOGRAM, CORONARY ARTERY;  Surgeon: Perfecto Coburn MD;  Location: [facility] CATH LAB;  Service: Cardiology;  Laterality: N/A;    FOOT SURGERY Bilateral     plantar  fasciotomy bilateral, arthroplasty fifth toe bilateral    HERNIA REPAIR      IMPLANTATION OF BIVENTRICULAR HEART PACEMAKER N/A 6/26/2025    Procedure: INSERTION, PACEMAKER, BIVENTRICULAR;  Surgeon: Jason Lundberg MD;  Location: [facility] EP LAB;  Service: Cardiology;  Laterality: N/A;  CHB, CRT-P (LBAP vs CS), Biotronik, Anes, SK, CICU 3084    INSERTION, PACEMAKER, TEMPORARY TRANSVENOUS N/A 6/18/2025    Procedure: Insertion, Pacemaker, Temporary Transvenous;  Surgeon: Sean Ureña MD;  Location: [facility] CATH LAB/EP;  Service: Cardiology;  Laterality: N/A;     Family History       Problem Relation (Age of Onset)    Diabetes Father    Heart disease Sister, Brother          Tobacco Use    Smoking status: Never    Smokeless tobacco: Never   Substance and Sexual Activity    Alcohol use: Yes     Comment: Occasionally    Drug use: Not on file    Sexual activity: Yes     Review of Systems   Constitutional:  Negative for chills and fever.   Respiratory:  Negative for chest tightness.    Cardiovascular:  Negative for chest pain.   Gastrointestinal:  Negative for abdominal pain.   Musculoskeletal:  Negative for back pain and myalgias.   Neurological:  Negative for weakness and numbness.     Objective:     Vital Signs (Most Recent):  Temp: 97.2 °F (36.2 °C) (07/03/25 0430)  Pulse: 60 (07/03/25 0600)  Resp: 17 (07/03/25 0430)  BP: (!) 107/58 (07/03/25 0430)  SpO2: (!) 94 % (07/03/25 0430) Vital Signs (24h Range):  Temp:  [97.1 °F (36.2 °C)-97.8 °F (36.6 °C)] 97.2 °F (36.2 °C)  Pulse:  [60-74] 60  Resp:  [16-18] 17  SpO2:  [93 %-96 %] 94 %  BP: (107-146)/(48-69) 107/58     Weight: 60.2 kg (132 lb 11.5 oz)  Body mass index is 24.27 kg/m².      Physical Exam  Constitutional:       General: She is not in acute distress.  HENT:      Head: Normocephalic.      Mouth/Throat:      Mouth: Mucous membranes are moist.   Cardiovascular:      Rate and Rhythm: Normal rate.      Comments:   Palpable femoral pulses bilaterally 2+   R biphasic  DP  L monophasic DP, 1st toe ulcer  Warm    Pulmonary:      Effort: Pulmonary effort is normal.   Abdominal:      General: Abdomen is flat.   Musculoskeletal:         General: Tenderness present.      Cervical back: Neck supple.      Right lower leg: No edema.      Left lower leg: No edema.   Neurological:      Mental Status: She is alert and oriented to person, place, and time.      Sensory: No sensory deficit.      Motor: No weakness.          Significant Labs:  All pertinent labs from the last 24 hours have been reviewed.    Significant Diagnostics:  I have reviewed all pertinent imaging results/findings within the past 24 hours.    Assessment/Plan:     PAD (peripheral artery disease)  Concern for embolic/atheroembolic phenomenon, cardiac in nature.     Recommend ASA, systemic heparin, high intensity statin  Heparin gtt hold on call to OR  LLE angiogram TODAY            Hector Angel MD  Vascular Surgery Fellow  [facility] - Cardiology Stepdown

## 2025-07-03 NOTE — PT/OT/SLP PROGRESS
Physical Therapy Treatment    Patient Name:  Cady Watkins   MRN:  [id number]    Recommendations:     Discharge Recommendations: Moderate Intensity Therapy  Discharge Equipment Recommendations: wheelchair, bedside commode  Barriers to discharge: impaired functional mobility     Assessment:     Cady Watkins is a 74 y.o. female admitted with a medical diagnosis of Complete heart block.  She presents with the following impairments/functional limitations: weakness, impaired endurance, impaired self care skills, impaired functional mobility, decreased upper extremity function, decreased lower extremity function, decreased safety awareness, pain, decreased ROM, impaired skin, impaired cardiopulmonary response to activity, orthopedic precautions Pt tolerated treatment session well today. Pt requiring minimal assistance for bed mobility, transfers and gait training. Pt was able to increase distance ambulated during today's session. Pt reporting L plantar foot pain yet was able to participate with therapy. Patient remains appropriate for continued skilled services within the acute environment and goals remain appropriate.   .    Rehab Prognosis: Good; patient would benefit from acute skilled PT services to address these deficits and reach maximum level of function.    Recent Surgery: Procedure(s) (LRB):  INSERTION, PACEMAKER, BIVENTRICULAR (N/A) 7 Days Post-Op    Plan:     During this hospitalization, patient to be seen 4 x/week to address the identified rehab impairments via gait training, therapeutic activities, therapeutic exercises, neuromuscular re-education and progress toward the following goals:    Plan of Care Expires:  08/28/25    Subjective     Chief Complaint: L plantar foot pain  Patient/Family Comments/goals: Pt agreeable to PT   Pain/Comfort:  Pain Rating 1:  (not rated)  Location - Side 1: Left  Location - Orientation 1:  (plantar)  Location 1: foot  Pain Addressed 1: Reposition, Distraction  Pain  Rating Post-Intervention 1:  (not rated)      Objective:     Communicated with RN prior to session.  Patient found supine with telemetry, PureWick upon PT entry to room.     General Precautions: Standard, fall, pacemaker  Orthopedic Precautions: N/A  Braces: N/A  Respiratory Status: Room air     Functional Mobility:  Bed Mobility:     Scooting: minimum assistance  Supine to Sit: minimum assistance  EOB sitting: CGA/SBA    Transfers:     Sit to Stand x 3:  minimum assistance with hand-held assist  Gait: 3 steps frwd/bkwds + 6 ft frdw/bkwd Sophia with HHA   Pt ambulated with decreased gus, step length, WS and mild instability, no LOB noted    Pt performed 5 repetitions of seated B LE exercises consisting of: Marching, LAQ, ABD/ADD, heel raises, and toe raises.          AM-PAC 6 CLICK MOBILITY  Turning over in bed (including adjusting bedclothes, sheets and blankets)?: 3  Sitting down on and standing up from a chair with arms (e.g., wheelchair, bedside commode, etc.): 3  Moving from lying on back to sitting on the side of the bed?: 3  Moving to and from a bed to a chair (including a wheelchair)?: 3  Need to walk in hospital room?: 3  Climbing 3-5 steps with a railing?: 2  Basic Mobility Total Score: 17       Treatment & Education:  Therapist provided instruction and educated for safety during transfers and gait training. As well as proper body mechanics, energy conservation, and fall prevention strategies during tasks listed above, and the effects of prolonged immobility and the importance of performing EOB/OOB activity and exercises to promote healing and reduce recovery time.       Patient left up in chair with all lines intact, call button in reach, RN notified, and family present..    GOALS:   Multidisciplinary Problems       Physical Therapy Goals          Problem: Physical Therapy    Goal Priority Disciplines Outcome Interventions   Physical Therapy Goal     PT, PT/OT Progressing    Description: Goals to be met  by: 25     Patient will increase functional independence with mobility by performin. Supine to sit with MInimal Assistance  2. Sit to stand transfer with Minimal Assistance  3. Bed to chair transfer with Minimal Assistance using LRAD  4. Gait  x 50 feet with Minimal Assistance using LRAD.   5. Ascend/descend 2 stair with no Handrails Moderate Assistance using HHA.   6. Lower extremity exercise program x30 reps per handout, with supervision                         DME Justifications:   Cady requires a commode for home use because she is confined to a single room.  Cady Watkins has a mobility limitation that significantly impairs her ability to participate in one or more mobility related activities of daily living (MRADLs) such as toileting, feeding, dressing, grooming, and bathing in customary locations in the home.  The mobility limitation cannot be sufficiently resolved by the use of a cane or walker.   The use of a manual wheelchair will significantly improve the patients ability to participate in MRADLS and the patient will use it on regular basis in the home.  Cady Watkins has expressed her willingness to use a manual wheelchair in the home. Patients upper body strength is sufficient for propulsion.  She also has a caregiver who is available, willing, and able to provide assistance with the wheelchair when needed.      Time Tracking:     PT Received On: 25  PT Start Time: 1013     PT Stop Time: 1046  PT Total Time (min): 33 min     Billable Minutes: Gait Training 20 and Therapeutic Exercise 13    Treatment Type: Treatment  PT/PTA: PTA     Number of PTA visits since last PT visit: 2025

## 2025-07-03 NOTE — ASSESSMENT & PLAN NOTE
Complete heart block s/p temp TVP then PPM- TSH wnl. Doxy ppx x 5d, post-PPM restrictions.     RESOLVED

## 2025-07-03 NOTE — PLAN OF CARE
[facility] - [facility] Stepdown  Discharge Reassessment    Primary Care Provider: Blaine Benítez MD    Expected Discharge Date: 7/8/2025    Reassessment (most recent)       Discharge Reassessment - 07/03/25 1355          Discharge Reassessment    Assessment Type Discharge Planning Reassessment (P)      Did the patient's condition or plan change since previous assessment? Yes (P)      Discharge Plan discussed with: Patient (P)      Discharge Plan A Skilled Nursing Facility (P)      DME Needed Upon Discharge  none (P)      Transition of Care Barriers None (P)      Why the patient remains in the hospital Requires continued medical care (P)         Post-Acute Status    Post-Acute Authorization Placement (P)      Discharge Delays Change in Medical Condition (P)                    Pt not medically ready; pending angiogram.  Insurance auth received yesterday.  Call placed to Rosanna at [facility] [phone].  No answer, voicemail left.  CM to continue to follow for care coordination needs.    1427 Received called from Donna with [facility]. Requesting updates to be sent when Pt medically ready.  CM verbalized understanding.       Discharge Plan A and Plan B have been determined by review of patient's clinical status, future medical and therapeutic needs, and coverage/benefits for post-acute care in coordination with multidisciplinary team members.     Alyx Shelton RN, BSN  Case Management  [phone]

## 2025-07-03 NOTE — SUBJECTIVE & OBJECTIVE
Prescriptions Prior to Admission[1]    Review of patient's allergies indicates:   Allergen Reactions    Advil [ibuprofen] Hives    Penicillins     Codeine     Excedrin aspirin free [acetaminophen-caffeine]        Past Medical History:   Diagnosis Date    Diabetes mellitus, type 2     Fibromyalgia 2016    Hypertension 2016     Past Surgical History:   Procedure Laterality Date     SECTION      CHOLECYSTECTOMY      CORONARY ANGIOGRAPHY N/A 2025    Procedure: ANGIOGRAM, CORONARY ARTERY;  Surgeon: Perfecto Coburn MD;  Location: [facility] CATH LAB;  Service: Cardiology;  Laterality: N/A;    FOOT SURGERY Bilateral     plantar fasciotomy bilateral, arthroplasty fifth toe bilateral    HERNIA REPAIR      IMPLANTATION OF BIVENTRICULAR HEART PACEMAKER N/A 2025    Procedure: INSERTION, PACEMAKER, BIVENTRICULAR;  Surgeon: Jason Lundberg MD;  Location: [facility] EP LAB;  Service: Cardiology;  Laterality: N/A;  CHB, CRT-P (LBAP vs CS), Biotronik, Anes, SK, CICU 3084    INSERTION, PACEMAKER, TEMPORARY TRANSVENOUS N/A 2025    Procedure: Insertion, Pacemaker, Temporary Transvenous;  Surgeon: Sean Ureña MD;  Location: [facility] CATH LAB/EP;  Service: Cardiology;  Laterality: N/A;     Family History       Problem Relation (Age of Onset)    Diabetes Father    Heart disease Sister, Brother          Tobacco Use    Smoking status: Never    Smokeless tobacco: Never   Substance and Sexual Activity    Alcohol use: Yes     Comment: Occasionally    Drug use: Not on file    Sexual activity: Yes     Review of Systems   Constitutional:  Negative for chills and fever.   Respiratory:  Negative for chest tightness.    Cardiovascular:  Negative for chest pain.   Gastrointestinal:  Negative for abdominal pain.   Musculoskeletal:  Negative for back pain and myalgias.   Neurological:  Negative for weakness and numbness.     Objective:     Vital Signs (Most Recent):  Temp: 97.2 °F (36.2 °C) (25 0430)  Pulse: 60 (25  0600)  Resp: 17 (07/03/25 0430)  BP: (!) 107/58 (07/03/25 0430)  SpO2: (!) 94 % (07/03/25 0430) Vital Signs (24h Range):  Temp:  [97.1 °F (36.2 °C)-97.8 °F (36.6 °C)] 97.2 °F (36.2 °C)  Pulse:  [60-74] 60  Resp:  [16-18] 17  SpO2:  [93 %-96 %] 94 %  BP: (107-146)/(48-69) 107/58     Weight: 60.2 kg (132 lb 11.5 oz)  Body mass index is 24.27 kg/m².      Physical Exam  Constitutional:       General: She is not in acute distress.  HENT:      Head: Normocephalic.      Mouth/Throat:      Mouth: Mucous membranes are moist.   Cardiovascular:      Rate and Rhythm: Normal rate.      Comments: Palpable femoral pulses bilaterally 2+   R biphasic DP  L monophasic DP, 1st toe ulcer  Warm    Pulmonary:      Effort: Pulmonary effort is normal.   Abdominal:      General: Abdomen is flat.   Musculoskeletal:         General: Tenderness present.      Cervical back: Neck supple.      Right lower leg: No edema.      Left lower leg: No edema.   Neurological:      Mental Status: She is alert and oriented to person, place, and time.      Sensory: No sensory deficit.      Motor: No weakness.          Significant Labs:  All pertinent labs from the last 24 hours have been reviewed.    Significant Diagnostics:  I have reviewed all pertinent imaging results/findings within the past 24 hours.         [1]   Medications Prior to Admission   Medication Sig Dispense Refill Last Dose/Taking    cyclobenzaprine (FLEXERIL) 10 MG tablet TAKE 1 TABLET BY MOUTH EVERY DAY NIGHTLY AS NEEDED FOR MUSCLE SPASMS 30 tablet 0 Taking    gabapentin (NEURONTIN) 300 MG capsule TAKE 1 CAPSULE BY MOUTH EVERYDAY AT BEDTIME 90 capsule 3 Taking    lisinopriL (PRINIVIL,ZESTRIL) 20 MG tablet TAKE 1 TABLET BY MOUTH EVERY DAY 90 tablet 3 Taking    metFORMIN (GLUCOPHAGE) 500 MG tablet TAKE 1 TABLET BY MOUTH TWICE A DAY WITH MEALS 180 tablet 3 Taking    metoprolol tartrate (LOPRESSOR) 50 MG tablet TAKE 1 TABLET BY MOUTH TWICE A  tablet 3 Taking    oxyCODONE-acetaminophen  (PERCOCET)  mg per tablet Take 1 tablet by mouth nightly as needed for Pain. 30 tablet 0 Taking As Needed    rosuvastatin (CRESTOR) 5 MG tablet Take 1 tablet (5 mg total) by mouth once daily. 90 tablet 3 Taking

## 2025-07-03 NOTE — ANESTHESIA PREPROCEDURE EVALUATION
[facility]  Anesthesia Pre-Operative Evaluation     Patient Name: Cady Watkins  YOB: 1950  MRN: [id number]  CSN: [id number]      Code Status: Full Code   Date of Procedure: 7/7/2025  Anesthesia: Local MAC Procedure: Procedure(s) (LRB):  Angiogram Extremity Unilateral (Left)  Pre-Operative Diagnosis: Complete heart block [I44.2]  Proceduralist: Surgeons and Role:     * Glen Finn MD - Primary          SUBJECTIVE:     Pre-operative evaluation for Procedure(s) (LRB):  Angiogram Extremity Unilateral (Left)     07/03/2025    Cady Watkins is a 74 y.o. female with HTN, T2DM (A1c 6.6), CAD, CHB s/p PPM on 6/26/25, acute systolic HF (EF 20-25%), fibromyalgia, chronic pain on opioids, now with left heel pain with areas of erythema on top of L foot and great toe, concerning for embolic/atheroembolic source, cardiac in nature. Going for LLE angiogram.      Patient now presents for the above procedure(s).       Anticoagulants   Medication Route Frequency    heparin 25,000 units in dextrose 5% (100 units/ml) IV bolus from bag LOW INTENSITY nomogram - [facility] Intravenous PRN    heparin 25,000 units in dextrose 5% (100 units/ml) IV bolus from bag LOW INTENSITY nomogram - [facility] Intravenous PRN    heparin 25,000 units in dextrose 5% 250 mL (100 units/mL) infusion LOW INTENSITY nomogram - [facility] Intravenous Continuous        NPO status: NPO since midnight    Pertinent medications: On heparin gtt, Toprol    Previous Anesthesia:    6/26/25 - INSERTION, PACEMAKER, BIVENTRICULAR (Chest)  No complications reported.    Prev airway: None documented.          ________________________________________  Results for orders placed during the hospital encounter of 06/24/25    Echo    Interpretation Summary    Left Ventricle: The left ventricle is normal in size. Normal wall thickness. Regional wall motion abnormalities present. There is severely reduced systolic function with a visually estimated ejection  fraction of 15 - 20%. Grade I diastolic dysfunction. Elevated left ventricular filling pressure. No thrombus observed using contrast enhanced images. Hypokinesis of all segments except the bases suggests takastsubo CM pattern, but multivessel CAD cannot be exluded    Right Ventricle: The right ventricle is normal in size measuring 2.8 cm. Wall thickness is normal. Systolic function is normal.    Aortic Valve: There is mild aortic valve sclerosis. There is moderate annular calcification present.    Mitral Valve: There is mild regurgitation with a centrally directed jet.    Aorta: The aortic root is normal in size measuring 3.0 cm. The proximal ascending aorta is normal in size measuring 2.6 cm.    Pulmonary Artery: There is moderate pulmonary hypertension. The estimated pulmonary artery systolic pressure is 51 mmHg.    IVC/SVC: Elevated venous pressure at 15 mmHg.    Pericardium: There is no pericardial effusion.    [facility] 6/26/25    The 1st Mrg lesion was 60% stenosed.    The estimated blood loss was none.    CAD out of proportion to LV dysfunction.    Single vessel CAD - recommend medical therapy.     ________________________________________    LDA:   Peripheral IV Single Lumen 06/26/25 1400 20 G 1 1/4 in Right;Anterior Forearm (Active)   Site Assessment Clean;Dry;Intact;No redness;No swelling;No warmth;No drainage 07/03/25 0600   Line Securement Device Secured with sutureless device 07/02/25 1933   Extremity Assessment Distal to IV No abnormal discoloration;No redness;No swelling;No warmth 07/03/25 0600   Line Status Saline locked 07/03/25 0600   Dressing Status Clean;Dry;Intact 07/03/25 0600   Dressing Intervention Integrity maintained 07/03/25 0600   Dressing Change Due 06/30/25 07/03/25 0600   Site Change Due 06/30/25 07/02/25 1933   Reason Not Rotated Anticipated discharge 07/03/25 0600   Number of days: 6       Female External Urinary Catheter w/ Suction 07/01/25 2115 (Active)   Skin no redness;no  breakdown;female external urine collection device repositioned 25 0600   Tolerance no signs/symptoms of discomfort 25 0600   Suction Continuous suction at 70 mmHg 25   Date of last wick change 25   Time of last wick change 25   Output (mL) 400 mL 25   Number of days: 1       Drips:    heparin (porcine) in D5W  0-40 Units/kg/hr Intravenous Continuous 7.2 mL/hr at 25 12 Units/kg/hr at 25       Problem List[1]    Review of patient's allergies indicates:   Allergen Reactions    Advil [ibuprofen] Hives    Penicillins     Codeine     Excedrin aspirin free [acetaminophen-caffeine]        Current Inpatient Medications:    acetaminophen  1,000 mg Oral Q8H    aspirin  81 mg Oral Daily    atorvastatin  80 mg Oral Daily    capsaicin   Topical (Top) BID    furosemide  40 mg Oral BID    gabapentin  300 mg Oral TID    insulin glargine U-100  5 Units Subcutaneous Daily    LIDOcaine  1 patch Transdermal Q24H    magnesium oxide  800 mg Oral Daily    methocarbamoL  500 mg Oral BID    metoprolol succinate  50 mg Oral Daily    mupirocin   Topical (Top) Daily    pantoprazole  40 mg Oral Daily    polyethylene glycol  17 g Oral Daily    valsartan  40 mg Oral BID       Medications Ordered Prior to Encounter[2]    Past Surgical History:   Procedure Laterality Date     SECTION      CHOLECYSTECTOMY      CORONARY ANGIOGRAPHY N/A 2025    Procedure: ANGIOGRAM, CORONARY ARTERY;  Surgeon: Perfecto Coburn MD;  Location: [facility] CATH LAB;  Service: Cardiology;  Laterality: N/A;    FOOT SURGERY Bilateral     plantar fasciotomy bilateral, arthroplasty fifth toe bilateral    HERNIA REPAIR      IMPLANTATION OF BIVENTRICULAR HEART PACEMAKER N/A 2025    Procedure: INSERTION, PACEMAKER, BIVENTRICULAR;  Surgeon: Jason Lundberg MD;  Location: [facility] EP LAB;  Service: Cardiology;  Laterality: N/A;  CHB, CRT-P (LBAP vs CS), Biotronik, Anes, SK, CICU [id number]     INSERTION, PACEMAKER, TEMPORARY TRANSVENOUS N/A 6/18/2025    Procedure: Insertion, Pacemaker, Temporary Transvenous;  Surgeon: Sean Ureña MD;  Location: [facility] CATH LAB/EP;  Service: Cardiology;  Laterality: N/A;       Social History:  Tobacco Use: Low Risk  (6/26/2025)    Patient History     Smoking Tobacco Use: Never     Smokeless Tobacco Use: Never     Passive Exposure: Not on file       Alcohol Use: Not At Risk (6/19/2025)    AUDIT-C     Frequency of Alcohol Consumption: Never     Average Number of Drinks: Patient does not drink     Frequency of Binge Drinking: Never       OBJECTIVE:     Vital Signs Range:  BMI Readings from Last 1 Encounters:   06/29/25 24.27 kg/m²       Temp:  [36.2 °C (97.1 °F)-36.7 °C (98 °F)]   Pulse:  [60-68]   Resp:  [16-17]   BP: (107-134)/(48-62)   SpO2:  [93 %-97 %]        Significant Labs:        Component Value Date/Time    WBC 6.95 07/03/2025 0426    HGB 11.0 (L) 07/03/2025 0426    HGB 13.9 05/14/2024 0935    HCT 29.4 (L) 07/03/2025 0426    HCT 40.7 05/14/2024 0935     07/03/2025 0426     05/14/2024 0935     07/03/2025 0426     09/16/2024 1545    K 3.9 07/03/2025 0426    K 4.6 09/16/2024 1545     07/03/2025 0426     09/16/2024 1545    CO2 27 07/03/2025 0426    CO2 25 09/16/2024 1545     (H) 07/03/2025 0426     (H) 09/16/2024 1545    BUN 32 (H) 07/03/2025 0426    CREATININE 1.1 07/03/2025 0426    MG 2.0 07/03/2025 0426    PHOS 2.8 06/30/2025 0547    CALCIUM 8.3 (L) 07/03/2025 0426    CALCIUM 10.1 09/16/2024 1545    ALBUMIN 2.4 (L) 07/03/2025 0426    ALBUMIN 3.7 05/14/2024 0935    PROT 5.4 (L) 07/03/2025 0426    PROT 7.6 05/14/2024 0935    ALKPHOS 59 07/03/2025 0426    ALKPHOS 64 05/14/2024 0935    BILITOT 0.2 07/03/2025 0426    BILITOT 0.6 05/14/2024 0935    AST 23 07/03/2025 0426    AST 23 05/14/2024 0935    ALT 16 07/03/2025 0426    ALT 25 05/14/2024 0935    INR 1.0 06/24/2025 0122    HGBA1C 6.6 (H) 06/29/2025 2327     HGBA1C 6.1 (H) 09/16/2024 1545        Please see Results Review for additional labs.     Diagnostic Studies: No relevant studies.    EKG:   Results for orders placed or performed during the hospital encounter of 06/24/25   EKG 12-lead    Collection Time: 06/26/25  7:47 PM   Result Value Ref Range    QRS Duration 104 ms    [facility] QTC Calculation 418 ms    Narrative    Test Reason : I47.19,    Vent. Rate : 120 BPM     Atrial Rate : 120 BPM     P-R Int : 240 ms          QRS Dur : 104 ms      QT Int : 296 ms       P-R-T Axes :    238  51 degrees    QTcB Int : 418 ms    Sinus rhythm  Atrial-sensed ventricular-paced rhythm with prolonged AV conduction  Abnormal ECG  When compared with ECG of 24-Jun-2025 05:37,  Atrial-sensed ventricular-paced rhythm is now present  Vent. rate has increased by  70 bpm  Confirmed by Joni Pelaez ([id number]) on 6/27/2025 10:41:48 AM    Referred By: MARIANELA DOWELL           Confirmed By: Joni Pelaez       ECHO:  See subjective, if available.      ASSESSMENT/PLAN:           Pre-op Assessment    I have reviewed the Patient Summary Reports.     I have reviewed the Nursing Notes. I have reviewed the NPO Status.   I have reviewed the Medications.     Review of Systems  Anesthesia Hx:  No problems with previous Anesthesia   History of prior surgery of interest to airway management or planning:          Denies Family Hx of Anesthesia complications.    Denies Personal Hx of Anesthesia complications.                    Social:  Non-Smoker       EENT/Dental:  EENT/Dental Normal           Cardiovascular:    Pacemaker Hypertension   CAD    Dysrhythmias            Patient on beta blockers                          Pulmonary:      Shortness of breath                  Renal/:   Denies Chronic Renal Disease.                Hepatic/GI:  Hepatic/GI Normal                    Neurological:    Neuromuscular Disease,  Headaches                                 Endocrine:  Diabetes, type 2           Psych:  Psychiatric  History                  Physical Exam  General: Well nourished, Cooperative, Alert and Oriented    Airway:  Mallampati: I / I  Mouth Opening: Normal  TM Distance: Normal  Tongue: Normal  Neck ROM: Normal ROM    Dental:  Periodontal disease        Anesthesia Plan  Type of Anesthesia, risks & benefits discussed:    Anesthesia Type: Gen ETT, Gen Natural Airway, MAC  Intra-op Monitoring Plan: Standard ASA Monitors and Art Line  Post Op Pain Control Plan: multimodal analgesia and IV/PO Opioids PRN  Induction:  IV  Airway Plan: Direct and Video, Post-Induction  Informed Consent: Informed consent signed with the Patient and all parties understand the risks and agree with anesthesia plan.  All questions answered. Patient consented to blood products? Yes  ASA Score: 4  Day of Surgery Review of History & Physical: H&P Update referred to the surgeon/provider.    Ready For Surgery From Anesthesia Perspective.     .           [1]   Patient Active Problem List  Diagnosis    Obesity    Kidney stone    Fibromyalgia    High cholesterol    Hypertension    Insomnia    Migraine    Spasm    Type 2 diabetes with neuropathy    Ventral hernia    Diabetic nephropathy    Opioid dependence    Colon cancer screening declined    Mammogram declined    Complete heart block    Syncope    Acute pulmonary edema    Acute hypoxemic respiratory failure    Hypertensive emergency    Acute systolic heart failure    Bladder mass    ISAIAS (acute kidney injury)    Delirium    E. coli UTI (urinary tract infection)    Muscular deconditioning    Coronary artery disease involving native coronary artery    Left foot pain    PAD (peripheral artery disease)    Discharge planning issues    Left lower quadrant abdominal pain    Atheroembolism of lower extremity, left   [2]   No current facility-administered medications on file prior to encounter.     Current Outpatient Medications on File Prior to Encounter   Medication Sig Dispense Refill    cyclobenzaprine  (FLEXERIL) 10 MG tablet TAKE 1 TABLET BY MOUTH EVERY DAY NIGHTLY AS NEEDED FOR MUSCLE SPASMS 30 tablet 0    gabapentin (NEURONTIN) 300 MG capsule TAKE 1 CAPSULE BY MOUTH EVERYDAY AT BEDTIME 90 capsule 3    lisinopriL (PRINIVIL,ZESTRIL) 20 MG tablet TAKE 1 TABLET BY MOUTH EVERY DAY 90 tablet 3    metFORMIN (GLUCOPHAGE) 500 MG tablet TAKE 1 TABLET BY MOUTH TWICE A DAY WITH MEALS 180 tablet 3    metoprolol tartrate (LOPRESSOR) 50 MG tablet TAKE 1 TABLET BY MOUTH TWICE A  tablet 3    oxyCODONE-acetaminophen (PERCOCET)  mg per tablet Take 1 tablet by mouth nightly as needed for Pain. 30 tablet 0    rosuvastatin (CRESTOR) 5 MG tablet Take 1 tablet (5 mg total) by mouth once daily. 90 tablet 3

## 2025-07-03 NOTE — ASSESSMENT & PLAN NOTE
Acute HFrEF/NICMP, c/w Takotsubo? TTE showed HFrEF with low EF of 20-25% with evidence of WMAs.   - diuresed w Lasix IV, near euvolemic and switched to oral  - new Lasix 40 PO BID for maintenance, will need Rx for this + sliding scale eventually upon d/c  - Toprol 50   - Valsartan 40 BID    7/3- stable

## 2025-07-03 NOTE — ASSESSMENT & PLAN NOTE
DM2 controlled (A1C 6.1 on 9/2024) with neuropathy  F/u new A1C  Insulin basal 5u QD with LDSSI qACHS  - Gabapentin 300 TID    Recent Labs     07/01/25  2110 07/02/25  0217 07/02/25  0657 07/02/25  1609 07/02/25 2024 07/03/25  0626   POCTGLUCOSE 231* 196* 182* 273* 186* 218*

## 2025-07-04 LAB
ABSOLUTE EOSINOPHIL (OHS): 0.49 K/UL
ABSOLUTE MONOCYTE (OHS): 0.66 K/UL (ref 0.3–1)
ABSOLUTE NEUTROPHIL COUNT (OHS): 3.63 K/UL (ref 1.8–7.7)
ALBUMIN SERPL BCP-MCNC: 2.5 G/DL (ref 3.5–5.2)
ALP SERPL-CCNC: 54 UNIT/L (ref 40–150)
ALT SERPL W/O P-5'-P-CCNC: 19 UNIT/L (ref 10–44)
ANION GAP (OHS): 7 MMOL/L (ref 8–16)
APTT PPP: 42.4 SECONDS (ref 21–32)
APTT PPP: 49.4 SECONDS (ref 21–32)
APTT PPP: 71.6 SECONDS (ref 21–32)
AST SERPL-CCNC: 25 UNIT/L (ref 11–45)
BASOPHILS # BLD AUTO: 0.08 K/UL
BASOPHILS NFR BLD AUTO: 1.2 %
BILIRUB SERPL-MCNC: 0.3 MG/DL (ref 0.1–1)
BUN SERPL-MCNC: 28 MG/DL (ref 8–23)
CALCIUM SERPL-MCNC: 8.5 MG/DL (ref 8.7–10.5)
CHLORIDE SERPL-SCNC: 99 MMOL/L (ref 95–110)
CO2 SERPL-SCNC: 29 MMOL/L (ref 23–29)
CREAT SERPL-MCNC: 1 MG/DL (ref 0.5–1.4)
ERYTHROCYTE [DISTWIDTH] IN BLOOD BY AUTOMATED COUNT: 13.4 % (ref 11.5–14.5)
GFR SERPLBLD CREATININE-BSD FMLA CKD-EPI: 60 ML/MIN/1.73/M2
GLUCOSE SERPL-MCNC: 315 MG/DL (ref 70–110)
HCT VFR BLD AUTO: 33.9 % (ref 37–48.5)
HGB BLD-MCNC: 11.3 GM/DL (ref 12–16)
IMM GRANULOCYTES # BLD AUTO: 0.03 K/UL (ref 0–0.04)
IMM GRANULOCYTES NFR BLD AUTO: 0.4 % (ref 0–0.5)
LYMPHOCYTES # BLD AUTO: 1.85 K/UL (ref 1–4.8)
MAGNESIUM SERPL-MCNC: 1.9 MG/DL (ref 1.6–2.6)
MCH RBC QN AUTO: 32.2 PG (ref 27–31)
MCHC RBC AUTO-ENTMCNC: 33.3 G/DL (ref 32–36)
MCV RBC AUTO: 97 FL (ref 82–98)
NUCLEATED RBC (/100WBC) (OHS): 0 /100 WBC
PLATELET # BLD AUTO: 214 K/UL (ref 150–450)
PMV BLD AUTO: 12.3 FL (ref 9.2–12.9)
POCT GLUCOSE: 173 MG/DL (ref 70–110)
POCT GLUCOSE: 244 MG/DL (ref 70–110)
POCT GLUCOSE: 319 MG/DL (ref 70–110)
POCT GLUCOSE: 340 MG/DL (ref 70–110)
POTASSIUM SERPL-SCNC: 3.9 MMOL/L (ref 3.5–5.1)
PROT SERPL-MCNC: 5.6 GM/DL (ref 6–8.4)
RBC # BLD AUTO: 3.51 M/UL (ref 4–5.4)
RELATIVE EOSINOPHIL (OHS): 7.3 %
RELATIVE LYMPHOCYTE (OHS): 27.4 % (ref 18–48)
RELATIVE MONOCYTE (OHS): 9.8 % (ref 4–15)
RELATIVE NEUTROPHIL (OHS): 53.9 % (ref 38–73)
SODIUM SERPL-SCNC: 135 MMOL/L (ref 136–145)
WBC # BLD AUTO: 6.74 K/UL (ref 3.9–12.7)

## 2025-07-04 PROCEDURE — 25000003 PHARM REV CODE 250

## 2025-07-04 PROCEDURE — 36415 COLL VENOUS BLD VENIPUNCTURE: CPT | Performed by: HOSPITALIST

## 2025-07-04 PROCEDURE — 20600001 HC STEP DOWN PRIVATE ROOM

## 2025-07-04 PROCEDURE — 25000003 PHARM REV CODE 250: Performed by: STUDENT IN AN ORGANIZED HEALTH CARE EDUCATION/TRAINING PROGRAM

## 2025-07-04 PROCEDURE — 83735 ASSAY OF MAGNESIUM: CPT | Performed by: HOSPITALIST

## 2025-07-04 PROCEDURE — 63600175 PHARM REV CODE 636 W HCPCS: Performed by: HOSPITALIST

## 2025-07-04 PROCEDURE — 25000003 PHARM REV CODE 250: Performed by: HOSPITALIST

## 2025-07-04 PROCEDURE — 97116 GAIT TRAINING THERAPY: CPT | Mod: CQ

## 2025-07-04 PROCEDURE — 80053 COMPREHEN METABOLIC PANEL: CPT | Performed by: HOSPITALIST

## 2025-07-04 PROCEDURE — 97530 THERAPEUTIC ACTIVITIES: CPT | Mod: CQ

## 2025-07-04 PROCEDURE — 85730 THROMBOPLASTIN TIME PARTIAL: CPT | Performed by: HOSPITALIST

## 2025-07-04 PROCEDURE — 85025 COMPLETE CBC W/AUTO DIFF WBC: CPT

## 2025-07-04 RX ORDER — INSULIN ASPART 100 [IU]/ML
5 INJECTION, SOLUTION INTRAVENOUS; SUBCUTANEOUS
Status: DISCONTINUED | OUTPATIENT
Start: 2025-07-04 | End: 2025-07-05

## 2025-07-04 RX ORDER — INSULIN ASPART 100 [IU]/ML
0-10 INJECTION, SOLUTION INTRAVENOUS; SUBCUTANEOUS EVERY 6 HOURS PRN
Status: DISCONTINUED | OUTPATIENT
Start: 2025-07-04 | End: 2025-07-08 | Stop reason: HOSPADM

## 2025-07-04 RX ORDER — MECLIZINE HCL 12.5 MG 12.5 MG/1
12.5 TABLET ORAL 2 TIMES DAILY PRN
Status: DISCONTINUED | OUTPATIENT
Start: 2025-07-05 | End: 2025-07-08 | Stop reason: HOSPADM

## 2025-07-04 RX ADMIN — VALSARTAN 40 MG: 40 TABLET, FILM COATED ORAL at 08:07

## 2025-07-04 RX ADMIN — INSULIN GLARGINE 5 UNITS: 100 INJECTION, SOLUTION SUBCUTANEOUS at 08:07

## 2025-07-04 RX ADMIN — GABAPENTIN 300 MG: 300 CAPSULE ORAL at 08:07

## 2025-07-04 RX ADMIN — METHOCARBAMOL 500 MG: 500 TABLET ORAL at 10:07

## 2025-07-04 RX ADMIN — HEPARIN SODIUM 12 UNITS/KG/HR: 10000 INJECTION, SOLUTION INTRAVENOUS at 06:07

## 2025-07-04 RX ADMIN — Medication 800 MG: at 08:07

## 2025-07-04 RX ADMIN — VALSARTAN 40 MG: 40 TABLET, FILM COATED ORAL at 10:07

## 2025-07-04 RX ADMIN — CAPSAICIN: 0.25 CREAM TOPICAL at 08:07

## 2025-07-04 RX ADMIN — METOPROLOL SUCCINATE 50 MG: 50 TABLET, EXTENDED RELEASE ORAL at 08:07

## 2025-07-04 RX ADMIN — ACETAMINOPHEN 1000 MG: 500 TABLET ORAL at 05:07

## 2025-07-04 RX ADMIN — INSULIN ASPART 4 UNITS: 100 INJECTION, SOLUTION INTRAVENOUS; SUBCUTANEOUS at 04:07

## 2025-07-04 RX ADMIN — INSULIN ASPART 5 UNITS: 100 INJECTION, SOLUTION INTRAVENOUS; SUBCUTANEOUS at 09:07

## 2025-07-04 RX ADMIN — ACETAMINOPHEN 1000 MG: 500 TABLET ORAL at 10:07

## 2025-07-04 RX ADMIN — FUROSEMIDE 40 MG: 40 TABLET ORAL at 05:07

## 2025-07-04 RX ADMIN — CAPSAICIN: 0.25 CREAM TOPICAL at 10:07

## 2025-07-04 RX ADMIN — LIDOCAINE 1 PATCH: 50 PATCH CUTANEOUS at 03:07

## 2025-07-04 RX ADMIN — INSULIN ASPART 5 UNITS: 100 INJECTION, SOLUTION INTRAVENOUS; SUBCUTANEOUS at 04:07

## 2025-07-04 RX ADMIN — ACETAMINOPHEN 1000 MG: 500 TABLET ORAL at 01:07

## 2025-07-04 RX ADMIN — INSULIN ASPART 4 UNITS: 100 INJECTION, SOLUTION INTRAVENOUS; SUBCUTANEOUS at 08:07

## 2025-07-04 RX ADMIN — PANTOPRAZOLE SODIUM 40 MG: 40 TABLET, DELAYED RELEASE ORAL at 08:07

## 2025-07-04 RX ADMIN — MUPIROCIN: 20 OINTMENT TOPICAL at 08:07

## 2025-07-04 RX ADMIN — ATORVASTATIN CALCIUM 80 MG: 40 TABLET, FILM COATED ORAL at 08:07

## 2025-07-04 RX ADMIN — ASPIRIN 81 MG CHEWABLE TABLET 81 MG: 81 TABLET CHEWABLE at 08:07

## 2025-07-04 RX ADMIN — FUROSEMIDE 40 MG: 40 TABLET ORAL at 08:07

## 2025-07-04 RX ADMIN — GABAPENTIN 300 MG: 300 CAPSULE ORAL at 10:07

## 2025-07-04 RX ADMIN — GABAPENTIN 300 MG: 300 CAPSULE ORAL at 03:07

## 2025-07-04 RX ADMIN — METHOCARBAMOL 500 MG: 500 TABLET ORAL at 08:07

## 2025-07-04 RX ADMIN — INSULIN ASPART 2 UNITS: 100 INJECTION, SOLUTION INTRAVENOUS; SUBCUTANEOUS at 01:07

## 2025-07-04 NOTE — ASSESSMENT & PLAN NOTE
DM2 controlled (A1C 6.1 on 9/2024) with neuropathy  New A1C- 6.6  Insulin basal 5u QD with LDSSI qACHS  - Gabapentin 300 TID    Recent Labs     07/03/25  0626 07/03/25  1137 07/03/25  1548 07/03/25  1729 07/03/25  2226 07/04/25  0714   POCTGLUCOSE 218* 208* 282* 213* 348* 319*     7/4- BS high 319,  On lantus 5 daily, started aspart 5 ac, and increased SS 1-10, on consistent carb diet. Pt eating better. Takes metformin at home.

## 2025-07-04 NOTE — PROGRESS NOTES
"[facility] - Cardiology Stepdown  Adult Nutrition  Progress Note    SUMMARY       Recommendations    Recommendation/Intervention:   1. Recommend diabetic cardiac diet when diet is advanced and clinically indicated     - please continue to document PO % intake via flowsheets     2. Encourage good intake    3. RD to monitor weight, labs, meds, intake, tolerance    4. Recommend MVI    Goals:   1. % nutritional needs met with diet during admission     2. Maintain weight during admission    3. Display s/s of wound healing during admission  Nutrition Goal Status: new  Communication of RD Recs:  (POC)    Nutrition Discharge Planning    Nutrition Discharge Planning: Therapeutic diet (comments)  Therapeutic diet (comments): diabetic cardiac diet    Reason for Assessment    Reason For Assessment: length of stay  Diagnosis: cardiac disease (complete heart block)  General Information Comments: Pt admitted with complete heart block. PMHx: DM 2, HTN, fibromyalgia. New surgical hx: insertion of pacemaker 6/18; Implantation of biventricular heart pacemaker and Coronary angiography 6/26. Pt presented to the ED from her PMD office for symptomatic bradycardia and was transferred to [facility] for CHB. Pacemaker present. 1+, 2+ edema. Wound: incision on chest, pressure injury on perineum. No GI s/s - BM: 7/2. Needs assistance with feedings. Pt is currently NPO for a procedure (angiogram). Previously, having a good intake - PO: %. Wt loss during admission, suspected fluid loss, has edema and is on furosemide. No wt hx prior to admission.    Nutrition/Diet History    Spiritual, Cultural Beliefs, Mosque Practices, Values that Affect Care: no  Food Allergies: NKFA  Factors Affecting Nutritional Intake: NPO  Nutrition-related SDOH: None Identified    Anthropometrics    Height: 5' 2" (157.5 cm)  Height (inches): 62 in  Height Method: Stated  Weight: 60.2 kg (132 lb 11.5 oz)  Weight (lb): 132.72 lb  Weight Method: Standard " Scale  Ideal Body Weight (IBW), Female: 110 lb  % Ideal Body Weight, Female (lb): 134.55 %  BMI (Calculated): 24.3  BMI Grade: 18.5-24.9 - normal    Lab/Procedures/Meds    Pertinent Labs Reviewed: reviewed  Pertinent Labs Comments: H/H 11.3/33.9 low, Na 135 low, BUN 28 high, glucose 315 high, Ca 8.5 low, albumin 2.5 low  Pertinent Medications Reviewed: reviewed  Pertinent Medications Comments: aspirin, atorvastatin, furosemide, gabapentin, insulin aspart, insulin lantus, magnesium oxide, methocarbamol, pantoprazole, valsartan, heparin    Estimated/Assessed Needs    Weight Used For Calorie Calculations: 60.2 kg (132 lb 11.5 oz)  Energy Calorie Requirements (kcal): 1378 kcal  Energy Need Method: [place]-St Jeor (* 1.3)  Protein Requirements: 72-90 g/pro (1.2-1.5 g/kg)  Weight Used For Protein Calculations: 60.2 kg (132 lb 11.5 oz)        RDA Method (mL): 1378  CHO Requirement: 172 g    Nutrition Prescription Ordered    Current Diet Order: NPO    Evaluation of Received Nutrient/Fluid Intake    I/O: -7347.3 since admit  Energy Calories Required: not meeting needs  Protein Required: not meeting needs  Fluid Required:  (per MD)  Tolerance: tolerating  % Intake of Estimated Energy Needs: 0 - 25 %  % Meal Intake: NPO    PES Statement    Inadequate energy intake related to Inadequate protein energy intake as evidenced by Intake <75% estimated needs, NPO/CL status due to medical condition  Status: New    Nutrition Risk    Level of Risk/Frequency of Follow-up: low (1/week)     Monitor and Evaluation    Monitor and Evaluation: Energy intake, Food and beverage intake, Protein intake, Carbohydrate intake, Diet order, Weight, Electrolyte and renal panel, Gastrointestinal profile, Glucose/endocrine profile, Inflammatory profile, Nutrition focused physical findings, Lipid profile, Skin     Nutrition Follow-Up    RD Follow-up?: Yes

## 2025-07-04 NOTE — ASSESSMENT & PLAN NOTE
Acute exquisite severe Left heel pain, with erythema and bogginess/fluctuance, denies h/o any gout/CPPD, recent broken toes (healed).  Great toe with erythema/blister.  XR negative.  CT unremarkable, no abscess/OM other.  Considered checking an MRI foot, in discussion with Podiatry, however the abnormal VAS MIKAEL and U/S returned in the meantime so consulting VSURG first  - Suspect ischemic event to left heel and great toe.   - f/u Vsurg consult, appreciate  - Already on atherosclerosis meds for new Dx CAD as below  - f/u podiatry consult, appreciate  - EMLA cream and capsaicin cream, some relief with combo  - off loading boots for both heels at all times while laying in bed   - WBAT    7/4- LE angiogram planned Monday    Acute exquisite severe Left heel pain, with erythema and bogginess/fluctuance, denies h/o any gout/CPPD, recent broken toes (healed).  Great toe with erythema/blister.  XR negative.  CT unremarkable, no abscess/OM other.  Considered checking an MRI foot, in discussion with Podiatry, however the abnormal VAS MIKAEL and U/S returned in the meantime so consulting VSURG first  - f/u Vsurg consult, appreciate  - Already on atherosclerosis meds for new Dx CAD as below  - f/u podiatry consult, appreciate  - EMLA cream and capsaicin cream, some relief with combo  - off loading boots for both heels at all times while laying in bed   - WBAT

## 2025-07-04 NOTE — PLAN OF CARE
Recommendations     Recommendation/Intervention:   1. Recommend diabetic cardiac diet when diet is advanced and clinically indicated     - please continue to document PO % intake via flowsheets     2. Encourage good intake    3. RD to monitor weight, labs, meds, intake, tolerance    4. Recommend MVI     Goals:   1. % nutritional needs met with diet during admission     2. Maintain weight during admission    3. Display s/s of wound healing during admission  Nutrition Goal Status: new  Communication of RD Recs:  (POC)     Nutrition Discharge Planning     Nutrition Discharge Planning: Therapeutic diet (comments)  Therapeutic diet (comments): diabetic cardiac diet

## 2025-07-04 NOTE — ASSESSMENT & PLAN NOTE
Acute HFrEF/NICMP, c/w Takotsubo? TTE showed HFrEF with low EF of 20-25% with evidence of WMAs.   - diuresed w Lasix IV, near euvolemic and switched to oral  - new Lasix 40 PO BID for maintenance, will need Rx for this + sliding scale eventually upon d/c  - Toprol 50   - Valsartan 40 BID    7/4- stable   2.07

## 2025-07-04 NOTE — PT/OT/SLP PROGRESS
Physical Therapy Treatment    Patient Name:  Cady Watkins   MRN:  [id number]    Recommendations:     Discharge Recommendations: Moderate Intensity Therapy  Discharge Equipment Recommendations: bedside commode, wheelchair  Barriers to discharge: Inaccessible home and Decreased caregiver support Pt requiring increased skilled assistance at current time.     Assessment:     Cady Watkins is a 74 y.o. female admitted with a medical diagnosis of Complete heart block.  She presents with the following impairments/functional limitations: weakness, impaired endurance, impaired self care skills, impaired functional mobility, gait instability, impaired balance, decreased coordination, decreased upper extremity function, decreased lower extremity function, decreased safety awareness, pain, impaired coordination, orthopedic precautions requiring mod assistance and verbal cues for bed mob, scooting to EOB, sit < > stand transitions, and gait to prevent falls due to weakness, fatigue and pain.   In light of pt's current functional level and deficits, it is anticipated that pt will need to participate in a moderate intensity rehab program consisting of PT and OT in order to achieve full rehab potential to return to previous level of function and roles.  Pt remains motivated to participate in PT session and will cont to benefit from skilled PT intervention.  .    Rehab Prognosis: Good; patient would benefit from acute skilled PT services to address these deficits and reach maximum level of function.    Recent Surgery: Procedure(s) (LRB):  INSERTION, PACEMAKER, BIVENTRICULAR (N/A) 8 Days Post-Op    Plan:     During this hospitalization, patient to be seen 4 x/week to address the identified rehab impairments via gait training, therapeutic activities, therapeutic exercises, neuromuscular re-education and progress toward the following goals:    Plan of Care Expires:  08/28/25    Subjective     Chief Complaint: weakness,  fatigue, pain  Pain/Comfort:  Pain Rating 1:  (no rating provided)  Location - Side 1: Left  Location - Orientation 1: generalized  Location 1: foot  Pain Addressed 1: Reposition, Distraction, Cessation of Activity  Pain Rating Post-Intervention 1:  (no rating provided)      Objective:     2 attempts for tx session due to pt being cleaned up on 1st attempt    Communicated with nurse (Kellen) prior to session.  Patient found HOB elevated at 15* angle with peripheral IV, telemetry (No family present) upon PT entry to room.     General Precautions: Standard, fall, pacemaker  Orthopedic Precautions: N/A  Braces: N/A  Respiratory Status: Room air     Functional Mobility:  Bed Mobility:     Rolling Right: moderate assistance  Scooting: anteriorly to the EOB with mod/min A  Bridging: moderate assistance  Supine to Sit: mod A for trunk elevation and B LE's, exiting on the R side, HOB at 15* angle, increased time required  Transfers:     Sit to Stand:  from EOB/BS chair with mod/min A for hip elevation and safety with R HHA/SC  Gait: R HHA max to mod A for balance 42ft in hallway with chair follow with vc's for upright posture, directional guidance, and safety.  Pt demonstrates instability (especially when turning), narrow BAILEY, decreased B step length.  SC mod to min A for balance and safety 18ft in hallway with chair follow with aforementioned impairments  Balance: static sitting at the EOB with SBA; static standing with R HHA min A      AM-PAC 6 CLICK MOBILITY  Turning over in bed (including adjusting bedclothes, sheets and blankets)?: 2  Sitting down on and standing up from a chair with arms (e.g., wheelchair, bedside commode, etc.): 2  Moving from lying on back to sitting on the side of the bed?: 2  Moving to and from a bed to a chair (including a wheelchair)?: 2  Need to walk in hospital room?: 2  Climbing 3-5 steps with a railing?: 1  Basic Mobility Total Score: 11       Treatment & Education:  Patient provided with  daily orientation and goals of this PT session. They were educated to call for assistance and to transfer with hospital staff only.  Also, pt was educated on the effects of prolonged immobility and the importance of performing OOB activity and exercises to promote healing and reduce recovery time    Patient left up in chair with all lines intact, call button in reach, chair alarm on, and nurse notified..    GOALS:   Multidisciplinary Problems       Physical Therapy Goals          Problem: Physical Therapy    Goal Priority Disciplines Outcome Interventions   Physical Therapy Goal     PT, PT/OT Progressing    Description: Goals to be met by: 25     Patient will increase functional independence with mobility by performin. Supine to sit with MInimal Assistance  2. Sit to stand transfer with Minimal Assistance  3. Bed to chair transfer with Minimal Assistance using LRAD  4. Gait  x 50 feet with Minimal Assistance using LRAD.   5. Ascend/descend 2 stair with no Handrails Moderate Assistance using HHA.   6. Lower extremity exercise program x30 reps per handout, with supervision                         DME Justifications:   Cady requires a commode for home use because she is confined to a single room.  Cady Watkins has a mobility limitation that significantly impairs her ability to participate in one or more mobility related activities of daily living (MRADLs) such as toileting, feeding, dressing, grooming, and bathing in customary locations in the home.  The mobility limitation cannot be sufficiently resolved by the use of a cane or walker.   The use of a manual wheelchair will significantly improve the patients ability to participate in MRADLS and the patient will use it on regular basis in the home.  Cady Watkins has expressed her willingness to use a manual wheelchair in the home. Patients upper body strength is sufficient for propulsion.  She also has a caregiver who is available, willing,  and able to provide assistance with the wheelchair when needed.      Time Tracking:     PT Received On: 07/04/25  PT Start Time: 1414     PT Stop Time: 1444  PT Total Time (min): 30 min     Billable Minutes: Gait Training 20 and Therapeutic Activity 10    Treatment Type: Treatment  PT/PTA: PTA     Number of PTA visits since last PT visit: 2     07/04/2025

## 2025-07-04 NOTE — PLAN OF CARE
Plan of care discussed with patient. Patient AOX4 and VSS. Pt maintained free from falls/trauma/injury. Pt complained of pain, scheduled tylenol given. Heparin @10 units, PTT ordered All questions and concerns addressed.        Problem: Adult Inpatient Plan of Care  Goal: Plan of Care Review  Outcome: Progressing  Goal: Patient-Specific Goal (Individualized)  Outcome: Progressing  Goal: Absence of Hospital-Acquired Illness or Injury  Outcome: Progressing  Goal: Optimal Comfort and Wellbeing  Outcome: Progressing  Goal: Readiness for Transition of Care  Outcome: Progressing     Problem: Diabetes Comorbidity  Goal: Blood Glucose Level Within Targeted Range  Outcome: Progressing     Problem: Acute Kidney Injury/Impairment  Goal: Fluid and Electrolyte Balance  Outcome: Progressing  Goal: Improved Oral Intake  Outcome: Progressing  Goal: Effective Renal Function  Outcome: Progressing     Problem: Infection  Goal: Absence of Infection Signs and Symptoms  Outcome: Progressing     Problem: Skin Injury Risk Increased  Goal: Skin Health and Integrity  Outcome: Progressing     Problem: Fall Injury Risk  Goal: Absence of Fall and Fall-Related Injury  Outcome: Progressing     Problem: Wound  Goal: Optimal Coping  Outcome: Progressing  Goal: Optimal Functional Ability  Outcome: Progressing  Goal: Absence of Infection Signs and Symptoms  Outcome: Progressing  Goal: Improved Oral Intake  Outcome: Progressing  Goal: Optimal Pain Control and Function  Outcome: Progressing  Goal: Skin Health and Integrity  Outcome: Progressing  Goal: Optimal Wound Healing  Outcome: Progressing

## 2025-07-04 NOTE — SUBJECTIVE & OBJECTIVE
Interval History: see updated S/O above    Review of Systems   Constitutional:  Positive for activity change. Negative for fever.   Gastrointestinal:  Negative for abdominal distention, diarrhea and nausea.        Large ventral hernia, chronic   Musculoskeletal:  Positive for gait problem.   All other systems reviewed and are negative.    Objective:     Vital Signs (Most Recent):  Temp: 98.7 °F (37.1 °C) (07/04/25 0440)  Pulse: 60 (07/04/25 0615)  Resp: 18 (07/04/25 0440)  BP: (!) 119/59 (07/04/25 0440)  SpO2: 95 % (07/04/25 0440) Vital Signs (24h Range):  Temp:  [97.5 °F (36.4 °C)-98.7 °F (37.1 °C)] 98.7 °F (37.1 °C)  Pulse:  [59-63] 60  Resp:  [18] 18  SpO2:  [94 %-96 %] 95 %  BP: (119-149)/(59-73) 119/59     Weight: 60.2 kg (132 lb 11.5 oz)  Body mass index is 24.27 kg/m².    Intake/Output Summary (Last 24 hours) at 7/4/2025 0850  Last data filed at 7/3/2025 1500  Gross per 24 hour   Intake 557.6 ml   Output 750 ml   Net -192.4 ml         Physical Exam  Vitals reviewed.   Constitutional:       General: She is not in acute distress.     Appearance: She is not toxic-appearing.      Comments: thin   Cardiovascular:      Rate and Rhythm: Normal rate.   Abdominal:      General: There is no distension.      Tenderness: There is abdominal tenderness (related to hernia, mild).      Comments: TTP on LEFT middle aspect of Extremely large ventral hernia, soft, no rebound/guard, possible TTP anterior L thigh   Musculoskeletal:         General: Swelling (feet, right heel tendersess with mild erythema. left large toe, has smal peripheral ischemic lesion below at tip.) present.      Comments: LEFT foot - linear erythema of mid superior aspect of foot with small scab, L heel erythema and exquisite TTP with warmth, L great toe with 1cm blister on distal aspect and surrounding erythema with nomal tempurature   Neurological:      General: No focal deficit present.      Mental Status: She is oriented to person, place, and time.       Motor: Weakness present.   Psychiatric:         Mood and Affect: Mood normal.         Behavior: Behavior normal.               Significant Labs: All pertinent labs within the past 24 hours have been reviewed.    Significant Imaging: I have reviewed all pertinent imaging results/findings within the past 24 hours.

## 2025-07-04 NOTE — ASSESSMENT & PLAN NOTE
Due to large ventral hernia. Abd wall stretching. Pain was positional and I was able to relieve it by re-positioning the hernia on a pillow on 7/3.   - continue to monitor with serial exams.     And possible left thigh pain  All Sx on LEFT side, same as the LEFT foot and toe  F/u VSurg consult as above  May need repeat CT vs CTA ABd/Pelvis however if Vsurg is doing peripheral angiogram this would cover it

## 2025-07-04 NOTE — PROGRESS NOTES
"[facility] Card[facility] Medicine  Progress Note    Patient Name: Cady Watkins  MRN: [id number]  Patient Class: IP- Inpatient   Admission Date: 6/24/2025  Length of Stay: 10 days  Attending Physician: Mandy Pérez MD  Primary Care Provider: Blaine Benítez MD        Subjective     Principal Problem:Complete heart block    HPI:  74F with HTN, HLD, T2DM w neuropathy, chronic pain on opioids, and fibromyalgia who initially presented from PCP office to [facility] ED for reported symptomatic bradycardia w rate 30s. She was in her normal state of health until 3 weeks prior when she had an episode of syncope, while in her kitchen, she felt very off, walked to sofa and blacked out reportedly for 3 mins. Thereafter, she's had fatigue, decreased appetite, and increasing SOB.  In ED, found to have complete heart block, new onset cardiomyopathy ischemic vs Takotsubo, Hypertensive emergency (SBP 230s), and ISAIAS.  Started on nitro gtt, had an emergent TVP placed and then transferred to [facility] CCU.              Overview/Hospital Course:  CCU course:   Transferred in from [facility] to CCU.   IC and EP consulted, and decision made for LHC to eval ischemia, which was done after ISAIAS improved and pt diuresed, and LHC revealed "70% left circumflex stenosis, otherwise diffuse non-obstructive disease is present" no intervention needed.   Diuresed on Lasix IVP and Lasix IV gtt @10cc/hr 6/26-6/27.  Intermittent delirium which starts at night; given haldol 5mg IV on 6/24 then olanzapine 5mg on 6/26 and 6/27 am then d/c-ed; on delirium precautions.  Patient had PPM placed successfully with no complications. UA evident of potential UTI will plan to treat with CTX for 3 days. Will continue to work on patient functional status with PT/OT.      After stepdown to [facility] on 6/28.  Acute exquisite left heel pain, small areas of erythema on top of foot, consulted podiatry 6/30.  XR and CT negative. L great toe developed " erythema and blister.  VAS Duplex and ABIs abnormal with diffuse disease and several significant stenoses (prelim report in Media tab in Epic), consulted VSURG 7/2.  Also c/o new intermittent LLQ Abdominal and L upper thigh pain, 10/10, no identifiable triggers, present for 3 days but voiced to MD on 7/2, same day as worsening of her L heel and 1st toe pain.      7/3-  PVD. On ASA, systemic heparin, high intensity statin. Heparin gtt hold on call to OR. LLE angiogram planned Monday.  /62  Pulse 60  . . Pt has large hernia which interferes with walking.  Will ask PT/OT to eval for support device.     7/4- BS high 319,  started aspart 5 ac, and increased SS, on consistent carb diet. Pt eating better. Takes metformin at home.                Interval History: see updated S/O above    Review of Systems   Constitutional:  Positive for activity change. Negative for fever.   Gastrointestinal:  Negative for abdominal distention, diarrhea and nausea.        Large ventral hernia, chronic   Musculoskeletal:  Positive for gait problem.   All other systems reviewed and are negative.    Objective:     Vital Signs (Most Recent):  Temp: 98.7 °F (37.1 °C) (07/04/25 0440)  Pulse: 60 (07/04/25 0615)  Resp: 18 (07/04/25 0440)  BP: (!) 119/59 (07/04/25 0440)  SpO2: 95 % (07/04/25 0440) Vital Signs (24h Range):  Temp:  [97.5 °F (36.4 °C)-98.7 °F (37.1 °C)] 98.7 °F (37.1 °C)  Pulse:  [59-63] 60  Resp:  [18] 18  SpO2:  [94 %-96 %] 95 %  BP: (119-149)/(59-73) 119/59     Weight: 60.2 kg (132 lb 11.5 oz)  Body mass index is 24.27 kg/m².    Intake/Output Summary (Last 24 hours) at 7/4/2025 0850  Last data filed at 7/3/2025 1500  Gross per 24 hour   Intake 557.6 ml   Output 750 ml   Net -192.4 ml         Physical Exam  Vitals reviewed.   Constitutional:       General: She is not in acute distress.     Appearance: She is not toxic-appearing.      Comments: thin   Cardiovascular:      Rate and Rhythm: Normal rate.   Abdominal:       General: There is no distension.      Tenderness: There is abdominal tenderness (related to hernia, mild).      Comments: TTP on LEFT middle aspect of Extremely large ventral hernia, soft, no rebound/guard, possible TTP anterior L thigh   Musculoskeletal:         General: Swelling (feet, right heel tendersess with mild erythema. left large toe, has smal peripheral ischemic lesion below at tip.) present.      Comments: LEFT foot - linear erythema of mid superior aspect of foot with small scab, L heel erythema and exquisite TTP with warmth, L great toe with 1cm blister on distal aspect and surrounding erythema with nomal tempurature   Neurological:      General: No focal deficit present.      Mental Status: She is oriented to person, place, and time.      Motor: Weakness present.   Psychiatric:         Mood and Affect: Mood normal.         Behavior: Behavior normal.               Significant Labs: All pertinent labs within the past 24 hours have been reviewed.    Significant Imaging: I have reviewed all pertinent imaging results/findings within the past 24 hours.      Assessment & Plan  Complete heart block  Complete heart block s/p temp TVP then PPM- TSH wnl. Doxy ppx x 5d, post-PPM restrictions.     RESOLVED  Left foot pain  PAD (peripheral artery disease)  Acute exquisite severe Left heel pain, with erythema and bogginess/fluctuance, denies h/o any gout/CPPD, recent broken toes (healed).  Great toe with erythema/blister.  XR negative.  CT unremarkable, no abscess/OM other.  Considered checking an MRI foot, in discussion with Podiatry, however the abnormal VAS MIKAEL and U/S returned in the meantime so consulting VSURG first  - Suspect ischemic event to left heel and great toe.   - f/u Vsurg consult, appreciate  - Already on atherosclerosis meds for new Dx CAD as below  - f/u podiatry consult, appreciate  - EMLA cream and capsaicin cream, some relief with combo  - off loading boots for both heels at all times while  "laying in bed   - WBAT    7/4- LE angiogram planned Monday    Acute exquisite severe Left heel pain, with erythema and bogginess/fluctuance, denies h/o any gout/CPPD, recent broken toes (healed).  Great toe with erythema/blister.  XR negative.  CT unremarkable, no abscess/OM other.  Considered checking an MRI foot, in discussion with Podiatry, however the abnormal VAS MIKAEL and U/S returned in the meantime so consulting VSURG first  - f/u Vsurg consult, appreciate  - Already on atherosclerosis meds for new Dx CAD as below  - f/u podiatry consult, appreciate  - EMLA cream and capsaicin cream, some relief with combo  - off loading boots for both heels at all times while laying in bed   - WBAT  Left lower quadrant abdominal pain  Due to large ventral hernia. Abd wall stretching. Pain was positional and I was able to relieve it by re-positioning the hernia on a pillow on 7/3.   - continue to monitor with serial exams.     And possible left thigh pain  All Sx on LEFT side, same as the LEFT foot and toe  F/u VSurg consult as above  May need repeat CT vs CTA ABd/Pelvis however if Vsurg is doing peripheral angiogram this would cover it    Ventral hernia  Started decades ago after an elective cholecystectomy, she was at home recovering and her "stomach burst," reportedly "wasn't wrapped" by surgeon after the operation, and pt was too afraid to go to any more surgery for a repair so she's been living with it.  It's been enlarging over time.    Pain was positional and I was able to relieve it by re-positioning the hernia on a pillow on 7/3.     Bladder mass  - Bladder US with 3.0 cm mass like lesion in right dependent bladder.  - Consulted Urology for further evaluation.  - Urology ordered CT Renal Stone Study - unable to visualize bladder mass.  - CT Urogram inpt vs outpt   - consider in new clinical context with new Abd pain, see above  - Needs close follow up with urology, referral placed.    Acute systolic heart " failure  Acute HFrEF/NICMP, c/w Takotsubo? TTE showed HFrEF with low EF of 20-25% with evidence of WMAs.   - diuresed w Lasix IV, near euvolemic and switched to oral  - new Lasix 40 PO BID for maintenance, will need Rx for this + sliding scale eventually upon d/c  - Toprol 50   - Valsartan 40 BID    7/4- stable  Coronary artery disease involving native coronary artery   CAD-  LCx 70% stenosis and non-obstructive diffuse disease  - ASA 81  - Atorva 20  - BB  Acute hypoxemic respiratory failure  Resolving  Fibromyalgia  - Lido patch   - Robaxin 500 BID  - Capsaicin BID    High cholesterol  Atorva 20    Type 2 diabetes with neuropathy  DM2 controlled (A1C 6.1 on 9/2024) with neuropathy  New A1C- 6.6  Insulin basal 5u QD with LDSSI qACHS  - Gabapentin 300 TID    Recent Labs     07/03/25  0626 07/03/25  1137 07/03/25  1548 07/03/25  1729 07/03/25  2226 07/04/25  0714   POCTGLUCOSE 218* 208* 282* 213* 348* 319*     7/4- BS high 319,  On lantus 5 daily, started aspart 5 ac, and increased SS 1-10, on consistent carb diet. Pt eating better. Takes metformin at home.   E. coli UTI (urinary tract infection)  Pan-sensitive UTI, CTX IV x 3d    Hypertensive emergency  Hypertension  Resolved    Resolved    ISAIAS (acute kidney injury)  Resolved. cardiorenal  Delirium  Resolved    Muscular deconditioning  PT/OT rec Mod  Likely needs SNF    Discharge planning issues  Dispo - independent/ambulatory prior to admit (albeit with large ventral hernia), now new deconditioning/debility requiring two person assist for standing, very motivated and engaged, PT/OT rec Mod Intensity. Family interested in [facility] facility in [place], prior very positive experience there and would be near daughter who lives in [place].     Atheroembolism of lower extremity, left  Angiogram per Vascular Surgery planned 7/6.     VTE Risk Mitigation (From admission, onward)           Ordered     heparin 25,000 units in dextrose 5% (100 units/ml) IV bolus from bag LOW  INTENSITY nomogram - [facility]  As needed (PRN)        Question:  Heparin Infusion Adjustment (DO NOT MODIFY ANSWER)  Answer:  [email or web address]\Images\Pharmacy\HeparinInfusions\heparin LOW INTENSITY nomogram for [facility] [id number]    07/02/25 1720     heparin 25,000 units in dextrose 5% (100 units/ml) IV bolus from bag LOW INTENSITY nomogram - [facility]  As needed (PRN)        Question:  Heparin Infusion Adjustment (DO NOT MODIFY ANSWER)  Answer:  [email or web address]\Images\Pharmacy\HeparinInfusions\heparin LOW INTENSITY nomogram for [facility] [id number]    07/02/25 1720     heparin 25,000 units in dextrose 5% 250 mL (100 units/mL) infusion LOW INTENSITY nomogram - [facility]  Continuous        Question:  Begin at (units/kg/hr)  Answer:  12 07/02/25 1720                    Discharge Planning   LOKESH: 7/8/2025     Code Status: Full Code   Medical Readiness for Discharge Date:   Discharge Plan A: Skilled Nursing Facility   Discharge Delays: (!) Change in Medical Condition      Mandy Pérez MD  Department of Hospital Medicine   [facility] - Cardiology Stepdown

## 2025-07-04 NOTE — ASSESSMENT & PLAN NOTE
Dispo - independent/ambulatory prior to admit (albeit with large ventral hernia), now new deconditioning/debility requiring two person assist for standing, very motivated and engaged, PT/OT rec Mod Intensity. Family interested in [facility] facility in [place], prior very positive experience there and would be near daughter who lives in [place].

## 2025-07-05 LAB
ABSOLUTE EOSINOPHIL (OHS): 0.56 K/UL
ABSOLUTE MONOCYTE (OHS): 0.78 K/UL (ref 0.3–1)
ABSOLUTE NEUTROPHIL COUNT (OHS): 3.8 K/UL (ref 1.8–7.7)
ALBUMIN SERPL BCP-MCNC: 2.5 G/DL (ref 3.5–5.2)
ALP SERPL-CCNC: 56 UNIT/L (ref 40–150)
ALT SERPL W/O P-5'-P-CCNC: 20 UNIT/L (ref 10–44)
ANION GAP (OHS): 7 MMOL/L (ref 8–16)
APTT PPP: 44.7 SECONDS (ref 21–32)
AST SERPL-CCNC: 31 UNIT/L (ref 11–45)
BASOPHILS # BLD AUTO: 0.1 K/UL
BASOPHILS NFR BLD AUTO: 1.2 %
BILIRUB SERPL-MCNC: 0.2 MG/DL (ref 0.1–1)
BUN SERPL-MCNC: 30 MG/DL (ref 8–23)
CALCIUM SERPL-MCNC: 8.7 MG/DL (ref 8.7–10.5)
CHLORIDE SERPL-SCNC: 100 MMOL/L (ref 95–110)
CO2 SERPL-SCNC: 27 MMOL/L (ref 23–29)
CREAT SERPL-MCNC: 0.9 MG/DL (ref 0.5–1.4)
ERYTHROCYTE [DISTWIDTH] IN BLOOD BY AUTOMATED COUNT: 13.5 % (ref 11.5–14.5)
GFR SERPLBLD CREATININE-BSD FMLA CKD-EPI: >60 ML/MIN/1.73/M2
GLUCOSE SERPL-MCNC: 128 MG/DL (ref 70–110)
HCT VFR BLD AUTO: 32.1 % (ref 37–48.5)
HGB BLD-MCNC: 10.6 GM/DL (ref 12–16)
IMM GRANULOCYTES # BLD AUTO: 0.04 K/UL (ref 0–0.04)
IMM GRANULOCYTES NFR BLD AUTO: 0.5 % (ref 0–0.5)
LYMPHOCYTES # BLD AUTO: 2.77 K/UL (ref 1–4.8)
MAGNESIUM SERPL-MCNC: 1.9 MG/DL (ref 1.6–2.6)
MCH RBC QN AUTO: 32 PG (ref 27–31)
MCHC RBC AUTO-ENTMCNC: 33 G/DL (ref 32–36)
MCV RBC AUTO: 97 FL (ref 82–98)
NUCLEATED RBC (/100WBC) (OHS): 0 /100 WBC
PLATELET # BLD AUTO: 190 K/UL (ref 150–450)
PMV BLD AUTO: 12.9 FL (ref 9.2–12.9)
POCT GLUCOSE: 135 MG/DL (ref 70–110)
POCT GLUCOSE: 138 MG/DL (ref 70–110)
POCT GLUCOSE: 258 MG/DL (ref 70–110)
POCT GLUCOSE: 272 MG/DL (ref 70–110)
POTASSIUM SERPL-SCNC: 4.1 MMOL/L (ref 3.5–5.1)
PROT SERPL-MCNC: 5.7 GM/DL (ref 6–8.4)
RBC # BLD AUTO: 3.31 M/UL (ref 4–5.4)
RELATIVE EOSINOPHIL (OHS): 7 %
RELATIVE LYMPHOCYTE (OHS): 34.4 % (ref 18–48)
RELATIVE MONOCYTE (OHS): 9.7 % (ref 4–15)
RELATIVE NEUTROPHIL (OHS): 47.2 % (ref 38–73)
SODIUM SERPL-SCNC: 134 MMOL/L (ref 136–145)
WBC # BLD AUTO: 8.05 K/UL (ref 3.9–12.7)

## 2025-07-05 PROCEDURE — 85730 THROMBOPLASTIN TIME PARTIAL: CPT | Performed by: HOSPITALIST

## 2025-07-05 PROCEDURE — 25000003 PHARM REV CODE 250: Performed by: HOSPITALIST

## 2025-07-05 PROCEDURE — 25000003 PHARM REV CODE 250

## 2025-07-05 PROCEDURE — 85025 COMPLETE CBC W/AUTO DIFF WBC: CPT

## 2025-07-05 PROCEDURE — 36415 COLL VENOUS BLD VENIPUNCTURE: CPT | Performed by: HOSPITALIST

## 2025-07-05 PROCEDURE — 94761 N-INVAS EAR/PLS OXIMETRY MLT: CPT

## 2025-07-05 PROCEDURE — 20600001 HC STEP DOWN PRIVATE ROOM

## 2025-07-05 PROCEDURE — 83735 ASSAY OF MAGNESIUM: CPT | Performed by: HOSPITALIST

## 2025-07-05 PROCEDURE — 25000003 PHARM REV CODE 250: Performed by: STUDENT IN AN ORGANIZED HEALTH CARE EDUCATION/TRAINING PROGRAM

## 2025-07-05 PROCEDURE — 25000003 PHARM REV CODE 250: Performed by: NURSE PRACTITIONER

## 2025-07-05 PROCEDURE — 80053 COMPREHEN METABOLIC PANEL: CPT | Performed by: HOSPITALIST

## 2025-07-05 PROCEDURE — 63600175 PHARM REV CODE 636 W HCPCS: Performed by: HOSPITALIST

## 2025-07-05 RX ORDER — INSULIN GLARGINE 100 [IU]/ML
3 INJECTION, SOLUTION SUBCUTANEOUS DAILY
Status: DISCONTINUED | OUTPATIENT
Start: 2025-07-06 | End: 2025-07-05

## 2025-07-05 RX ORDER — INSULIN ASPART 100 [IU]/ML
3 INJECTION, SOLUTION INTRAVENOUS; SUBCUTANEOUS
Status: DISCONTINUED | OUTPATIENT
Start: 2025-07-05 | End: 2025-07-08

## 2025-07-05 RX ORDER — INSULIN GLARGINE 100 [IU]/ML
5 INJECTION, SOLUTION SUBCUTANEOUS DAILY
Status: DISCONTINUED | OUTPATIENT
Start: 2025-07-06 | End: 2025-07-08 | Stop reason: HOSPADM

## 2025-07-05 RX ORDER — INSULIN ASPART 100 [IU]/ML
4 INJECTION, SOLUTION INTRAVENOUS; SUBCUTANEOUS
Status: DISCONTINUED | OUTPATIENT
Start: 2025-07-05 | End: 2025-07-05

## 2025-07-05 RX ADMIN — METHOCARBAMOL 500 MG: 500 TABLET ORAL at 08:07

## 2025-07-05 RX ADMIN — OXYCODONE 5 MG: 5 TABLET ORAL at 02:07

## 2025-07-05 RX ADMIN — PANTOPRAZOLE SODIUM 40 MG: 40 TABLET, DELAYED RELEASE ORAL at 08:07

## 2025-07-05 RX ADMIN — GABAPENTIN 300 MG: 300 CAPSULE ORAL at 08:07

## 2025-07-05 RX ADMIN — VALSARTAN 40 MG: 40 TABLET, FILM COATED ORAL at 08:07

## 2025-07-05 RX ADMIN — ACETAMINOPHEN 1000 MG: 500 TABLET ORAL at 06:07

## 2025-07-05 RX ADMIN — INSULIN ASPART 5 UNITS: 100 INJECTION, SOLUTION INTRAVENOUS; SUBCUTANEOUS at 08:07

## 2025-07-05 RX ADMIN — Medication 800 MG: at 08:07

## 2025-07-05 RX ADMIN — FUROSEMIDE 40 MG: 40 TABLET ORAL at 08:07

## 2025-07-05 RX ADMIN — INSULIN ASPART 3 UNITS: 100 INJECTION, SOLUTION INTRAVENOUS; SUBCUTANEOUS at 12:07

## 2025-07-05 RX ADMIN — METOPROLOL SUCCINATE 50 MG: 50 TABLET, EXTENDED RELEASE ORAL at 08:07

## 2025-07-05 RX ADMIN — GABAPENTIN 300 MG: 300 CAPSULE ORAL at 04:07

## 2025-07-05 RX ADMIN — CAPSAICIN: 0.25 CREAM TOPICAL at 08:07

## 2025-07-05 RX ADMIN — INSULIN ASPART 1 UNITS: 100 INJECTION, SOLUTION INTRAVENOUS; SUBCUTANEOUS at 09:07

## 2025-07-05 RX ADMIN — MUPIROCIN: 20 OINTMENT TOPICAL at 08:07

## 2025-07-05 RX ADMIN — INSULIN ASPART 3 UNITS: 100 INJECTION, SOLUTION INTRAVENOUS; SUBCUTANEOUS at 04:07

## 2025-07-05 RX ADMIN — MECLIZINE HYDROCHLORIDE 12.5 MG: 12.5 TABLET ORAL at 12:07

## 2025-07-05 RX ADMIN — ATORVASTATIN CALCIUM 80 MG: 40 TABLET, FILM COATED ORAL at 08:07

## 2025-07-05 RX ADMIN — HEPARIN SODIUM 10 UNITS/KG/HR: 10000 INJECTION, SOLUTION INTRAVENOUS at 08:07

## 2025-07-05 RX ADMIN — ACETAMINOPHEN 1000 MG: 500 TABLET ORAL at 08:07

## 2025-07-05 RX ADMIN — ASPIRIN 81 MG CHEWABLE TABLET 81 MG: 81 TABLET CHEWABLE at 08:07

## 2025-07-05 RX ADMIN — INSULIN GLARGINE 5 UNITS: 100 INJECTION, SOLUTION SUBCUTANEOUS at 08:07

## 2025-07-05 RX ADMIN — ACETAMINOPHEN 1000 MG: 500 TABLET ORAL at 04:07

## 2025-07-05 RX ADMIN — FUROSEMIDE 40 MG: 40 TABLET ORAL at 06:07

## 2025-07-05 NOTE — SUBJECTIVE & OBJECTIVE
Interval History: see updated S/O above    Review of Systems   Constitutional:  Positive for activity change. Negative for fever.   Gastrointestinal:  Negative for abdominal distention, diarrhea and nausea.        Large ventral hernia, chronic   Musculoskeletal:  Positive for gait problem.   All other systems reviewed and are negative.    Objective:     Vital Signs (Most Recent):  Temp: 98.4 °F (36.9 °C) (07/05/25 0730)  Pulse: 60 (07/05/25 0730)  Resp: 17 (07/05/25 0730)  BP: 137/63 (07/05/25 0730)  SpO2: (!) 90 % (07/05/25 0730) Vital Signs (24h Range):  Temp:  [97.5 °F (36.4 °C)-98.6 °F (37 °C)] 98.4 °F (36.9 °C)  Pulse:  [60-66] 60  Resp:  [16-18] 17  SpO2:  [90 %-96 %] 90 %  BP: (115-139)/(58-68) 137/63     Weight: 60.2 kg (132 lb 11.5 oz)  Body mass index is 24.27 kg/m².    Intake/Output Summary (Last 24 hours) at 7/5/2025 0925  Last data filed at 7/4/2025 1200  Gross per 24 hour   Intake 240 ml   Output 400 ml   Net -160 ml         Physical Exam  Vitals reviewed.   Constitutional:       General: She is not in acute distress.     Appearance: She is not toxic-appearing.      Comments: thin   Cardiovascular:      Rate and Rhythm: Normal rate.   Abdominal:      General: There is no distension.      Tenderness: There is abdominal tenderness (related to hernia, mild).      Comments: TTP on LEFT middle aspect of Extremely large ventral hernia, soft, no rebound/guard, possible TTP anterior L thigh   Musculoskeletal:         General: Swelling (feet, right heel tendersess with mild erythema. left large toe, has smal peripheral ischemic lesion below at tip.) present.      Comments: LEFT foot - linear erythema of mid superior aspect of foot with small scab, L heel erythema and exquisite TTP with warmth, L great toe with 1cm blister on distal aspect and surrounding erythema with nomal tempurature   Neurological:      General: No focal deficit present.      Mental Status: She is oriented to person, place, and time.       Motor: Weakness present.   Psychiatric:         Mood and Affect: Mood normal.         Behavior: Behavior normal.               Significant Labs: All pertinent labs within the past 24 hours have been reviewed.    Significant Imaging: I have reviewed all pertinent imaging results/findings within the past 24 hours.

## 2025-07-05 NOTE — ASSESSMENT & PLAN NOTE
DM2 controlled (A1C 6.1 on 9/2024) with neuropathy  New A1C- 6.6  - Gabapentin 300 TID  - Working to optimize BG control  - Hold home meds: metformin  - SSI provided for corrective dosing  - POCT glucose checks as ordered  - Hypoglycemic protocol in effect  - Diabetic diet provided      Recent Labs     07/04/25  0714 07/04/25  1206 07/04/25  1639 07/04/25  2153 07/05/25  0605 07/05/25  1159   POCTGLUCOSE 319* 244* 340* 173* 135* 138*     7/4- BS high 319,  On lantus 5 daily, started aspart 5 ac, and increased SS 1-10, on consistent carb diet. Pt eating better. Takes metformin at home.   7/5- reduce lantus 5 and aspart 3 ac to prevent hypoglycemia

## 2025-07-05 NOTE — PROGRESS NOTES
"[facility] - Cardiology [facility] Medicine  Progress Note    Patient Name: Cady Watkins  MRN: [id number]  Patient Class: IP- Inpatient   Admission Date: 6/24/2025  Length of Stay: 11 days  Attending Physician: Mandy Pérez MD  Primary Care Provider: Blaine Benítez MD        Subjective     Principal Problem:Complete heart block      HPI:  74F with HTN, HLD, T2DM w neuropathy, chronic pain on opioids, and fibromyalgia who initially presented from PCP office to [facility] ED for reported symptomatic bradycardia w rate 30s. She was in her normal state of health until 3 weeks prior when she had an episode of syncope, while in her kitchen, she felt very off, walked to sofa and blacked out reportedly for 3 mins. Thereafter, she's had fatigue, decreased appetite, and increasing SOB.  In ED, found to have complete heart block, new onset cardiomyopathy ischemic vs Takotsubo, Hypertensive emergency (SBP 230s), and ISAIAS.  Started on nitro gtt, had an emergent TVP placed and then transferred to [facility] CCU.              Overview/Hospital Course:  CCU course:   Transferred in from [facility] to CCU.   IC and EP consulted, and decision made for LHC to eval ischemia, which was done after ISAIAS improved and pt diuresed, and LHC revealed "70% left circumflex stenosis, otherwise diffuse non-obstructive disease is present" no intervention needed.   Diuresed on Lasix IVP and Lasix IV gtt @10cc/hr 6/26-6/27.  Intermittent delirium which starts at night; given haldol 5mg IV on 6/24 then olanzapine 5mg on 6/26 and 6/27 am then d/c-ed; on delirium precautions.  Patient had PPM placed successfully with no complications. UA evident of potential UTI will plan to treat with CTX for 3 days. Will continue to work on patient functional status with PT/OT.      After stepdown to [facility] on 6/28.  Acute exquisite left heel pain, small areas of erythema on top of foot, consulted podiatry 6/30.  XR and CT negative. L great toe developed " erythema and blister.  VAS Duplex and ABIs abnormal with diffuse disease and several significant stenoses (prelim report in Media tab in Epic), consulted VSURG 7/2.  Also c/o new intermittent LLQ Abdominal and L upper thigh pain, 10/10, no identifiable triggers, present for 3 days but voiced to MD on 7/2, same day as worsening of her L heel and 1st toe pain.      7/3-  PVD. On ASA, systemic heparin, high intensity statin. Heparin gtt hold on call to OR. LLE angiogram planned Monday.  /62  Pulse 60  . . Pt has large hernia which interferes with walking.  Will ask PT/OT to eval for support device.     7/4- BS high 319,  started aspart 5 ac, and increased SS, on consistent carb diet. Pt eating better. Takes metformin at home.     7/5 - - > 135-> 138. Reduced insulin dose. LE angiogram planned Monday.  Left Great toe lesion slight improvement, left heel less painful and less erythremic.  Planning dc to [facility] after procedure when stable.                          Interval History: see updated S/O above    Review of Systems   Constitutional:  Positive for activity change. Negative for fever.   Gastrointestinal:  Negative for abdominal distention, diarrhea and nausea.        Large ventral hernia, chronic   Musculoskeletal:  Positive for gait problem.   All other systems reviewed and are negative.    Objective:     Vital Signs (Most Recent):  Temp: 98.4 °F (36.9 °C) (07/05/25 0730)  Pulse: 60 (07/05/25 0730)  Resp: 17 (07/05/25 0730)  BP: 137/63 (07/05/25 0730)  SpO2: (!) 90 % (07/05/25 0730) Vital Signs (24h Range):  Temp:  [97.5 °F (36.4 °C)-98.6 °F (37 °C)] 98.4 °F (36.9 °C)  Pulse:  [60-66] 60  Resp:  [16-18] 17  SpO2:  [90 %-96 %] 90 %  BP: (115-139)/(58-68) 137/63     Weight: 60.2 kg (132 lb 11.5 oz)  Body mass index is 24.27 kg/m².    Intake/Output Summary (Last 24 hours) at 7/5/2025 3671  Last data filed at 7/4/2025 1200  Gross per 24 hour   Intake 240 ml   Output 400 ml   Net -160 ml          Physical Exam  Vitals reviewed.   Constitutional:       General: She is not in acute distress.     Appearance: She is not toxic-appearing.      Comments: thin   Cardiovascular:      Rate and Rhythm: Normal rate.   Abdominal:      General: There is no distension.      Tenderness: There is abdominal tenderness (related to hernia, mild).      Comments: TTP on LEFT middle aspect of Extremely large ventral hernia, soft, no rebound/guard, possible TTP anterior L thigh   Musculoskeletal:         General: Swelling (feet, right heel tendersess with mild erythema. left large toe, has smal peripheral ischemic lesion below at tip.) present.      Comments: LEFT foot - linear erythema of mid superior aspect of foot with small scab, L heel erythema and exquisite TTP with warmth, L great toe with 1cm blister on distal aspect and surrounding erythema with nomal tempurature   Neurological:      General: No focal deficit present.      Mental Status: She is oriented to person, place, and time.      Motor: Weakness present.   Psychiatric:         Mood and Affect: Mood normal.         Behavior: Behavior normal.               Significant Labs: All pertinent labs within the past 24 hours have been reviewed.    Significant Imaging: I have reviewed all pertinent imaging results/findings within the past 24 hours.      Assessment & Plan  Complete heart block  Complete heart block s/p temp TVP then PPM- TSH wnl. Doxy ppx x 5d, post-PPM restrictions.     RESOLVED  Left foot pain  PAD (peripheral artery disease)  Acute exquisite severe Left heel pain, with erythema and bogginess/fluctuance, denies h/o any gout/CPPD, recent broken toes (healed).  Great toe with erythema/blister.  XR negative.  CT unremarkable, no abscess/OM other.  Considered checking an MRI foot, in discussion with Podiatry, however the abnormal VAS MIKAEL and U/S returned in the meantime so consulting VSURG first  - Suspect ischemic event to left heel and great toe.   - f/u Vsurg  "consult, appreciate  - Already on atherosclerosis meds for new Dx CAD as below  - f/u podiatry consult, appreciate  - EMLA cream and capsaicin cream, some relief with combo  - off loading boots for both heels at all times while laying in bed   - WBAT    7/4- LE angiogram planned Monday  Left lower quadrant abdominal pain  Due to large ventral hernia. Abd wall stretching. Pain was positional and I was able to relieve it by re-positioning the hernia on a pillow on 7/3.   - continue to monitor with serial exams.     And possible left thigh pain  All Sx on LEFT side, same as the LEFT foot and toe  F/u VSurg consult as above  May need repeat CT vs CTA ABd/Pelvis however if Vsurg is doing peripheral angiogram this would cover it    7/5- resolved    Ventral hernia  Started decades ago after an elective cholecystectomy, she was at home recovering and her "stomach burst," reportedly "wasn't wrapped" by surgeon after the operation, and pt was too afraid to go to any more surgery for a repair so she's been living with it.  It's been enlarging over time.    Pain was positional and I was able to relieve it by re-positioning the hernia on a pillow on 7/3.     Bladder mass  - Bladder US with 3.0 cm mass like lesion in right dependent bladder.  - Consulted Urology for further evaluation.  - Urology ordered CT Renal Stone Study - unable to visualize bladder mass.  - CT Urogram inpt vs outpt   - consider in new clinical context with new Abd pain, see above  - Needs close follow up with urology, referral placed.    Acute systolic heart failure  Acute HFrEF/NICMP, c/w Takotsubo? TTE showed HFrEF with low EF of 20-25% with evidence of WMAs.   - diuresed w Lasix IV, near euvolemic and switched to oral  - new Lasix 40 PO BID for maintenance, will need Rx for this + sliding scale eventually upon d/c  - Toprol 50   - Valsartan 40 BID    7/4- stable  Coronary artery disease involving native coronary artery   CAD-  LCx 70% stenosis and " non-obstructive diffuse disease  - ASA 81  - Atorva 20  - BB  Acute hypoxemic respiratory failure  Resolving  Fibromyalgia  - Lido patch   - Robaxin 500 BID  - Capsaicin BID    High cholesterol  Atorva 20    Type 2 diabetes with neuropathy  DM2 controlled (A1C 6.1 on 9/2024) with neuropathy  New A1C- 6.6  - Gabapentin 300 TID  - Working to optimize BG control  - Hold home meds: metformin  - SSI provided for corrective dosing  - POCT glucose checks as ordered  - Hypoglycemic protocol in effect  - Diabetic diet provided      Recent Labs     07/04/25  0714 07/04/25  1206 07/04/25  1639 07/04/25  2153 07/05/25  0605 07/05/25  1159   POCTGLUCOSE 319* 244* 340* 173* 135* 138*     7/4- BS high 319,  On lantus 5 daily, started aspart 5 ac, and increased SS 1-10, on consistent carb diet. Pt eating better. Takes metformin at home.   7/5- reduce lantus 5 and aspart 3 ac to prevent hypoglycemia  E. coli UTI (urinary tract infection)  Pan-sensitive UTI, CTX IV x 3d    Hypertensive emergency  Hypertension  Resolved      ISAIAS (acute kidney injury)  Resolved. cardiorenal  Delirium  Resolved    Muscular deconditioning  PT/OT rec Mod  Likely needs SNF    Discharge planning issues  Dispo - independent/ambulatory prior to admit (albeit with large ventral hernia), now new deconditioning/debility requiring two person assist for standing, very motivated and engaged, PT/OT rec Mod Intensity. Family interested in [facility] facility in [place], prior very positive experience there and would be near daughter who lives in [place].     Atheroembolism of lower extremity, left  Angiogram per Vascular Surgery planned 7/7. Monday.       VTE Risk Mitigation (From admission, onward)           Ordered     heparin 25,000 units in dextrose 5% (100 units/ml) IV bolus from bag LOW INTENSITY nomogram - [facility]  As needed (PRN)        Question:  Heparin Infusion Adjustment (DO NOT MODIFY ANSWER)  Answer:   [email or web address]\Images\Pharmacy\HeparinInfusions\heparin LOW INTENSITY nomogram for [facility] [id number]    07/02/25 1720     heparin 25,000 units in dextrose 5% (100 units/ml) IV bolus from bag LOW INTENSITY nomogram - [facility]  As needed (PRN)        Question:  Heparin Infusion Adjustment (DO NOT MODIFY ANSWER)  Answer:  [email or web address]\Images\Pharmacy\HeparinInfusions\heparin LOW INTENSITY nomogram for [facility] [id number].pdf    07/02/25 1720     heparin 25,000 units in dextrose 5% 250 mL (100 units/mL) infusion LOW INTENSITY nomogram - [facility]  Continuous        Question:  Begin at (units/kg/hr)  Answer:  12 07/02/25 1720                    Discharge Planning   LOKESH: 7/8/2025     Code Status: Full Code   Medical Readiness for Discharge Date:   Discharge Plan A: Skilled Nursing Facility   Discharge Delays: (!) Change in Medical Condition      Mandy Pérez MD  Department of Hospital Medicine   [facility] - Cardiology Stepdown

## 2025-07-05 NOTE — ASSESSMENT & PLAN NOTE
Acute exquisite severe Left heel pain, with erythema and bogginess/fluctuance, denies h/o any gout/CPPD, recent broken toes (healed).  Great toe with erythema/blister.  XR negative.  CT unremarkable, no abscess/OM other.  Considered checking an MRI foot, in discussion with Podiatry, however the abnormal VAS MIKAEL and U/S returned in the meantime so consulting VSURG first  - Suspect ischemic event to left heel and great toe.   - f/u Vsurg consult, appreciate  - Already on atherosclerosis meds for new Dx CAD as below  - f/u podiatry consult, appreciate  - EMLA cream and capsaicin cream, some relief with combo  - off loading boots for both heels at all times while laying in bed   - WBAT    7/4- LE angiogram planned Monday

## 2025-07-05 NOTE — PLAN OF CARE
Plan of care discussed with patient. Patient AOX4 and VSS. Pt maintained free from falls/trauma/injury and skin breakdown. Pt complained of pain in her foot. Pt complained of dizziness, MD notified, PRN medication given. Heparin @10 units. aPTT therapeutic. AM draw order put in. All questions and concerns addressed.         Problem: Adult Inpatient Plan of Care  Goal: Plan of Care Review  Outcome: Progressing  Goal: Patient-Specific Goal (Individualized)  Outcome: Progressing  Goal: Absence of Hospital-Acquired Illness or Injury  Outcome: Progressing  Goal: Optimal Comfort and Wellbeing  Outcome: Progressing  Goal: Readiness for Transition of Care  Outcome: Progressing     Problem: Diabetes Comorbidity  Goal: Blood Glucose Level Within Targeted Range  Outcome: Progressing     Problem: Acute Kidney Injury/Impairment  Goal: Fluid and Electrolyte Balance  Outcome: Progressing  Goal: Improved Oral Intake  Outcome: Progressing  Goal: Effective Renal Function  Outcome: Progressing     Problem: Infection  Goal: Absence of Infection Signs and Symptoms  Outcome: Progressing     Problem: Skin Injury Risk Increased  Goal: Skin Health and Integrity  Outcome: Progressing     Problem: Fall Injury Risk  Goal: Absence of Fall and Fall-Related Injury  Outcome: Progressing     Problem: Wound  Goal: Optimal Coping  Outcome: Progressing  Goal: Optimal Functional Ability  Outcome: Progressing  Goal: Absence of Infection Signs and Symptoms  Outcome: Progressing  Goal: Improved Oral Intake  Outcome: Progressing  Goal: Optimal Pain Control and Function  Outcome: Progressing  Goal: Skin Health and Integrity  Outcome: Progressing  Goal: Optimal Wound Healing  Outcome: Progressing

## 2025-07-05 NOTE — ASSESSMENT & PLAN NOTE
Due to large ventral hernia. Abd wall stretching. Pain was positional and I was able to relieve it by re-positioning the hernia on a pillow on 7/3.   - continue to monitor with serial exams.     And possible left thigh pain  All Sx on LEFT side, same as the LEFT foot and toe  F/u VSurg consult as above  May need repeat CT vs CTA ABd/Pelvis however if Vsurg is doing peripheral angiogram this would cover it    7/5- resolved

## 2025-07-05 NOTE — ASSESSMENT & PLAN NOTE
Acute HFrEF/NICMP, c/w Takotsubo? TTE showed HFrEF with low EF of 20-25% with evidence of WMAs.   - diuresed w Lasix IV, near euvolemic and switched to oral  - new Lasix 40 PO BID for maintenance, will need Rx for this + sliding scale eventually upon d/c  - Toprol 50   - Valsartan 40 BID    7/4- stable

## 2025-07-05 NOTE — ASSESSMENT & PLAN NOTE
Dispo - independent/ambulatory prior to admit (albeit with large ventral hernia), now new deconditioning/debility requiring two person assist for standing, very motivated and engaged, PT/OT rec Mod Intensity. Family interested in [facility] facility in [place], prior very positive experience there and would be near daughter who lives in [place].      [General Appearance - Well Developed] : well developed [General Appearance - Well Nourished] : well nourished [Normal Appearance] : normal appearance [Well Groomed] : well groomed [General Appearance - In No Acute Distress] : no acute distress [Abdomen Soft] : soft [Bowel Sounds] : normal bowel sounds [Abdomen Tenderness] : non-tender [Abdomen Mass (___ Cm)] : no abdominal mass palpated [Costovertebral Angle Tenderness] : no ~M costovertebral angle tenderness [] : no rash [Edema] : no peripheral edema [Oriented To Time, Place, And Person] : oriented to person, place, and time [Affect] : the affect was normal [Mood] : the mood was normal [Not Anxious] : not anxious [Normal Station and Gait] : the gait and station were normal for the patient's age [No Focal Deficits] : no focal deficits

## 2025-07-06 LAB
ABSOLUTE EOSINOPHIL (OHS): 0.74 K/UL
ABSOLUTE MONOCYTE (OHS): 0.72 K/UL (ref 0.3–1)
ABSOLUTE NEUTROPHIL COUNT (OHS): 4.8 K/UL (ref 1.8–7.7)
ALBUMIN SERPL BCP-MCNC: 2.6 G/DL (ref 3.5–5.2)
ALP SERPL-CCNC: 55 UNIT/L (ref 40–150)
ALT SERPL W/O P-5'-P-CCNC: 20 UNIT/L (ref 10–44)
ANION GAP (OHS): 8 MMOL/L (ref 8–16)
APTT PPP: 37.6 SECONDS (ref 21–32)
APTT PPP: 50.2 SECONDS (ref 21–32)
AST SERPL-CCNC: 32 UNIT/L (ref 11–45)
BASOPHILS # BLD AUTO: 0.11 K/UL
BASOPHILS NFR BLD AUTO: 1.3 %
BILIRUB SERPL-MCNC: 0.2 MG/DL (ref 0.1–1)
BUN SERPL-MCNC: 31 MG/DL (ref 8–23)
CALCIUM SERPL-MCNC: 8.7 MG/DL (ref 8.7–10.5)
CHLORIDE SERPL-SCNC: 100 MMOL/L (ref 95–110)
CO2 SERPL-SCNC: 28 MMOL/L (ref 23–29)
CREAT SERPL-MCNC: 0.9 MG/DL (ref 0.5–1.4)
ERYTHROCYTE [DISTWIDTH] IN BLOOD BY AUTOMATED COUNT: 13.6 % (ref 11.5–14.5)
GFR SERPLBLD CREATININE-BSD FMLA CKD-EPI: >60 ML/MIN/1.73/M2
GLUCOSE SERPL-MCNC: 174 MG/DL (ref 70–110)
HCT VFR BLD AUTO: 34.2 % (ref 37–48.5)
HGB BLD-MCNC: 11.7 GM/DL (ref 12–16)
IMM GRANULOCYTES # BLD AUTO: 0.06 K/UL (ref 0–0.04)
IMM GRANULOCYTES NFR BLD AUTO: 0.7 % (ref 0–0.5)
LYMPHOCYTES # BLD AUTO: 2.1 K/UL (ref 1–4.8)
MAGNESIUM SERPL-MCNC: 2 MG/DL (ref 1.6–2.6)
MCH RBC QN AUTO: 32.2 PG (ref 27–31)
MCHC RBC AUTO-ENTMCNC: 34.2 G/DL (ref 32–36)
MCV RBC AUTO: 94 FL (ref 82–98)
NUCLEATED RBC (/100WBC) (OHS): 0 /100 WBC
PLATELET # BLD AUTO: 194 K/UL (ref 150–450)
PMV BLD AUTO: 12.9 FL (ref 9.2–12.9)
POCT GLUCOSE: 194 MG/DL (ref 70–110)
POCT GLUCOSE: 214 MG/DL (ref 70–110)
POCT GLUCOSE: 220 MG/DL (ref 70–110)
POTASSIUM SERPL-SCNC: 5.1 MMOL/L (ref 3.5–5.1)
PROT SERPL-MCNC: 6 GM/DL (ref 6–8.4)
RBC # BLD AUTO: 3.63 M/UL (ref 4–5.4)
RELATIVE EOSINOPHIL (OHS): 8.7 %
RELATIVE LYMPHOCYTE (OHS): 24.6 % (ref 18–48)
RELATIVE MONOCYTE (OHS): 8.4 % (ref 4–15)
RELATIVE NEUTROPHIL (OHS): 56.3 % (ref 38–73)
SODIUM SERPL-SCNC: 136 MMOL/L (ref 136–145)
WBC # BLD AUTO: 8.53 K/UL (ref 3.9–12.7)

## 2025-07-06 PROCEDURE — 25000003 PHARM REV CODE 250

## 2025-07-06 PROCEDURE — 94761 N-INVAS EAR/PLS OXIMETRY MLT: CPT

## 2025-07-06 PROCEDURE — 25000003 PHARM REV CODE 250: Performed by: STUDENT IN AN ORGANIZED HEALTH CARE EDUCATION/TRAINING PROGRAM

## 2025-07-06 PROCEDURE — 25000003 PHARM REV CODE 250: Performed by: HOSPITALIST

## 2025-07-06 PROCEDURE — 36415 COLL VENOUS BLD VENIPUNCTURE: CPT | Performed by: HOSPITALIST

## 2025-07-06 PROCEDURE — 85025 COMPLETE CBC W/AUTO DIFF WBC: CPT

## 2025-07-06 PROCEDURE — 99231 SBSQ HOSP IP/OBS SF/LOW 25: CPT | Mod: ,,, | Performed by: PODIATRIST

## 2025-07-06 PROCEDURE — 85730 THROMBOPLASTIN TIME PARTIAL: CPT | Performed by: HOSPITALIST

## 2025-07-06 PROCEDURE — 20600001 HC STEP DOWN PRIVATE ROOM

## 2025-07-06 PROCEDURE — 82947 ASSAY GLUCOSE BLOOD QUANT: CPT | Performed by: HOSPITALIST

## 2025-07-06 PROCEDURE — 83735 ASSAY OF MAGNESIUM: CPT | Performed by: HOSPITALIST

## 2025-07-06 RX ORDER — METHOCARBAMOL 500 MG/1
500 TABLET, FILM COATED ORAL 2 TIMES DAILY
Qty: 20 TABLET | Refills: 0 | Status: CANCELLED | OUTPATIENT
Start: 2025-07-06 | End: 2025-07-16

## 2025-07-06 RX ORDER — METOPROLOL SUCCINATE 50 MG/1
50 TABLET, EXTENDED RELEASE ORAL DAILY
Qty: 90 TABLET | Refills: 3 | Status: CANCELLED | OUTPATIENT
Start: 2025-07-06 | End: 2026-07-06

## 2025-07-06 RX ORDER — GABAPENTIN 300 MG/1
300 CAPSULE ORAL 3 TIMES DAILY
Qty: 90 CAPSULE | Refills: 11 | Status: CANCELLED | OUTPATIENT
Start: 2025-07-06 | End: 2026-07-06

## 2025-07-06 RX ORDER — PANTOPRAZOLE SODIUM 40 MG/1
40 TABLET, DELAYED RELEASE ORAL DAILY
Qty: 90 TABLET | Refills: 3 | Status: CANCELLED | OUTPATIENT
Start: 2025-07-06 | End: 2026-07-06

## 2025-07-06 RX ORDER — OXYCODONE HYDROCHLORIDE 5 MG/1
5 TABLET ORAL EVERY 12 HOURS PRN
Qty: 14 TABLET | Refills: 0 | Status: CANCELLED | OUTPATIENT
Start: 2025-07-06

## 2025-07-06 RX ORDER — ATORVASTATIN CALCIUM 80 MG/1
80 TABLET, FILM COATED ORAL DAILY
Qty: 90 TABLET | Refills: 3 | Status: CANCELLED | OUTPATIENT
Start: 2025-07-06 | End: 2026-07-06

## 2025-07-06 RX ORDER — NAPROXEN SODIUM 220 MG/1
81 TABLET, FILM COATED ORAL DAILY
Qty: 30 TABLET | Refills: 11 | Status: CANCELLED | OUTPATIENT
Start: 2025-07-06 | End: 2026-07-06

## 2025-07-06 RX ORDER — ACETAMINOPHEN 500 MG
1000 TABLET ORAL EVERY 8 HOURS
Qty: 60 TABLET | Refills: 0 | Status: CANCELLED | OUTPATIENT
Start: 2025-07-06

## 2025-07-06 RX ORDER — INSULIN ASPART 100 [IU]/ML
0-10 INJECTION, SOLUTION INTRAVENOUS; SUBCUTANEOUS EVERY 6 HOURS PRN
Qty: 5 ML | Refills: 0 | Status: CANCELLED | OUTPATIENT
Start: 2025-07-06 | End: 2026-07-06

## 2025-07-06 RX ORDER — MECLIZINE HCL 12.5 MG 12.5 MG/1
12.5 TABLET ORAL 2 TIMES DAILY PRN
Qty: 30 TABLET | Refills: 0 | Status: CANCELLED | OUTPATIENT
Start: 2025-07-06

## 2025-07-06 RX ORDER — LIDOCAINE 50 MG/G
1 PATCH TOPICAL DAILY
Qty: 30 PATCH | Refills: 0 | Status: CANCELLED | OUTPATIENT
Start: 2025-07-06

## 2025-07-06 RX ORDER — VALSARTAN 40 MG/1
40 TABLET ORAL 2 TIMES DAILY
Qty: 180 TABLET | Refills: 3 | Status: CANCELLED | OUTPATIENT
Start: 2025-07-06 | End: 2026-07-06

## 2025-07-06 RX ORDER — POLYETHYLENE GLYCOL 3350 17 G/17G
17 POWDER, FOR SOLUTION ORAL DAILY
Qty: 30 EACH | Refills: 0 | Status: CANCELLED | OUTPATIENT
Start: 2025-07-06

## 2025-07-06 RX ORDER — LANOLIN ALCOHOL/MO/W.PET/CERES
800 CREAM (GRAM) TOPICAL DAILY
Qty: 14 TABLET | Refills: 0 | Status: CANCELLED | OUTPATIENT
Start: 2025-07-06 | End: 2025-07-13

## 2025-07-06 RX ORDER — LIDOCAINE AND PRILOCAINE 25; 25 MG/G; MG/G
CREAM TOPICAL
Qty: 5 G | Refills: 0 | Status: CANCELLED | OUTPATIENT
Start: 2025-07-06

## 2025-07-06 RX ORDER — FUROSEMIDE 40 MG/1
40 TABLET ORAL 2 TIMES DAILY
Qty: 60 TABLET | Refills: 11 | Status: CANCELLED | OUTPATIENT
Start: 2025-07-06 | End: 2026-07-06

## 2025-07-06 RX ADMIN — INSULIN ASPART 2 UNITS: 100 INJECTION, SOLUTION INTRAVENOUS; SUBCUTANEOUS at 12:07

## 2025-07-06 RX ADMIN — INSULIN ASPART 3 UNITS: 100 INJECTION, SOLUTION INTRAVENOUS; SUBCUTANEOUS at 05:07

## 2025-07-06 RX ADMIN — GABAPENTIN 300 MG: 300 CAPSULE ORAL at 05:07

## 2025-07-06 RX ADMIN — MUPIROCIN: 20 OINTMENT TOPICAL at 08:07

## 2025-07-06 RX ADMIN — METHOCARBAMOL 500 MG: 500 TABLET ORAL at 08:07

## 2025-07-06 RX ADMIN — INSULIN ASPART 3 UNITS: 100 INJECTION, SOLUTION INTRAVENOUS; SUBCUTANEOUS at 08:07

## 2025-07-06 RX ADMIN — VALSARTAN 40 MG: 40 TABLET, FILM COATED ORAL at 08:07

## 2025-07-06 RX ADMIN — ASPIRIN 81 MG CHEWABLE TABLET 81 MG: 81 TABLET CHEWABLE at 08:07

## 2025-07-06 RX ADMIN — ACETAMINOPHEN 1000 MG: 500 TABLET ORAL at 03:07

## 2025-07-06 RX ADMIN — ACETAMINOPHEN 1000 MG: 500 TABLET ORAL at 08:07

## 2025-07-06 RX ADMIN — INSULIN GLARGINE 5 UNITS: 100 INJECTION, SOLUTION SUBCUTANEOUS at 08:07

## 2025-07-06 RX ADMIN — FUROSEMIDE 40 MG: 40 TABLET ORAL at 05:07

## 2025-07-06 RX ADMIN — CAPSAICIN: 0.25 CREAM TOPICAL at 08:07

## 2025-07-06 RX ADMIN — ATORVASTATIN CALCIUM 80 MG: 40 TABLET, FILM COATED ORAL at 08:07

## 2025-07-06 RX ADMIN — GABAPENTIN 300 MG: 300 CAPSULE ORAL at 08:07

## 2025-07-06 RX ADMIN — GABAPENTIN 300 MG: 300 CAPSULE ORAL at 11:07

## 2025-07-06 RX ADMIN — METOPROLOL SUCCINATE 50 MG: 50 TABLET, EXTENDED RELEASE ORAL at 08:07

## 2025-07-06 RX ADMIN — FUROSEMIDE 40 MG: 40 TABLET ORAL at 08:07

## 2025-07-06 RX ADMIN — Medication 800 MG: at 08:07

## 2025-07-06 RX ADMIN — PANTOPRAZOLE SODIUM 40 MG: 40 TABLET, DELAYED RELEASE ORAL at 08:07

## 2025-07-06 RX ADMIN — INSULIN ASPART 3 UNITS: 100 INJECTION, SOLUTION INTRAVENOUS; SUBCUTANEOUS at 12:07

## 2025-07-06 RX ADMIN — CAPSAICIN: 0.25 CREAM TOPICAL at 09:07

## 2025-07-06 NOTE — PLAN OF CARE
Problem: Adult Inpatient Plan of Care  Goal: Absence of Hospital-Acquired Illness or Injury  Intervention: Prevent Skin Injury  Flowsheets (Taken 7/5/2025 2338)  Body Position:   turned   legs elevated  Device Skin Pressure Protection:   absorbent pad utilized/changed   positioning supports utilized  Intervention: Prevent and Manage VTE (Venous Thromboembolism) Risk  Flowsheets (Taken 7/5/2025 2338)  VTE Prevention/Management: ROM (active) performed  Intervention: Prevent Infection  Flowsheets (Taken 7/5/2025 2338)  Infection Prevention: rest/sleep promoted  Goal: Optimal Comfort and Wellbeing  Intervention: Monitor Pain and Promote Comfort  Flowsheets (Taken 7/5/2025 2200)  Pain Management Interventions:   care clustered   position adjusted   quiet environment facilitated   pillow support provided  Intervention: Provide Person-Centered Care  Flowsheets (Taken 7/5/2025 2338)  Trust Relationship/Rapport:   care explained   questions encouraged     Problem: Diabetes Comorbidity  Goal: Blood Glucose Level Within Targeted Range  Intervention: Monitor and Manage Glycemia  Flowsheets (Taken 7/5/2025 2338)  Glycemic Management: blood glucose monitored     Problem: Skin Injury Risk Increased  Goal: Skin Health and Integrity  Intervention: Optimize Skin Protection  Flowsheets (Taken 7/5/2025 2338)  Pressure Reduction Techniques:   frequent weight shift encouraged   weight shift assistance provided  Activity Management: Rolling - L1  Head of Bed (HOB) Positioning: HOB elevated     Problem: Fall Injury Risk  Goal: Absence of Fall and Fall-Related Injury  Intervention: Identify and Manage Contributors  Flowsheets (Taken 7/5/2025 2338)  Self-Care Promotion: independence encouraged

## 2025-07-06 NOTE — ASSESSMENT & PLAN NOTE
74F with HTN, HLD, T2DM w neuropathy, chronic pain on opioids, and fibromyalgia who initially presented for symptomatic bradycardia with complete heart block, hospital course notable for LLE pain concerning for embolic/atheroembolic phenomenon and blue toe syndrome, possible cardiac in nature.     - Recommend ASA, systemic heparin, high intensity statin  - Plan for L left angio, R CFA approach tomorrow, Monday 7/7.  - Hold heparin gtt on call to OR.

## 2025-07-06 NOTE — ASSESSMENT & PLAN NOTE
PT/OT rec Mod  Likely needs SNF. Plans to be discharged to [facility] in [place], which is close to DTR's home.

## 2025-07-06 NOTE — PROGRESS NOTES
[facility] - Cardiology Stepdown  Vascular Surgery  Progress Note    Patient Name: Cady Watkins  MRN: [id number]  Admission Date: 6/24/2025  Primary Care Provider: Blaine Benítez MD    Subjective:     Interval History: No acute events overnight. L foot pain improving somewhat.     Post-Op Info:  Procedure(s) (LRB):  INSERTION, PACEMAKER, BIVENTRICULAR (N/A)   10 Days Post-Op     Medications:  Continuous Infusions:   heparin (porcine) in D5W  0-40 Units/kg/hr Intravenous Continuous 6 mL/hr at 07/05/25 2054 10 Units/kg/hr at 07/05/25 2054     Scheduled Meds:   acetaminophen  1,000 mg Oral Q8H    aspirin  81 mg Oral Daily    atorvastatin  80 mg Oral Daily    capsaicin   Topical (Top) BID    furosemide  40 mg Oral BID    gabapentin  300 mg Oral TID    insulin aspart U-100  3 Units Subcutaneous TIDWM    insulin glargine U-100  5 Units Subcutaneous Daily    LIDOcaine  1 patch Transdermal Q24H    magnesium oxide  800 mg Oral Daily    methocarbamoL  500 mg Oral BID    metoprolol succinate  50 mg Oral Daily    mupirocin   Topical (Top) Daily    pantoprazole  40 mg Oral Daily    polyethylene glycol  17 g Oral Daily    valsartan  40 mg Oral BID     PRN Meds:  Current Facility-Administered Medications:     dextrose 50%, 12.5 g, Intravenous, PRN    dextrose 50%, 25 g, Intravenous, PRN    glucagon (human recombinant), 1 mg, Intramuscular, PRN    glucose, 16 g, Oral, PRN    glucose, 24 g, Oral, PRN    heparin (PORCINE), 60 Units/kg, Intravenous, PRN    heparin (PORCINE), 30 Units/kg, Intravenous, PRN    HYDROmorphone, 0.2 mg, Intravenous, Q6H PRN    insulin aspart U-100, 0-10 Units, Subcutaneous, Q6H PRN    LIDOcaine-prilocaine, , Topical (Top), PRN    meclizine, 12.5 mg, Oral, BID PRN    naloxone, 0.02 mg, Intravenous, PRN    ondansetron, 4 mg, Oral, Q8H PRN    oxyCODONE, 5 mg, Oral, Q6H PRN    sodium chloride 0.9%, 10 mL, Intravenous, PRN     Objective:     Vital Signs (Most Recent):  Temp: 99.6 °F (37.6 °C) (07/06/25  0727)  Pulse: 62 (07/06/25 1000)  Resp: 18 (07/06/25 0743)  BP: (!) 146/65 (07/06/25 0727)  SpO2: 95 % (07/06/25 0743) Vital Signs (24h Range):  Temp:  [98 °F (36.7 °C)-99.6 °F (37.6 °C)] 99.6 °F (37.6 °C)  Pulse:  [] 62  Resp:  [16-18] 18  SpO2:  [93 %-97 %] 95 %  BP: (112-146)/(53-73) 146/65     Date 07/06/25 0700 - 07/07/25 0659   Shift 6556-4792 7386-2131 8313-1704 24 Hour Total   INTAKE   P.O. 240   240   Shift Total(mL/kg) 240(4)   240(4)   OUTPUT   Urine(mL/kg/hr) 325   325   Shift Total(mL/kg) 325(5.4)   325(5.4)   Weight (kg) 60.2 60.2 60.2 60.2        Physical Exam  Constitutional:       General: She is not in acute distress.  HENT:      Head: Normocephalic.      Mouth/Throat:      Mouth: Mucous membranes are moist.   Cardiovascular:      Rate and Rhythm: Normal rate.      Comments: Palpable femoral pulses bilaterally 2+   R biphasic DP  L monophasic DP, 1st toe ulcer  Warm    Pulmonary:      Effort: Pulmonary effort is normal.   Abdominal:      General: Abdomen is flat.   Musculoskeletal:         General: Tenderness present.      Cervical back: Neck supple.      Right lower leg: No edema.      Left lower leg: No edema.   Neurological:      Mental Status: She is alert and oriented to person, place, and time.      Sensory: No sensory deficit.      Motor: No weakness.          Significant Labs:  All pertinent labs from the last 24 hours have been reviewed.    Significant Diagnostics:  I have reviewed and interpreted all pertinent imaging results/findings within the past 24 hours.  Assessment/Plan:     PAD (peripheral artery disease)  74F with HTN, HLD, T2DM w neuropathy, chronic pain on opioids, and fibromyalgia who initially presented for symptomatic bradycardia with complete heart block, hospital course notable for LLE pain concerning for embolic/atheroembolic phenomenon and blue toe syndrome, possible cardiac in nature.     - Recommend ASA, systemic heparin, high intensity statin  - Plan for L left  angio, R CFA approach tomorrow, Monday 7/7.  - Hold heparin gtt on call to OR.             Shimon Clayton MD  Vascular Surgery  [facility] - Cardiology Stepdown

## 2025-07-06 NOTE — PROGRESS NOTES
"[facility] - Cardiology [facility] Medicine  Progress Note    Patient Name: Cady Watkins  MRN: [id number]  Patient Class: IP- Inpatient   Admission Date: 6/24/2025  Length of Stay: 12 days  Attending Physician: Mandy Pérez MD  Primary Care Provider: Blaine Benítez MD        Subjective     Principal Problem:Complete heart block    HPI:  74F with HTN, HLD, T2DM w neuropathy, chronic pain on opioids, and fibromyalgia who initially presented from PCP office to [facility] ED for reported symptomatic bradycardia w rate 30s. She was in her normal state of health until 3 weeks prior when she had an episode of syncope, while in her kitchen, she felt very off, walked to sofa and blacked out reportedly for 3 mins. Thereafter, she's had fatigue, decreased appetite, and increasing SOB.  In ED, found to have complete heart block, new onset cardiomyopathy ischemic vs Takotsubo, Hypertensive emergency (SBP 230s), and ISAIAS.  Started on nitro gtt, had an emergent TVP placed and then transferred to [facility].              Overview/Hospital Course:  CCU course:   Transferred in from [facility] to CCU.   IC and EP consulted, and decision made for LHC to eval ischemia, which was done after ISAIAS improved and pt diuresed, and LHC revealed "70% left circumflex stenosis, otherwise diffuse non-obstructive disease is present" no intervention needed.   Diuresed on Lasix IVP and Lasix IV gtt @10cc/hr 6/26-6/27.  Intermittent delirium which starts at night; given haldol 5mg IV on 6/24 then olanzapine 5mg on 6/26 and 6/27 am then d/c-ed; on delirium precautions.  Patient had PPM placed successfully with no complications. UA evident of potential UTI will plan to treat with CTX for 3 days. Will continue to work on patient functional status with PT/OT.      After stepdown to [facility] on 6/28.  Acute exquisite left heel pain, small areas of erythema on top of foot, consulted podiatry 6/30.  XR and CT negative. L great toe developed " erythema and blister.  VAS Duplex and ABIs abnormal with diffuse disease and several significant stenoses (prelim report in Media tab in Epic), consulted VSURG 7/2.  Also c/o new intermittent LLQ Abdominal and L upper thigh pain, 10/10, no identifiable triggers, present for 3 days but voiced to MD on 7/2, same day as worsening of her L heel and 1st toe pain.      7/3-  PVD. On ASA, systemic heparin, high intensity statin. Heparin gtt hold on call to OR. LLE angiogram planned Monday.  /62  Pulse 60  . . Pt has large hernia which interferes with walking.  Will ask PT/OT to eval for support device.     7/4- BS high 319,  started aspart 5 ac, and increased SS, on consistent carb diet. Pt eating better. Takes metformin at home.     7/5 - - > 135-> 138. Reduced insulin dose. LE angiogram planned Monday.  Left Great toe lesion slight improvement, left heel less painful and less erythremic.  Planning dc to [facility] after procedure when stable.     7/6- -194                         Interval History: see updated S/O above    Review of Systems   Constitutional:  Positive for activity change. Negative for fever.   Gastrointestinal:  Negative for abdominal distention, diarrhea and nausea.        Large ventral hernia, chronic   Musculoskeletal:  Positive for gait problem.   All other systems reviewed and are negative.    Objective:     Vital Signs (Most Recent):  Temp: 99.6 °F (37.6 °C) (07/06/25 0727)  Pulse: 65 (07/06/25 0743)  Resp: 18 (07/06/25 0743)  BP: (!) 146/65 (07/06/25 0727)  SpO2: 95 % (07/06/25 0743) Vital Signs (24h Range):  Temp:  [98 °F (36.7 °C)-99.6 °F (37.6 °C)] 99.6 °F (37.6 °C)  Pulse:  [] 65  Resp:  [16-18] 18  SpO2:  [93 %-97 %] 95 %  BP: (112-146)/(53-73) 146/65     Weight: 60.2 kg (132 lb 11.5 oz)  Body mass index is 24.27 kg/m².    Intake/Output Summary (Last 24 hours) at 7/6/2025 0863  Last data filed at 7/6/2025 0400  Gross per 24 hour   Intake 240 ml   Output 600 ml    Net -360 ml         Physical Exam  Vitals reviewed.   Constitutional:       General: She is not in acute distress.     Appearance: She is not toxic-appearing.      Comments: thin   Cardiovascular:      Rate and Rhythm: Normal rate.   Abdominal:      General: There is no distension.      Tenderness: There is abdominal tenderness (related to hernia, mild).      Comments: TTP on LEFT middle aspect of Extremely large ventral hernia, soft, no rebound/guard, possible TTP anterior L thigh   Musculoskeletal:         General: Swelling (feet, right heel tendersess with mild erythema. left large toe, has smal peripheral ischemic lesion below at tip.) present.      Comments: LEFT foot - linear erythema of mid superior aspect of foot with small scab, L heel erythema and exquisite TTP with warmth, L great toe with 1cm blister on distal aspect and surrounding erythema with nomal tempurature   Neurological:      General: No focal deficit present.      Mental Status: She is oriented to person, place, and time.      Motor: Weakness present.   Psychiatric:         Mood and Affect: Mood normal.         Behavior: Behavior normal.               Significant Labs: All pertinent labs within the past 24 hours have been reviewed.    Significant Imaging: I have reviewed all pertinent imaging results/findings within the past 24 hours.      Assessment & Plan  Complete heart block  Complete heart block s/p temp TVP then PPM- TSH wnl. Doxy ppx x 5d, post-PPM restrictions.     RESOLVED  Left foot pain  PAD (peripheral artery disease)  Acute exquisite severe Left heel pain, with erythema and bogginess/fluctuance, denies h/o any gout/CPPD, recent broken toes (healed).  Great toe with erythema/blister.  XR negative.  CT unremarkable, no abscess/OM other.  Considered checking an MRI foot, in discussion with Podiatry, however the abnormal VAS MIKAEL and U/S returned in the meantime so consulting VSURG first  - Suspect ischemic event to left heel and  "great toe.   - f/u Vsurg consult, appreciate  - Already on atherosclerosis meds for new Dx CAD as below  - f/u podiatry consult, appreciate  - EMLA cream and capsaicin cream, some relief with combo  - off loading boots for both heels at all times while laying in bed   - WBAT    7/6- LE angiogram planned Monday  Acute exquisite severe Left heel pain, with erythema and bogginess/fluctuance, denies h/o any gout/CPPD, recent broken toes (healed).  Great toe with erythema/blister.  XR negative.  CT unremarkable, no abscess/OM other.  Considered checking an MRI foot, in discussion with Podiatry, however the abnormal VAS MIKAEL and U/S returned in the meantime so consulting VSURG first  - Suspect ischemic event to left heel and great toe.   - f/u Vsurg consult, appreciate  - Already on atherosclerosis meds for new Dx CAD as below  - f/u podiatry consult, appreciate  - EMLA cream and capsaicin cream, some relief with combo  - off loading boots for both heels at all times while laying in bed   - WBAT    7/4- LE angiogram planned Monday  Left lower quadrant abdominal pain  Due to large ventral hernia. Abd wall stretching. Pain was positional and I was able to relieve it by re-positioning the hernia on a pillow on 7/3.   - continue to monitor with serial exams.     And possible left thigh pain  All Sx on LEFT side, same as the LEFT foot and toe  F/u VSurg consult as above  May need repeat CT vs CTA ABd/Pelvis however if Vsurg is doing peripheral angiogram this would cover it  With daily exams, note that pain is related to ventral hernia and is positional. Seems to be related to abd wall stretching.  No evidence or strangulation/ obstruction. Asked PT/OT to arrange for support devise, if possible.     7/6- resolved    Ventral hernia  Started decades ago after an elective cholecystectomy, she was at home recovering and her "stomach burst," reportedly "wasn't wrapped" by surgeon after the operation, and pt was too afraid to go to " any more surgery for a repair so she's been living with it.  It's been enlarging over time.    Pain was positional and I was able to relieve it by re-positioning the hernia on a pillow on 7/3.   - see abd pain    Bladder mass  - Bladder US with 3.0 cm mass like lesion in right dependent bladder.  - Consulted Urology for further evaluation.  - Urology ordered CT Renal Stone Study - unable to visualize bladder mass.  - CT Urogram inpt vs outpt   - consider in new clinical context with new Abd pain, see above  - Needs close follow up with urology, referral placed.    Acute systolic heart failure  Acute HFrEF/NICMP, c/w Takotsubo? TTE showed HFrEF with low EF of 20-25% with evidence of WMAs.   - diuresed w Lasix IV, near euvolemic and switched to oral  - new Lasix 40 PO BID for maintenance, will need Rx for this + sliding scale eventually upon d/c  - Toprol 50   - Valsartan 40 BID    7/6- stable  Coronary artery disease involving native coronary artery   CAD-  LCx 70% stenosis and non-obstructive diffuse disease  - ASA 81  - Atorva 20  - BB  Acute hypoxemic respiratory failure  Resolving  Fibromyalgia  - Lido patch   - Robaxin 500 BID  - Capsaicin BID    High cholesterol  Atorva 20    Type 2 diabetes with neuropathy  DM2 controlled (A1C 6.1 on 9/2024) with neuropathy  New A1C- 6.6  - Gabapentin 300 TID  - Working to optimize BG control  - Hold home meds: metformin  - SSI provided for corrective dosing  - POCT glucose checks as ordered  - Hypoglycemic protocol in effect  - Diabetic diet provided      Recent Labs     07/04/25  2153 07/05/25  0605 07/05/25  1159 07/05/25  1603 07/05/25  2059 07/06/25  0637   POCTGLUCOSE 173* 135* 138* 258* 272* 194*     7/4- BS high 319,  On lantus 5 daily, started aspart 5 ac, and increased SS 1-10, on consistent carb diet. Pt eating better. Takes metformin at home.   7/5- reduce lantus 5 and aspart 3 ac to prevent hypoglycemia  E. coli UTI (urinary tract infection)  Pan-sensitive UTI,  CTX IV x 3d    Hypertensive emergency  Hypertension  Resolved      Resolved      ISAIAS (acute kidney injury)  Resolved. cardiorenal  Delirium  Resolved    Muscular deconditioning  PT/OT rec Mod  Likely needs SNF. Plans to be discharged to [facility] in [place], which is close to DTR's home.     Discharge planning issues  Dispo - independent/ambulatory prior to admit (albeit with large ventral hernia), now new deconditioning/debility requiring two person assist for standing, very motivated and engaged, PT/OT rec Mod Intensity. Family interested in [facility] facility in [place], prior very positive experience there and would be near daughter who lives in [place].     Atheroembolism of lower extremity, left  Angiogram per Vascular Surgery planned 7/7. Monday.     VTE Risk Mitigation (From admission, onward)           Ordered     heparin 25,000 units in dextrose 5% (100 units/ml) IV bolus from bag LOW INTENSITY nomogram - [facility]  As needed (PRN)        Question:  Heparin Infusion Adjustment (DO NOT MODIFY ANSWER)  Answer:  \\[email or web address]\epic\Images\Pharmacy\HeparinInfusions\heparin LOW INTENSITY nomogram for [facility] [email or web address]    07/02/25 1720     heparin 25,000 units in dextrose 5% (100 units/ml) IV bolus from bag LOW INTENSITY nomogram - [facility]  As needed (PRN)        Question:  Heparin Infusion Adjustment (DO NOT MODIFY ANSWER)  Answer:  \\[employer]\epic\Images\Pharmacy\HeparinInfusions\heparin LOW INTENSITY nomogram for [facility] [id number]    07/02/25 1720     heparin 25,000 units in dextrose 5% 250 mL (100 units/mL) infusion LOW INTENSITY nomogram - [facility]  Continuous        Question:  Begin at (units/kg/hr)  Answer:  12 07/02/25 1720                    Discharge Planning   LOKESH: 7/8/2025     Code Status: Full Code   Medical Readiness for Discharge Date:   Discharge Plan A: Skilled Nursing Facility   Discharge Delays: (!) Change in Medical Condition              Please place Justification for KEYLA Pérez  MD  Department of Hospital Medicine   [facility] - Cardiology Stepdown

## 2025-07-06 NOTE — ASSESSMENT & PLAN NOTE
DM2 controlled (A1C 6.1 on 9/2024) with neuropathy  New A1C- 6.6  - Gabapentin 300 TID  - Working to optimize BG control  - Hold home meds: metformin  - SSI provided for corrective dosing  - POCT glucose checks as ordered  - Hypoglycemic protocol in effect  - Diabetic diet provided      Recent Labs     07/04/25  2153 07/05/25  0605 07/05/25  1159 07/05/25  1603 07/05/25  2059 07/06/25  0637   POCTGLUCOSE 173* 135* 138* 258* 272* 194*     7/4- BS high 319,  On lantus 5 daily, started aspart 5 ac, and increased SS 1-10, on consistent carb diet. Pt eating better. Takes metformin at home.   7/5- reduce lantus 5 and aspart 3 ac to prevent hypoglycemia

## 2025-07-06 NOTE — ASSESSMENT & PLAN NOTE
Due to large ventral hernia. Abd wall stretching. Pain was positional and I was able to relieve it by re-positioning the hernia on a pillow on 7/3.   - continue to monitor with serial exams.     And possible left thigh pain  All Sx on LEFT side, same as the LEFT foot and toe  F/u VSurg consult as above  May need repeat CT vs CTA ABd/Pelvis however if Vsurg is doing peripheral angiogram this would cover it  With daily exams, note that pain is related to ventral hernia and is positional. Seems to be related to abd wall stretching.  No evidence or strangulation/ obstruction. Asked PT/OT to arrange for support devise, if possible.     7/6- resolved

## 2025-07-06 NOTE — ASSESSMENT & PLAN NOTE
Acute HFrEF/NICMP, c/w Takotsubo? TTE showed HFrEF with low EF of 20-25% with evidence of WMAs.   - diuresed w Lasix IV, near euvolemic and switched to oral  - new Lasix 40 PO BID for maintenance, will need Rx for this + sliding scale eventually upon d/c  - Toprol 50   - Valsartan 40 BID    7/6- stable

## 2025-07-06 NOTE — ASSESSMENT & PLAN NOTE
Acute exquisite severe Left heel pain, with erythema and bogginess/fluctuance, denies h/o any gout/CPPD, recent broken toes (healed).  Great toe with erythema/blister.  XR negative.  CT unremarkable, no abscess/OM other.  Considered checking an MRI foot, in discussion with Podiatry, however the abnormal VAS MIKAEL and U/S returned in the meantime so consulting VSURG first  - Suspect ischemic event to left heel and great toe.   - f/u Vsurg consult, appreciate  - Already on atherosclerosis meds for new Dx CAD as below  - f/u podiatry consult, appreciate  - EMLA cream and capsaicin cream, some relief with combo  - off loading boots for both heels at all times while laying in bed   - WBAT    7/6- LE angiogram planned Monday  Acute exquisite severe Left heel pain, with erythema and bogginess/fluctuance, denies h/o any gout/CPPD, recent broken toes (healed).  Great toe with erythema/blister.  XR negative.  CT unremarkable, no abscess/OM other.  Considered checking an MRI foot, in discussion with Podiatry, however the abnormal VAS MIKAEL and U/S returned in the meantime so consulting VSURG first  - Suspect ischemic event to left heel and great toe.   - f/u Vsurg consult, appreciate  - Already on atherosclerosis meds for new Dx CAD as below  - f/u podiatry consult, appreciate  - EMLA cream and capsaicin cream, some relief with combo  - off loading boots for both heels at all times while laying in bed   - WBAT    7/4- LE angiogram planned Monday

## 2025-07-06 NOTE — SUBJECTIVE & OBJECTIVE
Medications:  Continuous Infusions:   heparin (porcine) in D5W  0-40 Units/kg/hr Intravenous Continuous 6 mL/hr at 07/05/25 2054 10 Units/kg/hr at 07/05/25 2054     Scheduled Meds:   acetaminophen  1,000 mg Oral Q8H    aspirin  81 mg Oral Daily    atorvastatin  80 mg Oral Daily    capsaicin   Topical (Top) BID    furosemide  40 mg Oral BID    gabapentin  300 mg Oral TID    insulin aspart U-100  3 Units Subcutaneous TIDWM    insulin glargine U-100  5 Units Subcutaneous Daily    LIDOcaine  1 patch Transdermal Q24H    magnesium oxide  800 mg Oral Daily    methocarbamoL  500 mg Oral BID    metoprolol succinate  50 mg Oral Daily    mupirocin   Topical (Top) Daily    pantoprazole  40 mg Oral Daily    polyethylene glycol  17 g Oral Daily    valsartan  40 mg Oral BID     PRN Meds:  Current Facility-Administered Medications:     dextrose 50%, 12.5 g, Intravenous, PRN    dextrose 50%, 25 g, Intravenous, PRN    glucagon (human recombinant), 1 mg, Intramuscular, PRN    glucose, 16 g, Oral, PRN    glucose, 24 g, Oral, PRN    heparin (PORCINE), 60 Units/kg, Intravenous, PRN    heparin (PORCINE), 30 Units/kg, Intravenous, PRN    HYDROmorphone, 0.2 mg, Intravenous, Q6H PRN    insulin aspart U-100, 0-10 Units, Subcutaneous, Q6H PRN    LIDOcaine-prilocaine, , Topical (Top), PRN    meclizine, 12.5 mg, Oral, BID PRN    naloxone, 0.02 mg, Intravenous, PRN    ondansetron, 4 mg, Oral, Q8H PRN    oxyCODONE, 5 mg, Oral, Q6H PRN    sodium chloride 0.9%, 10 mL, Intravenous, PRN     Objective:     Vital Signs (Most Recent):  Temp: 99.6 °F (37.6 °C) (07/06/25 0727)  Pulse: 62 (07/06/25 1000)  Resp: 18 (07/06/25 0743)  BP: (!) 146/65 (07/06/25 0727)  SpO2: 95 % (07/06/25 0743) Vital Signs (24h Range):  Temp:  [98 °F (36.7 °C)-99.6 °F (37.6 °C)] 99.6 °F (37.6 °C)  Pulse:  [] 62  Resp:  [16-18] 18  SpO2:  [93 %-97 %] 95 %  BP: (112-146)/(53-73) 146/65     Date 07/06/25 0700 - 07/07/25 0659   Shift 8387-5897 6010-0862 0246-6249 24 Hour  Total   INTAKE   P.O. 240   240   Shift Total(mL/kg) 240(4)   240(4)   OUTPUT   Urine(mL/kg/hr) 325   325   Shift Total(mL/kg) 325(5.4)   325(5.4)   Weight (kg) 60.2 60.2 60.2 60.2        Physical Exam  Constitutional:       General: She is not in acute distress.  HENT:      Head: Normocephalic.      Mouth/Throat:      Mouth: Mucous membranes are moist.   Cardiovascular:      Rate and Rhythm: Normal rate.      Comments: Palpable femoral pulses bilaterally 2+   R biphasic DP  L monophasic DP, 1st toe ulcer  Warm    Pulmonary:      Effort: Pulmonary effort is normal.   Abdominal:      General: Abdomen is flat.   Musculoskeletal:         General: Tenderness present.      Cervical back: Neck supple.      Right lower leg: No edema.      Left lower leg: No edema.   Neurological:      Mental Status: She is alert and oriented to person, place, and time.      Sensory: No sensory deficit.      Motor: No weakness.          Significant Labs:  All pertinent labs from the last 24 hours have been reviewed.    Significant Diagnostics:  I have reviewed and interpreted all pertinent imaging results/findings within the past 24 hours.

## 2025-07-06 NOTE — ASSESSMENT & PLAN NOTE
"Started decades ago after an elective cholecystectomy, she was at home recovering and her "stomach burst," reportedly "wasn't wrapped" by surgeon after the operation, and pt was too afraid to go to any more surgery for a repair so she's been living with it.  It's been enlarging over time.    Pain was positional and I was able to relieve it by re-positioning the hernia on a pillow on 7/3.   - see abd pain    "

## 2025-07-06 NOTE — SUBJECTIVE & OBJECTIVE
Interval History: see updated S/O above    Review of Systems   Constitutional:  Positive for activity change. Negative for fever.   Gastrointestinal:  Negative for abdominal distention, diarrhea and nausea.        Large ventral hernia, chronic   Musculoskeletal:  Positive for gait problem.   All other systems reviewed and are negative.    Objective:     Vital Signs (Most Recent):  Temp: 99.6 °F (37.6 °C) (07/06/25 0727)  Pulse: 65 (07/06/25 0743)  Resp: 18 (07/06/25 0743)  BP: (!) 146/65 (07/06/25 0727)  SpO2: 95 % (07/06/25 0743) Vital Signs (24h Range):  Temp:  [98 °F (36.7 °C)-99.6 °F (37.6 °C)] 99.6 °F (37.6 °C)  Pulse:  [] 65  Resp:  [16-18] 18  SpO2:  [93 %-97 %] 95 %  BP: (112-146)/(53-73) 146/65     Weight: 60.2 kg (132 lb 11.5 oz)  Body mass index is 24.27 kg/m².    Intake/Output Summary (Last 24 hours) at 7/6/2025 0832  Last data filed at 7/6/2025 0400  Gross per 24 hour   Intake 240 ml   Output 600 ml   Net -360 ml         Physical Exam  Vitals reviewed.   Constitutional:       General: She is not in acute distress.     Appearance: She is not toxic-appearing.      Comments: thin   Cardiovascular:      Rate and Rhythm: Normal rate.   Abdominal:      General: There is no distension.      Tenderness: There is abdominal tenderness (related to hernia, mild).      Comments: TTP on LEFT middle aspect of Extremely large ventral hernia, soft, no rebound/guard, possible TTP anterior L thigh   Musculoskeletal:         General: Swelling (feet, right heel tendersess with mild erythema. left large toe, has smal peripheral ischemic lesion below at tip.) present.      Comments: LEFT foot - linear erythema of mid superior aspect of foot with small scab, L heel erythema and exquisite TTP with warmth, L great toe with 1cm blister on distal aspect and surrounding erythema with nomal tempurature   Neurological:      General: No focal deficit present.      Mental Status: She is oriented to person, place, and time.       Motor: Weakness present.   Psychiatric:         Mood and Affect: Mood normal.         Behavior: Behavior normal.               Significant Labs: All pertinent labs within the past 24 hours have been reviewed.    Significant Imaging: I have reviewed all pertinent imaging results/findings within the past 24 hours.

## 2025-07-06 NOTE — ASSESSMENT & PLAN NOTE
Dispo - independent/ambulatory prior to admit (albeit with large ventral hernia), now new deconditioning/debility requiring two person assist for standing, very motivated and engaged, PT/OT rec Mod Intensity. Family interested in [facility] facility in [place], prior very positive experience there and would be near daughter who lives in [place].

## 2025-07-07 ENCOUNTER — ANESTHESIA (OUTPATIENT)
Dept: SURGERY | Facility: HOSPITAL | Age: 75
DRG: 242 | End: 2025-07-07
Payer: MEDICARE

## 2025-07-07 ENCOUNTER — DOCUMENTATION ONLY (OUTPATIENT)
Dept: CARDIOLOGY | Facility: HOSPITAL | Age: 75
End: 2025-07-07
Payer: MEDICARE

## 2025-07-07 LAB
ABSOLUTE EOSINOPHIL (OHS): 0.86 K/UL
ABSOLUTE MONOCYTE (OHS): 0.75 K/UL (ref 0.3–1)
ABSOLUTE NEUTROPHIL COUNT (OHS): 4.07 K/UL (ref 1.8–7.7)
ALBUMIN SERPL BCP-MCNC: 2.5 G/DL (ref 3.5–5.2)
ALP SERPL-CCNC: 54 UNIT/L (ref 40–150)
ALT SERPL W/O P-5'-P-CCNC: 27 UNIT/L (ref 10–44)
ANION GAP (OHS): 9 MMOL/L (ref 8–16)
APTT PPP: 46.1 SECONDS (ref 21–32)
APTT PPP: 46.9 SECONDS (ref 21–32)
AST SERPL-CCNC: 34 UNIT/L (ref 11–45)
BASOPHILS # BLD AUTO: 0.1 K/UL
BASOPHILS NFR BLD AUTO: 1.2 %
BILIRUB SERPL-MCNC: 0.2 MG/DL (ref 0.1–1)
BUN SERPL-MCNC: 32 MG/DL (ref 8–23)
CALCIUM SERPL-MCNC: 8.7 MG/DL (ref 8.7–10.5)
CHLORIDE SERPL-SCNC: 100 MMOL/L (ref 95–110)
CO2 SERPL-SCNC: 28 MMOL/L (ref 23–29)
CREAT SERPL-MCNC: 0.9 MG/DL (ref 0.5–1.4)
ERYTHROCYTE [DISTWIDTH] IN BLOOD BY AUTOMATED COUNT: 13.5 % (ref 11.5–14.5)
GFR SERPLBLD CREATININE-BSD FMLA CKD-EPI: >60 ML/MIN/1.73/M2
GLUCOSE SERPL-MCNC: 219 MG/DL (ref 70–110)
HCT VFR BLD AUTO: 32.6 % (ref 37–48.5)
HGB BLD-MCNC: 11.2 GM/DL (ref 12–16)
IMM GRANULOCYTES # BLD AUTO: 0.05 K/UL (ref 0–0.04)
IMM GRANULOCYTES NFR BLD AUTO: 0.6 % (ref 0–0.5)
LYMPHOCYTES # BLD AUTO: 2.62 K/UL (ref 1–4.8)
MAGNESIUM SERPL-MCNC: 1.9 MG/DL (ref 1.6–2.6)
MCH RBC QN AUTO: 32.6 PG (ref 27–31)
MCHC RBC AUTO-ENTMCNC: 34.4 G/DL (ref 32–36)
MCV RBC AUTO: 95 FL (ref 82–98)
NUCLEATED RBC (/100WBC) (OHS): 0 /100 WBC
PLATELET # BLD AUTO: 197 K/UL (ref 150–450)
PMV BLD AUTO: 13.1 FL (ref 9.2–12.9)
POCT GLUCOSE: 165 MG/DL (ref 70–110)
POCT GLUCOSE: 197 MG/DL (ref 70–110)
POCT GLUCOSE: 202 MG/DL (ref 70–110)
POCT GLUCOSE: 269 MG/DL (ref 70–110)
POCT GLUCOSE: 292 MG/DL (ref 70–110)
POTASSIUM SERPL-SCNC: 4.7 MMOL/L (ref 3.5–5.1)
PROT SERPL-MCNC: 5.9 GM/DL (ref 6–8.4)
RBC # BLD AUTO: 3.44 M/UL (ref 4–5.4)
RELATIVE EOSINOPHIL (OHS): 10.2 %
RELATIVE LYMPHOCYTE (OHS): 31 % (ref 18–48)
RELATIVE MONOCYTE (OHS): 8.9 % (ref 4–15)
RELATIVE NEUTROPHIL (OHS): 48.1 % (ref 38–73)
SODIUM SERPL-SCNC: 137 MMOL/L (ref 136–145)
WBC # BLD AUTO: 8.45 K/UL (ref 3.9–12.7)

## 2025-07-07 PROCEDURE — C1725 CATH, TRANSLUMIN NON-LASER: HCPCS | Performed by: SURGERY

## 2025-07-07 PROCEDURE — 63600175 PHARM REV CODE 636 W HCPCS: Performed by: HOSPITALIST

## 2025-07-07 PROCEDURE — 85025 COMPLETE CBC W/AUTO DIFF WBC: CPT

## 2025-07-07 PROCEDURE — 80053 COMPREHEN METABOLIC PANEL: CPT | Performed by: HOSPITALIST

## 2025-07-07 PROCEDURE — C1887 CATHETER, GUIDING: HCPCS | Performed by: SURGERY

## 2025-07-07 PROCEDURE — 63600175 PHARM REV CODE 636 W HCPCS

## 2025-07-07 PROCEDURE — 37000008 HC ANESTHESIA 1ST 15 MINUTES: Performed by: SURGERY

## 2025-07-07 PROCEDURE — 047Q3ZZ DILATION OF LEFT ANTERIOR TIBIAL ARTERY, PERCUTANEOUS APPROACH: ICD-10-PCS | Performed by: SURGERY

## 2025-07-07 PROCEDURE — 36000706: Performed by: SURGERY

## 2025-07-07 PROCEDURE — 71000016 HC POSTOP RECOV ADDL HR: Performed by: SURGERY

## 2025-07-07 PROCEDURE — 94761 N-INVAS EAR/PLS OXIMETRY MLT: CPT

## 2025-07-07 PROCEDURE — C1894 INTRO/SHEATH, NON-LASER: HCPCS | Performed by: SURGERY

## 2025-07-07 PROCEDURE — 36415 COLL VENOUS BLD VENIPUNCTURE: CPT | Performed by: HOSPITALIST

## 2025-07-07 PROCEDURE — B41G1ZZ FLUOROSCOPY OF LEFT LOWER EXTREMITY ARTERIES USING LOW OSMOLAR CONTRAST: ICD-10-PCS | Performed by: SURGERY

## 2025-07-07 PROCEDURE — 27201423 OPTIME MED/SURG SUP & DEVICES STERILE SUPPLY: Performed by: SURGERY

## 2025-07-07 PROCEDURE — 37000009 HC ANESTHESIA EA ADD 15 MINS: Performed by: SURGERY

## 2025-07-07 PROCEDURE — C1769 GUIDE WIRE: HCPCS | Performed by: SURGERY

## 2025-07-07 PROCEDURE — 25000003 PHARM REV CODE 250

## 2025-07-07 PROCEDURE — C1760 CLOSURE DEV, VASC: HCPCS | Performed by: SURGERY

## 2025-07-07 PROCEDURE — 25000003 PHARM REV CODE 250: Performed by: HOSPITALIST

## 2025-07-07 PROCEDURE — 63600175 PHARM REV CODE 636 W HCPCS: Performed by: SURGERY

## 2025-07-07 PROCEDURE — 11000001 HC ACUTE MED/SURG PRIVATE ROOM

## 2025-07-07 PROCEDURE — 85730 THROMBOPLASTIN TIME PARTIAL: CPT | Performed by: HOSPITALIST

## 2025-07-07 PROCEDURE — 82962 GLUCOSE BLOOD TEST: CPT | Performed by: SURGERY

## 2025-07-07 PROCEDURE — 25000003 PHARM REV CODE 250: Performed by: STUDENT IN AN ORGANIZED HEALTH CARE EDUCATION/TRAINING PROGRAM

## 2025-07-07 PROCEDURE — 36000707: Performed by: SURGERY

## 2025-07-07 PROCEDURE — 047L3ZZ DILATION OF LEFT FEMORAL ARTERY, PERCUTANEOUS APPROACH: ICD-10-PCS | Performed by: SURGERY

## 2025-07-07 PROCEDURE — 71000015 HC POSTOP RECOV 1ST HR: Performed by: SURGERY

## 2025-07-07 PROCEDURE — 83735 ASSAY OF MAGNESIUM: CPT | Performed by: HOSPITALIST

## 2025-07-07 PROCEDURE — 71000033 HC RECOVERY, INTIAL HOUR: Performed by: SURGERY

## 2025-07-07 RX ORDER — SODIUM CHLORIDE 0.9 % (FLUSH) 0.9 %
10 SYRINGE (ML) INJECTION
Status: DISCONTINUED | OUTPATIENT
Start: 2025-07-07 | End: 2025-07-08

## 2025-07-07 RX ORDER — CEFAZOLIN SODIUM 1 G/3ML
INJECTION, POWDER, FOR SOLUTION INTRAMUSCULAR; INTRAVENOUS
Status: DISCONTINUED | OUTPATIENT
Start: 2025-07-07 | End: 2025-07-07

## 2025-07-07 RX ORDER — HEPARIN SODIUM 1000 [USP'U]/ML
INJECTION, SOLUTION INTRAVENOUS; SUBCUTANEOUS
Status: DISCONTINUED | OUTPATIENT
Start: 2025-07-07 | End: 2025-07-07

## 2025-07-07 RX ORDER — HYDROMORPHONE HYDROCHLORIDE 1 MG/ML
0.2 INJECTION, SOLUTION INTRAMUSCULAR; INTRAVENOUS; SUBCUTANEOUS EVERY 5 MIN PRN
Status: DISCONTINUED | OUTPATIENT
Start: 2025-07-07 | End: 2025-07-08

## 2025-07-07 RX ORDER — HEPARIN SOD,PORCINE/0.9 % NACL 1000/500ML
INTRAVENOUS SOLUTION INTRAVENOUS
Status: DISCONTINUED | OUTPATIENT
Start: 2025-07-07 | End: 2025-07-07 | Stop reason: HOSPADM

## 2025-07-07 RX ORDER — PHENYLEPHRINE HCL IN 0.9% NACL 1 MG/10 ML
SYRINGE (ML) INTRAVENOUS
Status: DISCONTINUED | OUTPATIENT
Start: 2025-07-07 | End: 2025-07-07

## 2025-07-07 RX ORDER — GLUCAGON 1 MG
1 KIT INJECTION
Status: DISCONTINUED | OUTPATIENT
Start: 2025-07-07 | End: 2025-07-08

## 2025-07-07 RX ORDER — VASOPRESSIN 20 [USP'U]/ML
INJECTION, SOLUTION INTRAMUSCULAR; SUBCUTANEOUS
Status: DISCONTINUED | OUTPATIENT
Start: 2025-07-07 | End: 2025-07-07

## 2025-07-07 RX ORDER — PROCHLORPERAZINE EDISYLATE 5 MG/ML
5 INJECTION INTRAMUSCULAR; INTRAVENOUS EVERY 30 MIN PRN
Status: DISCONTINUED | OUTPATIENT
Start: 2025-07-07 | End: 2025-07-08

## 2025-07-07 RX ORDER — DEXMEDETOMIDINE HYDROCHLORIDE 100 UG/ML
INJECTION, SOLUTION INTRAVENOUS
Status: DISCONTINUED | OUTPATIENT
Start: 2025-07-07 | End: 2025-07-07

## 2025-07-07 RX ORDER — PROCHLORPERAZINE EDISYLATE 5 MG/ML
INJECTION INTRAMUSCULAR; INTRAVENOUS
Status: COMPLETED
Start: 2025-07-07 | End: 2025-07-07

## 2025-07-07 RX ORDER — FENTANYL CITRATE 50 UG/ML
INJECTION, SOLUTION INTRAMUSCULAR; INTRAVENOUS
Status: DISCONTINUED | OUTPATIENT
Start: 2025-07-07 | End: 2025-07-07

## 2025-07-07 RX ORDER — PROPOFOL 10 MG/ML
VIAL (ML) INTRAVENOUS
Status: DISCONTINUED | OUTPATIENT
Start: 2025-07-07 | End: 2025-07-07

## 2025-07-07 RX ORDER — FENTANYL CITRATE 50 UG/ML
25 INJECTION, SOLUTION INTRAMUSCULAR; INTRAVENOUS EVERY 5 MIN PRN
Status: DISCONTINUED | OUTPATIENT
Start: 2025-07-07 | End: 2025-07-08

## 2025-07-07 RX ORDER — MIDAZOLAM HYDROCHLORIDE 1 MG/ML
INJECTION INTRAMUSCULAR; INTRAVENOUS
Status: DISCONTINUED | OUTPATIENT
Start: 2025-07-07 | End: 2025-07-07

## 2025-07-07 RX ORDER — ONDANSETRON HYDROCHLORIDE 2 MG/ML
4 INJECTION, SOLUTION INTRAVENOUS DAILY PRN
Status: DISCONTINUED | OUTPATIENT
Start: 2025-07-07 | End: 2025-07-08

## 2025-07-07 RX ADMIN — METHOCARBAMOL 500 MG: 500 TABLET ORAL at 08:07

## 2025-07-07 RX ADMIN — ASPIRIN 81 MG CHEWABLE TABLET 81 MG: 81 TABLET CHEWABLE at 08:07

## 2025-07-07 RX ADMIN — CAPSAICIN: 0.25 CREAM TOPICAL at 08:07

## 2025-07-07 RX ADMIN — OXYCODONE 5 MG: 5 TABLET ORAL at 08:07

## 2025-07-07 RX ADMIN — FUROSEMIDE 40 MG: 40 TABLET ORAL at 08:07

## 2025-07-07 RX ADMIN — HYDROMORPHONE HYDROCHLORIDE 0.2 MG: 1 INJECTION, SOLUTION INTRAMUSCULAR; INTRAVENOUS; SUBCUTANEOUS at 03:07

## 2025-07-07 RX ADMIN — DEXMEDETOMIDINE 8 MCG: 100 INJECTION, SOLUTION, CONCENTRATE INTRAVENOUS at 01:07

## 2025-07-07 RX ADMIN — HEPARIN SODIUM 5000 UNITS: 1000 INJECTION INTRAVENOUS; SUBCUTANEOUS at 01:07

## 2025-07-07 RX ADMIN — PROCHLORPERAZINE EDISYLATE 5 MG: 5 INJECTION INTRAMUSCULAR; INTRAVENOUS at 03:07

## 2025-07-07 RX ADMIN — FENTANYL CITRATE 50 MCG: 50 INJECTION, SOLUTION INTRAMUSCULAR; INTRAVENOUS at 02:07

## 2025-07-07 RX ADMIN — FENTANYL CITRATE 50 MCG: 50 INJECTION, SOLUTION INTRAMUSCULAR; INTRAVENOUS at 01:07

## 2025-07-07 RX ADMIN — SODIUM CHLORIDE: 9 INJECTION, SOLUTION INTRAVENOUS at 01:07

## 2025-07-07 RX ADMIN — Medication 800 MG: at 08:07

## 2025-07-07 RX ADMIN — ACETAMINOPHEN 1000 MG: 500 TABLET ORAL at 08:07

## 2025-07-07 RX ADMIN — ACETAMINOPHEN 1000 MG: 500 TABLET ORAL at 03:07

## 2025-07-07 RX ADMIN — GABAPENTIN 300 MG: 300 CAPSULE ORAL at 03:07

## 2025-07-07 RX ADMIN — VASOPRESSIN 1 UNITS: 20 INJECTION INTRAVENOUS at 02:07

## 2025-07-07 RX ADMIN — OXYCODONE 5 MG: 5 TABLET ORAL at 03:07

## 2025-07-07 RX ADMIN — LIDOCAINE 1 PATCH: 50 PATCH CUTANEOUS at 03:07

## 2025-07-07 RX ADMIN — HEPARIN SODIUM 12 UNITS/KG/HR: 10000 INJECTION, SOLUTION INTRAVENOUS at 04:07

## 2025-07-07 RX ADMIN — INSULIN ASPART 2 UNITS: 100 INJECTION, SOLUTION INTRAVENOUS; SUBCUTANEOUS at 08:07

## 2025-07-07 RX ADMIN — Medication 100 MCG: at 01:07

## 2025-07-07 RX ADMIN — INSULIN GLARGINE 5 UNITS: 100 INJECTION, SOLUTION SUBCUTANEOUS at 08:07

## 2025-07-07 RX ADMIN — METOPROLOL SUCCINATE 50 MG: 50 TABLET, EXTENDED RELEASE ORAL at 08:07

## 2025-07-07 RX ADMIN — Medication 100 MCG: at 02:07

## 2025-07-07 RX ADMIN — PROCHLORPERAZINE EDISYLATE: 5 INJECTION INTRAMUSCULAR; INTRAVENOUS at 03:07

## 2025-07-07 RX ADMIN — PROPOFOL 20 MG: 10 INJECTION, EMULSION INTRAVENOUS at 01:07

## 2025-07-07 RX ADMIN — VALSARTAN 40 MG: 40 TABLET, FILM COATED ORAL at 08:07

## 2025-07-07 RX ADMIN — FUROSEMIDE 40 MG: 40 TABLET ORAL at 05:07

## 2025-07-07 RX ADMIN — MIDAZOLAM 2 MG: 1 INJECTION INTRAMUSCULAR; INTRAVENOUS at 01:07

## 2025-07-07 RX ADMIN — GABAPENTIN 300 MG: 300 CAPSULE ORAL at 08:07

## 2025-07-07 RX ADMIN — INSULIN ASPART 1 UNITS: 100 INJECTION, SOLUTION INTRAVENOUS; SUBCUTANEOUS at 09:07

## 2025-07-07 RX ADMIN — ATORVASTATIN CALCIUM 80 MG: 40 TABLET, FILM COATED ORAL at 08:07

## 2025-07-07 RX ADMIN — MUPIROCIN: 20 OINTMENT TOPICAL at 08:07

## 2025-07-07 RX ADMIN — PANTOPRAZOLE SODIUM 40 MG: 40 TABLET, DELAYED RELEASE ORAL at 08:07

## 2025-07-07 RX ADMIN — HEPARIN SODIUM 12 UNITS/KG/HR: 10000 INJECTION, SOLUTION INTRAVENOUS at 09:07

## 2025-07-07 RX ADMIN — CEFAZOLIN 2 G: 330 INJECTION, POWDER, FOR SOLUTION INTRAMUSCULAR; INTRAVENOUS at 01:07

## 2025-07-07 NOTE — ASSESSMENT & PLAN NOTE
74 y.o. female presenting for heart block, with new PPM placed. Podiatry consulted for new onset left heel pain. VSS, Afebrile. WBC wnl. CRP 10.5. Radiographs of left foot demonstrate No acute fracture, dislocation, or bone destruction seen.  No foreign body seen.  Clinical exam: No open wounds or gross deformity seen on, though patient is very tender throughout. Suspect pain is ischemic due to left hallux blister. VAS arterial US demonstrated hemodynamically significant stenosis of LLE. Vascular planning LLE angiogram tomorrow.     Plan:  - No podiatric surgical intervention at this time  - No open wounds  - Appreciate vascular recs  - Weight bearing status: WBAT  - Recommend off loading boots for both heels at all times while laying in bed   - Podiatry will sign off please reach out or re consult if there are any questions of concerns    Discharge Recommendations  - Patient to follow up in outpatient Podiatry clinic with Dr Hogue to follow left foot lesions. Podiatry will schedule

## 2025-07-07 NOTE — PROGRESS NOTES
[facility] - [facility] Stepdown  Podiatry  Progress Note    Patient Name: Cady Watkins  MRN: [id number]  Admission Date: 6/24/2025  Hospital Length of Stay: 12 days  Attending Physician: Mandy Pérez MD  Primary Care Provider: Blaine Benítez MD     Subjective:     Interval History: NAEON. VSS, afebrile. Vascular planning angiogram of left leg. Patient relates left heel pain has improved. daughter relates blister and redness have improved to left hallux.      Post-Operative Day: 10 Days Post-Op    Scheduled Meds:   acetaminophen  1,000 mg Oral Q8H    aspirin  81 mg Oral Daily    atorvastatin  80 mg Oral Daily    capsaicin   Topical (Top) BID    furosemide  40 mg Oral BID    gabapentin  300 mg Oral TID    insulin aspart U-100  3 Units Subcutaneous TIDWM    insulin glargine U-100  5 Units Subcutaneous Daily    LIDOcaine  1 patch Transdermal Q24H    magnesium oxide  800 mg Oral Daily    methocarbamoL  500 mg Oral BID    metoprolol succinate  50 mg Oral Daily    mupirocin   Topical (Top) Daily    pantoprazole  40 mg Oral Daily    polyethylene glycol  17 g Oral Daily    valsartan  40 mg Oral BID     Continuous Infusions:   heparin (porcine) in D5W  0-40 Units/kg/hr Intravenous Continuous 7.2 mL/hr at 07/06/25 1200 12 Units/kg/hr at 07/06/25 1200     PRN Meds:  Current Facility-Administered Medications:     dextrose 50%, 12.5 g, Intravenous, PRN    dextrose 50%, 25 g, Intravenous, PRN    glucagon (human recombinant), 1 mg, Intramuscular, PRN    glucose, 16 g, Oral, PRN    glucose, 24 g, Oral, PRN    heparin (PORCINE), 60 Units/kg, Intravenous, PRN    heparin (PORCINE), 30 Units/kg, Intravenous, PRN    HYDROmorphone, 0.2 mg, Intravenous, Q6H PRN    insulin aspart U-100, 0-10 Units, Subcutaneous, Q6H PRN    LIDOcaine-prilocaine, , Topical (Top), PRN    meclizine, 12.5 mg, Oral, BID PRN    naloxone, 0.02 mg, Intravenous, PRN    ondansetron, 4 mg, Oral, Q8H PRN    oxyCODONE, 5 mg, Oral, Q6H PRN    sodium  chloride 0.9%, 10 mL, Intravenous, PRN    Review of Systems  Objective:     Vital Signs (Most Recent):  Temp: 98.6 °F (37 °C) (07/06/25 1955)  Pulse: 60 (07/06/25 1955)  Resp: 18 (07/06/25 1955)  BP: (!) 138/52 (07/06/25 1955)  SpO2: (!) 93 % (07/06/25 1955) Vital Signs (24h Range):  Temp:  [98.6 °F (37 °C)-99.6 °F (37.6 °C)] 98.6 °F (37 °C)  Pulse:  [60-65] 60  Resp:  [16-18] 18  SpO2:  [93 %-97 %] 93 %  BP: (122-161)/(52-73) 138/52     Weight: 60.2 kg (132 lb 11.5 oz)  Body mass index is 24.27 kg/m².    Foot Exam    General  General Appearance: appears stated age and healthy   Affect: appropriate       Right Foot/Ankle     Inspection and Palpation  Ecchymosis: none  Tenderness: none   Swelling: none   Skin Exam: skin not intact     Neurovascular  Dorsalis pedis: 1+  Posterior tibial: 1+    Comments  No gross deformity or open wounds, lesions    Left Foot/Ankle      Inspection and Palpation  Ecchymosis: none  Tenderness: fifth metatarsal base, plantar fascia and calcaneus tenderness (Non specific tendneress throughout foot, with most severe being)  Swelling: plantar fascia   Skin Exam: blister, skin changes, abnormal color and erythema; no callus, no drainage, no dry skin, no cellulitis and no ulcer     Comments  Left plantar heel is mildly erythematous, and tender to palpation, though improved. Several rash like skin lesions throughout foot without  breaks in the skin. Left hallux with small blister at distal tip. No clinical signs of infection including drainage, malodor, fluctuance, crepitus.     Clinical media:            Laboratory:  All pertinent labs reviewed within the last 24 hours.    Diagnostic Results:  I have reviewed all pertinent imaging results/findings within the past 24 hours.  Assessment/Plan:     Orthopedic  Left foot pain  74 y.o. female presenting for heart block, with new PPM placed. Podiatry consulted for new onset left heel pain. VSS, Afebrile. WBC wnl. CRP 10.5. Radiographs of left foot  demonstrate No acute fracture, dislocation, or bone destruction seen.  No foreign body seen.  Clinical exam: No open wounds or gross deformity seen on, though patient is very tender throughout. Suspect pain is ischemic due to left hallux blister. VAS arterial US demonstrated hemodynamically significant stenosis of LLE. Vascular planning LLE angiogram tomorrow.     Plan:  - No podiatric surgical intervention at this time  - No open wounds  - Appreciate vascular recs  - Weight bearing status: WBAT  - Recommend off loading boots for both heels at all times while laying in bed   - Podiatry will sign off please reach out or re consult if there are any questions of concerns    Discharge Recommendations  - Patient to follow up in outpatient Podiatry clinic with Dr Hogue to follow left foot lesions. Podiatry will schedule        Diomedes Hernandez MD  Podiatry  [facility] - [facility] Stepdown

## 2025-07-07 NOTE — PLAN OF CARE
07/07/25 1136   Rounds   Attendance Provider;;Assigned nurse;Charge nurse   Discharge Plan A Skilled Nursing Facility   Why the patient remains in the hospital Requires continued medical care  (angiogram today)   Transition of Care Barriers None     Pt getting angiogram with vascular surgery today.  [facility] 7/8 to SNF.      Eve Wahl LMSW  [facility]  [id number]

## 2025-07-07 NOTE — CONSULTS
[facility] - [facility] Stepdown    Wound Care     Patient Name:  Cady Watkins  MRN:  [id number]  Date: 7/7/2025  Diagnosis: Complete heart block     History:  Past Medical History:   Diagnosis Date    Diabetes mellitus, type 2     Fibromyalgia 9/21/2016    Hypertension 9/21/2016     Social History[1]  Precautions:  Allergies as of 06/23/2025 - Reviewed 06/19/2025   Allergen Reaction Noted    Advil [ibuprofen] Hives 10/03/2016    Penicillins  10/03/2016    Codeine  10/03/2016    Excedrin aspirin free [acetaminophen-caffeine]  10/03/2016       WO Assessment Details / Treatment:    Patient seen for wound care: New Consult   Chart reviewed for this encounter.   Labs:   WBC (K/uL)   Date Value   07/07/2025 8.45   07/06/2025 8.53     Glucose (mg/dL)   Date Value   07/07/2025 219 (H)   07/06/2025 174 (H)   09/16/2024 163 (H)   05/14/2024 143 (H)     Albumin (g/dL)   Date Value   07/07/2025 2.5 (L)   07/06/2025 2.6 (L)   05/14/2024 3.7   01/05/2023 3.4 (L)       Lopez Score: 17    Narrative:  Pt seen for WC consultation  and agreed to assessment. Pt awake and alert lying in bed. Turns independently.   Chart reviewed for this encounter.   See Flow Sheet for additional documentation and media.    Buttocks: DTI clean and intact skin. Sensitive to touch. Blanchable erythema to nikita wound. Triad applied.           RECOMMENDATIONS:  Bedside nurse assess for acute changes (purulence, increased redness/swelling, increased drainage, malodor, increased pain, pallor, necrosis) please contact physician on any acute changes.    Buttocks: Cleanse with bath wipe. Apply triad BID and PRN soiling   -Turn Q2   -No sacral foams   -Waffle     Bedside nursing to continue care, dressing changes, & continue monitoring.  Bedside nursing to maintain pressure injury prevention interventions, (PIP).     Recommendations made to primary team for above plan.    Thank you for the consult. Wound Care will continue to follow.     07/08/25 1337         Wound 07/03/25 1000 Pressure Injury Perineum   Date First Assessed/Time First Assessed: 07/03/25 1000   Primary Wound Type: Pressure Injury  Location: Perineum   Wound Image    Pressure Injury Stage DTPI   Dressing Appearance Open to air   Appearance Purple;Red   Care Cleansed with:;Other (see comments)  (bath wipe)   Dressing Applied;Other (comment)  (triad)                              [1]   Social History  Socioeconomic History    Marital status:    Tobacco Use    Smoking status: Never    Smokeless tobacco: Never   Substance and Sexual Activity    Alcohol use: Yes     Comment: Occasionally    Sexual activity: Yes     Social Drivers of Health     Financial Resource Strain: Low Risk  (6/24/2025)    Overall Financial Resource Strain (CARDIA)     Difficulty of Paying Living Expenses: Not hard at all   Recent Concern: Financial Resource Strain - Medium Risk (6/19/2025)    Overall Financial Resource Strain (CARDIA)     Difficulty of Paying Living Expenses: Somewhat hard   Food Insecurity: No Food Insecurity (6/24/2025)    Hunger Vital Sign     Worried About Running Out of Food in the Last Year: Never true     Ran Out of Food in the Last Year: Never true   Transportation Needs: No Transportation Needs (6/24/2025)    PRAPARE - Transportation     Lack of Transportation (Medical): No     Lack of Transportation (Non-Medical): No   Physical Activity: Inactive (6/19/2025)    Exercise Vital Sign     Days of Exercise per Week: 0 days     Minutes of Exercise per Session: 0 min   Stress: No Stress Concern Present (6/24/2025)    [employer] - Occupational Stress Questionnaire     Feeling of Stress : Not at all   Recent Concern: Stress - Stress Concern Present (6/19/2025)    [employer] - Occupational Stress Questionnaire     Feeling of Stress : Very much   Housing Stability: Low Risk  (6/24/2025)    Housing Stability Vital Sign     Unable to Pay for Housing in the  Last Year: No     Number of Times Moved in the Last Year: 0     Homeless in the Last Year: No

## 2025-07-07 NOTE — OP NOTE
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 Watkins is a 73yo  female who was transferred from an OSH with cardiac dysrhythmia and acquired left foot pressure ulcerations.  Given her non-palpable pedal pulses, left lower extremity angiography was recommended.    Operation in Detail:  After informed consent was obtained, the patient was taken to the operating room and placed in the supine position.  Her bilateral groins were prepped and draped in the usual sterile fashion.  A time-out was performed verifying the identification of the patient as well as procedure to be performed.  She received a dose of antibiotics within 60 minutes of incision.  We began by using the ultrasound and micropuncture technique to cannulate the right common femoral artery placing a short 5 Slovak sheath.  Over a Glidewire, we brought up an Omni flush catheter and shot an aortogram.  We went up and over the aortic bifurcation, parking the flush catheter at the femoral head.  We then shot left lower extremity angiography.  Over a  Glidewire advantage we exchanged the short 5 Haitian sheath for a 6 Haitian 55 cm long Francois sheath, parking it in the proximal superficial femoral artery.  Using an [id number] cm long exchange catheter and Glidewire advantage were able to cannulate the left superficial femoral, popliteal, anterior tibial, dorsalis pedis arteries.  It should be noted we did ask our anesthesia colleagues to administer 5000 units of IV heparin which was allowed to circulate for 2 minutes after the insertion of the Francois sheath.  We exchanged for a choice PT wire and then brought up a 3 x 200 balloon, performing balloon angioplasty of the left anterior tibial artery.  We did have to use a 2.5 x 60 balloon in the distal anterior tibial proximal dorsalis pedis arteries.  We then used a 5 x 200 balloon and performed balloon angioplasty of left superficial femoral artery.  Completion angiogram was performed through the sheath.  We removed all sheaths catheters and wires and deployed a ProGlide without extravasation or hematoma.  All counts were correct x2 at the conclusion of the case.  I was scrubbed and present and performed key portions of the operation.    Interpretation of the radiographic findings:  Patent infrarenal abdominal aorta as well as bilateral common and external iliac arteries.  The left internal iliac artery was also patent.  The left common femoral profunda femoris arteries were patent.  The left superficial femoral artery demonstrated approximately 50% ostial stenosis, with multiple areas of 75% stenosis at the adductor hiatus as well as mid superficial femoral artery.  The left popliteal artery is patent as was the anterior tibial artery proximally.  The mid anterior tibial artery demonstrated a occlusion with reconstitution of a atretic distal anterior tibial and dorsalis pedis arteries.  The tibioperoneal trunk, posterior tibial, peroneal arteries were occluded.  There was reconstitution of the distal peroneal artery with  a look like an anterior branch extending to the dorsalis pedis artery.  After balloon angioplasty there was minimal residual stenosis, no extravasation, dissection, or embolic phenomenon.

## 2025-07-07 NOTE — SUBJECTIVE & OBJECTIVE
Subjective:     Interval History: NAEON. VSS, afebrile. Vascular planning angiogram of left leg. Patient relates left heel pain has improved. daughter relates blister and redness have improved to left hallux.      Post-Operative Day: 10 Days Post-Op    Scheduled Meds:   acetaminophen  1,000 mg Oral Q8H    aspirin  81 mg Oral Daily    atorvastatin  80 mg Oral Daily    capsaicin   Topical (Top) BID    furosemide  40 mg Oral BID    gabapentin  300 mg Oral TID    insulin aspart U-100  3 Units Subcutaneous TIDWM    insulin glargine U-100  5 Units Subcutaneous Daily    LIDOcaine  1 patch Transdermal Q24H    magnesium oxide  800 mg Oral Daily    methocarbamoL  500 mg Oral BID    metoprolol succinate  50 mg Oral Daily    mupirocin   Topical (Top) Daily    pantoprazole  40 mg Oral Daily    polyethylene glycol  17 g Oral Daily    valsartan  40 mg Oral BID     Continuous Infusions:   heparin (porcine) in D5W  0-40 Units/kg/hr Intravenous Continuous 7.2 mL/hr at 07/06/25 1200 12 Units/kg/hr at 07/06/25 1200     PRN Meds:  Current Facility-Administered Medications:     dextrose 50%, 12.5 g, Intravenous, PRN    dextrose 50%, 25 g, Intravenous, PRN    glucagon (human recombinant), 1 mg, Intramuscular, PRN    glucose, 16 g, Oral, PRN    glucose, 24 g, Oral, PRN    heparin (PORCINE), 60 Units/kg, Intravenous, PRN    heparin (PORCINE), 30 Units/kg, Intravenous, PRN    HYDROmorphone, 0.2 mg, Intravenous, Q6H PRN    insulin aspart U-100, 0-10 Units, Subcutaneous, Q6H PRN    LIDOcaine-prilocaine, , Topical (Top), PRN    meclizine, 12.5 mg, Oral, BID PRN    naloxone, 0.02 mg, Intravenous, PRN    ondansetron, 4 mg, Oral, Q8H PRN    oxyCODONE, 5 mg, Oral, Q6H PRN    sodium chloride 0.9%, 10 mL, Intravenous, PRN    Review of Systems  Objective:     Vital Signs (Most Recent):  Temp: 98.6 °F (37 °C) (07/06/25 1955)  Pulse: 60 (07/06/25 1955)  Resp: 18 (07/06/25 1955)  BP: (!) 138/52 (07/06/25 1955)  SpO2: (!) 93 % (07/06/25 1955) Vital  Signs (24h Range):  Temp:  [98.6 °F (37 °C)-99.6 °F (37.6 °C)] 98.6 °F (37 °C)  Pulse:  [60-65] 60  Resp:  [16-18] 18  SpO2:  [93 %-97 %] 93 %  BP: (122-161)/(52-73) 138/52     Weight: 60.2 kg (132 lb 11.5 oz)  Body mass index is 24.27 kg/m².    Foot Exam    General  General Appearance: appears stated age and healthy   Affect: appropriate       Right Foot/Ankle     Inspection and Palpation  Ecchymosis: none  Tenderness: none   Swelling: none   Skin Exam: skin not intact     Neurovascular  Dorsalis pedis: 1+  Posterior tibial: 1+    Comments  No gross deformity or open wounds, lesions    Left Foot/Ankle      Inspection and Palpation  Ecchymosis: none  Tenderness: fifth metatarsal base, plantar fascia and calcaneus tenderness (Non specific tendneress throughout foot, with most severe being)  Swelling: plantar fascia   Skin Exam: blister, skin changes, abnormal color and erythema; no callus, no drainage, no dry skin, no cellulitis and no ulcer     Comments  Left plantar heel is mildly erythematous, and tender to palpation, though improved. Several rash like skin lesions throughout foot without  breaks in the skin. Left hallux with small blister at distal tip. No clinical signs of infection including drainage, malodor, fluctuance, crepitus.     Clinical media:            Laboratory:  All pertinent labs reviewed within the last 24 hours.    Diagnostic Results:  I have reviewed all pertinent imaging results/findings within the past 24 hours.

## 2025-07-07 NOTE — SUBJECTIVE & OBJECTIVE
Interval History: see updated S/O above    Review of Systems   Constitutional:  Positive for activity change. Negative for fever.   Gastrointestinal:  Negative for abdominal distention, diarrhea and nausea.        Large ventral hernia, chronic   Musculoskeletal:  Positive for gait problem.   All other systems reviewed and are negative.    Objective:     Vital Signs (Most Recent):  Temp: 98.6 °F (37 °C) (07/07/25 0725)  Pulse: 60 (07/07/25 0725)  Resp: 17 (07/07/25 0725)  BP: (!) 159/70 (07/07/25 0725)  SpO2: 95 % (07/07/25 0725) Vital Signs (24h Range):  Temp:  [97.8 °F (36.6 °C)-99.5 °F (37.5 °C)] 98.6 °F (37 °C)  Pulse:  [60-75] 60  Resp:  [16-18] 17  SpO2:  [93 %-95 %] 95 %  BP: (135-161)/(52-78) 159/70     Weight: 60.2 kg (132 lb 11.5 oz)  Body mass index is 24.27 kg/m².    Intake/Output Summary (Last 24 hours) at 7/7/2025 0802  Last data filed at 7/7/2025 0500  Gross per 24 hour   Intake 720 ml   Output 625 ml   Net 95 ml         Physical Exam  Vitals reviewed.   Constitutional:       General: She is not in acute distress.     Appearance: She is not toxic-appearing.      Comments: thin   Cardiovascular:      Rate and Rhythm: Normal rate.   Abdominal:      General: There is no distension.      Tenderness: There is abdominal tenderness (related to hernia, mild).      Comments: TTP on LEFT middle aspect of Extremely large ventral hernia, soft, no rebound/guard, possible TTP anterior L thigh   Musculoskeletal:         General: Swelling (feet, right heel tendersess with mild erythema. left large toe, has smal peripheral ischemic lesion below at tip.) present.      Comments: LEFT foot - linear erythema of mid superior aspect of foot with small scab, L heel erythema and exquisite TTP with warmth, L great toe with 1cm blister on distal aspect and surrounding erythema with nomal tempurature ( IMPROVING)    Neurological:      General: No focal deficit present.      Mental Status: She is oriented to person, place, and  time.      Motor: Weakness present.   Psychiatric:         Mood and Affect: Mood normal.         Behavior: Behavior normal.               Significant Labs: All pertinent labs within the past 24 hours have been reviewed.    Significant Imaging: I have reviewed all pertinent imaging results/findings within the past 24 hours.

## 2025-07-07 NOTE — PROGRESS NOTES
Patient has been identified as having an implanted cardiac rhythm device (CRD); the implanted device is a Biotronik pacemaker.      The planned surgical procedure is a Angiogram Extremity Unilateral (Lower Extemity).    PACEMAKERS BELOW WAIST    The reported surgical procedure is BELOW the umbilicus; per protocol, magnet application is not needed and device reprogramming is not required.    For additional questions, please contact the Arrhythmia Department at Ext [phone].

## 2025-07-07 NOTE — TRANSFER OF CARE
"Anesthesia Transfer of Care Note    Patient: Cady Watkins    Procedure(s) Performed: Procedure(s) (LRB):  Angiogram Extremity Unilateral (Left)  ANGIOPLASTY, VEIN    Patient location: PACU    Anesthesia Type: MAC    Transport from OR: Transported from OR on 6-10 L/min O2 by face mask with adequate spontaneous ventilation    Post pain: adequate analgesia    Post assessment: no apparent anesthetic complications and tolerated procedure well    Post vital signs: stable    Level of consciousness: awake, alert and oriented    Nausea/Vomiting: no nausea/vomiting    Complications: none    Transfer of care protocol was followed      Last vitals: Visit Vitals  BP (!) 129/95   Pulse 72   Temp 36.6 °C (97.9 °F) (Temporal)   Resp 20   Ht 5' 2" (1.575 m)   Wt 60.2 kg (132 lb 11.5 oz)   LMP 04/22/2001   SpO2 95%   Breastfeeding No   BMI 24.27 kg/m²     "

## 2025-07-07 NOTE — BRIEF OP NOTE
[facility] - Surgery (2nd Fl)  Brief Operative Note    SUMMARY     Surgery Date: 7/7/2025     Surgeons and Role:     * Glen Finn MD - Primary     * Shimon Clayton MD - Fellow    Assisting Surgeon: None    Pre-op Diagnosis:  Complete heart block [I44.2]    Post-op Diagnosis:  Post-Op Diagnosis Codes:     * Complete heart block [I44.2]    Procedure(s) (LRB):  Ultrasound guided access R CFA   Perclose closure above R CFA 6 Fr   Aortogram, of note no preoperative CTA available, left lower extremity angiogram   PTA L SFA/popliteal [id number] Ultraverse  PTA L AT/DP [id number] Stopover    Anesthesia: Local MAC    Operative Findings:   >70% stenosis proximal, mid, distal SFA   AT runoff to foot with occlusion     Post-treatment with AT runoff to foot via DP    Restart heparin on post op check, can transition to DOAC tomorrow    Estimated Blood Loss: 10cc         Specimens:   N/A

## 2025-07-07 NOTE — ASSESSMENT & PLAN NOTE
Acute exquisite severe Left heel pain, with erythema and bogginess/fluctuance, denies h/o any gout/CPPD, recent broken toes (healed).  Great toe with erythema/blister.  XR negative.  CT unremarkable, no abscess/OM other.  Considered checking an MRI foot, in discussion with Podiatry, however the abnormal VAS MIKAEL and U/S returned in the meantime so consulting VSURG first  - Suspect ischemic event to left heel and great toe.   - f/u Vsurg consult, appreciate  - Already on atherosclerosis meds for new Dx CAD as below  - f/u podiatry consult, appreciate  - EMLA cream and capsaicin cream, some relief with combo  - off loading boots for both heels at all times while laying in bed   - WBAT    7/6- LE angiogram planned Monday

## 2025-07-07 NOTE — ASSESSMENT & PLAN NOTE
DM2 controlled (A1C 6.1 on 9/2024) with neuropathy  New A1C- 6.6  - Gabapentin 300 TID  - Working to optimize BG control  - Hold home meds: metformin  - SSI provided for corrective dosing  - POCT glucose checks as ordered  - Hypoglycemic protocol in effect  - Diabetic diet provided      Recent Labs     07/05/25  1159 07/05/25  1603 07/05/25  2059 07/06/25  0637 07/06/25  1215 07/06/25 2002   POCTGLUCOSE 138* 258* 272* 194* 220* 214*     7/4- BS high 319,  On lantus 5 daily, started aspart 5 ac, and increased SS 1-10, on consistent carb diet. Pt eating better. Takes metformin at home.   7/5- reduce lantus 5 and aspart 3 ac to prevent hypoglycemia

## 2025-07-07 NOTE — PROGRESS NOTES
[facility] - Cardiology Stepdown  Vascular Surgery  Progress Note    Patient Name: Cady Watkins  MRN: [id number]  Admission Date: 6/24/2025  Primary Care Provider: Blaine Benítez MD    Subjective:     Interval History:   FAREED  Doing well this morning  Reports left foot pain is improved  NPO since midnight    Post-Op Info:  Procedure(s) (LRB):  INSERTION, PACEMAKER, BIVENTRICULAR (N/A)   11 Days Post-Op     Medications:  Continuous Infusions:   heparin (porcine) in D5W  0-40 Units/kg/hr Intravenous Continuous 7.2 mL/hr at 07/07/25 0118 12 Units/kg/hr at 07/07/25 0118     Scheduled Meds:   acetaminophen  1,000 mg Oral Q8H    aspirin  81 mg Oral Daily    atorvastatin  80 mg Oral Daily    capsaicin   Topical (Top) BID    furosemide  40 mg Oral BID    gabapentin  300 mg Oral TID    insulin aspart U-100  3 Units Subcutaneous TIDWM    insulin glargine U-100  5 Units Subcutaneous Daily    LIDOcaine  1 patch Transdermal Q24H    magnesium oxide  800 mg Oral Daily    methocarbamoL  500 mg Oral BID    metoprolol succinate  50 mg Oral Daily    mupirocin   Topical (Top) Daily    pantoprazole  40 mg Oral Daily    polyethylene glycol  17 g Oral Daily    valsartan  40 mg Oral BID     PRN Meds:  Current Facility-Administered Medications:     dextrose 50%, 12.5 g, Intravenous, PRN    dextrose 50%, 25 g, Intravenous, PRN    glucagon (human recombinant), 1 mg, Intramuscular, PRN    glucose, 16 g, Oral, PRN    glucose, 24 g, Oral, PRN    heparin (PORCINE), 60 Units/kg, Intravenous, PRN    heparin (PORCINE), 30 Units/kg, Intravenous, PRN    HYDROmorphone, 0.2 mg, Intravenous, Q6H PRN    insulin aspart U-100, 0-10 Units, Subcutaneous, Q6H PRN    LIDOcaine-prilocaine, , Topical (Top), PRN    meclizine, 12.5 mg, Oral, BID PRN    naloxone, 0.02 mg, Intravenous, PRN    ondansetron, 4 mg, Oral, Q8H PRN    oxyCODONE, 5 mg, Oral, Q6H PRN    sodium chloride 0.9%, 10 mL, Intravenous, PRN     Objective:     Vital Signs (Most  Recent):  Temp: 97.9 °F (36.6 °C) (07/07/25 0329)  Pulse: 60 (07/07/25 0600)  Resp: 16 (07/07/25 0329)  BP: (!) 159/78 (07/07/25 0329)  SpO2: (!) 93 % (07/07/25 0329) Vital Signs (24h Range):  Temp:  [97.8 °F (36.6 °C)-99.6 °F (37.6 °C)] 97.9 °F (36.6 °C)  Pulse:  [60-75] 60  Resp:  [16-18] 16  SpO2:  [93 %-97 %] 93 %  BP: (135-161)/(52-78) 159/78          Physical Exam  Constitutional:       General: She is not in acute distress.  HENT:      Head: Normocephalic.      Mouth/Throat:      Mouth: Mucous membranes are moist.   Cardiovascular:      Rate and Rhythm: Normal rate.      Comments: Palpable femoral pulses bilaterally 2+   R biphasic DP  L monophasic DP, 1st toe ulcer  Warm    Pulmonary:      Effort: Pulmonary effort is normal.   Abdominal:      General: Abdomen is flat.   Musculoskeletal:         General: Tenderness present.      Cervical back: Neck supple.      Right lower leg: No edema.      Left lower leg: No edema.   Neurological:      Mental Status: She is alert and oriented to person, place, and time.      Sensory: No sensory deficit.      Motor: No weakness.          Significant Labs:  All pertinent labs from the last 24 hours have been reviewed.    Significant Diagnostics:  I have reviewed all pertinent imaging results/findings within the past 24 hours.  Assessment/Plan:     PAD (peripheral artery disease)  74F with HTN, HLD, T2DM w neuropathy, chronic pain on opioids, and fibromyalgia who initially presented for symptomatic bradycardia with complete heart block, hospital course notable for LLE pain concerning for embolic/atheroembolic phenomenon and blue toe syndrome, possible cardiac in nature.     - Recommend ASA, systemic heparin, high intensity statin  - Plan for L left angio, R CFA approach today, consented and marked  - Hold heparin gtt on call to OR.             Floyd Tirado MD  Vascular Surgery  [facility] - Cardiology Stepdown

## 2025-07-07 NOTE — PROGRESS NOTES
"[facility] - Cardiology [facility] Medicine  Progress Note    Patient Name: Cady Watkins  MRN: [id number]  Patient Class: IP- Inpatient   Admission Date: 6/24/2025  Length of Stay: 13 days  Attending Physician: Mandy Pérez MD  Primary Care Provider: Blaine Benítez MD        Subjective     Principal Problem:Complete heart block  Acute Condition: PVD    HPI:  74F with HTN, HLD, T2DM w neuropathy, chronic pain on opioids, and fibromyalgia who initially presented from PCP office to [facility] ED for reported symptomatic bradycardia w rate 30s. She was in her normal state of health until 3 weeks prior when she had an episode of syncope, while in her kitchen, she felt very off, walked to sofa and blacked out reportedly for 3 mins. Thereafter, she's had fatigue, decreased appetite, and increasing SOB.  In ED, found to have complete heart block, new onset cardiomyopathy ischemic vs Takotsubo, Hypertensive emergency (SBP 230s), and ISAIAS.  Started on nitro gtt, had an emergent TVP placed and then transferred to [facility] CCU.              Overview/Hospital Course:  CCU course:   Transferred in from [facility] to CCU.   IC and EP consulted, and decision made for C to eval ischemia, which was done after ISAIAS improved and pt diuresed, and LHC revealed "70% left circumflex stenosis, otherwise diffuse non-obstructive disease is present" no intervention needed.   Diuresed on Lasix IVP and Lasix IV gtt @10cc/hr 6/26-6/27.  Intermittent delirium which starts at night; given haldol 5mg IV on 6/24 then olanzapine 5mg on 6/26 and 6/27 am then d/c-ed; on delirium precautions.  Patient had PPM placed successfully with no complications. UA evident of potential UTI will plan to treat with CTX for 3 days. Will continue to work on patient functional status with PT/OT.      After stepdown to [facility] on 6/28.  Acute exquisite left heel pain, small areas of erythema on top of foot, consulted podiatry 6/30.  XR and CT negative. L " great toe developed erythema and blister.  VAS Duplex and ABIs abnormal with diffuse disease and several significant stenoses (prelim report in Media tab in Epic), consulted VSURG 7/2.  Also c/o new intermittent LLQ Abdominal and L upper thigh pain, 10/10, no identifiable triggers, present for 3 days but voiced to MD on 7/2, same day as worsening of her L heel and 1st toe pain.      7/3-  PVD. On ASA, systemic heparin, high intensity statin. Heparin gtt hold on call to OR. LLE angiogram planned Monday.  /62  Pulse 60  . . Pt has large hernia which interferes with walking.  Will ask PT/OT to eval for support device.     7/4- BS high 319,  started aspart 5 ac, and increased SS, on consistent carb diet. Pt eating better. Takes metformin at home.     7/5 - - > 135-> 138. Reduced insulin dose. LE angiogram planned Monday.  Left Great toe lesion slight improvement, left heel less painful and less erythremic.   7/6- -194  7/7-   this am. left lower extremity angiography today.  Planning dc to [facility] after procedure when stable.  Prelim SNF orders done      Interval History: see updated S/O above    Review of Systems   Constitutional:  Positive for activity change. Negative for fever.   Gastrointestinal:  Negative for abdominal distention, diarrhea and nausea.        Large ventral hernia, chronic   Musculoskeletal:  Positive for gait problem.   All other systems reviewed and are negative.    Objective:     Vital Signs (Most Recent):  Temp: 98.6 °F (37 °C) (07/07/25 0725)  Pulse: 60 (07/07/25 0725)  Resp: 17 (07/07/25 0725)  BP: (!) 159/70 (07/07/25 0725)  SpO2: 95 % (07/07/25 0725) Vital Signs (24h Range):  Temp:  [97.8 °F (36.6 °C)-99.5 °F (37.5 °C)] 98.6 °F (37 °C)  Pulse:  [60-75] 60  Resp:  [16-18] 17  SpO2:  [93 %-95 %] 95 %  BP: (135-161)/(52-78) 159/70     Weight: 60.2 kg (132 lb 11.5 oz)  Body mass index is 24.27 kg/m².    Intake/Output Summary (Last 24 hours) at 7/7/2025  0802  Last data filed at 7/7/2025 0500  Gross per 24 hour   Intake 720 ml   Output 625 ml   Net 95 ml         Physical Exam  Vitals reviewed.   Constitutional:       General: She is not in acute distress.     Appearance: She is not toxic-appearing.      Comments: thin   Cardiovascular:      Rate and Rhythm: Normal rate.   Abdominal:      General: There is no distension.      Tenderness: There is abdominal tenderness (related to hernia, mild).      Comments: TTP on LEFT middle aspect of Extremely large ventral hernia, soft, no rebound/guard, possible TTP anterior L thigh   Musculoskeletal:         General: Swelling (feet, right heel tendersess with mild erythema. left large toe, has smal peripheral ischemic lesion below at tip.) present.      Comments: LEFT foot - linear erythema of mid superior aspect of foot with small scab, L heel erythema and exquisite TTP with warmth, L great toe with 1cm blister on distal aspect and surrounding erythema with nomal tempurature ( IMPROVING)    Neurological:      General: No focal deficit present.      Mental Status: She is oriented to person, place, and time.      Motor: Weakness present.   Psychiatric:         Mood and Affect: Mood normal.         Behavior: Behavior normal.               Significant Labs: All pertinent labs within the past 24 hours have been reviewed.    Significant Imaging: I have reviewed all pertinent imaging results/findings within the past 24 hours.      Assessment & Plan  Complete heart block  Complete heart block s/p temp TVP then PPM- TSH wnl. Doxy ppx x 5d, post-PPM restrictions.     RESOLVED  Left foot pain  PAD (peripheral artery disease)  Acute exquisite severe Left heel pain, with erythema and bogginess/fluctuance, denies h/o any gout/CPPD, recent broken toes (healed).  Great toe with erythema/blister.  XR negative.  CT unremarkable, no abscess/OM other.  Considered checking an MRI foot, in discussion with Podiatry, however the abnormal VAS MIKAEL and  "U/S returned in the meantime so consulting VSURG first  - Suspect ischemic event to left heel and great toe.   - f/u Vsurg consult, appreciate  - Already on atherosclerosis meds for new Dx CAD as below  - f/u podiatry consult, appreciate  - EMLA cream and capsaicin cream, some relief with combo  - off loading boots for both heels at all times while laying in bed   - WBAT    7/6- LE angiogram planned Monday  Left lower quadrant abdominal pain  Due to large ventral hernia. Abd wall stretching. Pain was positional and I was able to relieve it by re-positioning the hernia on a pillow on 7/3.   - continue to monitor with serial exams.     And possible left thigh pain  All Sx on LEFT side, same as the LEFT foot and toe  F/u VSurg consult as above  May need repeat CT vs CTA ABd/Pelvis however if Vsurg is doing peripheral angiogram this would cover it  With daily exams, note that pain is related to ventral hernia and is positional. Seems to be related to abd wall stretching.  No evidence or strangulation/ obstruction. Asked PT/OT to arrange for support devise, if possible.     7/6- resolved    Ventral hernia  Started decades ago after an elective cholecystectomy, she was at home recovering and her "stomach burst," reportedly "wasn't wrapped" by surgeon after the operation, and pt was too afraid to go to any more surgery for a repair so she's been living with it.  It's been enlarging over time.    Pain was positional and I was able to relieve it by re-positioning the hernia on a pillow on 7/3.   - see abd pain    Bladder mass  - Bladder US with 3.0 cm mass like lesion in right dependent bladder.  - Consulted Urology for further evaluation.  - Urology ordered CT Renal Stone Study - unable to visualize bladder mass.  - CT Urogram inpt vs outpt   - consider in new clinical context with new Abd pain, see above  - Needs close follow up with urology, referral placed.    Acute systolic heart failure  Acute HFrEF/NICMP, c/w " Takotsubo? TTE showed HFrEF with low EF of 20-25% with evidence of WMAs.   - diuresed w Lasix IV, near euvolemic and switched to oral  - new Lasix 40 PO BID for maintenance, will need Rx for this + sliding scale eventually upon d/c  - Toprol 50   - Valsartan 40 BID    7/6- stable  Coronary artery disease involving native coronary artery   CAD-  LCx 70% stenosis and non-obstructive diffuse disease  - ASA 81  - Atorva 20  - BB  Acute hypoxemic respiratory failure  Resolving  Fibromyalgia  - Lido patch   - Robaxin 500 BID  - Capsaicin BID    High cholesterol  Atorva 20    Type 2 diabetes with neuropathy  DM2 controlled (A1C 6.1 on 9/2024) with neuropathy  New A1C- 6.6  - Gabapentin 300 TID  - Working to optimize BG control  - Hold home meds: metformin  - SSI provided for corrective dosing  - POCT glucose checks as ordered  - Hypoglycemic protocol in effect  - Diabetic diet provided      Recent Labs     07/05/25  1159 07/05/25  1603 07/05/25  2059 07/06/25  0637 07/06/25  1215 07/06/25 2002   POCTGLUCOSE 138* 258* 272* 194* 220* 214*     7/4- BS high 319,  On lantus 5 daily, started aspart 5 ac, and increased SS 1-10, on consistent carb diet. Pt eating better. Takes metformin at home.   7/5- reduce lantus 5 and aspart 3 ac to prevent hypoglycemia  E. coli UTI (urinary tract infection)  Pan-sensitive UTI, CTX IV x 3d    Hypertensive emergency  Hypertension  Resolved    ISAIAS (acute kidney injury)  Resolved. cardiorenal  Delirium  Resolved    Muscular deconditioning  PT/OT rec Mod  Likely needs SNF. Plans to be discharged to [facility] in [place], which is close to DTR's home.     Discharge planning issues  Dispo - independent/ambulatory prior to admit (albeit with large ventral hernia), now new deconditioning/debility requiring two person assist for standing, very motivated and engaged, PT/OT rec Mod Intensity. Family interested in [facility] facility in [place], prior very positive experience there and would be near  daughter who lives in [place].     Atheroembolism of lower extremity, left  Angiogram per Vascular Surgery planned 7/7. Monday.       VTE Risk Mitigation (From admission, onward)           Ordered     heparin 25,000 units in dextrose 5% (100 units/ml) IV bolus from bag LOW INTENSITY nomogram - [facility]  As needed (PRN)        Question:  Heparin Infusion Adjustment (DO NOT MODIFY ANSWER)  Answer:  \\[email or web address]\epic\Images\Pharmacy\HeparinInfusions\heparin LOW INTENSITY nomogram for [facility] [id number].pdf    07/02/25 1720     heparin 25,000 units in dextrose 5% (100 units/ml) IV bolus from bag LOW INTENSITY nomogram - [facility]  As needed (PRN)        Question:  Heparin Infusion Adjustment (DO NOT MODIFY ANSWER)  Answer:  \\[email or web address]\epic\Images\Pharmacy\HeparinInfusions\heparin LOW INTENSITY nomogram for [facility] [id number].pdf    07/02/25 1720     heparin 25,000 units in dextrose 5% 250 mL (100 units/mL) infusion LOW INTENSITY nomogram - [facility]  Continuous        Question:  Begin at (units/kg/hr)  Answer:  12 07/02/25 1720                    Discharge Planning   LOKESH: 7/8/2025     Code Status: Full Code   Medical Readiness for Discharge Date:   Discharge Plan A: Skilled Nursing Facility   Discharge Delays: (!) Change in Medical Condition        Mandy Pérez MD  Department of Hospital Medicine   [facility] - Cardiology Stepdown

## 2025-07-07 NOTE — PT/OT/SLP PROGRESS
Physical Therapy      Patient Name:  Cady Watkins   MRN:  [id number]    Patient not seen today secondary to Off the floor for procedure/surgery attempted at 1320. Will follow-up as appropriate.

## 2025-07-07 NOTE — PLAN OF CARE
Pre op Note: Pt to preop with heparin drip infusing via pump.  Infusion continues per order Dr Finn as confirmed by Charge Nurse LIO Yan  via telephone call into OR. Pt noted to be AAOx4, pleasant. Pt prepared for surgery. Surgical, anesthesia consents noted, limited blood consent/refusal form noted in patient's chart. Remote tele box labelled and placed in ziplock type bag and taken to PACU. Pt monitored via associated bedside monitor at this time. 2nd PIV started d/t ongoing heparin infusion.CBG taken, jake's applied,  Spoke with Eva in pacemaker clinic,form completed. Daughter to bedside, given patient information card. Warm blankets provided. Call light in reach, bed low and locked. Pt denies needs.

## 2025-07-07 NOTE — NURSING TRANSFER
Nursing Transfer Note      7/7/2025   4:47 PM      Reason patient is being transferred: post procedure    Transfer To:  324    Transfer via stretcher    Transfer with cardiac monitoring    Transported by PCT    Any special needs or follow-up needed: routine    Patient belongings transferred with patient: No    Chart send with patient: Yes    Notified: daughter    Patient reassessed at:  7/7/2025, 6730

## 2025-07-07 NOTE — SUBJECTIVE & OBJECTIVE
Medications:  Continuous Infusions:   heparin (porcine) in D5W  0-40 Units/kg/hr Intravenous Continuous 7.2 mL/hr at 07/07/25 0118 12 Units/kg/hr at 07/07/25 0118     Scheduled Meds:   acetaminophen  1,000 mg Oral Q8H    aspirin  81 mg Oral Daily    atorvastatin  80 mg Oral Daily    capsaicin   Topical (Top) BID    furosemide  40 mg Oral BID    gabapentin  300 mg Oral TID    insulin aspart U-100  3 Units Subcutaneous TIDWM    insulin glargine U-100  5 Units Subcutaneous Daily    LIDOcaine  1 patch Transdermal Q24H    magnesium oxide  800 mg Oral Daily    methocarbamoL  500 mg Oral BID    metoprolol succinate  50 mg Oral Daily    mupirocin   Topical (Top) Daily    pantoprazole  40 mg Oral Daily    polyethylene glycol  17 g Oral Daily    valsartan  40 mg Oral BID     PRN Meds:  Current Facility-Administered Medications:     dextrose 50%, 12.5 g, Intravenous, PRN    dextrose 50%, 25 g, Intravenous, PRN    glucagon (human recombinant), 1 mg, Intramuscular, PRN    glucose, 16 g, Oral, PRN    glucose, 24 g, Oral, PRN    heparin (PORCINE), 60 Units/kg, Intravenous, PRN    heparin (PORCINE), 30 Units/kg, Intravenous, PRN    HYDROmorphone, 0.2 mg, Intravenous, Q6H PRN    insulin aspart U-100, 0-10 Units, Subcutaneous, Q6H PRN    LIDOcaine-prilocaine, , Topical (Top), PRN    meclizine, 12.5 mg, Oral, BID PRN    naloxone, 0.02 mg, Intravenous, PRN    ondansetron, 4 mg, Oral, Q8H PRN    oxyCODONE, 5 mg, Oral, Q6H PRN    sodium chloride 0.9%, 10 mL, Intravenous, PRN     Objective:     Vital Signs (Most Recent):  Temp: 97.9 °F (36.6 °C) (07/07/25 0329)  Pulse: 60 (07/07/25 0600)  Resp: 16 (07/07/25 0329)  BP: (!) 159/78 (07/07/25 0329)  SpO2: (!) 93 % (07/07/25 0329) Vital Signs (24h Range):  Temp:  [97.8 °F (36.6 °C)-99.6 °F (37.6 °C)] 97.9 °F (36.6 °C)  Pulse:  [60-75] 60  Resp:  [16-18] 16  SpO2:  [93 %-97 %] 93 %  BP: (135-161)/(52-78) 159/78          Physical Exam  Constitutional:       General: She is not in acute  distress.  HENT:      Head: Normocephalic.      Mouth/Throat:      Mouth: Mucous membranes are moist.   Cardiovascular:      Rate and Rhythm: Normal rate.      Comments: Palpable femoral pulses bilaterally 2+   R biphasic DP  L monophasic DP, 1st toe ulcer  Warm    Pulmonary:      Effort: Pulmonary effort is normal.   Abdominal:      General: Abdomen is flat.   Musculoskeletal:         General: Tenderness present.      Cervical back: Neck supple.      Right lower leg: No edema.      Left lower leg: No edema.   Neurological:      Mental Status: She is alert and oriented to person, place, and time.      Sensory: No sensory deficit.      Motor: No weakness.          Significant Labs:  All pertinent labs from the last 24 hours have been reviewed.    Significant Diagnostics:  I have reviewed all pertinent imaging results/findings within the past 24 hours.

## 2025-07-07 NOTE — ASSESSMENT & PLAN NOTE
PT/OT rec Mod  Likely needs SNF. Plans to be discharged to [facility] in [place], which is close to DTR's home.

## 2025-07-07 NOTE — ASSESSMENT & PLAN NOTE
Dispo - independent/ambulatory prior to admit (albeit with large ventral hernia), now new deconditioning/debility requiring two person assist for standing, very motivated and engaged, PT/OT rec Mod Intensity. Family interested in [facility] facility in [place], prior very positive experience there and would be near daughter who lives in [place].

## 2025-07-07 NOTE — PLAN OF CARE
Chart rec'd, awaiting patient's arrival. Per Charge Nurse LIO Yan, states per order Dr Finn, heparin drip to continue infusing in pore op.

## 2025-07-07 NOTE — PT/OT/SLP PROGRESS
Occupational Therapy      Patient Name:  Cady Watkins   MRN:  [id number]    Patient not seen today secondary to IMAN for procedure . Will follow-up 7/8/25.    7/7/2025

## 2025-07-07 NOTE — ASSESSMENT & PLAN NOTE
74F with HTN, HLD, T2DM w neuropathy, chronic pain on opioids, and fibromyalgia who initially presented for symptomatic bradycardia with complete heart block, hospital course notable for LLE pain concerning for embolic/atheroembolic phenomenon and blue toe syndrome, possible cardiac in nature.     - Recommend ASA, systemic heparin, high intensity statin  - Plan for L left angio, R CFA approach today, consented and marked  - Hold heparin gtt on call to OR.

## 2025-07-07 NOTE — PLAN OF CARE
Problem: Adult Inpatient Plan of Care  Goal: Absence of Hospital-Acquired Illness or Injury  Intervention: Prevent Skin Injury  Flowsheets (Taken 7/6/2025 2054)  Body Position:   turned   legs elevated  Device Skin Pressure Protection:   absorbent pad utilized/changed   positioning supports utilized  Intervention: Prevent and Manage VTE (Venous Thromboembolism) Risk  Flowsheets (Taken 7/6/2025 2054)  VTE Prevention/Management: ROM (active) performed  Intervention: Prevent Infection  Flowsheets (Taken 7/6/2025 2054)  Infection Prevention: rest/sleep promoted  Goal: Optimal Comfort and Wellbeing  Intervention: Monitor Pain and Promote Comfort  Flowsheets (Taken 7/6/2025 2054)  Pain Management Interventions:   care clustered   position adjusted   quiet environment facilitated   pillow support provided  Intervention: Provide Person-Centered Care  Flowsheets (Taken 7/6/2025 2054)  Trust Relationship/Rapport:   care explained   questions encouraged     Problem: Diabetes Comorbidity  Goal: Blood Glucose Level Within Targeted Range  Intervention: Monitor and Manage Glycemia  Flowsheets (Taken 7/6/2025 2054)  Glycemic Management: blood glucose monitored     Problem: Skin Injury Risk Increased  Goal: Skin Health and Integrity  Intervention: Optimize Skin Protection  Flowsheets (Taken 7/6/2025 2054)  Pressure Reduction Techniques:   frequent weight shift encouraged   weight shift assistance provided  Activity Management: Rolling - L1  Head of Bed (HOB) Positioning: HOB elevated     Problem: Fall Injury Risk  Goal: Absence of Fall and Fall-Related Injury  Intervention: Identify and Manage Contributors  Flowsheets (Taken 7/6/2025 2054)  Self-Care Promotion: independence encouraged

## 2025-07-07 NOTE — PLAN OF CARE
07/07/25 1223   Post-Acute Status   Post-Acute Authorization Placement   Post-Acute Placement Status Pending medical clearance/testing     BENY informed Rosanna at [facility] ([phone]) that pt should be medically stable for admit later today vs tomorrow.  Updated clinicals sent.  BENY also confirmed with [facility]ca at [facility] ([phone]) that SNF auth is still valid.      UPDATE 4:09 PM  SW went to bedside to attempt to review IMM but pt was IMAN with nobody at bedside.        Eve Wahl, [facility]  [facility]  [id number]

## 2025-07-08 ENCOUNTER — TELEPHONE (OUTPATIENT)
Dept: VASCULAR SURGERY | Facility: CLINIC | Age: 75
End: 2025-07-08
Payer: MEDICARE

## 2025-07-08 VITALS
WEIGHT: 132.69 LBS | BODY MASS INDEX: 24.42 KG/M2 | SYSTOLIC BLOOD PRESSURE: 165 MMHG | TEMPERATURE: 98 F | OXYGEN SATURATION: 96 % | RESPIRATION RATE: 18 BRPM | DIASTOLIC BLOOD PRESSURE: 89 MMHG | HEIGHT: 62 IN | HEART RATE: 59 BPM

## 2025-07-08 DIAGNOSIS — Z98.890 S/P ANGIOGRAM OF EXTREMITY: ICD-10-CM

## 2025-07-08 DIAGNOSIS — I73.9 PAD (PERIPHERAL ARTERY DISEASE): Primary | ICD-10-CM

## 2025-07-08 LAB
ABSOLUTE EOSINOPHIL (OHS): 0.28 K/UL
ABSOLUTE MONOCYTE (OHS): 0.99 K/UL (ref 0.3–1)
ABSOLUTE NEUTROPHIL COUNT (OHS): 9.13 K/UL (ref 1.8–7.7)
ALBUMIN SERPL BCP-MCNC: 2.4 G/DL (ref 3.5–5.2)
ALP SERPL-CCNC: 55 UNIT/L (ref 40–150)
ALT SERPL W/O P-5'-P-CCNC: 31 UNIT/L (ref 10–44)
ANION GAP (OHS): 8 MMOL/L (ref 8–16)
APTT PPP: 40.7 SECONDS (ref 21–32)
AST SERPL-CCNC: 61 UNIT/L (ref 11–45)
BASOPHILS # BLD AUTO: 0.08 K/UL
BASOPHILS NFR BLD AUTO: 0.6 %
BILIRUB SERPL-MCNC: 0.2 MG/DL (ref 0.1–1)
BUN SERPL-MCNC: 30 MG/DL (ref 8–23)
CALCIUM SERPL-MCNC: 8.5 MG/DL (ref 8.7–10.5)
CHLORIDE SERPL-SCNC: 100 MMOL/L (ref 95–110)
CO2 SERPL-SCNC: 26 MMOL/L (ref 23–29)
CREAT SERPL-MCNC: 1 MG/DL (ref 0.5–1.4)
ERYTHROCYTE [DISTWIDTH] IN BLOOD BY AUTOMATED COUNT: 14 % (ref 11.5–14.5)
GFR SERPLBLD CREATININE-BSD FMLA CKD-EPI: 60 ML/MIN/1.73/M2
GLUCOSE SERPL-MCNC: 216 MG/DL (ref 70–110)
HCT VFR BLD AUTO: 30.2 % (ref 37–48.5)
HGB BLD-MCNC: 10.2 GM/DL (ref 12–16)
IMM GRANULOCYTES # BLD AUTO: 0.11 K/UL (ref 0–0.04)
IMM GRANULOCYTES NFR BLD AUTO: 0.9 % (ref 0–0.5)
LYMPHOCYTES # BLD AUTO: 1.96 K/UL (ref 1–4.8)
MAGNESIUM SERPL-MCNC: 1.9 MG/DL (ref 1.6–2.6)
MCH RBC QN AUTO: 32.5 PG (ref 27–31)
MCHC RBC AUTO-ENTMCNC: 33.8 G/DL (ref 32–36)
MCV RBC AUTO: 96 FL (ref 82–98)
NUCLEATED RBC (/100WBC) (OHS): 0 /100 WBC
PLATELET # BLD AUTO: 166 K/UL (ref 150–450)
PMV BLD AUTO: 13.1 FL (ref 9.2–12.9)
POCT GLUCOSE: 246 MG/DL (ref 70–110)
POCT GLUCOSE: 276 MG/DL (ref 70–110)
POTASSIUM SERPL-SCNC: 5.7 MMOL/L (ref 3.5–5.1)
PROT SERPL-MCNC: 5.7 GM/DL (ref 6–8.4)
RBC # BLD AUTO: 3.14 M/UL (ref 4–5.4)
RELATIVE EOSINOPHIL (OHS): 2.2 %
RELATIVE LYMPHOCYTE (OHS): 15.6 % (ref 18–48)
RELATIVE MONOCYTE (OHS): 7.9 % (ref 4–15)
RELATIVE NEUTROPHIL (OHS): 72.8 % (ref 38–73)
SODIUM SERPL-SCNC: 134 MMOL/L (ref 136–145)
WBC # BLD AUTO: 12.55 K/UL (ref 3.9–12.7)

## 2025-07-08 PROCEDURE — 25000003 PHARM REV CODE 250: Performed by: HOSPITALIST

## 2025-07-08 PROCEDURE — 25000003 PHARM REV CODE 250: Performed by: STUDENT IN AN ORGANIZED HEALTH CARE EDUCATION/TRAINING PROGRAM

## 2025-07-08 PROCEDURE — 82040 ASSAY OF SERUM ALBUMIN: CPT | Performed by: HOSPITALIST

## 2025-07-08 PROCEDURE — 85025 COMPLETE CBC W/AUTO DIFF WBC: CPT

## 2025-07-08 PROCEDURE — 25000003 PHARM REV CODE 250

## 2025-07-08 PROCEDURE — 97530 THERAPEUTIC ACTIVITIES: CPT | Mod: CQ

## 2025-07-08 PROCEDURE — 97535 SELF CARE MNGMENT TRAINING: CPT

## 2025-07-08 PROCEDURE — 83735 ASSAY OF MAGNESIUM: CPT | Performed by: HOSPITALIST

## 2025-07-08 PROCEDURE — 97116 GAIT TRAINING THERAPY: CPT | Mod: CQ

## 2025-07-08 PROCEDURE — 85730 THROMBOPLASTIN TIME PARTIAL: CPT | Performed by: HOSPITALIST

## 2025-07-08 PROCEDURE — 36415 COLL VENOUS BLD VENIPUNCTURE: CPT | Performed by: HOSPITALIST

## 2025-07-08 PROCEDURE — 63600175 PHARM REV CODE 636 W HCPCS: Performed by: HOSPITALIST

## 2025-07-08 RX ORDER — LIDOCAINE AND PRILOCAINE 25; 25 MG/G; MG/G
CREAM TOPICAL
Qty: 30 G | Refills: 0 | Status: SHIPPED | OUTPATIENT
Start: 2025-07-08

## 2025-07-08 RX ORDER — VALSARTAN 40 MG/1
40 TABLET ORAL 2 TIMES DAILY
Qty: 180 TABLET | Refills: 3 | Status: SHIPPED | OUTPATIENT
Start: 2025-07-08 | End: 2026-07-08

## 2025-07-08 RX ORDER — POLYETHYLENE GLYCOL 3350 17 G/17G
17 POWDER, FOR SOLUTION ORAL DAILY
Qty: 30 EACH | Refills: 0 | Status: SHIPPED | OUTPATIENT
Start: 2025-07-08

## 2025-07-08 RX ORDER — ACETAMINOPHEN 500 MG
500 TABLET ORAL EVERY 8 HOURS
Qty: 90 TABLET | Refills: 1 | Status: SHIPPED | OUTPATIENT
Start: 2025-07-08

## 2025-07-08 RX ORDER — OXYCODONE HYDROCHLORIDE 5 MG/1
5 TABLET ORAL EVERY 6 HOURS PRN
Qty: 10 TABLET | Refills: 0 | Status: SHIPPED | OUTPATIENT
Start: 2025-07-08

## 2025-07-08 RX ORDER — LIDOCAINE 50 MG/G
1 PATCH TOPICAL DAILY
Qty: 30 PATCH | Refills: 0 | Status: SHIPPED | OUTPATIENT
Start: 2025-07-08

## 2025-07-08 RX ORDER — PANTOPRAZOLE SODIUM 40 MG/1
40 TABLET, DELAYED RELEASE ORAL DAILY
Qty: 90 TABLET | Refills: 3 | Status: SHIPPED | OUTPATIENT
Start: 2025-07-08 | End: 2026-07-08

## 2025-07-08 RX ORDER — FUROSEMIDE 40 MG/1
40 TABLET ORAL 2 TIMES DAILY
Qty: 60 TABLET | Refills: 11 | Status: SHIPPED | OUTPATIENT
Start: 2025-07-08 | End: 2026-07-08

## 2025-07-08 RX ORDER — METHOCARBAMOL 500 MG/1
500 TABLET, FILM COATED ORAL 2 TIMES DAILY
Qty: 20 TABLET | Refills: 0 | Status: SHIPPED | OUTPATIENT
Start: 2025-07-08 | End: 2025-07-18

## 2025-07-08 RX ORDER — MECLIZINE HCL 12.5 MG 12.5 MG/1
12.5 TABLET ORAL 2 TIMES DAILY PRN
Qty: 30 TABLET | Refills: 0 | Status: SHIPPED | OUTPATIENT
Start: 2025-07-08

## 2025-07-08 RX ORDER — CAPSAICIN 0.03 G/100G
CREAM TOPICAL 2 TIMES DAILY
Qty: 60 G | Refills: 0 | Status: SHIPPED | OUTPATIENT
Start: 2025-07-08

## 2025-07-08 RX ORDER — NAPROXEN SODIUM 220 MG/1
81 TABLET, FILM COATED ORAL DAILY
Qty: 30 TABLET | Refills: 11 | Status: SHIPPED | OUTPATIENT
Start: 2025-07-08 | End: 2026-07-08

## 2025-07-08 RX ORDER — INSULIN LISPRO 100 [IU]/ML
1-10 INJECTION, SOLUTION INTRAVENOUS; SUBCUTANEOUS
Qty: 6 ML | Refills: 0 | Status: SHIPPED | OUTPATIENT
Start: 2025-07-08 | End: 2026-07-08

## 2025-07-08 RX ORDER — METOPROLOL SUCCINATE 50 MG/1
50 TABLET, EXTENDED RELEASE ORAL DAILY
Qty: 90 TABLET | Refills: 3 | Status: SHIPPED | OUTPATIENT
Start: 2025-07-08 | End: 2026-07-08

## 2025-07-08 RX ORDER — INSULIN LISPRO 100 [IU]/ML
5 INJECTION, SOLUTION INTRAVENOUS; SUBCUTANEOUS
Qty: 3 ML | Refills: 11 | Status: SHIPPED | OUTPATIENT
Start: 2025-07-08 | End: 2026-07-08

## 2025-07-08 RX ORDER — GABAPENTIN 300 MG/1
300 CAPSULE ORAL 3 TIMES DAILY
Qty: 90 CAPSULE | Refills: 11 | Status: SHIPPED | OUTPATIENT
Start: 2025-07-08 | End: 2025-07-10

## 2025-07-08 RX ORDER — OXYCODONE HYDROCHLORIDE 5 MG/1
5 TABLET ORAL EVERY 6 HOURS PRN
Qty: 10 TABLET | Refills: 0 | Status: SHIPPED | OUTPATIENT
Start: 2025-07-08 | End: 2025-07-08

## 2025-07-08 RX ORDER — ATORVASTATIN CALCIUM 80 MG/1
80 TABLET, FILM COATED ORAL DAILY
Qty: 90 TABLET | Refills: 3 | Status: SHIPPED | OUTPATIENT
Start: 2025-07-08 | End: 2026-07-08

## 2025-07-08 RX ORDER — INSULIN ASPART 100 [IU]/ML
5 INJECTION, SOLUTION INTRAVENOUS; SUBCUTANEOUS
Status: DISCONTINUED | OUTPATIENT
Start: 2025-07-08 | End: 2025-07-08 | Stop reason: HOSPADM

## 2025-07-08 RX ORDER — INSULIN GLARGINE 100 [IU]/ML
5 INJECTION, SOLUTION SUBCUTANEOUS DAILY
Qty: 1.5 ML | Refills: 11 | Status: SHIPPED | OUTPATIENT
Start: 2025-07-08 | End: 2026-07-08

## 2025-07-08 RX ADMIN — Medication 800 MG: at 09:07

## 2025-07-08 RX ADMIN — INSULIN ASPART 5 UNITS: 100 INJECTION, SOLUTION INTRAVENOUS; SUBCUTANEOUS at 11:07

## 2025-07-08 RX ADMIN — VALSARTAN 40 MG: 40 TABLET, FILM COATED ORAL at 09:07

## 2025-07-08 RX ADMIN — ASPIRIN 81 MG CHEWABLE TABLET 81 MG: 81 TABLET CHEWABLE at 09:07

## 2025-07-08 RX ADMIN — LIDOCAINE 1 PATCH: 50 PATCH CUTANEOUS at 02:07

## 2025-07-08 RX ADMIN — GABAPENTIN 300 MG: 300 CAPSULE ORAL at 02:07

## 2025-07-08 RX ADMIN — INSULIN ASPART 3 UNITS: 100 INJECTION, SOLUTION INTRAVENOUS; SUBCUTANEOUS at 07:07

## 2025-07-08 RX ADMIN — APIXABAN 2.5 MG: 2.5 TABLET, FILM COATED ORAL at 09:07

## 2025-07-08 RX ADMIN — METHOCARBAMOL 500 MG: 500 TABLET ORAL at 09:07

## 2025-07-08 RX ADMIN — ATORVASTATIN CALCIUM 80 MG: 40 TABLET, FILM COATED ORAL at 09:07

## 2025-07-08 RX ADMIN — MUPIROCIN: 20 OINTMENT TOPICAL at 09:07

## 2025-07-08 RX ADMIN — INSULIN ASPART 5 UNITS: 100 INJECTION, SOLUTION INTRAVENOUS; SUBCUTANEOUS at 04:07

## 2025-07-08 RX ADMIN — CAPSAICIN: 0.25 CREAM TOPICAL at 09:07

## 2025-07-08 RX ADMIN — ACETAMINOPHEN 1000 MG: 500 TABLET ORAL at 06:07

## 2025-07-08 RX ADMIN — ACETAMINOPHEN 1000 MG: 500 TABLET ORAL at 02:07

## 2025-07-08 RX ADMIN — FUROSEMIDE 40 MG: 40 TABLET ORAL at 09:07

## 2025-07-08 RX ADMIN — INSULIN GLARGINE 5 UNITS: 100 INJECTION, SOLUTION SUBCUTANEOUS at 09:07

## 2025-07-08 RX ADMIN — PANTOPRAZOLE SODIUM 40 MG: 40 TABLET, DELAYED RELEASE ORAL at 09:07

## 2025-07-08 RX ADMIN — GABAPENTIN 300 MG: 300 CAPSULE ORAL at 09:07

## 2025-07-08 RX ADMIN — POLYETHYLENE GLYCOL 3350 17 G: 17 POWDER, FOR SOLUTION ORAL at 09:07

## 2025-07-08 RX ADMIN — METOPROLOL SUCCINATE 50 MG: 50 TABLET, EXTENDED RELEASE ORAL at 09:07

## 2025-07-08 NOTE — DISCHARGE SUMMARY
"[facility] - Cardiology [facility] Medicine  Discharge Summary      Patient Name: Cady Watkins  MRN: [id number]  FRANCISCO: [id number]  Patient Class: IP- Inpatient  Admission Date: 6/24/2025  Hospital Length of Stay: 14 days  Discharge Date and Time: No discharge date for patient encounter.  Attending Physician: Mandy Pérez MD   Discharging Provider: Mandy Pérez MD  Primary Care Provider: Blaine Benítez MD  Hospital Medicine Team: [facility]  Mandy Pérez MD  Primary Care Team: [facility]     HPI:   74F with HTN, HLD, T2DM w neuropathy, chronic pain on opioids, and fibromyalgia who initially presented from PCP office to [facility] ED for reported symptomatic bradycardia w rate 30s. She was in her normal state of health until 3 weeks prior when she had an episode of syncope, while in her kitchen, she felt very off, walked to sofa and blacked out reportedly for 3 mins. Thereafter, she's had fatigue, decreased appetite, and increasing SOB.  In ED, found to have complete heart block, new onset cardiomyopathy ischemic vs Takotsubo, Hypertensive emergency (SBP 230s), and ISAIAS.  Started on nitro gtt, had an emergent TVP placed and then transferred to [facility] CCU.              Procedure(s) (LRB):  Angiogram Extremity Unilateral (Left)  ANGIOPLASTY, VEIN      Hospital Course:   CCU course:   Transferred in from [facility] to CCU.   IC and EP consulted, and decision made for [facility] to eval ischemia, which was done after ISAIAS improved and pt diuresed, and LHC revealed "70% left circumflex stenosis, otherwise diffuse non-obstructive disease is present" no intervention needed.   Diuresed on Lasix IVP and Lasix IV gtt @10cc/hr 6/26-6/27.  Intermittent delirium which starts at night; given haldol 5mg IV on 6/24 then olanzapine 5mg on 6/26 and 6/27 am then d/c-ed; on delirium precautions.  Patient had PPM placed successfully with no complications. UA evident of potential UTI will plan to treat with CTX for 3 " days. Will continue to work on patient functional status with PT/OT.      After stepdown to [facility] on 6/28.  Acute exquisite left heel pain, small areas of erythema on top of foot, consulted podiatry 6/30.  XR and CT negative. L great toe developed erythema and blister.  VAS Duplex and ABIs abnormal with diffuse disease and several significant stenoses (prelim report in Media tab in Epic), consulted VSURG 7/2.  Also c/o new intermittent LLQ Abdominal and L upper thigh pain, 10/10, no identifiable triggers, present for 3 days but voiced to MD on 7/2, same day as worsening of her L heel and 1st toe pain.      7/3-  PVD. On ASA, systemic heparin, high intensity statin. Heparin gtt hold on call to OR. LLE angiogram planned Monday.  /62  Pulse 60  . . Pt has large hernia which interferes with walking.  Will ask PT/OT to eval for support device.     7/4- BS high 319,  started aspart 5 ac, and increased SS, on consistent carb diet. Pt eating better. Takes metformin at home.     7/5 - - > 135-> 138. Reduced insulin dose. LE angiogram planned Monday.  Left Great toe lesion slight improvement, left heel less painful and less erythremic.   7/6- -194  7/7-   this am. left lower extremity angiography today.  Planning dc to [facility] after procedure when stable.  Prelim SNF orders done  - balloon angioplasty done    7/8- s/p angiogram and balloon. Per [facility] surgery. Recommend single agent antiplatelet agent with oral anticoagulant along with high-dose statin therapy. May mobilize from my standpoint.  Dc heparin.  Plan for SNF at [facility]     Goals of Care Treatment Preferences:  Code Status: Full Code         Consults:   Consults (From admission, onward)          Status Ordering Provider     Inpatient consult to Podiatry  Once        Provider:  (Not yet assigned)    KATRINA Thrasher     Inpatient consult to Interventional Cardiology  Once        Provider:  (Not yet assigned)     Completed ARIANNA TIRADO     Inpatient consult to Electrophysiology  Once        Provider:  (Not yet assigned)    Completed HOLDEN FUENTES            Assessment & Plan  Complete heart block  Complete heart block s/p temp TVP then PPM- TSH wnl. Doxy ppx x 5d, post-PPM restrictions.     RESOLVED    F/U EP cardiology  Left foot pain  PAD (peripheral artery disease)  Acute exquisite severe Left heel pain, with erythema and bogginess/fluctuance, denies h/o any gout/CPPD, recent broken toes (healed).  Great toe with erythema/blister.  XR negative.  CT unremarkable, no abscess/OM other.  Considered checking an MRI foot, in discussion with Podiatry, however the abnormal VAS MIKAEL and U/S returned in the meantime so consulting VSURG first  - Suspect ischemic event to left heel and great toe.   - f/u Vsurg consult, appreciate  - Already on atherosclerosis meds for new Dx CAD as below  - f/u podiatry consult, appreciate  - EMLA cream and capsaicin cream, some relief with combo  - off loading boots for both heels at all times while laying in bed   - WBAT    Dc on eliquis,statin and asa 81.   F/u Vasc surgery      Acute exquisite severe Left heel pain, with erythema and bogginess/fluctuance, denies h/o any gout/CPPD, recent broken toes (healed).  Great toe with erythema/blister.  XR negative.  CT unremarkable, no abscess/OM other.  Considered checking an MRI foot, in discussion with Podiatry, however the abnormal VAS MIKAEL and U/S returned in the meantime so consulting VSURG first  - Suspect ischemic event to left heel and great toe.   - f/u Vsurg consult, appreciate  - Already on atherosclerosis meds for new Dx CAD as below  - f/u podiatry consult, appreciate  - EMLA cream and capsaicin cream, some relief with combo  - off loading boots for both heels at all times while laying in bed   - WBAT    Left lower quadrant abdominal pain  Due to large ventral hernia. Abd wall stretching. Pain was positional and I was able to relieve it by  "re-positioning the hernia on a pillow on 7/3.   - continue to monitor with serial exams.     And possible left thigh pain  All Sx on LEFT side, same as the LEFT foot and toe  F/u VSurg consult as above  May need repeat CT vs CTA ABd/Pelvis however if Vsurg is doing peripheral angiogram this would cover it  With daily exams, note that pain is related to ventral hernia and is positional. Seems to be related to abd wall stretching.  No evidence or strangulation/ obstruction. Asked PT/OT to arrange for support devise, if possible.     7/6- resolved    Ventral hernia  Started decades ago after an elective cholecystectomy, she was at home recovering and her "stomach burst," reportedly "wasn't wrapped" by surgeon after the operation, and pt was too afraid to go to any more surgery for a repair so she's been living with it.  It's been enlarging over time.    Pain was positional and I was able to relieve it by re-positioning the hernia on a pillow on 7/3.   - see abd pain    Bladder mass  - Bladder US with 3.0 cm mass like lesion in right dependent bladder.  - Consulted Urology for further evaluation.  - Urology ordered CT Renal Stone Study - unable to visualize bladder mass.  - CT Urogram inpt vs outpt   - consider in new clinical context with new Abd pain, see above  - Needs close follow up with urology, referral placed.    Acute systolic heart failure  Acute HFrEF/NICMP, c/w Takotsubo? TTE showed HFrEF with low EF of 20-25% with evidence of WMAs.   - diuresed w Lasix IV, near euvolemic and switched to oral  - new Lasix 40 PO BID for maintenance, will need Rx for this + sliding scale eventually upon d/c  - Toprol 50   - Valsartan 40 BID    7/6- stable  Coronary artery disease involving native coronary artery   CAD-  LCx 70% stenosis and non-obstructive diffuse disease  - ASA 81  - Atorva 20  - BB  Acute hypoxemic respiratory failure  Resolving  Fibromyalgia  - Lido patch   - Robaxin 500 BID  - Capsaicin BID    High " cholesterol  Atorva 20    Type 2 diabetes with neuropathy  DM2 controlled (A1C 6.1 on 9/2024) with neuropathy  New A1C- 6.6  - Gabapentin 300 TID  - Working to optimize BG control  - Hold home meds: metformin  - SSI provided for corrective dosing  - POCT glucose checks as ordered  - Hypoglycemic protocol in effect  - Diabetic diet provided      Recent Labs     07/06/25  1559 07/06/25  2002 07/07/25  0821 07/07/25  1004 07/07/25  1529 07/07/25  2058   POCTGLUCOSE 269* 214* 202* 165* 197* 292*     7/4- BS high 319,  On lantus 5 daily, started aspart 5 ac, and increased SS 1-10, on consistent carb diet. Pt eating better. Takes metformin at home.   7/5- reduce lantus 5 and aspart 3 ac to prevent hypoglycemia  7/8 Adjust lantus 5, and aspart 5 ac.   E. coli UTI (urinary tract infection)  Pan-sensitive UTI, CTX IV x 3d    Hypertensive emergency  Hypertension  Resolved      ISAIAS (acute kidney injury)  Resolved. cardiorenal  Delirium  Resolved    Muscular deconditioning  PT/OT rec Mod  Likely needs SNF. Plans to be discharged to [facility] in [place], which is close to DTR's home.     Discharge planning issues  Dispo - independent/ambulatory prior to admit (albeit with large ventral hernia), now new deconditioning/debility requiring two person assist for standing, very motivated and engaged, PT/OT rec Mod Intensity. Family interested in [facility] [facility] facility in [place], prior very positive experience there and would be near daughter who lives in [place].       wbc up - likely a stress demargination. No other SIRS, pt denies new symptoms. ROS neg.  Will add lab to SNF orders.  Weight bearing- WBAT.  able to walk with FALL precautions.   Atheroembolism of lower extremity, left  Angiogram per Vascular Surgery done 7/7.    Final Active Diagnoses:    Diagnosis Date Noted POA    PRINCIPAL PROBLEM:  Complete heart block [I44.2] 06/18/2025 Yes    Acute systolic heart failure [I50.21] 06/20/2025 Yes    Acute hypoxemic respiratory  failure [J96.01] 06/19/2025 Yes    Left lower quadrant abdominal pain [R10.32] 07/02/2025 Yes    Atheroembolism of lower extremity, left [I75.022] 07/02/2025 No    Left foot pain [M79.672] 06/30/2025 No    PAD (peripheral artery disease) [I73.9] 06/30/2025 Yes    ISAIAS (acute kidney injury) [N17.9] 06/23/2025 Yes    Type 2 diabetes with neuropathy [E11.40] 12/13/2016 Yes     Chronic    Hypertensive emergency [I16.1] 06/19/2025 Yes    E. coli UTI (urinary tract infection) [N39.0, B96.20] 06/27/2025 Yes    Bladder mass [N32.89] 06/23/2025 Yes    Hypertension [I10] 09/21/2016 Yes     Chronic    Coronary artery disease involving native coronary artery [I25.10] 06/29/2025 Yes    Muscular deconditioning [R29.898] 06/28/2025 Yes    Delirium [R41.0] 06/25/2025 Yes    Fibromyalgia [M79.7] 09/21/2016 Yes    Ventral hernia [K43.9] 09/21/2016 Yes     Chronic    High cholesterol [E78.00] 02/23/2018 Yes    Discharge planning issues [Z75.8] 07/02/2025 Yes      Problems Resolved During this Admission:       Discharged Condition: good    Disposition: Skilled Nursing Facility    Follow Up:    Patient Instructions:      Ambulatory referral/consult to Urology   Standing Status: Future   Referral Priority: Routine Referral Type: Consultation   Referral Reason: Specialty Services Required   Requested Specialty: Urology   Number of Visits Requested: 1     ACCEPT - Ambulatory referral/consult to [facility]   Standing Status: Future   Referral Priority: Routine Referral Type: Consultation   Referral Reason: Specialty Services Required   Requested Specialty: Cardiology   Number of Visits Requested: 1     Ambulatory referral/consult to Cardiology   Standing Status: Future   Referral Priority: Routine Referral Type: Consultation   Referral Reason: Specialty Services Required   Requested Specialty: Cardiology   Number of Visits Requested: 1     Ambulatory referral/consult to Vascular Surgery   Standing Status: Future    Referral Priority: Routine Referral Type: Consultation   Referral Reason: Specialty Services Required   Requested Specialty: Vascular Surgery   Number of Visits Requested: 1       Significant Diagnostic Studies: Labs: CMP   Recent Labs   Lab 07/07/25  0351 07/08/25 0450    134*   K 4.7 5.7*    100   CO2 28 26   * 216*   BUN 32* 30*   CREATININE 0.9 1.0   CALCIUM 8.7 8.5*   PROT 5.9* 5.7*   ALBUMIN 2.5* 2.4*   BILITOT 0.2 0.2   ALKPHOS 54 55   AST 34 61*   ALT 27 31   ANIONGAP 9 8    and CBC   Recent Labs   Lab 07/07/25  0351 07/08/25 0450   WBC 8.45 12.55   HGB 11.2* 10.2*   HCT 32.6* 30.2*    166       Pending Diagnostic Studies:       Procedure Component Value Units Date/Time    VAS Ankle Brachial Indices Resting [[id number]]     Order Status: Sent Lab Status: No result     VAS US Arterial Legs Bilateral [[id number]]     Order Status: Sent Lab Status: No result            Medications:  Reconciled Home Medications:      Medication List        START taking these medications      acetaminophen 500 MG tablet  Commonly known as: TYLENOL  Take 1 tablet (500 mg total) by mouth every 8 (eight) hours.     apixaban 2.5 mg Tab  Commonly known as: ELIQUIS  Take 1 tablet (2.5 mg total) by mouth 2 (two) times daily.     aspirin 81 MG Chew  Take 1 tablet (81 mg total) by mouth once daily.     atorvastatin 80 MG tablet  Commonly known as: LIPITOR  Take 1 tablet (80 mg total) by mouth once daily.     capsaicin 0.025 % cream  Commonly known as: ZOSTRIX  Apply topically 2 (two) times daily.     furosemide 40 MG tablet  Commonly known as: LASIX  Take 1 tablet (40 mg total) by mouth 2 (two) times daily.     * HumaLOG KwikPen Insulin 100 unit/mL pen  Generic drug: insulin lispro  Inject 1-10 Units into the skin before meals as needed (per SS).     * HumaLOG KwikPen Insulin 100 unit/mL pen  Generic drug: insulin lispro  Inject 5 Units into the skin 3 (three) times daily before meals.     insulin glargine  U-100 (Lantus) 100 unit/mL (3 mL) Inpn pen  Inject 5 Units into the skin once daily.     LIDOcaine 5 %  Commonly known as: LIDODERM  Place 1 patch onto the skin once daily. Remove & Discard patch within 12 hours or as directed by MD     LIDOcaine-prilocaine cream  Commonly known as: EMLA  Apply topically as needed.     meclizine 12.5 mg tablet  Commonly known as: ANTIVERT  Take 1 tablet (12.5 mg total) by mouth 2 (two) times daily as needed for Dizziness.     methocarbamoL 500 MG Tab  Commonly known as: Robaxin  Take 1 tablet (500 mg total) by mouth 2 (two) times a day. for 10 days     metoprolol succinate 50 MG 24 hr tablet  Commonly known as: TOPROL-XL  Take 1 tablet (50 mg total) by mouth once daily.     oxyCODONE 5 MG immediate release tablet  Commonly known as: ROXICODONE  Take 1 tablet (5 mg total) by mouth every 6 (six) hours as needed for Pain.     pantoprazole 40 MG tablet  Commonly known as: PROTONIX  Take 1 tablet (40 mg total) by mouth once daily.     polyethylene glycol 17 gram Pwpk  Commonly known as: GLYCOLAX  Take 17 g by mouth once daily.     valsartan 40 MG tablet  Commonly known as: DIOVAN  Take 1 tablet (40 mg total) by mouth 2 (two) times daily.           * This list has 2 medication(s) that are the same as other medications prescribed for you. Read the directions carefully, and ask your doctor or other care provider to review them with you.                CHANGE how you take these medications      gabapentin 300 MG capsule  Commonly known as: NEURONTIN  Take 1 capsule (300 mg total) by mouth 3 (three) times daily.  What changed:   when to take this  additional instructions            CONTINUE taking these medications      metFORMIN 500 MG tablet  Commonly known as: GLUCOPHAGE  TAKE 1 TABLET BY MOUTH TWICE A DAY WITH MEALS            STOP taking these medications      cyclobenzaprine 10 MG tablet  Commonly known as: FLEXERIL     lisinopriL 20 MG tablet  Commonly known as: PRINIVIL,ZESTRIL      metoprolol tartrate 50 MG tablet  Commonly known as: LOPRESSOR     oxyCODONE-acetaminophen  mg per tablet  Commonly known as: PERCOCET     rosuvastatin 5 MG tablet  Commonly known as: CRESTOR              Indwelling Lines/Drains at time of discharge:   Lines/Drains/Airways       Drain  Duration             Female External Urinary Catheter w/ Suction 07/01/25 2116 6 days                        Time spent on the discharge of patient: 35 minutes         Mandy Pérez MD  Department of Hospital Medicine  [facility] - Cardiology Stepdown

## 2025-07-08 NOTE — SUBJECTIVE & OBJECTIVE
Interval History: see updated S/O above    Review of Systems   Constitutional:  Positive for activity change. Negative for fever.   Gastrointestinal:  Negative for abdominal distention, diarrhea and nausea.        Large ventral hernia, chronic   Musculoskeletal:  Positive for gait problem.   All other systems reviewed and are negative.    Objective:     Vital Signs (Most Recent):  Temp: 98.2 °F (36.8 °C) (07/08/25 0446)  Pulse: 60 (07/08/25 0446)  Resp: 18 (07/08/25 0446)  BP: (!) 125/58 (07/08/25 0446)  SpO2: 95 % (07/08/25 0446) Vital Signs (24h Range):  Temp:  [96.6 °F (35.9 °C)-98.9 °F (37.2 °C)] 98.2 °F (36.8 °C)  Pulse:  [60-75] 60  Resp:  [16-22] 18  SpO2:  [90 %-99 %] 95 %  BP: (112-156)/(55-95) 125/58     Weight: 60.2 kg (132 lb 11.5 oz)  Body mass index is 24.27 kg/m².    Intake/Output Summary (Last 24 hours) at 7/8/2025 0753  Last data filed at 7/8/2025 0500  Gross per 24 hour   Intake 680 ml   Output 1150 ml   Net -470 ml         Physical Exam  Vitals reviewed.   Constitutional:       General: She is not in acute distress.     Appearance: She is not toxic-appearing.      Comments: thin   Cardiovascular:      Rate and Rhythm: Normal rate.   Abdominal:      General: There is no distension.      Tenderness: There is abdominal tenderness (related to hernia, mild).      Comments: TTP on LEFT middle aspect of Extremely large ventral hernia, soft, no rebound/guard, possible TTP anterior L thigh   Musculoskeletal:         General: Swelling (feet, right heel tendersess with mild erythema. left large toe, has smal peripheral ischemic lesion below at tip.) present.      Comments: LEFT foot - linear erythema of mid superior aspect of foot with small scab, L heel erythema and exquisite TTP with warmth, L great toe with 1cm blister on distal aspect and surrounding erythema with nomal tempurature ( IMPROVING)    Neurological:      General: No focal deficit present.      Mental Status: She is oriented to person, place,  and time.      Motor: Weakness present.   Psychiatric:         Mood and Affect: Mood normal.         Behavior: Behavior normal.               Significant Labs: All pertinent labs within the past 24 hours have been reviewed.    Significant Imaging: I have reviewed all pertinent imaging results/findings within the past 24 hours.

## 2025-07-08 NOTE — NURSING
Report given to SANDHYA Vazquez at [facility]. All pertinent information including current condition, medications, and discharge instruction, communicated. Receiving nurse voiced understanding and had no further questions.

## 2025-07-08 NOTE — ASSESSMENT & PLAN NOTE
Dispo - independent/ambulatory prior to admit (albeit with large ventral hernia), now new deconditioning/debility requiring two person assist for standing, very motivated and engaged, PT/OT rec Mod Intensity. Family interested in [facility] facility in [place], prior very positive experience there and would be near daughter who lives in [place].       wbc up - likely a stress demargination. No other SIRS, pt denies new symptoms. ROS neg.  Will add lab to SNF orders.  Weight bearing- WBAT.  able to walk with FALL precautions.

## 2025-07-08 NOTE — NURSING
Patient discharge to [facility]. Left the unit via arranged transportation in stable condition. No acute distress noted at time of discharge.

## 2025-07-08 NOTE — ASSESSMENT & PLAN NOTE
Acute exquisite severe Left heel pain, with erythema and bogginess/fluctuance, denies h/o any gout/CPPD, recent broken toes (healed).  Great toe with erythema/blister.  XR negative.  CT unremarkable, no abscess/OM other.  Considered checking an MRI foot, in discussion with Podiatry, however the abnormal VAS MIKAEL and U/S returned in the meantime so consulting VSURG first  - Suspect ischemic event to left heel and great toe.   - f/u Vsurg consult, appreciate  - Already on atherosclerosis meds for new Dx CAD as below  - f/u podiatry consult, appreciate  - EMLA cream and capsaicin cream, some relief with combo  - off loading boots for both heels at all times while laying in bed   - WBAT    Dc on eliquis,statin and asa 81.   F/u Vasc surgery      Acute exquisite severe Left heel pain, with erythema and bogginess/fluctuance, denies h/o any gout/CPPD, recent broken toes (healed).  Great toe with erythema/blister.  XR negative.  CT unremarkable, no abscess/OM other.  Considered checking an MRI foot, in discussion with Podiatry, however the abnormal VAS MIKAEL and U/S returned in the meantime so consulting VSURG first  - Suspect ischemic event to left heel and great toe.   - f/u Vsurg consult, appreciate  - Already on atherosclerosis meds for new Dx CAD as below  - f/u podiatry consult, appreciate  - EMLA cream and capsaicin cream, some relief with combo  - off loading boots for both heels at all times while laying in bed   - WBAT

## 2025-07-08 NOTE — PLAN OF CARE
[facility] - Cardiology Stepdown  Facility Transfer Orders        Admit to: [facility], [place]    Diagnoses:   Active Hospital Problems    Diagnosis  POA    *Complete heart block [I44.2]  Yes     Priority: 1 - High    Acute systolic heart failure [I50.21]  Yes     Priority: 2     Acute hypoxemic respiratory failure [J96.01]  Yes     Priority: 2     Left lower quadrant abdominal pain [R10.32]  Yes     Priority: 3     Atheroembolism of lower extremity, left [I75.022]  No     Priority: 3     Left foot pain [M79.672]  No     Priority: 3     PAD (peripheral artery disease) [I73.9]  Yes     Priority: 3     ISAIAS (acute kidney injury) [N17.9]  Yes     Priority: 4     Type 2 diabetes with neuropathy [E11.40]  Yes     Priority: 5      Chronic    Hypertensive emergency [I16.1]  Yes     Priority: 7     E. coli UTI (urinary tract infection) [N39.0, B96.20]  Yes     Priority: 8     Bladder mass [N32.89]  Yes     Priority: 8     Hypertension [I10]  Yes     Priority: 9      Chronic    Coronary artery disease involving native coronary artery [I25.10]  Yes     Priority: 12     Muscular deconditioning [R29.898]  Yes     Priority: 12     Delirium [R41.0]  Yes     Priority: 13     Fibromyalgia [M79.7]  Yes     Priority: 14     Ventral hernia [K43.9]  Yes     Priority: 14      Chronic    High cholesterol [E78.00]  Yes     Priority: 17     Discharge planning issues [Z75.8]  Yes     Priority: 19       Resolved Hospital Problems   No resolved problems to display.     Allergies:   Review of patient's allergies indicates:   Allergen Reactions    Advil [ibuprofen] Hives    Penicillins     Codeine     Excedrin aspirin free [acetaminophen-caffeine]        Code Status: FULL    Vitals: Routine       Diet: diabetic diet: 2000 calorie    Activity: Up in a chair each morning as tolerated and Activity as tolerated  FALL precautions    Nursing Precautions: Aspiration , Fall, and Pressure ulcer prevention    Lab - cbc in one week.     Bed/Surface: Low Air  Loss    Consults: PT to evaluate and treat- 5 times a week, OT to evaluate and treat- 5 times a week, and Wound Care    Weight bearing- WBAT.  able to walk with FALL precautions.     Wound care:   Keep heels off bed  Apply bedadyne to left great toe lesion  F/u Cardiology for pacemaker. No care needed.     Oxygen: room air    Dialysis: Patient is not on dialysis.         Pending Diagnostic Studies:       Procedure Component Value Units Date/Time    VAS Ankle Brachial Indices Resting [[id number]]     Order Status: Sent Lab Status: No result     VAS US Arterial Legs Bilateral [[id number]]     Order Status: Sent Lab Status: No result               Miscellaneous Care:   Routine Skin for Bedridden Patients:  Apply moisture barrier cream to all  Wound Care: fooot and pacemaker  Diabetes Care: Diabetes: Check blood sugar. Fingerstick blood sugar AC and HS  Sliding Scale/Hypoglycemia Protocol: **MODERATE CORRECTION DOSE**  Blood Glucose  mg/dL    Pre-meal     Bedtime  151-200  2 units        1 unit  201-250   4 units       2 units   251-300  6 units        3 units   301-350   8 units       4 units   >350      10 units        5 units  **CALL MD for BG >350**         Medications: Discontinue all previous medication orders, if any. See new list below.  Current Discharge Medication List        START taking these medications    Details   acetaminophen (TYLENOL) 500 MG tablet Take 1 tablet (500 mg total) by mouth every 8 (eight) hours.  Qty: 90 tablet, Refills: 1      apixaban (ELIQUIS) 2.5 mg Tab Take 1 tablet (2.5 mg total) by mouth 2 (two) times daily.  Qty: 60 tablet, Refills: 11      aspirin 81 MG Chew Take 1 tablet (81 mg total) by mouth once daily.  Qty: 30 tablet, Refills: 11      atorvastatin (LIPITOR) 80 MG tablet Take 1 tablet (80 mg total) by mouth once daily.  Qty: 90 tablet, Refills: 3      capsaicin (ZOSTRIX) 0.025 % cream Apply topically 2 (two) times daily.  Qty: 60 g, Refills: 0      furosemide (LASIX) 40 MG  tablet Take 1 tablet (40 mg total) by mouth 2 (two) times daily.  Qty: 60 tablet, Refills: 11      insulin glargine U-100, Lantus, 100 unit/mL (3 mL) SubQ InPn pen Inject 5 Units into the skin once daily.  Qty: 1.5 mL, Refills: 11      !! insulin lispro (HUMALOG KWIKPEN INSULIN) 100 unit/mL pen Inject 1-10 Units into the skin before meals as needed (per SS).  Qty: 6 mL, Refills: 0      !! insulin lispro (HUMALOG KWIKPEN INSULIN) 100 unit/mL pen Inject 5 Units into the skin 3 (three) times daily before meals.  Qty: 3 mL, Refills: 11      LIDOcaine (LIDODERM) 5 % Place 1 patch onto the skin once daily. Remove & Discard patch within 12 hours or as directed by MD  Qty: 30 patch, Refills: 0      LIDOcaine-prilocaine (EMLA) cream Apply topically as needed.  Qty: 30 g, Refills: 0      meclizine (ANTIVERT) 12.5 mg tablet Take 1 tablet (12.5 mg total) by mouth 2 (two) times daily as needed for Dizziness.  Qty: 30 tablet, Refills: 0      methocarbamoL (ROBAXIN) 500 MG Tab Take 1 tablet (500 mg total) by mouth 2 (two) times a day. for 10 days  Qty: 20 tablet, Refills: 0      metoprolol succinate (TOPROL-XL) 50 MG 24 hr tablet Take 1 tablet (50 mg total) by mouth once daily.  Qty: 90 tablet, Refills: 3    Comments: .      oxyCODONE (ROXICODONE) 5 MG immediate release tablet Take 1 tablet (5 mg total) by mouth every 6 (six) hours as needed for Pain.  Qty: 10 tablet, Refills: 0    Comments: n/a   Associated Diagnoses: Atheroembolism of lower extremity, left      pantoprazole (PROTONIX) 40 MG tablet Take 1 tablet (40 mg total) by mouth once daily.  Qty: 90 tablet, Refills: 3      polyethylene glycol (GLYCOLAX) 17 gram PwPk Take 17 g by mouth once daily.  Qty: 30 each, Refills: 0      valsartan (DIOVAN) 40 MG tablet Take 1 tablet (40 mg total) by mouth 2 (two) times daily.  Qty: 180 tablet, Refills: 3    Comments: .       !! - Potential duplicate medications found. Please discuss with provider.        CONTINUE these medications  which have CHANGED    Details   gabapentin (NEURONTIN) 300 MG capsule Take 1 capsule (300 mg total) by mouth 3 (three) times daily.  Qty: 90 capsule, Refills: 11           CONTINUE these medications which have NOT CHANGED    Details   metFORMIN (GLUCOPHAGE) 500 MG tablet TAKE 1 TABLET BY MOUTH TWICE A DAY WITH MEALS  Qty: 180 tablet, Refills: 3    Associated Diagnoses: Type 2 diabetes mellitus with diabetic neuropathy, without long-term current use of insulin           STOP taking these medications       cyclobenzaprine (FLEXERIL) 10 MG tablet Comments:   Reason for Stopping:         lisinopriL (PRINIVIL,ZESTRIL) 20 MG tablet Comments:   Reason for Stopping:         metoprolol tartrate (LOPRESSOR) 50 MG tablet Comments:   Reason for Stopping:         oxyCODONE-acetaminophen (PERCOCET)  mg per tablet Comments:   Reason for Stopping:         rosuvastatin (CRESTOR) 5 MG tablet Comments:   Reason for Stopping:             Follow up:       Immunizations Administered as of 7/8/2025       Name Date Dose VIS Date Route Exp Date    COVID-19, MRNA, LN-S, PF (Moderna) 4/23/2021 -- -- Intramuscular --    Site: Left arm     : [employer]     Lot: [id number]     Comment: Adminis     COVID-19, MRNA, LN-S, PF (Moderna) 3/26/2021 -- -- Intramuscular --    Site: Left arm     : [employer]     Lot: [id number]     Comment: Adminis             Mandy Pérez MD

## 2025-07-08 NOTE — TELEPHONE ENCOUNTER
----- Message from Torrie Staley sent at 7/8/2025  9:47 AM CDT -----  Regarding: follow up  Please schedule 4 week follow up with ABIs in Dr. Finn clinic for this patient (s/p LLE angio + AT PTA on 7/7)    Thanks!

## 2025-07-08 NOTE — ASSESSMENT & PLAN NOTE
PT/OT rec Mod  Likely needs SNF. Plans to be discharged to [facility] in [place], which is close to DTR's home.

## 2025-07-08 NOTE — PT/OT/SLP PROGRESS
Physical Therapy Partial Co-Treatment with OT    Patient Name:  Cady Watkins   MRN:  [id number]    Recommendations:     Discharge Recommendations: Moderate Intensity Therapy  Discharge Equipment Recommendations: bedside commode, wheelchair  Barriers to discharge: Inaccessible home, Decreased caregiver support, and requiring increased level of skilled assistance    Assessment:     Cady Watkins is a 74 y.o. female admitted with a medical diagnosis of Complete heart block.  She presents with the following impairments/functional limitations: weakness, impaired endurance, impaired functional mobility, gait instability, impaired self care skills, decreased safety awareness, decreased upper extremity function, decreased lower extremity function, pain, decreased coordination, impaired cardiopulmonary response to activity, orthopedic precautions. Pt was highly motivated and demonstrated a positive attitude toward participation in PT today. The pt was agreeable to all interventions and tolerated the session well without signs of distress or adverse response. Pt is progressing well and had increased tolerance to ambulation distances compared to prior sessions and improved functional mobility. Patient continues to demonstrate the need for moderate intensity therapy on a daily basis post acute to address deficits and progress towards prior level of function. Pt would continue to benefit from skilled acute PT in order to address current deficits and progress functional mobility.     Co-treatment performed due to patient's multiple deficits requiring two skilled therapists to appropriately and safely assess patient's strength and endurance while facilitating functional tasks in addition to accommodating for patient's activity tolerance.      Rehab Prognosis: Good; patient would benefit from acute skilled PT services to address these deficits and reach maximum level of function.    Recent Surgery: Procedure(s)  (LRB):  Angiogram Extremity Unilateral (Left)  ANGIOPLASTY, VEIN 1 Day Post-Op    Plan:     During this hospitalization, patient to be seen 4 x/week to address the identified rehab impairments via gait training, therapeutic activities, therapeutic exercises, neuromuscular re-education and progress toward the following goals:    Plan of Care Expires:  08/28/25    Subjective     Chief Complaint: foot pain  Patient/Family Comments/goals: to get out of here  Pain/Comfort:  Pain Rating 1: 3/10  Location - Side 1: Left  Location - Orientation 1: generalized  Location 1: foot  Pain Addressed 1: Reposition, Distraction, Cessation of Activity      Objective:     Communicated with RN prior to session.  Patient found sitting edge of bed with peripheral IV, telemetry, PureWick upon PT entry to room.     General Precautions: Standard, fall, pacemaker  Orthopedic Precautions: N/A  Braces: N/A  Respiratory Status: Room air     Functional Mobility:  Transfers:     Sit to Stand from EOB: x2 trials; minimum assistance with rolling walker  Sit to Stand from chair with arms: x1 trials; contact guard assistance with rolling walker  Cueing to not push through LUE  Cueing for hand placement and sequencing     Gait:   8ft to chair Sophia with HHA   30ft in hallway Sophia with HHA  Increased cueing for hand placement and sequencing  Chair follow; seated rest breaks between trials   Deviations: decreased gus, decreased step length, decreased foot clearance, mildly unsteady gait, slight flexed posture, narrow BAILEY, decreased gait speed    Balance:   Static Sit: SBA  Static Stand: Sophia       AM-PAC 6 CLICK MOBILITY  Turning over in bed (including adjusting bedclothes, sheets and blankets)?: 3  Sitting down on and standing up from a chair with arms (e.g., wheelchair, bedside commode, etc.): 3  Moving from lying on back to sitting on the side of the bed?: 3  Moving to and from a bed to a chair (including a wheelchair)?: 3  Need to walk in  hospital room?: 3  Climbing 3-5 steps with a railing?: 1  Basic Mobility Total Score: 16       Treatment & Education:  Educated pt on PT role/POC  Educated pt on importance of OOB activity and daily ambulation  Time provided for active listening, education, counseling and discussion of health disposition in regards to patient's current status   Questions/concerns addressed within PTA scope of practice. Answered to pt satisfaction, no further questions  White board updated accordingly   RN aware of patient's mobility needs and status  Discussed RW management, fall prevention, and safety   Gait belt utilized during session for patient safety  Bedside table in front of patient and area set up for function, convenience, and safety     Patient left up in chair with all lines intact, call button in reach, RN notified, and daughter present..    GOALS:   Multidisciplinary Problems       Physical Therapy Goals          Problem: Physical Therapy    Goal Priority Disciplines Outcome Interventions   Physical Therapy Goal     PT, PT/OT Progressing    Description: Goals to be met by: 25     Patient will increase functional independence with mobility by performin. Supine to sit with MInimal Assistance  2. Sit to stand transfer with Minimal Assistance  3. Bed to chair transfer with Minimal Assistance using LRAD  4. Gait  x 50 feet with Minimal Assistance using LRAD.   5. Ascend/descend 2 stair with no Handrails Moderate Assistance using HHA.   6. Lower extremity exercise program x30 reps per handout, with supervision                         DME Justifications:   Cady requires a commode for home use because she is confined to a single room.  Cady Watkins has a mobility limitation that significantly impairs her ability to participate in one or more mobility related activities of daily living (MRADLs) such as toileting, feeding, dressing, grooming, and bathing in customary locations in the home.  The mobility  limitation cannot be sufficiently resolved by the use of a cane or walker.   The use of a manual wheelchair will significantly improve the patients ability to participate in MRADLS and the patient will use it on regular basis in the home.  Cady Aleccici has expressed her willingness to use a manual wheelchair in the home. Patients upper body strength is sufficient for propulsion.  She also has a caregiver who is available, willing, and able to provide assistance with the wheelchair when needed.      Time Tracking:     PT Received On: 07/08/25  PT Start Time: 1010     PT Stop Time: 1034  PT Total Time (min): 24 min     Billable Minutes: Gait Training 16 and Therapeutic Activity 8    Treatment Type: Treatment  PT/PTA: PTA     Number of PTA visits since last PT visit: 3     07/08/2025

## 2025-07-08 NOTE — ASSESSMENT & PLAN NOTE
Dispo - independent/ambulatory prior to admit (albeit with large ventral hernia), now new deconditioning/debility requiring two person assist for standing, very motivated and engaged, PT/OT rec Mod Intensity. Family interested in [facility] facility in [place], prior very positive experience there and would be near daughter who lives in [place].

## 2025-07-08 NOTE — TELEPHONE ENCOUNTER
Spoke w/ pt's daughter. Advised Dr. Finn would like to see her in a month with ABIs. Pt scheduled for 8/6/25. Appt letter to be mailed. Patient's daughter verbalized understanding and had no further questions.

## 2025-07-08 NOTE — PT/OT/SLP PROGRESS
"Occupational Therapy  Co- Treatment    Name: Cady Watkins  MRN: [id number]  Admitting Diagnosis:  Complete heart block  1 Day Post-Op    Recommendations:     Discharge Recommendations: Moderate Intensity Therapy  Discharge Equipment Recommendations:  bedside commode, wheelchair  Barriers to discharge:  Other (Comment) (increaesed assist needed.)    Assessment:     Cady Watkins is a 74 y.o. female with a medical diagnosis of Complete heart block.  She presents with wanting to get OOB and ambulate. Performance deficits affecting function are weakness, impaired endurance, impaired self care skills, impaired functional mobility, gait instability, impaired balance, decreased coordination, decreased upper extremity function, decreased lower extremity function, decreased safety awareness, pain, impaired coordination, orthopedic precautions. Recommending moderate intensity therapy once medically appropriate for discharge to increase maximal independence, reduce burden of care, and ensure safety. Patient would benefit from continued skilled acute OT 4x/wk to improve functional mobility, increase independence with ADLs, and address established goals.    Rehab Prognosis:  Good; patient would benefit from acute skilled OT services to address these deficits and reach maximum level of function.       Plan:     Patient to be seen 4 x/week to address the above listed problems via self-care/home management, therapeutic activities, therapeutic exercises  Plan of Care Expires: 07/29/25  Plan of Care Reviewed with: patient, daughter    Subjective   " I want to go home and see my 7 babies"  Chief Complaint: Painful left foot.  Patient/Family Comments/goals: To be d/c'd to SNF  Pain/Comfort:  Pain Rating 1: 3/10  Location - Side 1: Left  Location - Orientation 1: generalized  Location 1: foot  Pain Addressed 1: Distraction, Reposition, Cessation of Activity  Pain Rating Post-Intervention 1: other (see comments) (not rated but " pain lessened to ambulate.)    Objective:     Communicated with: RN, daughter  prior to session.  Patient found HOB elevated with peripheral IV, telemetry, PureWick upon OT entry to room.    General Precautions: Standard, fall, pacemaker    Orthopedic Precautions:N/A  Braces: N/A  Respiratory Status: Room air     Occupational Performance:     Bed Mobility:    Patient completed Rolling/Turning to Right with minimum assistance  Patient completed Scooting/Bridging with contact guard assistance  Patient completed Supine to Sit with minimum assistance     Functional Mobility/Transfers:  Patient completed Sit <> Stand Transfer with minimum assistance  with  no assistive device   Patient completed Bed <> Chair Transfer using Step Transfer technique with contact guard assistance with no assistive device, HHA for 8 feet.  Functional Mobility: Pt able to ambulate with chair follow in morley with min A x 2 30 feet with no LOB, SOB or breaks.  Pt stood at EOB with RW and SBA for 6 minutes.    Activities of Daily Living:  Upper Body Dressing: stand by assistance don back gown, doff gown EOB  Lower Body Dressing: maximal assistance don socks EOB  Toileting: maximal assistance to change out PureWick pad.      [facility] 6 Click ADL: 19    Treatment & Education:  Role of OT and POC  Safety  ADL retraining  Functional mobility training  Discharge planning  Importance of EOB/OOB activity      Patient left in bedside chair with all lines intact, call button in reach, RN notified, and daughter present    GOALS:   Multidisciplinary Problems       Occupational Therapy Goals          Problem: Occupational Therapy    Goal Priority Disciplines Outcome Interventions   Occupational Therapy Goal     OT, PT/OT Progressing    Description: Goals to be met by: 7/29/25     Patient will increase functional independence with ADLs by performing:    UE Dressing with independence  LE Dressing with minimal assistance and AD as needed.  Grooming while standing  at sink with Minimal Assistance  and AD as needed  Toileting from toilet with contact guard Assistance for hygiene and clothing management.   Sitting at edge of bed x10 minutes with supervision  Supine to sit with independence   Step transfer with minimal assistance and AD prn  Toilet transfer to toilet with with contact guard and AD prn  Increased functional strength to WNL for BUEs  Upper extremity exercise program x10 reps per handout, with assistance as needed.                         DME Justifications:  No DME recommended requiring DME justifications at this time. To be reassessed for RW after 6 week LUE restriction on WB is lifted.    Time Tracking:     OT Date of Treatment: 07/08/25  OT Start Time: 0945  OT Stop Time: 1034  OT Total Time (min): 49 min    Billable Minutes:Self Care/Home Management 49    OT/MONIE: OT          7/8/2025

## 2025-07-08 NOTE — PROGRESS NOTES
"[facility] - Cardiology [facility] Medicine  Progress Note    Patient Name: Cady Watkins  MRN: [id number]  Patient Class: IP- Inpatient   Admission Date: 6/24/2025  Length of Stay: 14 days  Attending Physician: Mandy Pérez MD  Primary Care Provider: Blaine Benítez MD        Subjective     Principal Problem:Complete heart block  Acute Condition: PVD    HPI:  74F with HTN, HLD, T2DM w neuropathy, chronic pain on opioids, and fibromyalgia who initially presented from PCP office to [facility] ED for reported symptomatic bradycardia w rate 30s. She was in her normal state of health until 3 weeks prior when she had an episode of syncope, while in her kitchen, she felt very off, walked to sofa and blacked out reportedly for 3 mins. Thereafter, she's had fatigue, decreased appetite, and increasing SOB.  In ED, found to have complete heart block, new onset cardiomyopathy ischemic vs Takotsubo, Hypertensive emergency (SBP 230s), and ISAIAS.  Started on nitro gtt, had an emergent TVP placed and then transferred to [facility] CCU.              Overview/Hospital Course:  CCU course:   Transferred in from [facility] to CCU.   IC and EP consulted, and decision made for C to eval ischemia, which was done after ISAIAS improved and pt diuresed, and LHC revealed "70% left circumflex stenosis, otherwise diffuse non-obstructive disease is present" no intervention needed.   Diuresed on Lasix IVP and Lasix IV gtt @10cc/hr 6/26-6/27.  Intermittent delirium which starts at night; given haldol 5mg IV on 6/24 then olanzapine 5mg on 6/26 and 6/27 am then d/c-ed; on delirium precautions.  Patient had PPM placed successfully with no complications. UA evident of potential UTI will plan to treat with CTX for 3 days. Will continue to work on patient functional status with PT/OT.      After stepdown to [facility] on 6/28.  Acute exquisite left heel pain, small areas of erythema on top of foot, consulted podiatry 6/30.  XR and CT negative. L " great toe developed erythema and blister.  VAS Duplex and ABIs abnormal with diffuse disease and several significant stenoses (prelim report in Media tab in Epic), consulted VSURG 7/2.  Also c/o new intermittent LLQ Abdominal and L upper thigh pain, 10/10, no identifiable triggers, present for 3 days but voiced to MD on 7/2, same day as worsening of her L heel and 1st toe pain.      7/3-  PVD. On ASA, systemic heparin, high intensity statin. Heparin gtt hold on call to OR. LLE angiogram planned Monday.  /62  Pulse 60  . . Pt has large hernia which interferes with walking.  Will ask PT/OT to eval for support device.     7/4- BS high 319,  started aspart 5 ac, and increased SS, on consistent carb diet. Pt eating better. Takes metformin at home.     7/5 - - > 135-> 138. Reduced insulin dose. LE angiogram planned Monday.  Left Great toe lesion slight improvement, left heel less painful and less erythremic.   7/6- -194  7/7-   this am. left lower extremity angiography today.  Planning dc to [facility] after procedure when stable.  Prelim SNF orders done  - balloon angioplasty done    7/8- s/p angiogram and balloon. Per vas surgery. Recommend single agent antiplatelet agent with oral anticoagulant along with high-dose statin therapy. May mobilize from my standpoint.  Dc heparin.  Plan for SNF at [facility]    Interval History: see updated S/O above    Review of Systems   Constitutional:  Positive for activity change. Negative for fever.   Gastrointestinal:  Negative for abdominal distention, diarrhea and nausea.        Large ventral hernia, chronic   Musculoskeletal:  Positive for gait problem.   All other systems reviewed and are negative.    Objective:     Vital Signs (Most Recent):  Temp: 98.2 °F (36.8 °C) (07/08/25 0446)  Pulse: 60 (07/08/25 0446)  Resp: 18 (07/08/25 0446)  BP: (!) 125/58 (07/08/25 0446)  SpO2: 95 % (07/08/25 0446) Vital Signs (24h Range):  Temp:  [96.6 °F (35.9  °C)-98.9 °F (37.2 °C)] 98.2 °F (36.8 °C)  Pulse:  [60-75] 60  Resp:  [16-22] 18  SpO2:  [90 %-99 %] 95 %  BP: (112-156)/(55-95) 125/58     Weight: 60.2 kg (132 lb 11.5 oz)  Body mass index is 24.27 kg/m².    Intake/Output Summary (Last 24 hours) at 7/8/2025 0753  Last data filed at 7/8/2025 0500  Gross per 24 hour   Intake 680 ml   Output 1150 ml   Net -470 ml         Physical Exam  Vitals reviewed.   Constitutional:       General: She is not in acute distress.     Appearance: She is not toxic-appearing.      Comments: thin   Cardiovascular:      Rate and Rhythm: Normal rate.   Abdominal:      General: There is no distension.      Tenderness: There is abdominal tenderness (related to hernia, mild).      Comments: TTP on LEFT middle aspect of Extremely large ventral hernia, soft, no rebound/guard, possible TTP anterior L thigh   Musculoskeletal:         General: Swelling (feet, right heel tendersess with mild erythema. left large toe, has smal peripheral ischemic lesion below at tip.) present.      Comments: LEFT foot - linear erythema of mid superior aspect of foot with small scab, L heel erythema and exquisite TTP with warmth, L great toe with 1cm blister on distal aspect and surrounding erythema with nomal tempurature ( IMPROVING)    Neurological:      General: No focal deficit present.      Mental Status: She is oriented to person, place, and time.      Motor: Weakness present.   Psychiatric:         Mood and Affect: Mood normal.         Behavior: Behavior normal.               Significant Labs: All pertinent labs within the past 24 hours have been reviewed.    Significant Imaging: I have reviewed all pertinent imaging results/findings within the past 24 hours.      Assessment & Plan  Complete heart block  Complete heart block s/p temp TVP then PPM- TSH wnl. Doxy ppx x 5d, post-PPM restrictions.     RESOLVED  Left foot pain  PAD (peripheral artery disease)  Acute exquisite severe Left heel pain, with erythema and  bogginess/fluctuance, denies h/o any gout/CPPD, recent broken toes (healed).  Great toe with erythema/blister.  XR negative.  CT unremarkable, no abscess/OM other.  Considered checking an MRI foot, in discussion with Podiatry, however the abnormal VAS MIKAEL and U/S returned in the meantime so consulting VSURG first  - Suspect ischemic event to left heel and great toe.   - f/u Vsurg consult, appreciate  - Already on atherosclerosis meds for new Dx CAD as below  - f/u podiatry consult, appreciate  - EMLA cream and capsaicin cream, some relief with combo  - off loading boots for both heels at all times while laying in bed   - WBAT    7/7 - s/p LE angiogram and balloon angioplasty left anterior tibial/superficial femoral artery for non-healing left foot wounds.   Acute exquisite severe Left heel pain, with erythema and bogginess/fluctuance, denies h/o any gout/CPPD, recent broken toes (healed).  Great toe with erythema/blister.  XR negative.  CT unremarkable, no abscess/OM other.  Considered checking an MRI foot, in discussion with Podiatry, however the abnormal VAS MIKAEL and U/S returned in the meantime so consulting VSURG first  - Suspect ischemic event to left heel and great toe.   - f/u Vsurg consult, appreciate  - Already on atherosclerosis meds for new Dx CAD as below  - f/u podiatry consult, appreciate  - EMLA cream and capsaicin cream, some relief with combo  - off loading boots for both heels at all times while laying in bed   - WBAT    7/6- LE angiogram planned Monday  Left lower quadrant abdominal pain  Due to large ventral hernia. Abd wall stretching. Pain was positional and I was able to relieve it by re-positioning the hernia on a pillow on 7/3.   - continue to monitor with serial exams.     And possible left thigh pain  All Sx on LEFT side, same as the LEFT foot and toe  F/u VSurg consult as above  May need repeat CT vs CTA ABd/Pelvis however if Vsurg is doing peripheral angiogram this would cover it  With  "daily exams, note that pain is related to ventral hernia and is positional. Seems to be related to abd wall stretching.  No evidence or strangulation/ obstruction. Asked PT/OT to arrange for support devise, if possible.     7/6- resolved    Ventral hernia  Started decades ago after an elective cholecystectomy, she was at home recovering and her "stomach burst," reportedly "wasn't wrapped" by surgeon after the operation, and pt was too afraid to go to any more surgery for a repair so she's been living with it.  It's been enlarging over time.    Pain was positional and I was able to relieve it by re-positioning the hernia on a pillow on 7/3.   - see abd pain    Bladder mass  - Bladder US with 3.0 cm mass like lesion in right dependent bladder.  - Consulted Urology for further evaluation.  - Urology ordered CT Renal Stone Study - unable to visualize bladder mass.  - CT Urogram inpt vs outpt   - consider in new clinical context with new Abd pain, see above  - Needs close follow up with urology, referral placed.    Acute systolic heart failure  Acute HFrEF/NICMP, c/w Takotsubo? TTE showed HFrEF with low EF of 20-25% with evidence of WMAs.   - diuresed w Lasix IV, near euvolemic and switched to oral  - new Lasix 40 PO BID for maintenance, will need Rx for this + sliding scale eventually upon d/c  - Toprol 50   - Valsartan 40 BID    7/6- stable  Coronary artery disease involving native coronary artery   CAD-  LCx 70% stenosis and non-obstructive diffuse disease  - ASA 81  - Atorva 20  - BB  Acute hypoxemic respiratory failure  Resolving  Fibromyalgia  - Lido patch   - Robaxin 500 BID  - Capsaicin BID    High cholesterol  Atorva 20    Type 2 diabetes with neuropathy  DM2 controlled (A1C 6.1 on 9/2024) with neuropathy  New A1C- 6.6  - Gabapentin 300 TID  - Working to optimize BG control  - Hold home meds: metformin  - SSI provided for corrective dosing  - POCT glucose checks as ordered  - Hypoglycemic protocol in effect  - " Diabetic diet provided      Recent Labs     07/06/25  1559 07/06/25 2002 07/07/25  0821 07/07/25  1004 07/07/25  1529 07/07/25 2058   POCTGLUCOSE 269* 214* 202* 165* 197* 292*     7/4- BS high 319,  On lantus 5 daily, started aspart 5 ac, and increased SS 1-10, on consistent carb diet. Pt eating better. Takes metformin at home.   7/5- reduce lantus 5 and aspart 3 ac to prevent hypoglycemia  7/8 Adjust lantus 5, and aspart 5 ac.   E. coli UTI (urinary tract infection)  Pan-sensitive UTI, CTX IV x 3d    Hypertensive emergency  Hypertension  Resolved    Resolved    ISAIAS (acute kidney injury)  Resolved. cardiorenal  Delirium  Resolved    Muscular deconditioning  PT/OT rec Mod  Likely needs SNF. Plans to be discharged to [facility] in [place], which is close to DTR's home.     Discharge planning issues  Dispo - independent/ambulatory prior to admit (albeit with large ventral hernia), now new deconditioning/debility requiring two person assist for standing, very motivated and engaged, PT/OT rec Mod Intensity. Family interested in [facility] facility in [place], prior very positive experience there and would be near daughter who lives in [place].     Atheroembolism of lower extremity, left  Angiogram per Vascular Surgery done 7/7.    VTE Risk Mitigation (From admission, onward)           Ordered     apixaban tablet 2.5 mg  2 times daily         07/08/25 0752                    Discharge Planning   LOKESH: 7/8/2025     Code Status: Full Code   Medical Readiness for Discharge Date:   Discharge Plan A: Skilled Nursing Facility   Discharge Delays: (!) Change in Medical Condition      Mandy Pérez MD  Department of Hospital Medicine   [facility] - Cardiology Stepdown

## 2025-07-08 NOTE — ASSESSMENT & PLAN NOTE
Complete heart block s/p temp TVP then PPM- TSH wnl. Doxy ppx x 5d, post-PPM restrictions.     RESOLVED    F/U EP cardiology

## 2025-07-08 NOTE — ASSESSMENT & PLAN NOTE
DM2 controlled (A1C 6.1 on 9/2024) with neuropathy  New A1C- 6.6  - Gabapentin 300 TID  - Working to optimize BG control  - Hold home meds: metformin  - SSI provided for corrective dosing  - POCT glucose checks as ordered  - Hypoglycemic protocol in effect  - Diabetic diet provided      Recent Labs     07/06/25  1559 07/06/25 2002 07/07/25  0821 07/07/25  1004 07/07/25  1529 07/07/25 2058   POCTGLUCOSE 269* 214* 202* 165* 197* 292*     7/4- BS high 319,  On lantus 5 daily, started aspart 5 ac, and increased SS 1-10, on consistent carb diet. Pt eating better. Takes metformin at home.   7/5- reduce lantus 5 and aspart 3 ac to prevent hypoglycemia  7/8 Adjust lantus 5, and aspart 5 ac.

## 2025-07-08 NOTE — ANESTHESIA POSTPROCEDURE EVALUATION
Anesthesia Post Evaluation    Patient: Cady Watkins    Procedure(s) Performed: Procedure(s) (LRB):  Angiogram Extremity Unilateral (Left)  ANGIOPLASTY, VEIN    Final Anesthesia Type: general      Patient location during evaluation: PACU  Patient participation: Yes- Able to Participate  Level of consciousness: awake and alert and oriented  Post-procedure vital signs: reviewed and stable  Pain management: adequate  Airway patency: patent    PONV status at discharge: No PONV  Anesthetic complications: no      Cardiovascular status: blood pressure returned to baseline and hemodynamically stable  Respiratory status: unassisted  Hydration status: euvolemic  Follow-up not needed.              Vitals Value Taken Time   /78 07/08/25 11:26   Temp 36.4 °C (97.6 °F) 07/08/25 11:26   Pulse 60 07/08/25 11:26   Resp 18 07/08/25 11:26   SpO2 95 % 07/08/25 11:26         Event Time   Out of Recovery 07/07/2025 15:15:00         Pain/Jay Score: Pain Rating Prior to Med Admin: 8 (7/8/2025  6:41 AM)  Pain Rating Post Med Admin: 5 (7/7/2025  9:59 PM)  Jay Score: 9 (7/7/2025  3:15 PM)

## 2025-07-08 NOTE — PLAN OF CARE
07/08/25 1301   Medicare Message   Important Message from Medicare regarding Discharge Appeal Rights Given to patient/caregiver;Explained to patient/caregiver;Signed/date by patient/caregiver  (signed by daughter Marilyn at bedside)   Date IMM was signed 07/08/25   Time IMM was signed 0950     BENY met with pt and daughter Marilyn at bedside and reviewed IMM and discussed plan for pt to discharge today to [facility] (pt has discharge order).  IMM signed by Marilyn who reported having questions for Dr Pérez prior to discharge.  Dr Pérez notified.    BENY notified Rosanna at [facility] that pt has a discharge order placed for transfer today.      Eve Wahl, [facility]  [facility]  [id number]

## 2025-07-08 NOTE — PLAN OF CARE
[facility] - Cardiology Stepdown  Discharge Final Note    Primary Care Provider: Blaine Benítez MD    Expected Discharge Date: 7/8/2025    Final Discharge Note (most recent)       Final Note - 07/08/25 1329          Final Note    Assessment Type Final Discharge Note     Anticipated Discharge Disposition Skilled Nursing Facility        Post-Acute Status    Post-Acute Authorization Placement     Post-Acute Placement Status Set-up Complete/Auth obtained                 SW spoke with pt's daughter Marilyn who stated she felt better after speaking with Dr Pérez.      Per Rosanna at [facility] pt's daughter is on her way to complete admissions paperwork and transportation can be set.  Transportation scheduled via wheelchair van for 2:30pm to transfer to [facility] (time not guaranteed).  SANDHYA Eason to call report to [phone], room A3.  SANDHYA Eason was notified of the above.        Eve Wahl LMSW  [facility]  [id number]        Important Message from Medicare  Important Message from Medicare regarding Discharge Appeal Rights: Given to patient/caregiver, Explained to patient/caregiver, Signed/date by patient/caregiver (signed by daughter Marilyn at bedside)     Date IMM was signed: 07/08/25  Time IMM was signed: 0950     Follow-up providers       Glen Finn MD   Specialty: Vascular Surgery, General Surgery    [address]  [facility] [address]   Phone: [phone]       Next Steps: Follow up in 4 week(s)              After-discharge care                Destination       *[facility]   Service: Skilled Nursing    [place] [id number]   Phone: [phone]

## 2025-07-08 NOTE — PLAN OF CARE
Problem: Adult Inpatient Plan of Care  Goal: Plan of Care Review  Outcome: Met  Goal: Patient-Specific Goal (Individualized)  Outcome: Met  Goal: Absence of Hospital-Acquired Illness or Injury  Outcome: Met  Goal: Optimal Comfort and Wellbeing  Outcome: Met  Goal: Readiness for Transition of Care  Outcome: Met     Problem: Diabetes Comorbidity  Goal: Blood Glucose Level Within Targeted Range  Outcome: Met     Problem: Acute Kidney Injury/Impairment  Goal: Fluid and Electrolyte Balance  Outcome: Met  Goal: Improved Oral Intake  Outcome: Met  Goal: Effective Renal Function  Outcome: Met     Problem: Infection  Goal: Absence of Infection Signs and Symptoms  Outcome: Met     Problem: Skin Injury Risk Increased  Goal: Skin Health and Integrity  Outcome: Met     Problem: Fall Injury Risk  Goal: Absence of Fall and Fall-Related Injury  Outcome: Met     Problem: Wound  Goal: Optimal Coping  Outcome: Met  Goal: Optimal Functional Ability  Outcome: Met  Goal: Absence of Infection Signs and Symptoms  Outcome: Met  Goal: Improved Oral Intake  Outcome: Met  Goal: Optimal Pain Control and Function  Outcome: Met  Goal: Skin Health and Integrity  Outcome: Met  Goal: Optimal Wound Healing  Outcome: Met

## 2025-07-08 NOTE — ASSESSMENT & PLAN NOTE
PT/OT rec Mod  Likely needs SNF. Plans to be discharged to [facility] in [place], which is close to DTR's home.

## 2025-07-08 NOTE — ASSESSMENT & PLAN NOTE
Acute exquisite severe Left heel pain, with erythema and bogginess/fluctuance, denies h/o any gout/CPPD, recent broken toes (healed).  Great toe with erythema/blister.  XR negative.  CT unremarkable, no abscess/OM other.  Considered checking an MRI foot, in discussion with Podiatry, however the abnormal VAS MIKAEL and U/S returned in the meantime so consulting VSURG first  - Suspect ischemic event to left heel and great toe.   - f/u Vsurg consult, appreciate  - Already on atherosclerosis meds for new Dx CAD as below  - f/u podiatry consult, appreciate  - EMLA cream and capsaicin cream, some relief with combo  - off loading boots for both heels at all times while laying in bed   - WBAT    7/7 - s/p LE angiogram and balloon angioplasty left anterior tibial/superficial femoral artery for non-healing left foot wounds.   Acute exquisite severe Left heel pain, with erythema and bogginess/fluctuance, denies h/o any gout/CPPD, recent broken toes (healed).  Great toe with erythema/blister.  XR negative.  CT unremarkable, no abscess/OM other.  Considered checking an MRI foot, in discussion with Podiatry, however the abnormal VAS MIKAEL and U/S returned in the meantime so consulting VSURG first  - Suspect ischemic event to left heel and great toe.   - f/u Vsurg consult, appreciate  - Already on atherosclerosis meds for new Dx CAD as below  - f/u podiatry consult, appreciate  - EMLA cream and capsaicin cream, some relief with combo  - off loading boots for both heels at all times while laying in bed   - WBAT    7/6- LE angiogram planned Monday

## 2025-07-08 NOTE — PROGRESS NOTES
Vascular Surgery Note    POD#1 s/p left anterior tibial/superficial femoral artery angioplasty for non-healing left foot wounds.  She denied symptoms suggestive of rest pain.  Right groin access site remains soft with palpable left DP pulse.  Recommend single agent antiplatelet agent with oral anticoagulant along with high-dose statin therapy.  May mobilize from my standpoint.  All of Ms. Watkins's questions were answered.  She was in agreement with the plan.

## 2025-07-17 ENCOUNTER — DOCUMENTATION ONLY (OUTPATIENT)
Dept: CARDIOLOGY | Facility: CLINIC | Age: 75
End: 2025-07-17
Payer: MEDICARE

## 2025-07-17 NOTE — PROGRESS NOTES
Heart Failure Transitional Care Clinic (HFTCC) Team notified of pt referral via Ambulatory Referral to Heart Failure Transitional Care ([id number]).    Patient screened today 7/17/2025 by provider and LPN for enrollment to program.      Pt was deemed not a candidate for enrollment at this time related to PP CHB.    Pt will require additional follow up planning per primary team.     If pt status, diagnosis, or treatment plan changes , please place AMB referral to Heart Failure Transitional Care Clinic ([id number]) for HFTCC enrollment re-evalution.

## 2025-07-24 ENCOUNTER — HOSPITAL ENCOUNTER (OUTPATIENT)
Facility: HOSPITAL | Age: 75
Discharge: HOME HEALTH | End: 2025-07-26
Attending: STUDENT IN AN ORGANIZED HEALTH CARE EDUCATION/TRAINING PROGRAM | Admitting: STUDENT IN AN ORGANIZED HEALTH CARE EDUCATION/TRAINING PROGRAM
Payer: MEDICARE

## 2025-07-24 DIAGNOSIS — R07.9 CHEST PAIN: ICD-10-CM

## 2025-07-24 DIAGNOSIS — R79.89 TROPONIN I ABOVE REFERENCE RANGE: ICD-10-CM

## 2025-07-24 DIAGNOSIS — F99 PSYCHIATRIC DISORDER: Primary | ICD-10-CM

## 2025-07-24 DIAGNOSIS — I50.9 CHF (CONGESTIVE HEART FAILURE): ICD-10-CM

## 2025-07-24 DIAGNOSIS — Z00.8 MEDICAL CLEARANCE FOR PSYCHIATRIC ADMISSION: ICD-10-CM

## 2025-07-24 LAB
ABSOLUTE EOSINOPHIL (SMH): 0.37 K/UL
ABSOLUTE MONOCYTE (SMH): 0.48 K/UL (ref 0.3–1)
ABSOLUTE NEUTROPHIL COUNT (SMH): 4.1 K/UL (ref 1.8–7.7)
ALBUMIN SERPL-MCNC: 3.7 G/DL (ref 3.5–5.2)
ALP SERPL-CCNC: 54 UNIT/L (ref 55–135)
ALT SERPL-CCNC: 11 UNIT/L (ref 10–44)
ANION GAP (SMH): 2 MMOL/L (ref 8–16)
APAP SERPL-MCNC: 1.4 UG/ML (ref 10–20)
AST SERPL-CCNC: 18 UNIT/L (ref 10–40)
BASOPHILS # BLD AUTO: 0.07 K/UL
BASOPHILS NFR BLD AUTO: 1.1 %
BILIRUB SERPL-MCNC: 0.4 MG/DL (ref 0.1–1)
BUN SERPL-MCNC: 21 MG/DL (ref 8–23)
CALCIUM SERPL-MCNC: 9 MG/DL (ref 8.7–10.5)
CHLORIDE SERPL-SCNC: 104 MMOL/L (ref 95–110)
CO2 SERPL-SCNC: 31 MMOL/L (ref 23–29)
CREAT SERPL-MCNC: 0.9 MG/DL (ref 0.5–1.4)
ERYTHROCYTE [DISTWIDTH] IN BLOOD BY AUTOMATED COUNT: 13.6 % (ref 11.5–14.5)
ETHANOL SERPL-MCNC: <10 MG/DL
GFR SERPLBLD CREATININE-BSD FMLA CKD-EPI: >60 ML/MIN/1.73/M2
GLUCOSE SERPL-MCNC: 168 MG/DL (ref 70–110)
HCT VFR BLD AUTO: 32 % (ref 37–48.5)
HGB BLD-MCNC: 10.5 GM/DL (ref 12–16)
IMM GRANULOCYTES # BLD AUTO: 0.03 K/UL (ref 0–0.04)
IMM GRANULOCYTES NFR BLD AUTO: 0.5 % (ref 0–0.5)
LYMPHOCYTES # BLD AUTO: 1.48 K/UL (ref 1–4.8)
MCH RBC QN AUTO: 32.5 PG (ref 27–31)
MCHC RBC AUTO-ENTMCNC: 32.8 G/DL (ref 32–36)
MCV RBC AUTO: 99 FL (ref 82–98)
NUCLEATED RBC (/100WBC) (SMH): 0 /100 WBC
PLATELET # BLD AUTO: 147 K/UL (ref 150–450)
PMV BLD AUTO: 11.7 FL (ref 9.2–12.9)
POTASSIUM SERPL-SCNC: 3.6 MMOL/L (ref 3.5–5.1)
PROT SERPL-MCNC: 6.8 GM/DL (ref 6–8.4)
RBC # BLD AUTO: 3.23 M/UL (ref 4–5.4)
RELATIVE EOSINOPHIL (SMH): 5.7 % (ref 0–8)
RELATIVE LYMPHOCYTE (SMH): 22.7 % (ref 18–48)
RELATIVE MONOCYTE (SMH): 7.4 % (ref 4–15)
RELATIVE NEUTROPHIL (SMH): 62.6 % (ref 38–73)
SALICYLATES SERPL-MCNC: <1.5 MG/DL (ref 15–30)
SODIUM SERPL-SCNC: 137 MMOL/L (ref 136–145)
TROPONIN HIGH SENSITIVE (SMH): 47.9 PG/ML
TSH SERPL-ACNC: 2.89 UIU/ML (ref 0.34–5.6)
VIT B12 SERPL-MCNC: 193 PG/ML (ref 210–950)
WBC # BLD AUTO: 6.52 K/UL (ref 3.9–12.7)

## 2025-07-24 PROCEDURE — 84484 ASSAY OF TROPONIN QUANT: CPT | Performed by: COMMUNITY HEALTH WORKER

## 2025-07-24 PROCEDURE — 82077 ASSAY SPEC XCP UR&BREATH IA: CPT | Performed by: COMMUNITY HEALTH WORKER

## 2025-07-24 PROCEDURE — 80179 DRUG ASSAY SALICYLATE: CPT | Performed by: COMMUNITY HEALTH WORKER

## 2025-07-24 PROCEDURE — 85025 COMPLETE CBC W/AUTO DIFF WBC: CPT | Performed by: COMMUNITY HEALTH WORKER

## 2025-07-24 PROCEDURE — 84443 ASSAY THYROID STIM HORMONE: CPT | Performed by: COMMUNITY HEALTH WORKER

## 2025-07-24 PROCEDURE — 80143 DRUG ASSAY ACETAMINOPHEN: CPT | Performed by: COMMUNITY HEALTH WORKER

## 2025-07-24 PROCEDURE — 96365 THER/PROPH/DIAG IV INF INIT: CPT

## 2025-07-24 PROCEDURE — 99285 EMERGENCY DEPT VISIT HI MDM: CPT | Mod: 25

## 2025-07-24 PROCEDURE — 63600175 PHARM REV CODE 636 W HCPCS: Performed by: COMMUNITY HEALTH WORKER

## 2025-07-24 PROCEDURE — 82607 VITAMIN B-12: CPT | Performed by: COMMUNITY HEALTH WORKER

## 2025-07-24 PROCEDURE — 93005 ELECTROCARDIOGRAM TRACING: CPT | Performed by: INTERNAL MEDICINE

## 2025-07-24 PROCEDURE — 80053 COMPREHEN METABOLIC PANEL: CPT | Performed by: COMMUNITY HEALTH WORKER

## 2025-07-24 PROCEDURE — 93010 ELECTROCARDIOGRAM REPORT: CPT | Mod: ,,, | Performed by: INTERNAL MEDICINE

## 2025-07-24 RX ORDER — LANOLIN ALCOHOL/MO/W.PET/CERES
1000 CREAM (GRAM) TOPICAL
Status: COMPLETED | OUTPATIENT
Start: 2025-07-24 | End: 2025-07-25

## 2025-07-24 RX ORDER — MAGNESIUM SULFATE HEPTAHYDRATE 40 MG/ML
2 INJECTION, SOLUTION INTRAVENOUS ONCE
Status: COMPLETED | OUTPATIENT
Start: 2025-07-24 | End: 2025-07-25

## 2025-07-24 RX ADMIN — MAGNESIUM SULFATE HEPTAHYDRATE 2 G: 40 INJECTION, SOLUTION INTRAVENOUS at 10:07

## 2025-07-25 PROBLEM — R79.89 ELEVATED TROPONIN: Status: ACTIVE | Noted: 2025-07-25

## 2025-07-25 PROBLEM — I50.20 HFREF (HEART FAILURE WITH REDUCED EJECTION FRACTION): Status: ACTIVE | Noted: 2025-07-25

## 2025-07-25 PROBLEM — E53.8 VITAMIN B12 DEFICIENCY: Status: ACTIVE | Noted: 2025-07-25

## 2025-07-25 PROBLEM — F99 PSYCHIATRIC DISORDER: Status: ACTIVE | Noted: 2025-07-25

## 2025-07-25 LAB
AMPHET UR QL SCN: NEGATIVE
BACTERIA #/AREA URNS AUTO: NORMAL /HPF
BARBITURATE SCN PRESENT UR: NEGATIVE
BENZODIAZ UR QL SCN: NEGATIVE
BILIRUB UR QL STRIP.AUTO: NEGATIVE
BNP SERPL-MCNC: 1370 PG/ML
CANNABINOIDS UR QL SCN: NEGATIVE
CLARITY UR: CLEAR
COCAINE UR QL SCN: NEGATIVE
COLOR UR AUTO: YELLOW
CREAT UR-MCNC: 61.1 MG/DL (ref 15–325)
GLUCOSE UR QL STRIP: NEGATIVE
HGB UR QL STRIP: NEGATIVE
KETONES UR QL STRIP: NEGATIVE
LEUKOCYTE ESTERASE UR QL STRIP: NEGATIVE
MICROSCOPIC COMMENT: NORMAL
NITRITE UR QL STRIP: POSITIVE
OPIATES UR QL SCN: NEGATIVE
PCP UR QL: NEGATIVE
PH UR STRIP: 7 [PH]
POCT GLUCOSE: 153 MG/DL (ref 70–110)
POCT GLUCOSE: 159 MG/DL (ref 70–110)
POCT GLUCOSE: 168 MG/DL (ref 70–110)
POCT GLUCOSE: 95 MG/DL (ref 70–110)
PROT UR QL STRIP: NEGATIVE
RBC #/AREA URNS AUTO: 1 /HPF
SP GR UR STRIP: 1.02
SQUAMOUS #/AREA URNS AUTO: 1 /HPF
TROPONIN HIGH SENSITIVE (SMH): 46.8 PG/ML
TROPONIN HIGH SENSITIVE (SMH): 50.9 PG/ML
UROBILINOGEN UR STRIP-ACNC: NEGATIVE EU/DL
WBC #/AREA URNS AUTO: 1 /HPF

## 2025-07-25 PROCEDURE — G0378 HOSPITAL OBSERVATION PER HR: HCPCS

## 2025-07-25 PROCEDURE — 94761 N-INVAS EAR/PLS OXIMETRY MLT: CPT

## 2025-07-25 PROCEDURE — 80307 DRUG TEST PRSMV CHEM ANLYZR: CPT | Performed by: COMMUNITY HEALTH WORKER

## 2025-07-25 PROCEDURE — 83880 ASSAY OF NATRIURETIC PEPTIDE: CPT | Performed by: STUDENT IN AN ORGANIZED HEALTH CARE EDUCATION/TRAINING PROGRAM

## 2025-07-25 PROCEDURE — 25000003 PHARM REV CODE 250: Performed by: COMMUNITY HEALTH WORKER

## 2025-07-25 PROCEDURE — 25000003 PHARM REV CODE 250: Performed by: STUDENT IN AN ORGANIZED HEALTH CARE EDUCATION/TRAINING PROGRAM

## 2025-07-25 PROCEDURE — 81001 URINALYSIS AUTO W/SCOPE: CPT | Performed by: COMMUNITY HEALTH WORKER

## 2025-07-25 PROCEDURE — G0427 INPT/ED TELECONSULT70: HCPCS | Mod: GT,,, | Performed by: STUDENT IN AN ORGANIZED HEALTH CARE EDUCATION/TRAINING PROGRAM

## 2025-07-25 PROCEDURE — 96366 THER/PROPH/DIAG IV INF ADDON: CPT

## 2025-07-25 PROCEDURE — 84484 ASSAY OF TROPONIN QUANT: CPT | Mod: 91

## 2025-07-25 PROCEDURE — 36415 COLL VENOUS BLD VENIPUNCTURE: CPT | Performed by: COMMUNITY HEALTH WORKER

## 2025-07-25 PROCEDURE — 99900035 HC TECH TIME PER 15 MIN (STAT)

## 2025-07-25 PROCEDURE — 36415 COLL VENOUS BLD VENIPUNCTURE: CPT | Performed by: STUDENT IN AN ORGANIZED HEALTH CARE EDUCATION/TRAINING PROGRAM

## 2025-07-25 PROCEDURE — 99900031 HC PATIENT EDUCATION (STAT)

## 2025-07-25 PROCEDURE — 94799 UNLISTED PULMONARY SVC/PX: CPT

## 2025-07-25 PROCEDURE — 80307 DRUG TEST PRSMV CHEM ANLYZR: CPT | Performed by: INTERNAL MEDICINE

## 2025-07-25 PROCEDURE — 84484 ASSAY OF TROPONIN QUANT: CPT | Performed by: STUDENT IN AN ORGANIZED HEALTH CARE EDUCATION/TRAINING PROGRAM

## 2025-07-25 PROCEDURE — 36415 COLL VENOUS BLD VENIPUNCTURE: CPT

## 2025-07-25 RX ORDER — IBUPROFEN 200 MG
24 TABLET ORAL
Status: DISCONTINUED | OUTPATIENT
Start: 2025-07-25 | End: 2025-07-26 | Stop reason: HOSPADM

## 2025-07-25 RX ORDER — TALC
6 POWDER (GRAM) TOPICAL NIGHTLY PRN
Status: DISCONTINUED | OUTPATIENT
Start: 2025-07-25 | End: 2025-07-26 | Stop reason: HOSPADM

## 2025-07-25 RX ORDER — VALSARTAN 40 MG/1
40 TABLET ORAL 2 TIMES DAILY
Status: DISCONTINUED | OUTPATIENT
Start: 2025-07-25 | End: 2025-07-26 | Stop reason: HOSPADM

## 2025-07-25 RX ORDER — ALUMINUM HYDROXIDE, MAGNESIUM HYDROXIDE, AND SIMETHICONE 1200; 120; 1200 MG/30ML; MG/30ML; MG/30ML
30 SUSPENSION ORAL 4 TIMES DAILY PRN
Status: DISCONTINUED | OUTPATIENT
Start: 2025-07-25 | End: 2025-07-26 | Stop reason: HOSPADM

## 2025-07-25 RX ORDER — LANOLIN ALCOHOL/MO/W.PET/CERES
1000 CREAM (GRAM) TOPICAL DAILY
Status: DISCONTINUED | OUTPATIENT
Start: 2025-07-25 | End: 2025-07-26 | Stop reason: HOSPADM

## 2025-07-25 RX ORDER — SODIUM,POTASSIUM PHOSPHATES 280-250MG
2 POWDER IN PACKET (EA) ORAL
Status: DISCONTINUED | OUTPATIENT
Start: 2025-07-25 | End: 2025-07-26 | Stop reason: HOSPADM

## 2025-07-25 RX ORDER — INSULIN GLARGINE 100 [IU]/ML
5 INJECTION, SOLUTION SUBCUTANEOUS DAILY
Status: DISCONTINUED | OUTPATIENT
Start: 2025-07-26 | End: 2025-07-26 | Stop reason: HOSPADM

## 2025-07-25 RX ORDER — NAPROXEN SODIUM 220 MG/1
81 TABLET, FILM COATED ORAL DAILY
Status: DISCONTINUED | OUTPATIENT
Start: 2025-07-26 | End: 2025-07-26 | Stop reason: HOSPADM

## 2025-07-25 RX ORDER — NALOXONE HCL 0.4 MG/ML
0.02 VIAL (ML) INJECTION
Status: DISCONTINUED | OUTPATIENT
Start: 2025-07-25 | End: 2025-07-26 | Stop reason: HOSPADM

## 2025-07-25 RX ORDER — GLUCAGON 1 MG
1 KIT INJECTION
Status: DISCONTINUED | OUTPATIENT
Start: 2025-07-25 | End: 2025-07-26 | Stop reason: HOSPADM

## 2025-07-25 RX ORDER — GABAPENTIN 300 MG/1
300 CAPSULE ORAL NIGHTLY
Status: DISCONTINUED | OUTPATIENT
Start: 2025-07-25 | End: 2025-07-26 | Stop reason: HOSPADM

## 2025-07-25 RX ORDER — POLYETHYLENE GLYCOL 3350 17 G/17G
17 POWDER, FOR SOLUTION ORAL DAILY PRN
Status: DISCONTINUED | OUTPATIENT
Start: 2025-07-25 | End: 2025-07-26 | Stop reason: HOSPADM

## 2025-07-25 RX ORDER — FUROSEMIDE 40 MG/1
40 TABLET ORAL 2 TIMES DAILY
Status: DISCONTINUED | OUTPATIENT
Start: 2025-07-25 | End: 2025-07-26 | Stop reason: HOSPADM

## 2025-07-25 RX ORDER — OXYCODONE HYDROCHLORIDE 5 MG/1
5 TABLET ORAL EVERY 6 HOURS PRN
Status: DISCONTINUED | OUTPATIENT
Start: 2025-07-25 | End: 2025-07-26 | Stop reason: HOSPADM

## 2025-07-25 RX ORDER — LANOLIN ALCOHOL/MO/W.PET/CERES
800 CREAM (GRAM) TOPICAL
Status: DISCONTINUED | OUTPATIENT
Start: 2025-07-25 | End: 2025-07-26 | Stop reason: HOSPADM

## 2025-07-25 RX ORDER — PANTOPRAZOLE SODIUM 40 MG/1
40 TABLET, DELAYED RELEASE ORAL DAILY
Status: DISCONTINUED | OUTPATIENT
Start: 2025-07-25 | End: 2025-07-26 | Stop reason: HOSPADM

## 2025-07-25 RX ORDER — ATORVASTATIN CALCIUM 40 MG/1
80 TABLET, FILM COATED ORAL DAILY
Status: DISCONTINUED | OUTPATIENT
Start: 2025-07-25 | End: 2025-07-26 | Stop reason: HOSPADM

## 2025-07-25 RX ORDER — INSULIN ASPART 100 [IU]/ML
0-5 INJECTION, SOLUTION INTRAVENOUS; SUBCUTANEOUS
Status: DISCONTINUED | OUTPATIENT
Start: 2025-07-25 | End: 2025-07-26 | Stop reason: HOSPADM

## 2025-07-25 RX ORDER — IBUPROFEN 200 MG
16 TABLET ORAL
Status: DISCONTINUED | OUTPATIENT
Start: 2025-07-25 | End: 2025-07-26 | Stop reason: HOSPADM

## 2025-07-25 RX ORDER — BISACODYL 10 MG/1
10 SUPPOSITORY RECTAL DAILY PRN
Status: DISCONTINUED | OUTPATIENT
Start: 2025-07-25 | End: 2025-07-26 | Stop reason: HOSPADM

## 2025-07-25 RX ORDER — ASPIRIN 325 MG
325 TABLET ORAL
Status: COMPLETED | OUTPATIENT
Start: 2025-07-25 | End: 2025-07-25

## 2025-07-25 RX ORDER — SODIUM CHLORIDE 0.9 % (FLUSH) 0.9 %
10 SYRINGE (ML) INJECTION
Status: DISCONTINUED | OUTPATIENT
Start: 2025-07-25 | End: 2025-07-26 | Stop reason: HOSPADM

## 2025-07-25 RX ORDER — METOPROLOL SUCCINATE 50 MG/1
50 TABLET, EXTENDED RELEASE ORAL DAILY
Status: DISCONTINUED | OUTPATIENT
Start: 2025-07-25 | End: 2025-07-26 | Stop reason: HOSPADM

## 2025-07-25 RX ORDER — ACETAMINOPHEN 325 MG/1
650 TABLET ORAL EVERY 4 HOURS PRN
Status: DISCONTINUED | OUTPATIENT
Start: 2025-07-25 | End: 2025-07-26 | Stop reason: HOSPADM

## 2025-07-25 RX ORDER — ONDANSETRON HYDROCHLORIDE 2 MG/ML
4 INJECTION, SOLUTION INTRAVENOUS EVERY 6 HOURS PRN
Status: DISCONTINUED | OUTPATIENT
Start: 2025-07-25 | End: 2025-07-26 | Stop reason: HOSPADM

## 2025-07-25 RX ADMIN — GABAPENTIN 300 MG: 300 CAPSULE ORAL at 03:07

## 2025-07-25 RX ADMIN — CYANOCOBALAMIN TAB 1000 MCG 1000 MCG: 1000 TAB at 09:07

## 2025-07-25 RX ADMIN — METOPROLOL SUCCINATE 50 MG: 50 TABLET, EXTENDED RELEASE ORAL at 09:07

## 2025-07-25 RX ADMIN — VALSARTAN 40 MG: 40 TABLET, FILM COATED ORAL at 08:07

## 2025-07-25 RX ADMIN — ATORVASTATIN CALCIUM 80 MG: 40 TABLET, FILM COATED ORAL at 09:07

## 2025-07-25 RX ADMIN — APIXABAN 2.5 MG: 2.5 TABLET, FILM COATED ORAL at 09:07

## 2025-07-25 RX ADMIN — CYANOCOBALAMIN TAB 1000 MCG 1000 MCG: 1000 TAB at 01:07

## 2025-07-25 RX ADMIN — PANTOPRAZOLE SODIUM 40 MG: 40 TABLET, DELAYED RELEASE ORAL at 05:07

## 2025-07-25 RX ADMIN — APIXABAN 2.5 MG: 2.5 TABLET, FILM COATED ORAL at 08:07

## 2025-07-25 RX ADMIN — GABAPENTIN 300 MG: 300 CAPSULE ORAL at 08:07

## 2025-07-25 RX ADMIN — VALSARTAN 40 MG: 40 TABLET, FILM COATED ORAL at 01:07

## 2025-07-25 RX ADMIN — ASPIRIN 325 MG: 325 TABLET ORAL at 01:07

## 2025-07-25 RX ADMIN — FUROSEMIDE 40 MG: 40 TABLET ORAL at 09:07

## 2025-07-25 RX ADMIN — FUROSEMIDE 40 MG: 40 TABLET ORAL at 06:07

## 2025-07-25 NOTE — ED NOTES
Pt remains in paper scrubs per hospital policy. Environment remains clear and safe from harmful objects. ED sitter remains at bedside for direct pt observation. Pt is calm and cooperate at this time. Restroom and comfort needs addressed. Pt denies pain or needs at this time.

## 2025-07-25 NOTE — PLAN OF CARE
Problem: Hospitalized Older Adult  Goal: Optimal Cognitive Function  Outcome: Ongoing  Goal: Effective Bowel Elimination  Outcome: Ongoing  Goal: Optimal Coping  Outcome: Ongoing  Goal: Fluid and Electrolyte Balance  Outcome: Ongoing  Goal: Optimal Functional Ability  Outcome: Ongoing  Goal: Improved Oral Intake  Outcome: Ongoing  Goal: Adequate Sleep/Rest  Outcome: Ongoing  Goal: Effective Urinary Elimination  Outcome: Ongoing     Problem: Fall Injury Risk  Goal: Absence of Fall and Fall-Related Injury  Outcome: Ongoing     Problem: Restraint, Violent or Self-Destructive  Goal: Absence of Harm or Injury  Outcome: Ongoing     Problem: Adult Inpatient Plan of Care  Goal: Plan of Care Review  Outcome: Ongoing  Goal: Patient-Specific Goal (Individualized)  Outcome: Ongoing  Goal: Absence of Hospital-Acquired Illness or Injury  Outcome: Ongoing  Goal: Optimal Comfort and Wellbeing  Outcome: Ongoing  Goal: Readiness for Transition of Care  Outcome: Ongoing     Problem: Infection  Goal: Absence of Infection Signs and Symptoms  Outcome: Ongoing     Problem: Diabetes Comorbidity  Goal: Blood Glucose Level Within Targeted Range  Outcome: Ongoing     Problem: Acute Kidney Injury/Impairment  Goal: Fluid and Electrolyte Balance  Outcome: Ongoing  Goal: Improved Oral Intake  Outcome: Ongoing  Goal: Effective Renal Function  Outcome: Ongoing     Problem: Wound  Goal: Optimal Coping  Outcome: Ongoing  Goal: Optimal Functional Ability  Outcome: Ongoing  Goal: Absence of Infection Signs and Symptoms  Outcome: Ongoing  Goal: Improved Oral Intake  Outcome: Ongoing  Goal: Optimal Pain Control and Function  Outcome: Ongoing  Goal: Skin Health and Integrity  Outcome: Ongoing  Goal: Optimal Wound Healing  Outcome: Ongoing

## 2025-07-25 NOTE — ASSESSMENT & PLAN NOTE
Echo 06/24/2025: Takotsubo pattern, EF 15-20%, grade 1 diastolic dysfunction  cardiac cath 06/26/2025:  Single-vessel CAD (1st marginal 60% stenosis)  Repeat ECHO in am  Continue Toprol-XL 50 mg daily, valsartan 40 mg b.i.d.

## 2025-07-25 NOTE — DISCHARGE INSTRUCTIONS
Follow up with PCP, Cardiology and vascular surgery on discharge   Outpatient referral made for neurology,  psychiatry, and psychology   PCP to order a CTA head and neck on discharge  Continue taking all medications as previously prescribed   Prescribed vitamin B12 for B12 deficiency.    Discharge Instructions, [facility] Medicine    Thank you for choosing [facility] for your medical care. The primary doctor who is taking care of you at the time of your discharge is Tanna Jeffers MD.     You were admitted to the hospital with <principal problem not specified>.     Please note your discharge instructions, including diet/activity restrictions, follow-up appointments, and medication changes.  If you have any questions about your medical issues, prescriptions, or any other questions, please feel free to contact the [facility] Medicine Dept at [phone] and we will help.    If you are previously with Home health, outpatient PT/OT or under a therapy program, you are cleared to return to those programs.    Please direct all long term medication refills and follow up to your primary care provider, Blaine Benítez MD. Thank you again for letting us take care of your health care needs.    Please note the following discharge instructions per your discharging physician-  Jacqueline Velasquez PA-C

## 2025-07-25 NOTE — PLAN OF CARE
Attempted d/c assessment. Pt was on video call with psych.       07/25/25 9762   Discharge Assessment   Assessment Type Discharge Planning Assessment   Prior to hospitilization cognitive status: Unable to Assess   Current cognitive status: Unable to Assess
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
## 2025-07-25 NOTE — ED NOTES
Security at bedside to wand pt. Pt changed in paper scrubs per hospital policy. Pt belongings taken to inventory and lock in safe. Environment clear and safe from harmful objects. ED sitter placed at bedside for direct pt observation. Pt is cooperative at this time. Restroom and comfort needs addressed. Pt denies pain or needs at this time.

## 2025-07-25 NOTE — CONSULTS
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 hospital PROGRESSIVE CARE UNIT

## 2025-07-25 NOTE — ASSESSMENT & PLAN NOTE
Aki in ED  Psych tele consult in a.m.  CT head showed a focus of abnormal decreased attenuation involving the left occipital lobe, will do MRI without contrast

## 2025-07-25 NOTE — SUBJECTIVE & OBJECTIVE
Past Medical History:   Diagnosis Date    Diabetes mellitus, type 2     Fibromyalgia 2016    Hypertension 2016       Past Surgical History:   Procedure Laterality Date    ANGIOGRAPHY OF LOWER EXTREMITY Left 2025    Procedure: Angiogram Extremity Unilateral;  Surgeon: Glen Finn MD;  Location: [facility] OR [address] [place] [address];  Service: Vascular;  Laterality: Left;  mGy 164.57   Gycm2 33.3164    Fluro time 8.4min   Contrast 24ml     SECTION      CHOLECYSTECTOMY      CORONARY ANGIOGRAPHY N/A 2025    Procedure: ANGIOGRAM, CORONARY ARTERY;  Surgeon: Perfecto Coburn MD;  Location: [facility] CATH LAB;  Service: Cardiology;  Laterality: N/A;    FOOT SURGERY Bilateral     plantar fasciotomy bilateral, arthroplasty fifth toe bilateral    HERNIA REPAIR      IMPLANTATION OF BIVENTRICULAR HEART PACEMAKER N/A 2025    Procedure: INSERTION, PACEMAKER, BIVENTRICULAR;  Surgeon: Jason Lundberg MD;  Location: [facility] EP LAB;  Service: Cardiology;  Laterality: N/A;  CHB, CRT-P (LBAP vs CS), Biotronik, Anes, SK, CICU 3084    INSERTION, PACEMAKER, TEMPORARY TRANSVENOUS N/A 2025    Procedure: Insertion, Pacemaker, Temporary Transvenous;  Surgeon: Sean Ureña MD;  Location: [facility] CATH LAB/EP;  Service: Cardiology;  Laterality: N/A;    VENOPLASTY  2025    Procedure: ANGIOPLASTY, VEIN;  Surgeon: Glen Finn MD;  Location: [facility] OR [address] [place] [address];  Service: Vascular;;       Review of patient's allergies indicates:   Allergen Reactions    Advil [ibuprofen] Hives    Penicillins     Codeine     Excedrin aspirin free [acetaminophen-caffeine]        No current facility-administered medications on file prior to encounter.     Current Outpatient Medications on File Prior to Encounter   Medication Sig    acetaminophen (TYLENOL) 500 MG tablet Take 1 tablet (500 mg total) by mouth every 8 (eight) hours.    apixaban (ELIQUIS) 2.5 mg Tab Take 1 tablet (2.5 mg total) by mouth 2 (two) times daily.    aspirin 81 MG Chew Take  1 tablet (81 mg total) by mouth once daily.    atorvastatin (LIPITOR) 80 MG tablet Take 1 tablet (80 mg total) by mouth once daily.    capsaicin (ZOSTRIX) 0.025 % cream Apply topically 2 (two) times daily.    furosemide (LASIX) 40 MG tablet Take 1 tablet (40 mg total) by mouth 2 (two) times daily.    gabapentin (NEURONTIN) 300 MG capsule TAKE 1 CAPSULE BY MOUTH EVERYDAY AT BEDTIME    insulin glargine U-100, Lantus, 100 unit/mL (3 mL) SubQ InPn pen Inject 5 Units into the skin once daily.    insulin lispro (HUMALOG KWIKPEN INSULIN) 100 unit/mL pen Inject 1-10 Units into the skin before meals as needed (per SS).    insulin lispro (HUMALOG KWIKPEN INSULIN) 100 unit/mL pen Inject 5 Units into the skin 3 (three) times daily before meals.    LIDOcaine (LIDODERM) 5 % Place 1 patch onto the skin once daily. Remove & Discard patch within 12 hours or as directed by MD    LIDOcaine-prilocaine (EMLA) cream Apply topically as needed.    meclizine (ANTIVERT) 12.5 mg tablet Take 1 tablet (12.5 mg total) by mouth 2 (two) times daily as needed for Dizziness.    metFORMIN (GLUCOPHAGE) 500 MG tablet TAKE 1 TABLET BY MOUTH TWICE A DAY WITH MEALS    metoprolol succinate (TOPROL-XL) 50 MG 24 hr tablet Take 1 tablet (50 mg total) by mouth once daily.    oxyCODONE (ROXICODONE) 5 MG immediate release tablet Take 1 tablet (5 mg total) by mouth every 6 (six) hours as needed for Pain.    pantoprazole (PROTONIX) 40 MG tablet Take 1 tablet (40 mg total) by mouth once daily.    polyethylene glycol (GLYCOLAX) 17 gram PwPk Take 17 g by mouth once daily.    valsartan (DIOVAN) 40 MG tablet Take 1 tablet (40 mg total) by mouth 2 (two) times daily.     Family History       Problem Relation (Age of Onset)    Diabetes Father    Heart disease Sister, Brother          Tobacco Use    Smoking status: Never    Smokeless tobacco: Never   Substance and Sexual Activity    Alcohol use: Yes     Comment: Occasionally    Drug use: Not on file    Sexual activity: Yes      Review of Systems    Constitutional:  Negative for fever, chills, generalized weakness, appetite change  HENT:  Negative for congestion, sore throat  Eyes:  Negative for redness, discharge  Respiratory:  Negative for cough and shortness of breath  Cardiovascular:  Negative for chest pain, palpitation  Gastrointestinal:  Negative for abdominal pain, nausea, vomiting, diarrhea  Genitourinary:  Negative for flank pain, difficulty urination  Musculoskeletal:  Negative for arthralgia, myalgia  Skin:  Negative for color change  Neuro:  Negative for dizziness, focal weakness  Psychiatric:  Negative for agitation, confusion    Objective:     Vital Signs (Most Recent):  Temp: 98.9 °F (37.2 °C) (07/24/25 2106)  Pulse: 80 (07/24/25 2106)  Resp: 20 (07/24/25 2106)  BP: (!) 150/90 (07/24/25 2106)  SpO2: 98 % (07/24/25 2106) Vital Signs (24h Range):  Temp:  [98.9 °F (37.2 °C)] 98.9 °F (37.2 °C)  Pulse:  [80] 80  Resp:  [20] 20  SpO2:  [98 %] 98 %  BP: (150)/(90) 150/90     Weight: 61.2 kg (135 lb)  Body mass index is 24.69 kg/m².     Physical Exam       General:  Awake, alert, not in apparent distress  Head:  NC/AT  HEENT:  PERRLA, EOMI, no pallor or icterus               Oral mucosa moist without exudates or erythema  Neck:  Supple, no JVD, no lymphadenopathy  Chest:  S1-S2 normal, no M/G/R  Respiratory:  Normal vesicular breath sounds with no added sounds  Abdomen:  Soft, nondistended, nontender, no organomegaly, bowel sounds present  Extremities:  No pitting edema of bilateral lower extremities present  Neuro:  Awake, alert, oriented x3, grossly intact motor and sensory exam  Psychiatric:  Normal mood and affect     Significant Labs: All pertinent labs within the past 24 hours have been reviewed.    Significant Imaging: I have reviewed all pertinent imaging results/findings within the past 24 hours.

## 2025-07-25 NOTE — PROGRESS NOTES
Patient is refusing to have echo. Secure chat sent to LUCA Meehan. Asked patient if they understand what they are refusing and patient stated that yes and they have a pacemaker and do not want to have another echo done.    Yes
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
## 2025-07-25 NOTE — PLAN OF CARE
[facility]  Initial Discharge Assessment       Primary Care Provider: Blaine Benítez MD    Admission Diagnosis: Medical clearance for psychiatric admission [Z00.8]    Admission Date: 7/24/2025  Expected Discharge Date: 7/26/2025     completed discharge assessment with pt at bedside. Pt AAOx4s. Pt lives at home alone, but she was recently staying at her daughter's house. Demographics, PCP, and insurance verified. No home health. No dialysis. Pt completes ADLs without assistance. Pt to discharge home via family transport. Pt has no other needs to be addressed at this time.     Transition of Care Barriers: None    Payor: [employer] [facility] / Plan: [employer] / Product Type: Medicare Advantage /     Extended Emergency Contact Information  Primary Emergency Contact: Marilyn Ayoub  Mobile Phone: [phone]  Relation: Daughter  Preferred language: English  Secondary Emergency Contact: Kim Borrego   [place]  Mobile Phone: [phone]  Relation: Sister    Discharge Plan A: Psychiatric hospital  Discharge Plan B: Home      [id number] [place] - [address]  [address] [address]  Phone: [phone] Fax: [phone]    [id number] - [place] - [address]  [address] [facility] [address]  [address] [place] [address]  Phone: [phone] Fax: [phone]      Initial Assessment (most recent)       Adult Discharge Assessment - 07/25/25 1449          Discharge Assessment    Assessment Type Discharge Planning Assessment     Confirmed/corrected address, phone number and insurance Yes     Confirmed Demographics Correct on Facesheet     Source of Information patient     People in Home alone     Facility Arrived From: Home     Do you expect to return to your current living situation? Yes   Pt states she is going to d/c home to her house (not her daughter's house where she was  admitted from)    Do you have help at home or someone to help you manage your care at home? No     Prior to hospitilization cognitive status: Unable to Assess     Current cognitive status: Alert/Oriented     Walking or Climbing Stairs Difficulty no     Dressing/Bathing Difficulty no     Home Accessibility wheelchair accessible     Home Layout Able to live on 1st floor     Equipment Currently Used at Home none     Readmission within 30 days? Yes     Patient currently being followed by outpatient case management? No     Do you currently have service(s) that help you manage your care at home? No     Do you take prescription medications? Yes     Do you have prescription coverage? Yes     Do you have any problems affording any of your prescribed medications? No     Is the patient taking medications as prescribed? yes     Who is going to help you get home at discharge? Current [facility]     How do you get to doctors appointments? car, drives self     Are you on dialysis? No     Discharge Plan A Psychiatric hospital     Discharge Plan B Home     DME Needed Upon Discharge  none     Discharge Plan discussed with: Patient     Transition of Care Barriers None
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
## 2025-07-25 NOTE — ASSESSMENT & PLAN NOTE
Status post balloon angioplasty of left anterior tibial artery and left superficial femoral artery on 07/07/2025  Continue aspirin 81 mg daily, Eliquis 2.5 mg b.i.d., Lipitor 80 mg daily

## 2025-07-25 NOTE — PLAN OF CARE
Pt was explained HILLIARD. Pt verbalized understanding of HILLIARD and signed. HILLIARD scanned to .    07/25/25 8234   HILLIARD Message   Medicare Outpatient and Observation Notification regarding financial responsibility Given to patient/caregiver;Explained to patient/caregiver;Signed/date by patient/caregiver   Date HILLIARD was signed 07/25/25   Time HILLIARD was signed 2559

## 2025-07-26 VITALS
HEIGHT: 62 IN | HEART RATE: 63 BPM | OXYGEN SATURATION: 100 % | DIASTOLIC BLOOD PRESSURE: 69 MMHG | RESPIRATION RATE: 17 BRPM | BODY MASS INDEX: 24.54 KG/M2 | TEMPERATURE: 98 F | WEIGHT: 133.38 LBS | SYSTOLIC BLOOD PRESSURE: 152 MMHG

## 2025-07-26 LAB
ABSOLUTE EOSINOPHIL (SMH): 0.45 K/UL
ABSOLUTE MONOCYTE (SMH): 0.55 K/UL (ref 0.3–1)
ABSOLUTE NEUTROPHIL COUNT (SMH): 2.4 K/UL (ref 1.8–7.7)
ALBUMIN SERPL-MCNC: 3.6 G/DL (ref 3.5–5.2)
ALP SERPL-CCNC: 42 UNIT/L (ref 55–135)
ALT SERPL-CCNC: 11 UNIT/L (ref 10–44)
ANION GAP (SMH): 4 MMOL/L (ref 8–16)
AST SERPL-CCNC: 20 UNIT/L (ref 10–40)
BASOPHILS # BLD AUTO: 0.09 K/UL
BASOPHILS NFR BLD AUTO: 1.7 %
BILIRUB SERPL-MCNC: 0.6 MG/DL (ref 0.1–1)
BUN SERPL-MCNC: 14 MG/DL (ref 8–23)
CALCIUM SERPL-MCNC: 9 MG/DL (ref 8.7–10.5)
CHLORIDE SERPL-SCNC: 102 MMOL/L (ref 95–110)
CHOLEST SERPL-MCNC: 90 MG/DL (ref 120–199)
CHOLEST/HDLC SERPL: 2.9 {RATIO} (ref 2–5)
CK MB SERPL-MCNC: 2.9 NG/ML (ref 0.1–6.5)
CO2 SERPL-SCNC: 33 MMOL/L (ref 23–29)
CREAT SERPL-MCNC: 0.7 MG/DL (ref 0.5–1.4)
ERYTHROCYTE [DISTWIDTH] IN BLOOD BY AUTOMATED COUNT: 13.9 % (ref 11.5–14.5)
GFR SERPLBLD CREATININE-BSD FMLA CKD-EPI: >60 ML/MIN/1.73/M2
GLUCOSE SERPL-MCNC: 108 MG/DL (ref 70–110)
HCT VFR BLD AUTO: 31.1 % (ref 37–48.5)
HDLC SERPL-MCNC: 31 MG/DL (ref 40–75)
HDLC SERPL: 34.4 % (ref 20–50)
HGB BLD-MCNC: 10.2 GM/DL (ref 12–16)
IMM GRANULOCYTES # BLD AUTO: 0.05 K/UL (ref 0–0.04)
IMM GRANULOCYTES NFR BLD AUTO: 0.9 % (ref 0–0.5)
LDLC SERPL CALC-MCNC: 39.4 MG/DL (ref 63–159)
LYMPHOCYTES # BLD AUTO: 1.88 K/UL (ref 1–4.8)
MAGNESIUM SERPL-MCNC: 1.9 MG/DL (ref 1.6–2.6)
MCH RBC QN AUTO: 32.6 PG (ref 27–31)
MCHC RBC AUTO-ENTMCNC: 32.8 G/DL (ref 32–36)
MCV RBC AUTO: 99 FL (ref 82–98)
NONHDLC SERPL-MCNC: 59 MG/DL
NUCLEATED RBC (/100WBC) (SMH): 0 /100 WBC
OHS QRS DURATION: 120 MS
OHS QTC CALCULATION: 532 MS
PLATELET # BLD AUTO: 128 K/UL (ref 150–450)
PMV BLD AUTO: 12.2 FL (ref 9.2–12.9)
POCT GLUCOSE: 113 MG/DL (ref 70–110)
POCT GLUCOSE: 98 MG/DL (ref 70–110)
POTASSIUM SERPL-SCNC: 4.4 MMOL/L (ref 3.5–5.1)
PROT SERPL-MCNC: 6.6 GM/DL (ref 6–8.4)
RBC # BLD AUTO: 3.13 M/UL (ref 4–5.4)
RELATIVE EOSINOPHIL (SMH): 8.3 % (ref 0–8)
RELATIVE LYMPHOCYTE (SMH): 34.6 % (ref 18–48)
RELATIVE MONOCYTE (SMH): 10.1 % (ref 4–15)
RELATIVE NEUTROPHIL (SMH): 44.4 % (ref 38–73)
SODIUM SERPL-SCNC: 139 MMOL/L (ref 136–145)
TRIGL SERPL-MCNC: 98 MG/DL (ref 30–150)
TROPONIN HIGH SENSITIVE (SMH): 45.9 PG/ML
WBC # BLD AUTO: 5.44 K/UL (ref 3.9–12.7)

## 2025-07-26 PROCEDURE — 36415 COLL VENOUS BLD VENIPUNCTURE: CPT

## 2025-07-26 PROCEDURE — G0378 HOSPITAL OBSERVATION PER HR: HCPCS

## 2025-07-26 PROCEDURE — 97116 GAIT TRAINING THERAPY: CPT

## 2025-07-26 PROCEDURE — 97165 OT EVAL LOW COMPLEX 30 MIN: CPT

## 2025-07-26 PROCEDURE — 92610 EVALUATE SWALLOWING FUNCTION: CPT

## 2025-07-26 PROCEDURE — 80053 COMPREHEN METABOLIC PANEL: CPT

## 2025-07-26 PROCEDURE — 83735 ASSAY OF MAGNESIUM: CPT

## 2025-07-26 PROCEDURE — 80061 LIPID PANEL: CPT | Performed by: INTERNAL MEDICINE

## 2025-07-26 PROCEDURE — 99447 NTRPROF PH1/NTRNET/EHR 11-20: CPT | Mod: ,,, | Performed by: STUDENT IN AN ORGANIZED HEALTH CARE EDUCATION/TRAINING PROGRAM

## 2025-07-26 PROCEDURE — 97161 PT EVAL LOW COMPLEX 20 MIN: CPT

## 2025-07-26 PROCEDURE — 94761 N-INVAS EAR/PLS OXIMETRY MLT: CPT

## 2025-07-26 PROCEDURE — 82553 CREATINE MB FRACTION: CPT

## 2025-07-26 PROCEDURE — 84484 ASSAY OF TROPONIN QUANT: CPT

## 2025-07-26 PROCEDURE — 85025 COMPLETE CBC W/AUTO DIFF WBC: CPT

## 2025-07-26 PROCEDURE — 92523 SPEECH SOUND LANG COMPREHEN: CPT

## 2025-07-26 PROCEDURE — 25000003 PHARM REV CODE 250: Performed by: STUDENT IN AN ORGANIZED HEALTH CARE EDUCATION/TRAINING PROGRAM

## 2025-07-26 RX ORDER — LANOLIN ALCOHOL/MO/W.PET/CERES
1000 CREAM (GRAM) TOPICAL DAILY
Qty: 30 TABLET | Refills: 0 | Status: SHIPPED | OUTPATIENT
Start: 2025-07-27 | End: 2025-08-26 | Stop reason: SDUPTHER

## 2025-07-26 RX ADMIN — ATORVASTATIN CALCIUM 80 MG: 40 TABLET, FILM COATED ORAL at 09:07

## 2025-07-26 RX ADMIN — PANTOPRAZOLE SODIUM 40 MG: 40 TABLET, DELAYED RELEASE ORAL at 06:07

## 2025-07-26 RX ADMIN — FUROSEMIDE 40 MG: 40 TABLET ORAL at 09:07

## 2025-07-26 RX ADMIN — ASPIRIN 81 MG CHEWABLE TABLET 81 MG: 81 TABLET CHEWABLE at 09:07

## 2025-07-26 RX ADMIN — METOPROLOL SUCCINATE 50 MG: 50 TABLET, EXTENDED RELEASE ORAL at 09:07

## 2025-07-26 RX ADMIN — VALSARTAN 40 MG: 40 TABLET, FILM COATED ORAL at 09:07

## 2025-07-26 RX ADMIN — CYANOCOBALAMIN TAB 1000 MCG 1000 MCG: 1000 TAB at 09:07

## 2025-07-26 RX ADMIN — FUROSEMIDE 40 MG: 40 TABLET ORAL at 05:07

## 2025-07-26 RX ADMIN — APIXABAN 2.5 MG: 2.5 TABLET, FILM COATED ORAL at 09:07

## 2025-07-26 NOTE — PT/OT/SLP EVAL
Speech Language Pathology Evaluation  Cognitive/Bedside Swallow    Patient Name:  Cady Watkins   MRN:  [id number]  Admitting Diagnosis: <principal problem not specified>    Recommendations:                  General Recommendations:  Cognitive-linguistic therapy  Diet recommendations:  Regular Diet - IDDSI Level 7, Thin liquids - IDDSI Level 0   Aspiration Precautions: Standard aspiration precautions   General Precautions: Standard, fall  Communication strategies:  provide increased time to answer and go to room if call light pushed  Discharge recommendations:  Low Intensity Therapy   Barriers to Discharge:  None    Assessment:     Cady Watkins is a 75 y.o. female with an SLP diagnosis of Cognitive-Linguistic Impairment.  She presented with decreased attention, poor immediate and delayed  memory and fair long term memory, and decreased functional problem solving skills.  She would benefit from cognitive-linguistic tx.  Recommend continue regular textures and liquids.  .    History:     Past Medical History:   Diagnosis Date    Diabetes mellitus, type 2     Fibromyalgia 2016    Hypertension 2016       Past Surgical History:   Procedure Laterality Date    ANGIOGRAPHY OF LOWER EXTREMITY Left 2025    Procedure: Angiogram Extremity Unilateral;  Surgeon: Glen Finn MD;  Location: [facility] OR [address] [place] [address];  Service: Vascular;  Laterality: Left;  mGy 164.57   Gycm2 33.3164    Fluro time 8.4min   Contrast 24ml     SECTION      CHOLECYSTECTOMY      CORONARY ANGIOGRAPHY N/A 2025    Procedure: ANGIOGRAM, CORONARY ARTERY;  Surgeon: Perfecto Coburn MD;  Location: [facility] CATH LAB;  Service: Cardiology;  Laterality: N/A;    FOOT SURGERY Bilateral     plantar fasciotomy bilateral, arthroplasty fifth toe bilateral    HERNIA REPAIR      IMPLANTATION OF BIVENTRICULAR HEART PACEMAKER N/A 2025    Procedure: INSERTION, PACEMAKER, BIVENTRICULAR;  Surgeon: Jason Lundberg MD;  Location: [facility] EP LAB;   Service: Cardiology;  Laterality: N/A;  CHB, CRT-P (LBAP vs CS), Biotronik, Anes, SK, CICU 3084    INSERTION, PACEMAKER, TEMPORARY TRANSVENOUS N/A 6/18/2025    Procedure: Insertion, Pacemaker, Temporary Transvenous;  Surgeon: Sean Ureña MD;  Location: [facility] CATH LAB/EP;  Service: Cardiology;  Laterality: N/A;    VENOPLASTY  7/7/2025    Procedure: ANGIOPLASTY, VEIN;  Surgeon: Glen Finn MD;  Location: [facility] OR [address] [place] [address];  Service: Vascular;;       Social History: Patient lives in the community.    Prior Intubation HX:  none this admission    Modified Barium Swallow: none in Epic    Chest X-Rays: trace bilateral pleural effusions.     CT Head -  Unchanged oval low-attenuation focus in the left occipital lobe.  2.  No new acute intracranial hemorrhage, no hydrocephalus, herniation or midline shift.     Prior diet: regular textures &liquids.    Subjective     Okay  Patient goals: sleep     Objective:     Cognitive Status:    Alert, cooperative, pleasant.  decreased attention, poor immediate and delayed  memory and fair long term memory, and decreased functional problem solving skills.   [place] Cognitive Assessment (MoCA) score (adjusted for education level) - 14 out of 30 indicating mild - moderate cognitive-linguistic deficits     Receptive Language/Comprehension: moderate auditory comprehension deficits increasing with increased complexity    Pragmatics: intact    Expressive Language: mild-moderate word finding and divergent naming skills      Motor Speech: WFL    Voice: Low volume, clear    Visual-Spatial:  No field cuts or neglect. Misread words at start of paragraphs    Reading: Comprehension of two sentence paragraphs only     Written Expression: intact except spelling    Oral Musculature Evaluation  Oral Musculature: WNL  Dentition: present and adequate (a few missing)  Secretion Management: adequate  Mucosal Quality: adequate  Mandibular Strength and Mobility: WNL  Oral Labial Strength and  Mobility: WNL  Lingual Strength and Mobility: WNL  Velar Elevation: WNL  Buccal Strength and Mobility: WNL  Volitional Cough: adequate  Volitional Swallow: rise and tilt palpated  Voice Prior to PO Intake: clear, no dysarthria, low volume    Bedside Swallow Eval:   Consistencies Assessed:  Thin liquids via cup and straw  Mixed consistencies peaches in thin juice  Solids kavitha crackers     Oral Phase:   WNL    Pharyngeal Phase:   no overt clinical signs/symptoms of aspiration  no overt clinical signs/symptoms of pharyngeal dysphagia    Compensatory Strategies  None    Treatment: education re impressions and recommendations    Goals:   Multidisciplinary Problems       SLP Goals          Problem: SLP    Goal Priority Disciplines Outcome   SLP Goal    High SLP    Description: 1. Use preferred memory strategies to set and recall functional info immediately and after 5 minute filled delay, modif indep, 80% acc.  2. Demo auditory comprehension of moderately complex info 90% acc, modif indep.  3. Solve simple functional problems indep, 100% acc.                       Plan:     Patient to be seen:  3 x/week   Plan of Care expires:     Plan of Care reviewed with:  patient   SLP Follow-Up:  Yes       Time Tracking:     SLP Treatment Date:   07/26/25  Speech Start Time:  1137  Speech Stop Time:  1218     Speech Total Time (min):  41 min    Billable Minutes: Eval 31  and Eval Swallow and Oral Function 10    07/26/2025

## 2025-07-26 NOTE — PT/OT/SLP EVAL
Physical Therapy Evaluation and Discharge Note    Patient Name:  Cady Watkins   MRN:  [id number]    Recommendations:     Discharge Recommendations: Low Intensity Therapy  Discharge Equipment Recommendations: none   Barriers to discharge: None    Assessment:     Cady Watkins is a 75 y.o. female admitted with a medical diagnosis of Psychiatric disorder. .  At this time, patient is functioning at their prior level of function and does not require further acute PT services.     Pt found sitting EOB at start of session. Pt agreeable to session at this time. Sit<>stand transfers SUP with RW. Ambulated 350' with no AD and CGA.    Recent Surgery: * No surgery found *      Plan:     During this hospitalization, patient does not require further acute PT services.  Please re-consult if situation changes.      Subjective     Chief Complaint: none verbalized  Patient/Family Comments/goals: return to her home  Pain/Comfort:  Pain Rating 1: 0/10    Patients cultural, spiritual, Sabianism conflicts given the current situation:      Living Environment:  Pt lives alone in a [facility] with a threshold entrance.    Prior to admission, patients level of function was Independent.  Equipment used at home: none.  DME owned (not currently used): none.  Upon discharge, patient will have assistance from daughter.    Objective:     Communicated with RNAmmy, prior to session.  Patient found sitting edge of bed with peripheral IV, telemetry upon PT entry to room.    General Precautions: Standard, fall    Orthopedic Precautions:    Braces:    Respiratory Status: Room air    Exams:  RLE ROM: WFL  RLE Strength: WFL  LLE ROM: WFL  LLE Strength: WFL    Functional Mobility:  Transfers:     Sit to Stand:  supervision with rolling walker  Gait: 350' with no AD and CGA    AM-PAC 6 CLICK MOBILITY  Total Score:24       Treatment and Education:  Spoke with pt about moving around room and sitting up in order to help improve her blood flow for  healing. Spoke with pt about using call button to notify RN.    AM-PAC 6 CLICK MOBILITY  Total Score:24     Patient left sitting edge of bed with all lines intact, call button in reach, bed alarm off, and RN notified.    GOALS:   Multidisciplinary Problems       Physical Therapy Goals       Not on file                    DME Justifications:  No DME recommended requiring DME justifications    History:     Past Medical History:   Diagnosis Date    Diabetes mellitus, type 2     Fibromyalgia 2016    Hypertension 2016       Past Surgical History:   Procedure Laterality Date    ANGIOGRAPHY OF LOWER EXTREMITY Left 2025    Procedure: Angiogram Extremity Unilateral;  Surgeon: Glen Finn MD;  Location: [facility] OR [address] [place] [address];  Service: Vascular;  Laterality: Left;  mGy 164.57   Gycm2 33.3164    Fluro time 8.4min   Contrast 24ml     SECTION      CHOLECYSTECTOMY      CORONARY ANGIOGRAPHY N/A 2025    Procedure: ANGIOGRAM, CORONARY ARTERY;  Surgeon: Perfecto Coburn MD;  Location: [facility] CATH LAB;  Service: Cardiology;  Laterality: N/A;    FOOT SURGERY Bilateral     plantar fasciotomy bilateral, arthroplasty fifth toe bilateral    HERNIA REPAIR      IMPLANTATION OF BIVENTRICULAR HEART PACEMAKER N/A 2025    Procedure: INSERTION, PACEMAKER, BIVENTRICULAR;  Surgeon: Jason Lundberg MD;  Location: [facility] EP LAB;  Service: Cardiology;  Laterality: N/A;  CHB, CRT-P (LBAP vs CS), Biotronik, Anes, SK, CICU 3084    INSERTION, PACEMAKER, TEMPORARY TRANSVENOUS N/A 2025    Procedure: Insertion, Pacemaker, Temporary Transvenous;  Surgeon: Sean Ureña MD;  Location: [facility] CATH LAB/EP;  Service: Cardiology;  Laterality: N/A;    VENOPLASTY  2025    Procedure: ANGIOPLASTY, VEIN;  Surgeon: Glen Finn MD;  Location: [facility] OR [address] [place] [address];  Service: Vascular;;       Time Tracking:     PT Received On: 25  PT Start Time: 1243     PT Stop Time: 1256  PT Total Time (min): 13 min     Billable Minutes:  Evaluation 5 and Gait Training 8      07/26/2025

## 2025-07-26 NOTE — CONSULTS
Replaced by [facility]  Inpatient Nutrition Assessment    Admit Date: 7/24/2025   Total duration of encounter: 2 days     Nutrition Recommendation/Prescription   1.) Continue consistent CHO, low Na 2gm diet restriction.   2.) Will add Boost Glucose Support BID to encourage PO intake.   3.) Recommend assistance with meals d/t prior hospital admission earlier this month requiring this.   4.) Heart Healthy, Consistent CHO diet handouts provided to chairside.   5.) Menu  to honor food preferences.       NUTRITION ASSESSMENT    MEDICAL CHART REVIEW:  Patient Age: 75 y.o. female  Reason For Assessment: consult, identified at risk by screening criteria  Diagnosis: psychological disorder  General Information Comments: Consult received for stroke pathway. Identified at risk d/t MST2. Pt laying in bed not aiding in assessment at this time. Observed breakfast at bedside untouched. Pt refusing to eat. Per chart review, pt required assistance with meals at [facility] earlier this month. No GI distress. LBM 7/24. Skin: skin tears, redness noted per media photo. Wt reviewed. UBW 60.2kg (7/2025); CBW 60.5kg reflecting wt maintanence. NFPE unable to be performed d/t pt refusal. Pt refused diet education. Materials provided at chairside.    Nutrition Risk Screen  MST Score: 2  Have you recently lost weight without trying?: Yes: 14-23 lbs  Weight loss score: 2  Have you been eating poorly because of a decreased appetite?: No  Appetite score: 0    The primary encounter diagnosis was Psychiatric disorder. Diagnoses of Medical clearance for psychiatric admission, Troponin I above reference range, Chest pain, and CHF (congestive heart failure) were also pertinent to this visit.     Past Medical History:   Diagnosis Date    Diabetes mellitus, type 2     Fibromyalgia 9/21/2016    Hypertension 9/21/2016         SOCIAL AND DIET HISTORY:  Nutrition/Diet History  Spiritual, Cultural Beliefs, Bahai Practices, Values that Affect Care:  "no  Food Allergies: NKFA  Factors Affecting Nutritional Intake: inability to feed self, impaired cognitive status/motor control  Nutrition-related SDOH: Unable to assess at this time    Pertinent Labs Reviewed: reviewed    Recent Labs   Lab 25  2159 25  0448    139   K 3.6 4.4   CALCIUM 9.0 9.0   MG  --  1.9    102   CO2 31* 33*   BUN 21 14   CREATININE 0.9 0.7   EGFRNORACEVR >60 >60   * 108   BILITOT 0.4 0.6   ALKPHOS 54* 42*   ALT 11 11   AST 18 20   ALBUMIN 3.7 3.6   TRIG  --  98       Pertinent Medications Reviewed: reviewed    Scheduled Meds:   apixaban  2.5 mg Oral BID    aspirin  81 mg Oral Daily    atorvastatin  80 mg Oral Daily    cyanocobalamin  1,000 mcg Oral Daily    furosemide  40 mg Oral BID    gabapentin  300 mg Oral QHS    insulin glargine U-100 (Lantus)  5 Units Subcutaneous Daily    metoprolol succinate  50 mg Oral Daily    pantoprazole  40 mg Oral Daily    valsartan  40 mg Oral BID     Continuous Meds:     NUTRITION FOCUSED PHYSICAL EXAM (NFPE):  Pt refused NFPE at this time.   Skin (Micronutrient): cuts unhealed, red     A minimum of two characteristics is recommended for diagnosis of either severe or non-severe malnutrition.  Wound(s):     Anthropometrics  Height: 5' 2" (157.5 cm)  Height (inches): 62 in Height Method: Stated   Weight: 60.5 kg (133 lb 6.1 oz) Weight (lb): 133.38 lb Weight Method: Bed Scale           BMI (Calculated): 24.4 BMI Grade: 18.5-24.9 - normal       Usual Body Weight (UBW), k.2 kg (2025) % Usual Body Weight: 100.71              Weight History:  Wt Readings from Last 5 Encounters:   25 60.5 kg (133 lb 6.1 oz)   25 60.2 kg (132 lb 11.5 oz)   25 73.5 kg (162 lb)   12/10/20 73.5 kg (162 lb)   16 72.6 kg (160 lb)     Weight Change(s) Since Admission: Weight stable d/t stated; unable to determine true weight loss d/t pt refusing to aid in assessment.   Admit Weight: 61.2 kg (135 lb) (25), Weight Method: " Stated    Diet order:   Diet Consistent Carbohydrate 2000 Calories (up to 75 gm per meal); Low Sodium,2gm     Nutrition Order Comments: consistent CHO, low Na, 2gm  ESTIMATED NUTRITION REQUIREMENTS:  Weight Used For Calorie Calculations: 60.5 kg (133 lb 6.1 oz)  Energy Calorie Requirements (kcal): 7384-5437  Energy Need Method: Kcal/kg (25-30)  Weight Used For Protein Calculations: 60.5 kg (133 lb 6.1 oz)  Protein Requirements: 49-61 (0.8-1.0)  Fluid Requirements (mL): 6873-5975  CHO Requirement: 170gm CHO QD    Evaluation of Received Nutrient/Fluid Intake  % Intake of Estimated Energy Needs: 0 - 25 %  % Meal Intake: 0 - 25 %  Energy Calories Required: not meeting needs  Protein Required: not meeting needs  Fluid Required: meeting needs  Tolerance: not tolerating     Intake/Output Summary (Last 24 hours) at 7/26/2025 0956  Last data filed at 7/26/2025 0948  Gross per 24 hour   Intake 900 ml   Output --   Net 900 ml      Last Bowel Movement: 07/24/25    NUTRITION DIAGNOSIS   Inadequate oral intake related to Acute illness as evidence by Intake <25% estimated needs  Status: New    NUTRITION INTERVENTION    Nutrition Interventions: Carbohydrate modified diet, Sodium modified diet, Medical food supplement therapy, Content related nutrition education    NUTRITION MONITORING AND EVALUATION    Monitor and Evaluation: Food and beverage intake, Carbohydrate intake, Diet order, Food and nutrition knowledge, Weight, Electrolyte and renal panel, Gastrointestinal profile, Glucose/endocrine profile, Inflammatory profile, Skin   Nutrition Goals:   Goal #1: Meal consumption of greater than or equal to 50% by RD follow up   Nutrition Goal Status #1: new  Goal #2: Oral supplement consumption of greater than or equal to 50% of estimated needs by RD follow up  Nutrition Goal Status #2: new    Nutrition Risk  Level of Risk/Frequency of Follow-up: moderate (1x/week)     Nutrition Follow-Up  RD Follow-up?: Yes    Please consult if  re-assessment needed sooner.    Eve Alamo, ASA 07/26/2025 9:56 AM
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
## 2025-07-26 NOTE — HOSPITAL COURSE
Patient is admitted to the hospital with psychiatric problem under pec.  Patient was monitored closely throughout her hospital stay.  CT head was obtained on admission and was concerning for a focus of abnormal decreased attenuation is present involving the left occipital lobe and may represent recent ischemic infarction.  Echo was ordered but patient declined.  Troponins were trended and were flat.  Patient denied any chest pain throughout her stay.  MRI brain was recommended however unable to obtain given recent pacemaker placement.  Psychiatry was consulted and recommended removing pec.  Neurology was consulted and recommending repeat CT head which was ordered.  Repeat CT head showed unchanged oval low-attenuation focus in the left occipital lobe.  Neurology did not recommend further workup as patient was asymptomatic.  Neurology recommended outpatient CTA head and neck in a nonurgent setting.  PT/OT/SLP were consulted and recommended low intensity therapy.  Home health was set up at discharge.  Patient was seen on day of discharge.  Patient to follow up with PCP.  Outpatient referral made for Psychiatry, Psychology and Neurology.  Patient to follow up outpatient with PCP versus neurology head and neck.  Patient to follow up with Cardiology as previously planned.  Return precautions discussed with the patient voiced understanding.  All questions answered.

## 2025-07-26 NOTE — PLAN OF CARE
Problem: Hospitalized Older Adult  Goal: Optimal Cognitive Function  Outcome: Progressing  Goal: Effective Bowel Elimination  Outcome: Progressing  Goal: Optimal Coping  Outcome: Progressing     Problem: Fall Injury Risk  Goal: Absence of Fall and Fall-Related Injury  Outcome: Progressing     Problem: Diabetes Comorbidity  Goal: Blood Glucose Level Within Targeted Range  Outcome: Progressing

## 2025-07-26 NOTE — PLAN OF CARE
1. Use preferred memory strategies to set and recall functional info immediately and after 5 minute filled delay, modif indep, 80% acc.  2. Demo auditory comprehension of moderately complex info 90% acc, modif indep.  3. Solve simple functional problems indep, 100% acc.
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
## 2025-07-26 NOTE — PT/OT/SLP EVAL
"Occupational Therapy   Evaluation and Discharge Note    Name: Cady Watkins  MRN: [id number]  Admitting Diagnosis: <principal problem not specified>  Recent Surgery: * No surgery found *      Recommendations:     Discharge Recommendations: Low Intensity Therapy/ HH services   Discharge Equipment Recommendations: none  Barriers to discharge:  None    Assessment:     Cady Watkins is a 75 y.o. female with a medical diagnosis of <principal problem not specified>. At this time, patient is functioning at their prior level of function and does not require further acute OT services.     Plan:     During this hospitalization, patient does not require further acute OT services.  Please re-consult if situation changes.    Plan of Care Reviewed with: patient    Subjective     Chief Complaint: "I am ready to go home"   Patient/Family Comments/goals: n/a    Occupational Profile:  Living Environment: Pt lives by herself in a H with no BRIAN.She has a tub/shower combo. She is currently staying with her daughter due to just having a pacemaker placed   Previous level of function: Pt is INDEP in all ADLs and functional transfers  Roles and Routines: Homemaker  Equipment Used at home: none  Assistance upon Discharge: Family     Pain/Comfort:  Pain Rating 1: 0/10  Pain Rating Post-Intervention 1: 0/10    Patients cultural, spiritual, Sikh conflicts given the current situation: no    Objective:     Communicated with: Nurse prior to session.  Patient found ambulatory in room/morley with peripheral IV upon OT entry to room.    General Precautions: Standard, fall  Orthopedic Precautions: N/A  Braces: N/A  Respiratory Status: Room air     Occupational Performance:    Bed Mobility:    Patient completed Rolling/Turning to Left with  independence  Patient completed Rolling/Turning to Right with independence  Patient completed Scooting/Bridging with independence  Patient completed Supine to Sit with independence  Patient completed " Sit to Supine with independence    Functional Mobility/Transfers:  Patient completed Sit <> Stand Transfer with independence  with  no assistive device   Functional Mobility: Pt ambulated throughout the room without assistance     Activities of Daily Living:  Lower Body Dressing: Pt donned socks INDEP      Cognitive/Visual Perceptual:  Cognitive/Psychosocial Skills:     -       Oriented to: Person, Place, Time, and Situation   -       Follows Commands/attention:Follows multistep  commands  -       Communication: clear/fluent  -       Safety awareness/insight to disability: impaired     Physical Exam:  Upper Extremity Range of Motion:     -       Right Upper Extremity: WNL  -       Left Upper Extremity: WNL  Upper Extremity Strength:    -       Right Upper Extremity: WNL  -       Left Upper Extremity: WNL   Strength:    -       Right Upper Extremity: WNL  -       Left Upper Extremity: WNL    AMPAC 6 Click ADL:  AMPAC Total Score: 24    Treatment & Education:  Pt was educated on the role of occupational therapy and the importance of completing ADLs and functional mobility.     Patient left HOB elevated with all lines intact and call button in reach    GOALS:   Multidisciplinary Problems       Occupational Therapy Goals       Not on file                    DME Justifications:  No DME recommended requiring DME justifications    History:     Past Medical History:   Diagnosis Date    Diabetes mellitus, type 2     Fibromyalgia 2016    Hypertension 2016         Past Surgical History:   Procedure Laterality Date    ANGIOGRAPHY OF LOWER EXTREMITY Left 2025    Procedure: Angiogram Extremity Unilateral;  Surgeon: Glen Finn MD;  Location: [facility] OR [address] [place] [address];  Service: Vascular;  Laterality: Left;  mGy 164.57   Gycm2 33.3164    Fluro time 8.4min   Contrast 24ml     SECTION      CHOLECYSTECTOMY      CORONARY ANGIOGRAPHY N/A 2025    Procedure: ANGIOGRAM, CORONARY ARTERY;  Surgeon: Perfecto Coburn  MD ALEXANDRE;  Location: [facility] CATH LAB;  Service: Cardiology;  Laterality: N/A;    FOOT SURGERY Bilateral     plantar fasciotomy bilateral, arthroplasty fifth toe bilateral    HERNIA REPAIR      IMPLANTATION OF BIVENTRICULAR HEART PACEMAKER N/A 6/26/2025    Procedure: INSERTION, PACEMAKER, BIVENTRICULAR;  Surgeon: Jason Lundberg MD;  Location: [facility] EP LAB;  Service: Cardiology;  Laterality: N/A;  CHB, CRT-P (LBAP vs CS), Biotronik, Anes, SK, CICU 3084    INSERTION, PACEMAKER, TEMPORARY TRANSVENOUS N/A 6/18/2025    Procedure: Insertion, Pacemaker, Temporary Transvenous;  Surgeon: Sean Ureña MD;  Location: [facility] CATH LAB/EP;  Service: Cardiology;  Laterality: N/A;    VENOPLASTY  7/7/2025    Procedure: ANGIOPLASTY, VEIN;  Surgeon: Glen Finn MD;  Location: [facility] OR [address] [place] [address];  Service: Vascular;;       Time Tracking:     OT Date of Treatment: 07/26/25  OT Start Time: 1230  OT Stop Time: 1241  OT Total Time (min): 11 min    Billable Minutes:Evaluation 11    7/26/2025

## 2025-07-26 NOTE — PLAN OF CARE
Problem: Oral Intake Inadequate  Goal: Improved Oral Intake  Outcome: Progressing  Intervention: Promote and Optimize Oral Intake  Flowsheets (Taken 7/26/2025 7556)  Oral Nutrition Promotion:   calorie-dense foods provided   calorie-dense liquids provided   nutrition counseling provided     Nutrition Recommendation/Prescription   1.) Continue consistent CHO, low Na 2gm diet restriction.   2.) Will add Boost Glucose Support BID to encourage PO intake.   3.) Recommend assistance with meals d/t prior hospital admission earlier this month requiring this.   4.) Heart Healthy, Consistent CHO diet handouts provided to chairside.   5.) Menu  to honor food preferences.

## 2025-07-26 NOTE — PLAN OF CARE
Sw met with pt to review discharge recommendation of HH and is agreeable to plan    Patient/family provided list of facilities in-network with patient's payor plan. Providers that are owned, operated, or affiliated with [facility] are included on the list.     Notified that referral sent to below listed facilities from in-network list based on proximity to home/family support:   [facility]  2.  3.  4.  5. (can send more than 5)    Patient/family instructed to identify preference.    Preferred Facility: (if more than 1, listed in order of descending preference)  [facility]        If an additional preferred facility not listed above is identified, additional referral to be sent. If above facilities unable to accept, will send additional referrals to in-network providers.

## 2025-07-26 NOTE — CARE UPDATE
07/26/25 0724   PRE-TX-O2   Device (Oxygen Therapy) room air   SpO2 95 %   Pulse Oximetry Type Intermittent   $ Pulse Oximetry - Multiple Charge Pulse Oximetry - Multiple   Pulse 66   Resp 15

## 2025-07-26 NOTE — PLAN OF CARE
Charts and orders reviewed. Pt to discharge home with [facility] and NP @ home. SOC date for [facility] is Monday 07/28/2025. Pt has no other needs to be addressed by case management. Pt cleared to discharge from case management standpoint.    Pt's daughter will be transporting pt home from [facility].         07/26/25 1623   Final Note   Assessment Type Final Discharge Note   Anticipated Discharge Disposition Home-Health   What phone number can be called within the next 1-3 days to see how you are doing after discharge? [phone]   Hospital Resources/Appts/Education Provided Appointments scheduled and added to AVS;Post-Acute resouces added to AVS   Post-Acute Status   Post-Acute Authorization Home Health   Home Health Status Set-up Complete/Auth obtained   Patient choice form signed by patient/caregiver List with quality metrics by geographic area provided   Discharge Delays None known at this time

## 2025-07-27 NOTE — NURSING
1900-Pt discharged. Pt went home with daughter, she was brought down by wheelchair with no signs of distress. She was given verbal and written instructions for upcoming appointments and updated prescriptions. Iv removed and tele monitor off. Pt discharged

## 2025-07-27 NOTE — PLAN OF CARE
Problem: Hospitalized Older Adult  Goal: Optimal Cognitive Function  Outcome: Met  Goal: Effective Bowel Elimination  Outcome: Met  Goal: Optimal Coping  Outcome: Met  Goal: Fluid and Electrolyte Balance  Outcome: Met  Goal: Optimal Functional Ability  Outcome: Met  Goal: Improved Oral Intake  Outcome: Met  Goal: Adequate Sleep/Rest  Outcome: Met  Goal: Effective Urinary Elimination  Outcome: Met

## 2025-07-28 ENCOUNTER — CLINICAL SUPPORT (OUTPATIENT)
Dept: CARDIOLOGY | Facility: HOSPITAL | Age: 75
End: 2025-07-28
Attending: INTERNAL MEDICINE
Payer: MEDICARE

## 2025-07-28 ENCOUNTER — TELEPHONE (OUTPATIENT)
Dept: ELECTROPHYSIOLOGY | Facility: CLINIC | Age: 75
End: 2025-07-28
Payer: MEDICARE

## 2025-07-28 ENCOUNTER — CLINICAL SUPPORT (OUTPATIENT)
Dept: CARDIOLOGY | Facility: HOSPITAL | Age: 75
End: 2025-07-28
Payer: MEDICARE

## 2025-07-28 DIAGNOSIS — I44.2 ATRIOVENTRICULAR BLOCK, COMPLETE: ICD-10-CM

## 2025-07-28 PROCEDURE — 93296 REM INTERROG EVL PM/IDS: CPT | Performed by: INTERNAL MEDICINE

## 2025-07-28 PROCEDURE — 93294 REM INTERROG EVL PM/LDLS PM: CPT | Mod: ,,, | Performed by: INTERNAL MEDICINE

## 2025-07-28 NOTE — TELEPHONE ENCOUNTER
Returned call to daughter. Hospital d/c states to follow up with cardiology as planned. Patient has 4 month post PPM insertion appointment with Dr. Lundberg already scheduled. Patient was scheduled to see Dr. Menard next week. Daughter verbalized understanding and will let us know if cardiology thinks she needs to be seen sooner.

## 2025-07-28 NOTE — TELEPHONE ENCOUNTER
----- Message from Med Assistant Johanna sent at 7/28/2025  1:26 PM CDT -----  Regarding: Appt  Pt's daughter, Marilyn, is calling stating that the pt has recently been in the ER and was instructed to see Dr. Lundberg as soon as possible. Please call back at [phone].    Thank you.

## 2025-07-29 ENCOUNTER — TELEPHONE (OUTPATIENT)
Dept: HOME HEALTH SERVICES | Facility: CLINIC | Age: 75
End: 2025-07-29
Payer: MEDICARE

## 2025-07-29 PROBLEM — I16.0 HYPERTENSIVE URGENCY: Status: ACTIVE | Noted: 2025-07-29

## 2025-07-29 LAB — LABCORP MISCELLANEOUS TEST RESULT: NORMAL

## 2025-08-01 LAB
OHS CV AF BURDEN PERCENT: < 1
OHS CV BIV PACING PERCENT: 100 %
OHS CV DC REMOTE DEVICE TYPE: NORMAL
OHS CV RV PACING PERCENT: 100 %

## 2025-08-04 ENCOUNTER — OFFICE VISIT (OUTPATIENT)
Dept: CARDIOLOGY | Facility: CLINIC | Age: 75
End: 2025-08-04
Payer: MEDICARE

## 2025-08-04 ENCOUNTER — OFFICE VISIT (OUTPATIENT)
Dept: UROLOGY | Facility: CLINIC | Age: 75
End: 2025-08-04
Payer: MEDICARE

## 2025-08-04 VITALS
HEART RATE: 65 BPM | BODY MASS INDEX: 22.92 KG/M2 | OXYGEN SATURATION: 98 % | HEIGHT: 62 IN | SYSTOLIC BLOOD PRESSURE: 133 MMHG | DIASTOLIC BLOOD PRESSURE: 61 MMHG | WEIGHT: 124.56 LBS

## 2025-08-04 VITALS
DIASTOLIC BLOOD PRESSURE: 65 MMHG | SYSTOLIC BLOOD PRESSURE: 149 MMHG | BODY MASS INDEX: 22.34 KG/M2 | HEIGHT: 62 IN | HEART RATE: 65 BPM | WEIGHT: 121.38 LBS

## 2025-08-04 DIAGNOSIS — R09.89 CAROTID BRUIT, UNSPECIFIED LATERALITY: ICD-10-CM

## 2025-08-04 DIAGNOSIS — I44.2 COMPLETE HEART BLOCK: ICD-10-CM

## 2025-08-04 DIAGNOSIS — N32.89 BLADDER MASS: Primary | ICD-10-CM

## 2025-08-04 DIAGNOSIS — I63.9 CEREBROVASCULAR ACCIDENT (CVA), UNSPECIFIED MECHANISM: Primary | ICD-10-CM

## 2025-08-04 DIAGNOSIS — I50.20 HFREF (HEART FAILURE WITH REDUCED EJECTION FRACTION): ICD-10-CM

## 2025-08-04 LAB
OHS QRS DURATION: 116 MS
OHS QTC CALCULATION: 538 MS

## 2025-08-04 PROCEDURE — 3008F BODY MASS INDEX DOCD: CPT | Mod: CPTII,S$GLB,, | Performed by: UROLOGY

## 2025-08-04 PROCEDURE — 3044F HG A1C LEVEL LT 7.0%: CPT | Mod: CPTII,S$GLB,, | Performed by: UROLOGY

## 2025-08-04 PROCEDURE — 3078F DIAST BP <80 MM HG: CPT | Mod: CPTII,S$GLB,, | Performed by: UROLOGY

## 2025-08-04 PROCEDURE — 3288F FALL RISK ASSESSMENT DOCD: CPT | Mod: CPTII,S$GLB,, | Performed by: UROLOGY

## 2025-08-04 PROCEDURE — 93000 ELECTROCARDIOGRAM COMPLETE: CPT | Mod: S$GLB,,, | Performed by: STUDENT IN AN ORGANIZED HEALTH CARE EDUCATION/TRAINING PROGRAM

## 2025-08-04 PROCEDURE — G2211 COMPLEX E/M VISIT ADD ON: HCPCS | Mod: S$GLB,,, | Performed by: UROLOGY

## 2025-08-04 PROCEDURE — 3077F SYST BP >= 140 MM HG: CPT | Mod: CPTII,S$GLB,, | Performed by: UROLOGY

## 2025-08-04 PROCEDURE — 3066F NEPHROPATHY DOC TX: CPT | Mod: CPTII,S$GLB,, | Performed by: UROLOGY

## 2025-08-04 PROCEDURE — 1126F AMNT PAIN NOTED NONE PRSNT: CPT | Mod: CPTII,S$GLB,, | Performed by: UROLOGY

## 2025-08-04 PROCEDURE — 99214 OFFICE O/P EST MOD 30 MIN: CPT | Mod: S$GLB,,, | Performed by: UROLOGY

## 2025-08-04 PROCEDURE — 1111F DSCHRG MED/CURRENT MED MERGE: CPT | Mod: CPTII,S$GLB,, | Performed by: UROLOGY

## 2025-08-04 PROCEDURE — 3061F NEG MICROALBUMINURIA REV: CPT | Mod: CPTII,S$GLB,, | Performed by: UROLOGY

## 2025-08-04 PROCEDURE — 1159F MED LIST DOCD IN RCRD: CPT | Mod: CPTII,S$GLB,, | Performed by: UROLOGY

## 2025-08-04 PROCEDURE — 1160F RVW MEDS BY RX/DR IN RCRD: CPT | Mod: CPTII,S$GLB,, | Performed by: UROLOGY

## 2025-08-04 PROCEDURE — 99999 PR PBB SHADOW E&M-EST. PATIENT-LVL IV: CPT | Mod: PBBFAC,,, | Performed by: UROLOGY

## 2025-08-04 PROCEDURE — 4010F ACE/ARB THERAPY RXD/TAKEN: CPT | Mod: CPTII,S$GLB,, | Performed by: UROLOGY

## 2025-08-04 PROCEDURE — 1100F PTFALLS ASSESS-DOCD GE2>/YR: CPT | Mod: CPTII,S$GLB,, | Performed by: UROLOGY

## 2025-08-04 PROCEDURE — 99999 PR PBB SHADOW E&M-EST. PATIENT-LVL IV: CPT | Mod: PBBFAC,,, | Performed by: INTERNAL MEDICINE

## 2025-08-04 RX ORDER — CLOPIDOGREL BISULFATE 75 MG/1
75 TABLET ORAL DAILY
Qty: 90 TABLET | Refills: 3 | Status: SHIPPED | OUTPATIENT
Start: 2025-08-04

## 2025-08-04 NOTE — PROGRESS NOTES
[facility]   Clinic Note    SUBJECTIVE:     Chief Complaint   Patient presents with    bladder mass        Referral from: Valery Gallegos MD.    History of Present Illness:  Cady Watkins is a 75 y.o. female who presents to clinic for a bladder mass.    Patient was seen as a consult 2025 for bladder mass seen on US. US at that time also showed a small right non-obstructing stone. Denied hematuria at that time.     Patient endorses no additional complaints at this time.    Past Medical History:   Diagnosis Date    Diabetes mellitus, type 2     Fibromyalgia 2016    Hypertension 2016       Past Surgical History:   Procedure Laterality Date    ANGIOGRAPHY OF LOWER EXTREMITY Left 2025    Procedure: Angiogram Extremity Unilateral;  Surgeon: Glen Finn MD;  Location: [facility] OR [address] [place] [address];  Service: Vascular;  Laterality: Left;  mGy 164.57   Gycm2 33.3164    Fluro time 8.4min   Contrast 24ml     SECTION      CHOLECYSTECTOMY      CORONARY ANGIOGRAPHY N/A 2025    Procedure: ANGIOGRAM, CORONARY ARTERY;  Surgeon: Perfecto Coburn MD;  Location: [facility] CATH LAB;  Service: Cardiology;  Laterality: N/A;    FOOT SURGERY Bilateral     plantar fasciotomy bilateral, arthroplasty fifth toe bilateral    HERNIA REPAIR      IMPLANTATION OF BIVENTRICULAR HEART PACEMAKER N/A 2025    Procedure: INSERTION, PACEMAKER, BIVENTRICULAR;  Surgeon: Jason Lundberg MD;  Location: [facility] EP LAB;  Service: Cardiology;  Laterality: N/A;  CHB, CRT-P (LBAP vs CS), Biotronik, Anes, SK, CICU 3084    INSERTION, PACEMAKER, TEMPORARY TRANSVENOUS N/A 2025    Procedure: Insertion, Pacemaker, Temporary Transvenous;  Surgeon: Sean Ureña MD;  Location: [facility] CATH LAB/EP;  Service: Cardiology;  Laterality: N/A;    VENOPLASTY  2025    Procedure: ANGIOPLASTY, VEIN;  Surgeon: Glen Finn MD;  Location: [facility] OR [address] [place] [address];  Service: Vascular;;       Family History   Problem Relation Name Age of Onset  "   Diabetes Father      Heart disease Sister      Heart disease Brother         Social History[1]    Medications Ordered Prior to Encounter[2]    Review of patient's allergies indicates:   Allergen Reactions    Advil [ibuprofen] Hives    Penicillins     Codeine     Excedrin aspirin free [acetaminophen-caffeine]        Review of Systems:  A review of 10+ systems was conducted with pertinent positive and negative findings documented in HPI with all other systems reviewed and negative.    OBJECTIVE:     Estimated body mass index is 22.2 kg/m² as calculated from the following:    Height as of this encounter: 5' 2" (1.575 m).    Weight as of this encounter: 55 kg (121 lb 5.8 oz).    Vital Signs (Most Recent)  Vitals:    08/04/25 1432   BP: (!) 149/65   Pulse: 65       Physical Exam:  GENERAL: patient sitting comfortably  HEENT: normocephalic  NECK: supple, no JVD  PULM: normal chest rise, no increased WOB  HEART: non-diaphoretic  ABDO: soft, nondistended, nontender  BACK: no CVA tenderness bilaterally  SKIN: warm, dry, well perfused  EXT: no bruising or edema  NEURO: grossly normal with no focal deficits  PSYCH: appropriate mood and affect    Genitourinary Exam:  deferred    Lab Results   Component Value Date    BUN 14 07/26/2025    CREATININE 0.7 07/26/2025    WBC 5.44 07/26/2025    HGB 10.2 (L) 07/26/2025    HCT 31.1 (L) 07/26/2025     (L) 07/26/2025    AST 20 07/26/2025    ALT 11 07/26/2025    ALKPHOS 42 (L) 07/26/2025    ALBUMIN 3.6 07/26/2025    HGBA1C 6.6 (H) 06/29/2025    INR 1.0 06/24/2025        Imaging:  I have personally reviewed all relevant imaging studies.    Results for orders placed or performed during the hospital encounter of 07/24/25 (from the past 2160 hours)   CT Head Without Contrast    Narrative    CLINICAL HISTORY:  ([id number])74 y/o  (1950) F    Stroke, follow up;    TECHNIQUE:  (A#[id number], exam time 7/26/2025 11:04)    CT HEAD WITHOUT CONTRAST [id number]    Axial CT of the brain without " contrast using soft tissue and bone algorithm. Please note in the acute setting if there is a clinical concern for an acute stroke MRI would be more sensitive/specific for evaluation of ischemia.    CMS MANDATED QUALITY DATA - CT RADIATION - 436    All CT scans at this facility utilize dose modulation, iterative reconstruction, and/or weight based dosing when appropriate to reduce radiation dose to as low as reasonably achievable.    COMPARISON:  CT most recently from 07/24/2025.    FINDINGS:  Unchanged oval focus of decreased attenuation in the periventricular left occipital lobe (sagittal image 18, axial image 20, coronal image 48), favored to represent an evolving infarct (noting an evolving bleed is a consideration).  No new acute intracranial hemorrhage, hydrocephalus, herniation or midline shift.    Mild diffuse cerebral and cerebellar atrophy for age with periventricular deep cerebral white matter low attenuation  nonspecific findings which can be seen in any diffuse white matter process but most commonly associated with chronic microvascular ischemic disease. There are scattered atheromatous calcifications in the intracranial internal carotid arteries.    The orbits appear within normal limits noting likely bilateral lens replacement. External auditory canals are unremarkable.  There is fluid opacifying the left sphenoid and trace debris in the right sphenoid.  There is rightward nasal septal deviation.  The remainder of the paranasal sinuses and mastoid air cells are clear.      Impression    1.  Unchanged oval low-attenuation focus in the left occipital lobe.    2.  No new acute intracranial hemorrhage, no hydrocephalus, herniation or midline shift.      Electronically signed by: Isidoro Parikh  Date:    07/26/2025  Time:    11:10   Results for orders placed or performed during the hospital encounter of 06/24/25 (from the past 2160 hours)   CT Foot Without Contrast Left    Narrative    EXAMINATION:  CT  FOOT WITHOUT CONTRAST LEFT    CLINICAL HISTORY:  Heel pain, chronic;    TECHNIQUE:  Axial images of the left foot obtained without intravenous contrast.  Data submitted for coronal and sagittal reformats.    COMPARISON:  X-ray foot 06/30/2025    FINDINGS:  Bones: There is a chronic fracture of the distal 5th metatarsal with partial union.  No evidence for acute fracture.  No lytic or blastic lesion.  Generalized osteopenia noted.    Joints: No dislocation.  Advanced degenerative changes are noted at the hallux MTP joint.    Ligaments/tendons: There is Achilles enthesopathy.  Lisfranc articulation is congruent.  Remaining visualized ligaments and tendons are unremarkable.    Miscellaneous: Thickening of the plantar fascia origin, not well characterized.  Vascular calcifications noted.      Impression    1. No acute fracture.  2. Chronic distal 5th metatarsal fracture with partial union.  3. Achilles enthesopathy.  4. Advanced degenerative changes of the hallux MTP joint.  5. Mild thickening of the plantar fascia, not well characterized.    Electronically signed by resident: Chel Pleitez  Date:    07/01/2025  Time:    08:29    Electronically signed by: Bala Currie MD  Date:    07/01/2025  Time:    11:18   Results for orders placed or performed during the hospital encounter of 06/18/25 (from the past 2160 hours)   CT Renal Stone Study Abd Pelvis WO    Narrative    EXAMINATION:  CT RENAL STONE STUDY ABD PELVIS WO    CLINICAL HISTORY:  Nephrolithiasis and potential bladder mass;    TECHNIQUE:  Low dose axial images, sagittal and coronal reformations were obtained from the lung bases to the pubic symphysis.  Contrast was not administered.    COMPARISON:  Ultrasound 06/22/2025    FINDINGS:  There are moderate bilateral pleural effusions with associated compressive atelectasis.    The liver, spleen, adrenal glands and pancreas are unremarkable.    There is a 3 mm nonobstructing calculus seen within the inferior pole of the  right kidney.  There is no evidence hydronephrosis.  There is no bladder calculus.    The patient is status post cholecystectomy.    There is a large anterior abdominal wall hernia containing is portions of large and small bowel without evidence bowel obstruction.  The neck of this hernia measures 10.3 cm.    The appendix is within normal limits and contained within the hernia.    There is diffuse anasarca.  The osseous structures are unremarkable.    There is no evidence bladder mass.  The pelvic organs are unremarkable.      Impression    There is a 3 mm nonobstructing calculus seen within the inferior pole of the right kidney.    There is no evidence bladder mass on this non contrasted study however the sensitivity is limited by the lack of contrast in the bladder.  Contrast installation into the bladder may provide greater sensitivity for small bladder masses or possibly cystoscopy if there is high clinical concern for bladder mass.    There is a large anterior abdominal wall hernia containing portions of small and large bowel without evidence obstruction.    There are bilateral pleural effusions with associated compressive atelectasis.      Electronically signed by: Renetta Acosta MD  Date:    06/23/2025  Time:    09:39     No results found for this or any previous visit (from the past 2160 hours).  CT Head Without Contrast  Narrative: CLINICAL HISTORY:  ([id number])74 y/o  (1950) F    Stroke, follow up;    TECHNIQUE:  (A#[id number], exam time 7/26/2025 11:04)    CT HEAD WITHOUT CONTRAST [id number]    Axial CT of the brain without contrast using soft tissue and bone algorithm. Please note in the acute setting if there is a clinical concern for an acute stroke MRI would be more sensitive/specific for evaluation of ischemia.    CMS MANDATED QUALITY DATA - CT RADIATION - 436    All CT scans at this facility utilize dose modulation, iterative reconstruction, and/or weight based dosing when appropriate to reduce  "radiation dose to as low as reasonably achievable.    COMPARISON:  CT most recently from 07/24/2025.    FINDINGS:  Unchanged oval focus of decreased attenuation in the periventricular left occipital lobe (sagittal image 18, axial image 20, coronal image 48), favored to represent an evolving infarct (noting an evolving bleed is a consideration).  No new acute intracranial hemorrhage, hydrocephalus, herniation or midline shift.    Mild diffuse cerebral and cerebellar atrophy for age with periventricular deep cerebral white matter low attenuation  nonspecific findings which can be seen in any diffuse white matter process but most commonly associated with chronic microvascular ischemic disease. There are scattered atheromatous calcifications in the intracranial internal carotid arteries.    The orbits appear within normal limits noting likely bilateral lens replacement. External auditory canals are unremarkable.  There is fluid opacifying the left sphenoid and trace debris in the right sphenoid.  There is rightward nasal septal deviation.  The remainder of the paranasal sinuses and mastoid air cells are clear.  Impression: 1.  Unchanged oval low-attenuation focus in the left occipital lobe.    2.  No new acute intracranial hemorrhage, no hydrocephalus, herniation or midline shift.    Electronically signed by: Isidoro Parikh  Date:    07/26/2025  Time:    11:10       PSA:  No results found for: "PSA", "PSADIAG", "PSATOTAL", "PSAFREE"    Testosterone:  No results found for: "TOTALTESTOST", "TESTOSTERONE"     ASSESSMENT     1. Bladder mass        PLAN:     Recommend office cystoscopy to rule out bladder tumor     - Visit today included increased complexity associated with the ongoing care of the episodic problem bladder mass which has been addressed and requires the longitudinal care of the patient due to the serious and/or complex managed problem of bladder mass.     Jayce Holliday MD  Urology  [facility]    Disclaimer: This note has been generated using voice-recognition software. There may be typographical errors that have been missed during proof-reading.          [1]   Social History  Tobacco Use    Smoking status: Never    Smokeless tobacco: Never   Substance Use Topics    Alcohol use: Yes     Comment: Occasionally   [2]   Current Outpatient Medications on File Prior to Visit   Medication Sig Dispense Refill    acetaminophen (TYLENOL) 500 MG tablet Take 1 tablet (500 mg total) by mouth every 8 (eight) hours. 90 tablet 1    apixaban (ELIQUIS) 2.5 mg Tab Take 1 tablet (2.5 mg total) by mouth 2 (two) times daily. 60 tablet 11    aspirin 81 MG Chew Take 1 tablet (81 mg total) by mouth once daily. 30 tablet 11    atorvastatin (LIPITOR) 80 MG tablet Take 1 tablet (80 mg total) by mouth once daily. 90 tablet 3    capsaicin (ZOSTRIX) 0.025 % cream Apply topically 2 (two) times daily. 60 g 0    cyanocobalamin (VITAMIN B-12) 1000 MCG tablet Take 1 tablet (1,000 mcg total) by mouth once daily. 30 tablet 0    furosemide (LASIX) 40 MG tablet Take 1 tablet (40 mg total) by mouth 2 (two) times daily. 60 tablet 11    metFORMIN (GLUCOPHAGE) 500 MG tablet TAKE 1 TABLET BY MOUTH TWICE A DAY WITH MEALS 180 tablet 3    metoprolol succinate (TOPROL-XL) 50 MG 24 hr tablet Take 1 tablet (50 mg total) by mouth once daily. 90 tablet 3    pantoprazole (PROTONIX) 40 MG tablet Take 1 tablet (40 mg total) by mouth once daily. 90 tablet 3    polyethylene glycol (GLYCOLAX) 17 gram PwPk Take 17 g by mouth once daily. 30 each 0    valsartan (DIOVAN) 40 MG tablet Take 1 tablet (40 mg total) by mouth 2 (two) times daily. 180 tablet 3    LIDOcaine (LIDODERM) 5 % Place 1 patch onto the skin once daily. Remove & Discard patch within 12 hours or as directed by MD (Patient not taking: Reported on 8/4/2025) 30 patch 0    LIDOcaine-prilocaine (EMLA) cream Apply topically as needed. (Patient not taking: Reported on 8/4/2025) 30 g 0     meclizine (ANTIVERT) 12.5 mg tablet Take 1 tablet (12.5 mg total) by mouth 2 (two) times daily as needed for Dizziness. (Patient not taking: Reported on 8/4/2025) 30 tablet 0     No current facility-administered medications on file prior to visit.

## 2025-08-04 NOTE — PROGRESS NOTES
Subjective:   @Patient ID:  Cady Watkins is a 75 y.o. female who presents for evaluation of No chief complaint on file.      HPI:       History of Present Illness    CHIEF COMPLAINT:  Patient presents today for cardiology follow-up after pacemaker placement.    CARDIOVASCULAR HISTORY:  She underwent dual-chamber pacemaker placement in June due to heart block. Angiogram revealed 70% stenosis in circumflex artery, which was managed medically. She denies additional cardiac symptoms or complications since pacemaker placement. She recently underwent angioplasty for a non-healing LLE wound performed by vascular surgery. The wound is improving with reduced leg swelling, and she denies significant pain or soreness. She will follow up with vascular surgery on Wednesday.    RECENT HOSPITALIZATION:  She was recently hospitalized for mental status changes where brain imaging revealed a small left occipital stroke.    BLOOD PRESSURE:  She has a history of HTN currently managed with two medications. Recent PCP readings showed low diastolic pressure in the 40-50 range. She was advised to monitor daily BP at home due to concerns about hypotension, particularly given low readings during recent hospitalization.    CURRENT SYMPTOMS:  She reports intermittent dizziness, experiencing symptom-free periods for a few days followed by recurrence. She questions if dizziness may be related to Eliquis or ASA 81 mg. Previously, Gabapentin was discontinued by her doctor due to causing dizziness and vertigo.    MEDICATIONS:  She is currently taking ASA 81 mg, Eliquis, cholesterol medication, BP medications, and diuretics for fluid retention.          Results for orders placed during the hospital encounter of 06/24/25    Echo    Interpretation Summary    Left Ventricle: The left ventricle is normal in size. Normal wall thickness. Regional wall motion abnormalities present. There is severely reduced systolic function with a visually  estimated ejection fraction of 15 - 20%. Grade I diastolic dysfunction. Elevated left ventricular filling pressure. No thrombus observed using contrast enhanced images. Hypokinesis of all segments except the bases suggests takastsubo CM pattern, but multivessel CAD cannot be exluded    Right Ventricle: The right ventricle is normal in size measuring 2.8 cm. Wall thickness is normal. Systolic function is normal.    Aortic Valve: There is mild aortic valve sclerosis. There is moderate annular calcification present.    Mitral Valve: There is mild regurgitation with a centrally directed jet.    Aorta: The aortic root is normal in size measuring 3.0 cm. The proximal ascending aorta is normal in size measuring 2.6 cm.    Pulmonary Artery: There is moderate pulmonary hypertension. The estimated pulmonary artery systolic pressure is 51 mmHg.    IVC/SVC: Elevated venous pressure at 15 mmHg.    Pericardium: There is no pericardial effusion.      No results found for this or any previous visit.      Results for orders placed during the hospital encounter of 06/24/25    Cardiac catheterization    Conclusion    The 1st Mrg lesion was 60% stenosed.    The estimated blood loss was none.    CAD out of proportion to LV dysfunction.    Single vessel CAD - recommend medical therapy.    The procedure log was documented by Documenter: Louise Vivar RN and verified by Perfecto Coburn MD.    Date: 6/26/2025  Time: 2:49 PM      Please document below the medical necessity for continuous telemetry monitoring or discontinue the current order if appropriate.    Current rhythm from flowsheet:               Patient Active Problem List    Diagnosis Date Noted    Hypertensive urgency 07/29/2025    Psychiatric disorder 07/25/2025    Elevated troponin 07/25/2025    HFrEF (heart failure with reduced ejection fraction) 07/25/2025    Vitamin B12 deficiency 07/25/2025    Discharge planning issues 07/02/2025    Left lower quadrant abdominal pain  07/02/2025    Atheroembolism of lower extremity, left 07/02/2025    Left foot pain 06/30/2025    PAD (peripheral artery disease) 06/30/2025    Coronary artery disease involving native coronary artery 06/29/2025    Muscular deconditioning 06/28/2025    E. coli UTI (urinary tract infection) 06/27/2025    Delirium 06/25/2025    Bladder mass 06/23/2025    ISAIAS (acute kidney injury) 06/23/2025    Acute systolic heart failure 06/20/2025    Acute pulmonary edema 06/19/2025    Acute hypoxemic respiratory failure 06/19/2025    Hypertensive emergency 06/19/2025    Complete heart block 06/18/2025    Syncope 06/18/2025    Colon cancer screening declined 09/17/2023    Mammogram declined 09/17/2023    Opioid dependence 07/08/2022    Diabetic nephropathy 12/10/2020    High cholesterol 02/23/2018    Spasm 02/23/2018    Insomnia 10/27/2017    Migraine 10/27/2017    Type 2 diabetes with neuropathy 12/13/2016    Obesity 10/13/2016    Kidney stone 10/13/2016    Fibromyalgia 09/21/2016    Hypertension 09/21/2016    Ventral hernia 09/21/2016                    LAST HbA1c  Lab Results   Component Value Date    HGBA1C 6.6 (H) 06/29/2025       Lipid panel  Lab Results   Component Value Date    CHOL 90 (L) 07/26/2025    CHOL 116 (L) 06/18/2025    CHOL 138 03/25/2025     Lab Results   Component Value Date    HDL 31 (L) 07/26/2025    HDL 27 (L) 06/18/2025    HDL 34 (L) 03/25/2025     Lab Results   Component Value Date    LDLCALC 39.4 (L) 07/26/2025    LDLCALC 70.0 06/18/2025    LDLCALC 75.8 03/25/2025     Lab Results   Component Value Date    TRIG 98 07/26/2025    TRIG 95 06/18/2025    TRIG 141 03/25/2025     Lab Results   Component Value Date    CHOLHDL 34.4 07/26/2025    CHOLHDL 23.3 06/18/2025    CHOLHDL 24.6 03/25/2025            Review of Systems   Constitutional: Negative for chills and fever.   HENT:  Negative for hearing loss and nosebleeds.    Eyes:  Negative for blurred vision.   Cardiovascular:         As in HPI    Respiratory:   Negative for cough, hemoptysis and shortness of breath.    Endocrine: Negative for cold intolerance and polyuria.   Hematologic/Lymphatic: Negative for bleeding problem.   Skin:  Negative for itching.   Musculoskeletal:  Negative for falls.   Gastrointestinal:  Negative for abdominal pain and hematochezia.   Genitourinary:  Negative for hematuria.   Neurological:  Negative for dizziness and loss of balance.   Psychiatric/Behavioral:  Negative for altered mental status and depression.        Objective:   Physical Exam  Constitutional:       Appearance: She is well-developed.   HENT:      Head: Normocephalic and atraumatic.   Eyes:      Conjunctiva/sclera: Conjunctivae normal.   Neck:      Vascular: No carotid bruit or JVD.   Cardiovascular:      Rate and Rhythm: Normal rate and regular rhythm.      Pulses:           Carotid pulses are 2+ on the right side and 2+ on the left side.       Radial pulses are 2+ on the right side and 2+ on the left side.      Heart sounds: Normal heart sounds. No murmur heard.     No friction rub. No gallop.   Pulmonary:      Effort: Pulmonary effort is normal. No respiratory distress.      Breath sounds: Normal breath sounds. No stridor. No wheezing.   Abdominal:      General: Abdomen is flat.      Palpations: Abdomen is soft.   Musculoskeletal:      Cervical back: Neck supple.      Right lower leg: No edema.      Left lower leg: No edema.   Skin:     General: Skin is warm and dry.   Neurological:      Mental Status: She is alert and oriented to person, place, and time.   Psychiatric:         Behavior: Behavior normal.         Assessment and Plan:         75-year-old female here to establish care with a new cardiologist.  Active medical problems include      -history of third-degree heart block with dizziness associated with low left ventricular ejection fraction now status post Bi V Biotronik pacemaker with coronary sinus and left bundle-branch pacing leads.  -history of nonischemic  cardiomyopathy with EF of 15-20% on Toprol-XL and valsartan.  Previously had low blood pressure and hence not escalating antihypertensive medications  -peripheral arterial disease status post angioplasty of left anterior tibial and SFA by vascular surgery in June 2025.  Done for nonhealing left foot wound  -diabetes A1c 6.6  -hyperlipidemia LDL 39 on Lipitor 80 mg  -recent admission to the hospital for altered mental status noted to have left occipital CVA.        -unsure why she is on Eliquis.  I do not see any documented atrial fibrillation.  Previously had angioplasty of the left anterior tibial and SFA within the last 2 months.  Recommend continuing on aspirin and Plavix without Eliquis.  Stopping Eliquis today  -euvolemic on examination.  Take Lasix 20 mg daily with additional Lasix only as needed in the afternoon.  -check blood pressure every day.  May need to stop valsartan for blood pressure low  -device check in my clinic ordered  -history of 70% left circumflex artery disease that is treated medically.  Continue aspirin and high-intensity statin  -follow up in 2 months with repeat echocardiogram and carotid ultrasound for carotid bruit and history of CVA      Assessment & Plan    I50.20 HFrEF (heart failure with reduced EF)  I44.2 Complete heart block  I63.9 CVA, unspecified mechanism  R09.89 Carotid bruit, unspecified laterality    HFREF (HEART FAILURE WITH REDUCED EF):  - Evaluated fluid status and decreased Lasix (furosemide) from twice daily to daily, with second dose only as needed.  - Patient to check blood pressure daily at home, monitor for signs of dehydration or low blood pressure.  - Continued metoprolol at current dose.  - Continued valsartan at current dose, with instruction to potentially discontinue if blood pressure becomes too low.  - Blood pressure management may lead to hypotension with current regimen.  - Ordered echocardiogram in 2 months.  - Follow up in 2 months.  - Contact the  office if blood pressure becomes too low.    COMPLETE HEART BLOCK:  - Ordered device interrogation to ensure pacemaker stability.  - Discussed potential for MRI compatibility with current pacemaker after 1 month post-implantation.    CVA, UNSPECIFIED MECHANISM:  - Discontinued Eliquis due to lack of strong evidence for its use and explained rationale for changing to Plavix.  - Started Plavix 1 tablet daily as more appropriate for maintaining vessel patency post-angioplasty.    CAROTID BRUIT, UNSPECIFIED LATERALITY:  - Ordered carotid US to aid neurologist in stroke etiology assessment.    PLAN SUMMARY:  - Discontinued Eliquis; started Plavix 1 tablet daily  - Continued valsartan at current dose  - Decreased Lasix (furosemide) from twice daily to daily  - Continued metoprolol at current dose  - Ordered device interrogation for pacemaker  - Ordered echocardiogram in 2 months  - Ordered carotid ultrasound  - Follow up in 2 months  - Discuss MRI compatibility with pacemaker after 1 month post-implantation              Pertinent cardiac images and EKG reviewed independently.    Continue with current medical plan and lifestyle changes.  Return sooner for concerns or questions. If symptoms persist go to the ED  I have reviewed all pertinent data including patient's medical history in detail and updated the computerized patient record.     Orders Placed This Encounter   Procedures    Cardiac device check - In Clinic [facility] Hospital     Standing Status:   Future     Expiration Date:   8/4/2026     What is the reason for interrogation?:   new device     What is the name of the device ?:   biotonik biv pacemaker     Release to patient:   Immediate    CV Ultrasound Bilateral Doppler Carotid     Standing Status:   Future     Expiration Date:   8/4/2026     Release to patient:   Immediate    IN OFFICE EKG 12-LEAD (to [place])    Echo     Standing Status:   Future     Expiration Date:   8/4/2026     Release to patient:    Immediate       Follow up as scheduled.     She expressed verbal understanding and agreed with the plan    Patient's Medications   New Prescriptions    CLOPIDOGREL (PLAVIX) 75 MG TABLET    Take 1 tablet (75 mg total) by mouth once daily.   Previous Medications    ACETAMINOPHEN (TYLENOL) 500 MG TABLET    Take 1 tablet (500 mg total) by mouth every 8 (eight) hours.    ASPIRIN 81 MG CHEW    Take 1 tablet (81 mg total) by mouth once daily.    ATORVASTATIN (LIPITOR) 80 MG TABLET    Take 1 tablet (80 mg total) by mouth once daily.    CAPSAICIN (ZOSTRIX) 0.025 % CREAM    Apply topically 2 (two) times daily.    CYANOCOBALAMIN (VITAMIN B-12) 1000 MCG TABLET    Take 1 tablet (1,000 mcg total) by mouth once daily.    FUROSEMIDE (LASIX) 40 MG TABLET    Take 1 tablet (40 mg total) by mouth 2 (two) times daily.    LIDOCAINE (LIDODERM) 5 %    Place 1 patch onto the skin once daily. Remove & Discard patch within 12 hours or as directed by MD    LIDOCAINE-PRILOCAINE (EMLA) CREAM    Apply topically as needed.    MECLIZINE (ANTIVERT) 12.5 MG TABLET    Take 1 tablet (12.5 mg total) by mouth 2 (two) times daily as needed for Dizziness.    METFORMIN (GLUCOPHAGE) 500 MG TABLET    TAKE 1 TABLET BY MOUTH TWICE A DAY WITH MEALS    METOPROLOL SUCCINATE (TOPROL-XL) 50 MG 24 HR TABLET    Take 1 tablet (50 mg total) by mouth once daily.    PANTOPRAZOLE (PROTONIX) 40 MG TABLET    Take 1 tablet (40 mg total) by mouth once daily.    POLYETHYLENE GLYCOL (GLYCOLAX) 17 GRAM PWPK    Take 17 g by mouth once daily.    VALSARTAN (DIOVAN) 40 MG TABLET    Take 1 tablet (40 mg total) by mouth 2 (two) times daily.   Modified Medications    No medications on file   Discontinued Medications    APIXABAN (ELIQUIS) 2.5 MG TAB    Take 1 tablet (2.5 mg total) by mouth 2 (two) times daily.        Rodrigo Menard M.D      This note was generated with the assistance of ambient listening technology. Verbal consent was obtained by the patient and accompanying visitor(s)  for the recording of patient appointment to facilitate this note. I attest to having reviewed and edited the generated note for accuracy, though some syntax or spelling errors may persist. Please contact the author of this note for any clarification.

## 2025-08-06 ENCOUNTER — HOSPITAL ENCOUNTER (OUTPATIENT)
Dept: VASCULAR SURGERY | Facility: CLINIC | Age: 75
Discharge: HOME | End: 2025-08-06
Payer: MEDICARE

## 2025-08-06 ENCOUNTER — OFFICE VISIT (OUTPATIENT)
Dept: VASCULAR SURGERY | Facility: CLINIC | Age: 75
End: 2025-08-06
Payer: MEDICARE

## 2025-08-06 VITALS
TEMPERATURE: 98 F | HEART RATE: 60 BPM | SYSTOLIC BLOOD PRESSURE: 124 MMHG | DIASTOLIC BLOOD PRESSURE: 67 MMHG | HEIGHT: 62 IN | WEIGHT: 123.44 LBS | BODY MASS INDEX: 22.71 KG/M2

## 2025-08-06 DIAGNOSIS — I73.9 PAD (PERIPHERAL ARTERY DISEASE): Primary | ICD-10-CM

## 2025-08-06 DIAGNOSIS — Z98.890 S/P ANGIOGRAM OF EXTREMITY: ICD-10-CM

## 2025-08-06 DIAGNOSIS — I73.9 PAD (PERIPHERAL ARTERY DISEASE): ICD-10-CM

## 2025-08-06 PROCEDURE — 99999 PR PBB SHADOW E&M-EST. PATIENT-LVL III: CPT | Mod: PBBFAC,,, | Performed by: SURGERY

## 2025-08-06 NOTE — PROGRESS NOTES
[facility] Surgery Clinic Note    Ms. Watkins is a pleasant 76yo  female who returns to the Vascular Surgery Clinic for post-operative evaluation after undergoing left lower extremity angiography.  Her daughter reports improvement in her left great toe wound.  She denied symptoms suggestive of rest pain.  On physical examination, she has palpable popliteal pulse on the left.  Her ABIs are non-compressible but her PVRs suggest mild occlusive disease at rest.  Recommended pursuing a supervised walking regimen, with continued antiplatelet and statin therapy.  Repeat ABIs in six months.  I also recommended off-loading her left great toe dry ulceration with open-toed shoes.  All of Ms. Watkins's questions were answered.

## 2025-08-11 ENCOUNTER — PROCEDURE VISIT (OUTPATIENT)
Dept: UROLOGY | Facility: CLINIC | Age: 75
End: 2025-08-11
Payer: MEDICARE

## 2025-08-11 VITALS
BODY MASS INDEX: 23.21 KG/M2 | DIASTOLIC BLOOD PRESSURE: 60 MMHG | HEIGHT: 62 IN | WEIGHT: 126.13 LBS | SYSTOLIC BLOOD PRESSURE: 159 MMHG | HEART RATE: 60 BPM

## 2025-08-11 DIAGNOSIS — N32.89 BLADDER MASS: ICD-10-CM

## 2025-08-11 PROCEDURE — 88112 CYTOPATH CELL ENHANCE TECH: CPT | Mod: TC | Performed by: UROLOGY

## 2025-08-11 PROCEDURE — 99499 UNLISTED E&M SERVICE: CPT | Mod: S$GLB,,, | Performed by: UROLOGY

## 2025-08-11 PROCEDURE — 52000 CYSTOURETHROSCOPY: CPT | Mod: S$GLB,,, | Performed by: UROLOGY

## 2025-08-13 ENCOUNTER — OFFICE VISIT (OUTPATIENT)
Dept: PODIATRY | Facility: CLINIC | Age: 75
End: 2025-08-13
Payer: MEDICARE

## 2025-08-13 VITALS
DIASTOLIC BLOOD PRESSURE: 66 MMHG | HEIGHT: 62 IN | HEART RATE: 62 BPM | BODY MASS INDEX: 23.53 KG/M2 | WEIGHT: 127.88 LBS | SYSTOLIC BLOOD PRESSURE: 166 MMHG

## 2025-08-13 DIAGNOSIS — E11.51 TYPE II DIABETES MELLITUS WITH PERIPHERAL CIRCULATORY DISORDER: Primary | ICD-10-CM

## 2025-08-13 LAB
ESTROGEN SERPL-MCNC: NORMAL PG/ML
INSULIN SERPL-ACNC: NORMAL U[IU]/ML
LAB AP CLINICAL INFORMATION: NORMAL
LAB AP GROSS DESCRIPTION: NORMAL
LAB AP NON-GYN INTERPRETATION SPECIMEN 1: NORMAL
LAB AP PERFORMING LOCATION(S): NORMAL

## 2025-08-13 PROCEDURE — 99999 PR PBB SHADOW E&M-EST. PATIENT-LVL III: CPT | Mod: PBBFAC,,, | Performed by: PODIATRIST

## 2025-08-13 RX ORDER — MAGNESIUM GLUCONATE 27.5 (500)
TABLET ORAL ONCE
COMMUNITY

## 2025-08-21 ENCOUNTER — HOSPITAL ENCOUNTER (EMERGENCY)
Facility: HOSPITAL | Age: 75
Discharge: LEFT BEFORE DISCHARGE | End: 2025-08-22
Attending: STUDENT IN AN ORGANIZED HEALTH CARE EDUCATION/TRAINING PROGRAM
Payer: MEDICARE

## 2025-08-21 ENCOUNTER — HOSPITAL ENCOUNTER (OUTPATIENT)
Dept: CARDIOLOGY | Facility: HOSPITAL | Age: 75
Discharge: HOME | End: 2025-08-21
Attending: INTERNAL MEDICINE
Payer: MEDICARE

## 2025-08-21 VITALS — BODY MASS INDEX: 23.53 KG/M2 | HEIGHT: 62 IN | WEIGHT: 127.88 LBS
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
## 2025-08-21 DIAGNOSIS — R07.9 CHEST PAIN: ICD-10-CM

## 2025-08-21 DIAGNOSIS — M25.522 LEFT ELBOW PAIN: ICD-10-CM

## 2025-08-21 DIAGNOSIS — T14.90XA INJURY: ICD-10-CM

## 2025-08-21 DIAGNOSIS — I50.20 HFREF (HEART FAILURE WITH REDUCED EJECTION FRACTION): ICD-10-CM

## 2025-08-21 DIAGNOSIS — R42 DIZZINESS: ICD-10-CM

## 2025-08-21 LAB
ABSOLUTE EOSINOPHIL (SMH): 0.41 K/UL
ABSOLUTE MONOCYTE (SMH): 0.61 K/UL (ref 0.3–1)
ABSOLUTE NEUTROPHIL COUNT (SMH): 3.9 K/UL (ref 1.8–7.7)
ALBUMIN SERPL-MCNC: 3.8 G/DL (ref 3.5–5.2)
ALP SERPL-CCNC: 63 UNIT/L (ref 55–135)
ALT SERPL-CCNC: 16 UNIT/L (ref 10–44)
ANION GAP (SMH): 7 MMOL/L (ref 8–16)
AORTIC SIZE INDEX (SOV): 2 CM/M2
AORTIC SIZE INDEX: 1.9 CM/M2
APICAL FOUR CHAMBER EJECTION FRACTION: 78 %
APICAL TWO CHAMBER EJECTION FRACTION: 67 %
ASCENDING AORTA: 3 CM
AST SERPL-CCNC: 20 UNIT/L (ref 10–40)
AV INDEX (PROSTH): 0.71
AV MEAN GRADIENT: 5 MMHG
AV PEAK GRADIENT: 10 MMHG
AV VALVE AREA BY VELOCITY RATIO: 1.8 CM²
AV VALVE AREA: 2 CM²
AV VELOCITY RATIO: 0.63
BASOPHILS # BLD AUTO: 0.07 K/UL
BASOPHILS NFR BLD AUTO: 0.9 %
BILIRUB SERPL-MCNC: 0.5 MG/DL (ref 0.1–1)
BILIRUB UR QL STRIP.AUTO: NEGATIVE
BSA FOR ECHO PROCEDURE: 1.59 M2
BUN SERPL-MCNC: 26 MG/DL (ref 8–23)
CALCIUM SERPL-MCNC: 9.5 MG/DL (ref 8.7–10.5)
CHLORIDE SERPL-SCNC: 105 MMOL/L (ref 95–110)
CLARITY UR: CLEAR
CO2 SERPL-SCNC: 29 MMOL/L (ref 23–29)
COLOR UR AUTO: YELLOW
CREAT SERPL-MCNC: 0.8 MG/DL (ref 0.5–1.4)
CV ECHO LV RWT: 0.4 CM
DOP CALC AO PEAK VEL: 1.6 M/S
DOP CALC AO VTI: 35.2 CM
DOP CALC LVOT AREA: 2.8 CM2
DOP CALC LVOT DIAMETER: 1.9 CM
DOP CALC LVOT PEAK VEL: 1 M/S
DOP CALC MV VTI: 35 CM
DOP CALCLVOT PEAK VEL VTI: 25 CM
E WAVE DECELERATION TIME: 280 MSEC
E/A RATIO: 0.84
E/E' RATIO: 15 M/S
ECHO LV POSTERIOR WALL: 0.8 CM (ref 0.6–1.1)
ERYTHROCYTE [DISTWIDTH] IN BLOOD BY AUTOMATED COUNT: 12.7 % (ref 11.5–14.5)
FRACTIONAL SHORTENING: 37.5 % (ref 28–44)
GFR SERPLBLD CREATININE-BSD FMLA CKD-EPI: >60 ML/MIN/1.73/M2
GLUCOSE SERPL-MCNC: 134 MG/DL (ref 70–110)
GLUCOSE UR QL STRIP: NEGATIVE
HCT VFR BLD AUTO: 33.4 % (ref 37–48.5)
HGB BLD-MCNC: 10.8 GM/DL (ref 12–16)
HGB UR QL STRIP: NEGATIVE
IMM GRANULOCYTES # BLD AUTO: 0.02 K/UL (ref 0–0.04)
IMM GRANULOCYTES NFR BLD AUTO: 0.3 % (ref 0–0.5)
INTERVENTRICULAR SEPTUM: 1.2 CM (ref 0.6–1.1)
IVC DIAMETER: 1.57 CM
IVRT: 123 MSEC
KETONES UR QL STRIP: NEGATIVE
LEFT ATRIUM AREA SYSTOLIC (APICAL 2 CHAMBER): 21.93 CM2
LEFT ATRIUM AREA SYSTOLIC (APICAL 4 CHAMBER): 17.57 CM2
LEFT ATRIUM VOLUME INDEX MOD: 38 ML/M2
LEFT ATRIUM VOLUME MOD: 60 ML
LEFT INTERNAL DIMENSION IN SYSTOLE: 2.5 CM (ref 2.1–4)
LEFT VENTRICLE DIASTOLIC VOLUME INDEX: 43.67 ML/M2
LEFT VENTRICLE DIASTOLIC VOLUME: 69 ML
LEFT VENTRICLE END DIASTOLIC VOLUME APICAL 2 CHAMBER: 53.56 ML
LEFT VENTRICLE END DIASTOLIC VOLUME APICAL 4 CHAMBER INDEX BSA: 45.28 ML/M2
LEFT VENTRICLE END DIASTOLIC VOLUME APICAL 4 CHAMBER: 71.55 ML
LEFT VENTRICLE END SYSTOLIC VOLUME APICAL 2 CHAMBER: 67.86 ML
LEFT VENTRICLE END SYSTOLIC VOLUME APICAL 4 CHAMBER: 48.38 ML
LEFT VENTRICLE MASS INDEX: 80.4 G/M2
LEFT VENTRICLE SYSTOLIC VOLUME INDEX: 13.3 ML/M2
LEFT VENTRICLE SYSTOLIC VOLUME: 21 ML
LEFT VENTRICULAR INTERNAL DIMENSION IN DIASTOLE: 4 CM (ref 3.5–6)
LEFT VENTRICULAR MASS: 127.1 G
LEUKOCYTE ESTERASE UR QL STRIP: NEGATIVE
LV LATERAL E/E' RATIO: 11.4 M/S
LV SEPTAL E/E' RATIO: 20 M/S
LVED V (TEICH): 69.25 ML
LVES V (TEICH): 21.37 ML
LVOT MG: 2.26 MMHG
LVOT MV: 0.71 CM/S
LYMPHOCYTES # BLD AUTO: 2.44 K/UL (ref 1–4.8)
Lab: 1.9 CM/M
Lab: 2 CM/M
MCH RBC QN AUTO: 31.9 PG (ref 27–31)
MCHC RBC AUTO-ENTMCNC: 32.3 G/DL (ref 32–36)
MCV RBC AUTO: 99 FL (ref 82–98)
MV A" WAVE DURATION": 145.58 MSEC
MV PEAK A VEL: 0.95 M/S
MV PEAK E VEL: 0.8 M/S
MV PEAK GRADIENT: 4 MMHG
MV STENOSIS PRESSURE HALF TIME: 81.16 MS
MV VALVE AREA BY CONTINUITY EQUATION: 2.02 CM2
MV VALVE AREA P 1/2 METHOD: 2.71 CM2
NITRITE UR QL STRIP: NEGATIVE
NUCLEATED RBC (/100WBC) (SMH): 0 /100 WBC
OHS CV CPX PATIENT HEIGHT IN: 62
OHS CV RV/LV RATIO: 1.03 CM
OHS LV EJECTION FRACTION SIMPSONS BIPLANE MOD: 72 %
PH UR STRIP: 5 [PH]
PISA MRMAX VEL: 4.35 M/S
PISA TR MAX VEL: 2.1 M/S
PLATELET # BLD AUTO: 144 K/UL (ref 150–450)
PMV BLD AUTO: 12 FL (ref 9.2–12.9)
POCT GLUCOSE: 142 MG/DL (ref 70–110)
POTASSIUM SERPL-SCNC: 3.9 MMOL/L (ref 3.5–5.1)
PROT SERPL-MCNC: 6.9 GM/DL (ref 6–8.4)
PROT UR QL STRIP: NEGATIVE
PULM VEIN S/D RATIO: 1.31
PV MV: 0.77 M/S
PV PEAK D VEL: 0.32 M/S
PV PEAK GRADIENT: 5 MMHG
PV PEAK S VEL: 0.42 M/S
PV PEAK VELOCITY: 1.12 M/S
RA PRESSURE ESTIMATED: 3 MMHG
RA VOL SYS: 42.52 ML
RBC # BLD AUTO: 3.39 M/UL (ref 4–5.4)
RELATIVE EOSINOPHIL (SMH): 5.5 % (ref 0–8)
RELATIVE LYMPHOCYTE (SMH): 32.8 % (ref 18–48)
RELATIVE MONOCYTE (SMH): 8.2 % (ref 4–15)
RELATIVE NEUTROPHIL (SMH): 52.3 % (ref 38–73)
RIGHT ATRIAL AREA: 15.7 CM2
RIGHT ATRIUM END SYSTOLIC VOLUME APICAL 4 CHAMBER INDEX BSA: 25.54 ML/M2
RIGHT ATRIUM VOLUME AREA LENGTH APICAL 4 CHAMBER: 40.35 ML
RIGHT VENTRICLE DIASTOLIC BASEL DIMENSION: 4.1 CM
RV TB RVSP: 5 MMHG
RV TISSUE DOPPLER FREE WALL SYSTOLIC VELOCITY 1 (APICAL 4 CHAMBER VIEW): 11.93 CM/S
SINUS: 3.2 CM
SODIUM SERPL-SCNC: 141 MMOL/L (ref 136–145)
SP GR UR STRIP: 1.02
TDI LATERAL: 0.07 M/S
TDI SEPTAL: 0.04 M/S
TDI: 0.06 M/S
TR MAX PG: 18 MMHG
TRICUSPID ANNULAR PLANE SYSTOLIC EXCURSION: 2.2 CM
TV REST PULMONARY ARTERY PRESSURE: 21 MMHG
UROBILINOGEN UR STRIP-ACNC: NEGATIVE EU/DL
WBC # BLD AUTO: 7.43 K/UL (ref 3.9–12.7)
Z-SCORE OF LEFT VENTRICULAR DIMENSION IN END DIASTOLE: -1.23
Z-SCORE OF LEFT VENTRICULAR DIMENSION IN END SYSTOLE: -0.92

## 2025-08-21 PROCEDURE — 93005 ELECTROCARDIOGRAM TRACING: CPT | Performed by: INTERNAL MEDICINE

## 2025-08-21 PROCEDURE — 93306 TTE W/DOPPLER COMPLETE: CPT | Mod: 26,,, | Performed by: INTERNAL MEDICINE

## 2025-08-21 PROCEDURE — 25500020 PHARM REV CODE 255: Performed by: STUDENT IN AN ORGANIZED HEALTH CARE EDUCATION/TRAINING PROGRAM

## 2025-08-21 PROCEDURE — 81003 URINALYSIS AUTO W/O SCOPE: CPT | Performed by: NURSE PRACTITIONER

## 2025-08-21 PROCEDURE — 80053 COMPREHEN METABOLIC PANEL: CPT | Performed by: NURSE PRACTITIONER

## 2025-08-21 PROCEDURE — 85025 COMPLETE CBC W/AUTO DIFF WBC: CPT | Performed by: NURSE PRACTITIONER

## 2025-08-21 PROCEDURE — 82962 GLUCOSE BLOOD TEST: CPT

## 2025-08-21 PROCEDURE — C8929 TTE W OR WO FOL WCON,DOPPLER: HCPCS

## 2025-08-21 PROCEDURE — 25500020 PHARM REV CODE 255: Performed by: INTERNAL MEDICINE

## 2025-08-21 PROCEDURE — 99285 EMERGENCY DEPT VISIT HI MDM: CPT | Mod: 25

## 2025-08-21 PROCEDURE — 93010 ELECTROCARDIOGRAM REPORT: CPT | Mod: ,,, | Performed by: INTERNAL MEDICINE

## 2025-08-21 RX ADMIN — IOHEXOL 100 ML: 350 INJECTION, SOLUTION INTRAVENOUS at 08:08

## 2025-08-21 RX ADMIN — HUMAN ALBUMIN MICROSPHERES AND PERFLUTREN 0.11 MG: 10; .22 INJECTION, SOLUTION INTRAVENOUS at 02:08

## 2025-08-22 VITALS
SYSTOLIC BLOOD PRESSURE: 180 MMHG | HEART RATE: 65 BPM | BODY MASS INDEX: 23.37 KG/M2 | OXYGEN SATURATION: 98 % | DIASTOLIC BLOOD PRESSURE: 90 MMHG | RESPIRATION RATE: 17 BRPM | HEIGHT: 62 IN | TEMPERATURE: 98 F | WEIGHT: 127 LBS

## 2025-08-22 LAB
OHS QRS DURATION: 118 MS
OHS QTC CALCULATION: 487 MS

## 2025-08-26 ENCOUNTER — HOSPITAL ENCOUNTER (OUTPATIENT)
Dept: CARDIOLOGY | Facility: HOSPITAL | Age: 75
Discharge: HOME | End: 2025-08-26
Attending: INTERNAL MEDICINE
Payer: MEDICARE

## 2025-08-26 DIAGNOSIS — I63.9 CEREBROVASCULAR ACCIDENT (CVA), UNSPECIFIED MECHANISM: ICD-10-CM

## 2025-08-26 DIAGNOSIS — R09.89 CAROTID BRUIT, UNSPECIFIED LATERALITY: ICD-10-CM

## 2025-08-26 PROCEDURE — 93880 EXTRACRANIAL BILAT STUDY: CPT

## 2025-08-28 LAB
LEFT CBA DIAS: 10 CM/S
LEFT CBA SYS: 87 CM/S
LEFT CCA DIST DIAS: 10 CM/S
LEFT CCA DIST SYS: 67 CM/S
LEFT CCA PROX DIAS: 12 CM/S
LEFT CCA PROX SYS: 103 CM/S
LEFT ECA SYS: 87 CM/S
LEFT ICA DIST DIAS: 21 CM/S
LEFT ICA DIST SYS: 83 CM/S
LEFT ICA MID DIAS: 18 CM/S
LEFT ICA MID SYS: 72 CM/S
LEFT ICA PROX DIAS: 16 CM/S
LEFT ICA PROX SYS: 82 CM/S
LEFT VERTEBRAL DIAS: 8 CM/S
LEFT VERTEBRAL SYS: 58 CM/S
OHS CV CAROTID RIGHT ICA EDV HIGHEST: 17
OHS CV CAROTID ULTRASOUND LEFT ICA/CCA RATIO: 1.24
OHS CV CAROTID ULTRASOUND RIGHT ICA/CCA RATIO: 1.35
OHS CV PV CAROTID LEFT HIGHEST CCA: 103
OHS CV PV CAROTID LEFT HIGHEST ICA: 83
OHS CV PV CAROTID RIGHT HIGHEST CCA: 76
OHS CV PV CAROTID RIGHT HIGHEST ICA: 96
OHS CV US CAROTID LEFT HIGHEST EDV: 21
RIGHT CBA DIAS: 13 CM/S
RIGHT CBA SYS: 73 CM/S
RIGHT CCA DIST DIAS: 13 CM/S
RIGHT CCA DIST SYS: 71 CM/S
RIGHT CCA PROX DIAS: 7 CM/S
RIGHT CCA PROX SYS: 76 CM/S
RIGHT ECA DIAS: 0 CM/S
RIGHT ECA SYS: 114 CM/S
RIGHT ICA DIST DIAS: 17 CM/S
RIGHT ICA DIST SYS: 69 CM/S
RIGHT ICA MID DIAS: 15 CM/S
RIGHT ICA MID SYS: 86 CM/S
RIGHT ICA PROX DIAS: 12 CM/S
RIGHT ICA PROX SYS: 96 CM/S
RIGHT VERTEBRAL DIAS: 11 CM/S
RIGHT VERTEBRAL SYS: 74 CM/S

## (undated) DEVICE — Device

## (undated) DEVICE — SOL 9P NACL IRR PIC IL

## (undated) DEVICE — SHEATH FLEXOR ANSEL 6FRX55CM

## (undated) DEVICE — KIT INTRODUCER MICROPUNCTR 4F

## (undated) DEVICE — KIT SELECTRA ACCESSORY

## (undated) DEVICE — CATH 5FR OMNIFLUSH 65CM .038

## (undated) DEVICE — DRAPE FEMORAL 82 X 124 IN

## (undated) DEVICE — KIT CUSTOM MANIFOLD

## (undated) DEVICE — TRAY CATH LAB OMC

## (undated) DEVICE — PENCIL SMK EVAC CONNECTOR 10FT

## (undated) DEVICE — COVER INSTR ELASTIC BAND 40X20

## (undated) DEVICE — KIT GLIDESHEATH SLEND 6FR 10CM

## (undated) DEVICE — GAUZE WOVEN STRL 12-PLY 4X4IN

## (undated) DEVICE — TUBING HPCIL ROT M/F ADPT 48IN

## (undated) DEVICE — COVERS PROBE NR-48 STERILE

## (undated) DEVICE — GUIDEWIRE EMERALD 150CM PTFE

## (undated) DEVICE — ADHESIVE DERMABOND ADVANCED

## (undated) DEVICE — SLING SWATHE UNIVERSAL FOAM

## (undated) DEVICE — CATH INFINITI JUDKINS JR4

## (undated) DEVICE — WIRE GUIDE CHOICE PT 300CM

## (undated) DEVICE — SYR IRRIGATION BULB STER 60ML

## (undated) DEVICE — PAD DEFIB CADENCE ADULT R2

## (undated) DEVICE — KIT LEFT HEART MANIFOLD CUSTOM

## (undated) DEVICE — GUIDEWIRE STF .035X180CM ANG

## (undated) DEVICE — SYS LABEL CORRECT MED

## (undated) DEVICE — SHEATH SAFESHEATH II ULTRA 6FR

## (undated) DEVICE — SET MICROPUNCTURE

## (undated) DEVICE — DRESSING AQUACEL AG ADV 3.5X6

## (undated) DEVICE — DRAPE ANGIO BRACH 38X44IN

## (undated) DEVICE — TRANSDUCER ADULT DISP

## (undated) DEVICE — CATH 5FR BALLOON PT HEART PACE

## (undated) DEVICE — VISE RADIFOCUS MULTI TORQUE

## (undated) DEVICE — GUIDEWIRE EMERALD .035IN 260CM

## (undated) DEVICE — DECANTER FLUID TRNSF WHITE 9IN

## (undated) DEVICE — SUT PERCLOSE PROSTYLE MEDIATE

## (undated) DEVICE — COVER PROBE US 5.5X58L NON LTX

## (undated) DEVICE — CATH SEEKER .035IN 150CM

## (undated) DEVICE — KIT INTRO MICRO NIT VSI 4FR

## (undated) DEVICE — INTRODUCER VASC RADPQ 5FRX10CM

## (undated) DEVICE — SOL NACL 0.9% IV INJ 1000ML

## (undated) DEVICE — ELECTRODE REM PLYHSV RETURN 9

## (undated) DEVICE — WIRE WHISPER MS 014 X 190

## (undated) DEVICE — INFLATOR ENCORE

## (undated) DEVICE — HEMOSTAT VASC BAND REG 24CM

## (undated) DEVICE — STOPCOCK MED PRSS 3-WAY

## (undated) DEVICE — SUT SILK 0 SH 30IN BLK BR

## (undated) DEVICE — CATH RESPONSE QPLR JSN 6F 120

## (undated) DEVICE — PAD RADI FEMORAL

## (undated) DEVICE — PACK PACER PERMANENT OMC

## (undated) DEVICE — SHEATH SELECTRA 50MM 42CM

## (undated) DEVICE — SHEATH PRELUDE 9X13CM SNAP

## (undated) DEVICE — ADAPTER PACING CABLE

## (undated) DEVICE — DRESSING TRANS 4X4 TEGADERM

## (undated) DEVICE — DRAPE INCISE IOBAN 2 23X17IN

## (undated) DEVICE — OMNIPAQUE 350MG 150ML VIAL

## (undated) DEVICE — CATH INFINITI 4F JL4 .042X100

## (undated) DEVICE — SPIKE SHORT LG BORE 1-WAY 2IN